# Patient Record
Sex: FEMALE | Race: WHITE | NOT HISPANIC OR LATINO | Employment: FULL TIME | URBAN - METROPOLITAN AREA
[De-identification: names, ages, dates, MRNs, and addresses within clinical notes are randomized per-mention and may not be internally consistent; named-entity substitution may affect disease eponyms.]

---

## 2017-01-09 ENCOUNTER — HOSPITAL ENCOUNTER (OUTPATIENT)
Dept: LAB | Facility: MEDICAL CENTER | Age: 42
End: 2017-01-09
Attending: INTERNAL MEDICINE
Payer: COMMERCIAL

## 2017-01-09 LAB
ALBUMIN SERPL BCP-MCNC: 4.3 G/DL (ref 3.2–4.9)
ALBUMIN/GLOB SERPL: 1.7 G/DL
ALP SERPL-CCNC: 31 U/L (ref 30–99)
ALT SERPL-CCNC: 22 U/L (ref 2–50)
ANION GAP SERPL CALC-SCNC: 8 MMOL/L (ref 0–11.9)
AST SERPL-CCNC: 28 U/L (ref 12–45)
BASOPHILS # BLD AUTO: 0.6 % (ref 0–1.8)
BASOPHILS # BLD: 0.04 K/UL (ref 0–0.12)
BILIRUB SERPL-MCNC: 0.7 MG/DL (ref 0.1–1.5)
BUN SERPL-MCNC: 16 MG/DL (ref 8–22)
CALCIUM SERPL-MCNC: 9.2 MG/DL (ref 8.4–10.2)
CHLORIDE SERPL-SCNC: 104 MMOL/L (ref 96–112)
CO2 SERPL-SCNC: 25 MMOL/L (ref 20–33)
CREAT SERPL-MCNC: 0.68 MG/DL (ref 0.5–1.4)
EOSINOPHIL # BLD AUTO: 0 K/UL (ref 0–0.51)
EOSINOPHIL NFR BLD: 0 % (ref 0–6.9)
ERYTHROCYTE [DISTWIDTH] IN BLOOD BY AUTOMATED COUNT: 54.6 FL (ref 35.9–50)
FERRITIN SERPL-MCNC: 24.3 NG/ML (ref 10–291)
GFR SERPL CREATININE-BSD FRML MDRD: >60 ML/MIN/1.73 M 2
GLOBULIN SER CALC-MCNC: 2.6 G/DL (ref 1.9–3.5)
GLUCOSE SERPL-MCNC: 98 MG/DL (ref 65–99)
HCT VFR BLD AUTO: 41.9 % (ref 37–47)
HGB BLD-MCNC: 14 G/DL (ref 12–16)
IMM GRANULOCYTES # BLD AUTO: 0.05 K/UL (ref 0–0.11)
IMM GRANULOCYTES NFR BLD AUTO: 0.7 % (ref 0–0.9)
IRON SATN MFR SERPL: 35 % (ref 15–55)
IRON SERPL-MCNC: 148 UG/DL (ref 40–170)
LYMPHOCYTES # BLD AUTO: 0.53 K/UL (ref 1–4.8)
LYMPHOCYTES NFR BLD: 7.6 % (ref 22–41)
MCH RBC QN AUTO: 32.6 PG (ref 27–33)
MCHC RBC AUTO-ENTMCNC: 33.4 G/DL (ref 33.6–35)
MCV RBC AUTO: 97.7 FL (ref 81.4–97.8)
MONOCYTES # BLD AUTO: 0.19 K/UL (ref 0–0.85)
MONOCYTES NFR BLD AUTO: 2.7 % (ref 0–13.4)
NEUTROPHILS # BLD AUTO: 6.15 K/UL (ref 2–7.15)
NEUTROPHILS NFR BLD: 88.4 % (ref 44–72)
NRBC # BLD AUTO: 0 K/UL
NRBC BLD AUTO-RTO: 0 /100 WBC
PLATELET # BLD AUTO: 218 K/UL (ref 164–446)
PMV BLD AUTO: 11.8 FL (ref 9–12.9)
POTASSIUM SERPL-SCNC: 3.5 MMOL/L (ref 3.6–5.5)
PROT SERPL-MCNC: 6.9 G/DL (ref 6–8.2)
RBC # BLD AUTO: 4.29 M/UL (ref 4.2–5.4)
SODIUM SERPL-SCNC: 137 MMOL/L (ref 135–145)
TIBC SERPL-MCNC: 428 UG/DL (ref 250–450)
WBC # BLD AUTO: 7 K/UL (ref 4.8–10.8)

## 2017-01-09 PROCEDURE — 85025 COMPLETE CBC W/AUTO DIFF WBC: CPT

## 2017-01-09 PROCEDURE — 83520 IMMUNOASSAY QUANT NOS NONAB: CPT

## 2017-01-09 PROCEDURE — 83540 ASSAY OF IRON: CPT

## 2017-01-09 PROCEDURE — 36415 COLL VENOUS BLD VENIPUNCTURE: CPT

## 2017-01-09 PROCEDURE — 80053 COMPREHEN METABOLIC PANEL: CPT

## 2017-01-09 PROCEDURE — 83550 IRON BINDING TEST: CPT

## 2017-01-09 PROCEDURE — 82728 ASSAY OF FERRITIN: CPT

## 2017-01-13 LAB — MISCELLANEOUS LAB RESULT MISCLAB: NORMAL

## 2017-01-18 RX ORDER — POTASSIUM CHLORIDE 1.5 G/1.58G
20 POWDER, FOR SOLUTION ORAL DAILY
Qty: 90 PACKET | Refills: 3 | Status: SHIPPED | OUTPATIENT
Start: 2017-01-18 | End: 2017-02-02 | Stop reason: SDUPTHER

## 2017-01-24 ENCOUNTER — TELEPHONE (OUTPATIENT)
Dept: MEDICAL GROUP | Facility: MEDICAL CENTER | Age: 42
End: 2017-01-24

## 2017-01-24 NOTE — TELEPHONE ENCOUNTER
We did not submit a prior authorization for this recently.    Could you please check with [employer] this was approved in August 2016, through August 2017

## 2017-01-24 NOTE — TELEPHONE ENCOUNTER
Pt left message stating she spoke with [employer] regarding her supplement. They received auth but need it to be revised. Prior auth needs to say that she needs  Laith Chocolate flavor 36 cans per month with an effective date of 2016. If the effective date is not changed in the next 2 days, they will deny her reimbursement.

## 2017-01-26 NOTE — TELEPHONE ENCOUNTER
Spoke w/Meli @ [facility], she looked it up and said that I would have to go through Customer Service (3 message left and on hold for 40 min).

## 2017-01-27 NOTE — TELEPHONE ENCOUNTER
Spoke w/pt, she will call claims and give them the REF # provided by Maurilio @ [facility].

## 2017-01-27 NOTE — TELEPHONE ENCOUNTER
Spoke with Maurilio, she states this AUTH is still valid. Spoke w/ pharmacy, given AUTH # and they will contact me if there is any further issues.

## 2017-01-27 NOTE — TELEPHONE ENCOUNTER
RICHARD Thorpe to call me, the issue is, that she is no longer Infusion and they wont pay for this ORAL. The Appeal letter says ORAL.

## 2017-02-01 ENCOUNTER — TELEPHONE (OUTPATIENT)
Dept: MEDICAL GROUP | Facility: MEDICAL CENTER | Age: 42
End: 2017-02-01

## 2017-02-02 ENCOUNTER — TELEPHONE (OUTPATIENT)
Dept: MEDICAL GROUP | Facility: MEDICAL CENTER | Age: 42
End: 2017-02-02

## 2017-02-02 RX ORDER — POTASSIUM CHLORIDE 1.5 G/1.58G
20 POWDER, FOR SOLUTION ORAL DAILY
Qty: 90 PACKET | Refills: 3 | Status: SHIPPED
Start: 2017-02-02 | End: 2017-04-20 | Stop reason: SDUPTHER

## 2017-02-02 RX ORDER — POTASSIUM CHLORIDE 1.5 G/1.58G
20 POWDER, FOR SOLUTION ORAL DAILY
Qty: 90 PACKET | Refills: 3 | Status: SHIPPED
Start: 2017-02-02 | End: 2017-02-02 | Stop reason: SDUPTHER

## 2017-02-02 NOTE — TELEPHONE ENCOUNTER
Please check with [facility], we have the authorization for this approved in August 2016 for one year, I will print out the paperwork we did at that time, check with [facility] to see if we need to submit a new prior authorization since this was approved for one year through August 2017

## 2017-02-02 NOTE — TELEPHONE ENCOUNTER
Pt left message requesting to have a new Prior auth submitted for  Laith Chocolate Qty 36 cans for 30 days for 2017-2018.     Call back number: [phone]

## 2017-02-06 ENCOUNTER — TELEPHONE (OUTPATIENT)
Dept: MEDICAL GROUP | Facility: MEDICAL CENTER | Age: 42
End: 2017-02-06

## 2017-02-07 NOTE — TELEPHONE ENCOUNTER
Pt is worried that the pain people want to cut her pain meds by more than half right before she has surgury. Does want to follow their recommendation but not right before her surgery. Has hard script for Opana, but has not filled it. Would like you to write for Oxy one more month. 30 mg #240 and her methadone 10 mg #120. Pt really wants to see this pain grooup but wants to do it after her surgery as she is terrified.  Rx on FRI and fill on Mon. Surgery is 8 am Tues. Please advise. Call back

## 2017-04-04 ENCOUNTER — NON-PROVIDER VISIT (OUTPATIENT)
Dept: MEDICAL GROUP | Facility: MEDICAL CENTER | Age: 42
End: 2017-04-04
Payer: COMMERCIAL

## 2017-04-04 DIAGNOSIS — D72.18 CHURG-STRAUSS SYNDROME (HCC): Chronic | ICD-10-CM

## 2017-04-04 DIAGNOSIS — M30.1 CHURG-STRAUSS SYNDROME (HCC): Chronic | ICD-10-CM

## 2017-04-04 PROCEDURE — 96372 THER/PROPH/DIAG INJ SC/IM: CPT | Performed by: INTERNAL MEDICINE

## 2017-04-04 NOTE — MR AVS SNAPSHOT
"Megan August   2017 11:00 AM   Non-Provider Visit   MRN: [id number]    Department:  [facility]   Dept Phone:  [phone]    Description:  Female : 1975   Provider:  [facility]           Reason for Visit     Injections Nucala (pt provided)      Allergies as of 2017     Allergen Noted Reactions    Other Food 2016   Anaphylaxis    \"all foods\"= blisters/ anaphylaxis    Reclast [Zoledronic Acid] 2016       Body aches, headache      Vital Signs     Smoking Status                   Never Smoker            Basic Information     Date Of Birth Sex Race Ethnicity Preferred Language    1975 Female White Non- English      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   2009 - Present    Polycystic ovary disease E28.2   2009 - Present    Anxiety (Chronic) F41.9   2009 - Present    Sjogren's disease (CMS-HCC) (Chronic) M35.00   2009 - Present    Preventative health care (Chronic) Z00.00   2009 - Present    Dyslipidemia (Chronic) E78.5   2011 - Present    History of melanoma (Chronic) Z85.820   2011 - Present    Eosinophilic esophagitis (Chronic) K20.0   2013 - Present    Constipation K59.00   2015 - Present    Autonomic neuropathy (Chronic) G90.9   3/23/2016 - Present    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   2016 - Present    Current chronic use of systemic steroids (Chronic) Z79.52   2016 - Present      Health Maintenance        Date Due Completion Dates    MAMMOGRAM 2016    PAP SMEAR 2018, 2011    COLONOSCOPY 3/27/2020 3/27/2015    IMM DTaP/Tdap/Td Vaccine (2 - Td) 2/3/2025 2/3/2015            Current Immunizations     Hepatitis A Vaccine, Adult 2/3/2015    Hepatitis B Vaccine Recombivax (Adol/Adult) 2/3/2015    Influenza TIV (IM) 10/21/2013   " Influenza Vaccine Quad Inj (Pf) 10/27/2016  6:06 PM, 10/15/2015  5:42 PM, 9/27/2014    Pneumococcal polysaccharide vaccine (PPSV-23) 11/23/2016    Tdap Vaccine 2/3/2015      Below and/or attached are the medications your provider expects you to take. Review all of your home medications and newly ordered medications with your provider and/or pharmacist. Follow medication instructions as directed by your provider and/or pharmacist. Please keep your medication list with you and share with your provider. Update the information when medications are discontinued, doses are changed, or new medications (including over-the-counter products) are added; and carry medication information at all times in the event of emergency situations     Allergies:  OTHER FOOD - Anaphylaxis     RECLAST - (reactions not documented)               Medications  Valid as of: April 04, 2017 - 11:54 AM    Generic Name Brand Name Tablet Size Instructions for use    CycloSPORINE (Emulsion) RESTASIS 0.05 % Place 1 Drop in both eyes 2 times a day.          Drospirenone-Ethinyl Estradiol (Tab) ASHLEY 3-0.03 MG Take 1 Tab by mouth every day.        Fexofenadine HCl   Take 1 Tab by mouth every evening.        Ibuprofen (Tab) MOTRIN 200 MG Take 200 mg by mouth every 6 hours as needed for Mild Pain.        Levothyroxine Sodium (Tab) SYNTHROID 112 MCG Take 112 mcg by mouth Every morning on an empty stomach.        Mycophenolate Mofetil (Tab) CELLCEPT 500 MG Take 500 mg by mouth 2 times a day.        Naproxen Sodium (Tab) ANAPROX 220 MG Take 220 mg by mouth as needed. Indications: Mild to Moderate Pain        Potassium Chloride (Pack) KLOR-CON 20 MEQ Take 1 Packet by mouth every day.        PredniSONE (Tab) DELTASONE 20 MG Take 1 Tab by mouth every day.        RaNITidine HCl (Tab) ZANTAC 150 MG Take 150 mg by mouth every evening.        Venlafaxine HCl (CAPSULE SR 24 HR) EFFEXOR XR 75 MG Take 1 Cap by mouth every day.        .                 Medicines  prescribed today were sent to:     [facility]/PHARMACY #[id number] - [place], [place] - [address]    [address] [place] [address]    Phone: [phone] Fax: [phone]    Open 24 Hours?: No      Medication refill instructions:       If your prescription bottle indicates you have medication refills left, it is not necessary to call your provider’s office. Please contact your pharmacy and they will refill your medication.    If your prescription bottle indicates you do not have any refills left, you may request refills at any time through one of the following ways: The online [employer] system (except Urgent Care), by calling your provider’s office, or by asking your pharmacy to contact your provider’s office with a refill request. Medication refills are processed only during regular business hours and may not be available until the next business day. Your provider may request additional information or to have a follow-up visit with you prior to refilling your medication.   *Please Note: Medication refills are assigned a new Rx number when refilled electronically. Your pharmacy may indicate that no refills were authorized even though a new prescription for the same medication is available at the pharmacy. Please request the medicine by name with the pharmacy before contacting your provider for a refill.        Other Notes About Your Plan         12/2/16 tsh 0.15 on levothyroxine 112 mcg daily, change to 112 mcg take six days per week, repeat tsh in 6 weeks  2/21/17  [facility] note failed azathioprine due to hair loss and off mycophenolate, now on prednisone 20 mg daily, try to obtain approval for mepolizumab 100 mg q month  3/24/17  [facility] note, discuss prednisone withdrawal, when she is treated with mepolizumab wait 2 weeks then reduce prednisone to 17.5 mg, then in 2 weeks with second injection mepolizumab will reduce prednisone to 15 mg, and 2 weeks after that to 12.5 mg, and in 2  weeks 10 mg  Need hep b vaccine to complete series    11/17 need prevnar  Need vit d             MyChart Access Code: Activation code not generated  Current [employer] Status: Active

## 2017-04-04 NOTE — PROGRESS NOTES
Megan August is a 41 y.o. female here for a non-provider visit for DUCALA injection.    Reason for injection: Churg-Homer Syndrome  Order in MAR?: No  Patient supplied?:Yes  Minimum interval has been met for this injection (per MAR order): Yes    Order and dose verified by: Dr Soares  Patient tolerated injection and no adverse effects were observed or reported YES.    # of Administrations remaining in MAR: Pt to receive shipment at office from [facility]. Pt should receive injection every 28 days.     Pt received 100mg/1.2 mL injection SQ L Arm.    Lot # [id number]  NDC 1858-9912-91  EXP: 06/2018    Packing slip scanned into MEDIA.

## 2017-04-05 DIAGNOSIS — E55.9 HYPOVITAMINOSIS D: ICD-10-CM

## 2017-04-05 DIAGNOSIS — E03.8 OTHER SPECIFIED HYPOTHYROIDISM: Chronic | ICD-10-CM

## 2017-04-05 RX ORDER — LEVOTHYROXINE SODIUM 112 UG/1
TABLET ORAL
Qty: 90 TAB | Refills: 1 | Status: SHIPPED | OUTPATIENT
Start: 2017-04-05 | End: 2017-10-09 | Stop reason: SDUPTHER

## 2017-04-10 RX ORDER — VENLAFAXINE HYDROCHLORIDE 75 MG/1
75 CAPSULE, EXTENDED RELEASE ORAL DAILY
Qty: 90 CAP | Refills: 3 | Status: SHIPPED | OUTPATIENT
Start: 2017-04-10 | End: 2018-04-10 | Stop reason: SDUPTHER

## 2017-04-10 NOTE — TELEPHONE ENCOUNTER
Venlafaxine    Was the patient seen in the last year in this department? Yes  Last 9/16/16  Does patient have an active prescription for medications requested? No     Received Request Via: Pharmacy

## 2017-04-14 ENCOUNTER — TELEPHONE (OUTPATIENT)
Dept: MEDICAL GROUP | Facility: MEDICAL CENTER | Age: 42
End: 2017-04-14

## 2017-04-14 ENCOUNTER — HOSPITAL ENCOUNTER (OUTPATIENT)
Dept: LAB | Facility: MEDICAL CENTER | Age: 42
End: 2017-04-14
Attending: INTERNAL MEDICINE
Payer: COMMERCIAL

## 2017-04-14 DIAGNOSIS — E55.9 HYPOVITAMINOSIS D: ICD-10-CM

## 2017-04-14 LAB
25(OH)D3 SERPL-MCNC: 32 NG/ML (ref 30–100)
TSH SERPL DL<=0.005 MIU/L-ACNC: 1.08 UIU/ML (ref 0.3–3.7)

## 2017-04-14 PROCEDURE — 84443 ASSAY THYROID STIM HORMONE: CPT

## 2017-04-14 PROCEDURE — 36415 COLL VENOUS BLD VENIPUNCTURE: CPT

## 2017-04-14 PROCEDURE — 82306 VITAMIN D 25 HYDROXY: CPT

## 2017-04-15 NOTE — TELEPHONE ENCOUNTER
Please notify patient that:  (1) her thyroid test is normal, continue same dose of her thyroid medication  (2) her vitamin D level is 32, have her take an extra 2000 units of vitamin D daily

## 2017-04-17 ENCOUNTER — TELEPHONE (OUTPATIENT)
Dept: MEDICAL GROUP | Facility: MEDICAL CENTER | Age: 42
End: 2017-04-17

## 2017-04-17 NOTE — TELEPHONE ENCOUNTER
----- Message from Zohaib Soares M.D. sent at 4/14/2017  6:20 PM PDT -----  Please notify patient that:  (1) her thyroid test is normal, continue same dose of her thyroid medication  (2) her vitamin D level is 32, have her take an extra 2000 units of vitamin D daily

## 2017-04-18 NOTE — TELEPHONE ENCOUNTER
Informed pt of message below. Pt would like to know how long she needs to take Vit D & she's also allergic to most supplements. Does Vit d contain anything else in it? Please advise.

## 2017-04-20 ENCOUNTER — APPOINTMENT (OUTPATIENT)
Dept: MEDICAL GROUP | Facility: MEDICAL CENTER | Age: 42
End: 2017-04-20
Payer: COMMERCIAL

## 2017-04-21 ENCOUNTER — OFFICE VISIT (OUTPATIENT)
Dept: MEDICAL GROUP | Facility: MEDICAL CENTER | Age: 42
End: 2017-04-21
Payer: COMMERCIAL

## 2017-04-21 ENCOUNTER — HOSPITAL ENCOUNTER (OUTPATIENT)
Facility: MEDICAL CENTER | Age: 42
End: 2017-04-21
Attending: NURSE PRACTITIONER
Payer: COMMERCIAL

## 2017-04-21 VITALS
HEART RATE: 62 BPM | WEIGHT: 130 LBS | TEMPERATURE: 98.3 F | DIASTOLIC BLOOD PRESSURE: 62 MMHG | HEIGHT: 66 IN | OXYGEN SATURATION: 99 % | BODY MASS INDEX: 20.89 KG/M2 | SYSTOLIC BLOOD PRESSURE: 110 MMHG

## 2017-04-21 DIAGNOSIS — B37.31 YEAST VAGINITIS: ICD-10-CM

## 2017-04-21 LAB
CANDIDA DNA VAG QL PROBE+SIG AMP: NEGATIVE
G VAGINALIS DNA VAG QL PROBE+SIG AMP: NEGATIVE
T VAGINALIS DNA VAG QL PROBE+SIG AMP: NEGATIVE

## 2017-04-21 PROCEDURE — 87480 CANDIDA DNA DIR PROBE: CPT

## 2017-04-21 PROCEDURE — 87660 TRICHOMONAS VAGIN DIR PROBE: CPT

## 2017-04-21 PROCEDURE — 99214 OFFICE O/P EST MOD 30 MIN: CPT | Performed by: NURSE PRACTITIONER

## 2017-04-21 PROCEDURE — 87510 GARDNER VAG DNA DIR PROBE: CPT

## 2017-04-21 RX ORDER — FLUCONAZOLE 150 MG/1
150 TABLET ORAL ONCE
Qty: 1 TAB | Refills: 0 | Status: SHIPPED | OUTPATIENT
Start: 2017-04-21 | End: 2017-04-21

## 2017-04-21 NOTE — PROGRESS NOTES
Subjective:     Chief Complaint   Patient presents with   • Vaginitis     possible cyst      Megan August is a 41 y.o. female established patient here for evaluation of vaginal irritation and discharge. She's been having itching, burning, and increased white discharge for the last several days. She started using an over-the-counter antifungal but has had no improvement. When applying the cream she felt a small area near the vaginal opening that she thought was abnormal or maybe assist. She is not having any pain in the area, it does not seem to be growing or changing. She is not sexually active    She does have an unknown identified autoimmune disorder, is on mepolizumab and cellcept. States her symptoms are well controlled     Current medicines (including changes today)  Current Outpatient Prescriptions   Medication Sig Dispense Refill   • fluconazole (DIFLUCAN) 150 MG tablet Take 1 Tab by mouth Once for 1 dose. 1 Tab 0   • potassium chloride (KLOR-CON) 20 MEQ Pack Take 1 Packet by mouth every day. 90 Packet 3   • Cholecalciferol (VITAMIN D3) Liquid 2,000 Units by Does not apply route every day. Vitamin D2 or D3 drops, 2000 units daily, 3 months supply 1 Bottle 3   • venlafaxine XR (EFFEXOR XR) 75 MG CAPSULE SR 24 HR Take 1 Cap by mouth every day. 90 Cap 3   • levothyroxine (SYNTHROID) 112 MCG Tab One po six days per week 90 Tab 1   • Mepolizumab 100 MG Recon Soln Inject 100 mg as instructed every 28 days. 1 Each 11   • mycophenolate (CELLCEPT) 500 MG tablet Take 500 mg by mouth 2 times a day.     • ranitidine (ZANTAC) 150 MG Tab Take 150 mg by mouth every evening.     • ibuprofen (MOTRIN) 200 MG Tab Take 200 mg by mouth every 6 hours as needed for Mild Pain.     • naproxen (ALEVE) 220 MG tablet Take 220 mg by mouth as needed. Indications: Mild to Moderate Pain     • predniSONE (DELTASONE) 20 MG Tab Take 1 Tab by mouth every day. 30 Tab 0   • drospirenone-ethinyl estradiol (ASHLEY) 3-0.03 MG per tablet  "Take 1 Tab by mouth every day. 28 Tab 5   • Fexofenadine HCl (ALLEGRA PO) Take 1 Tab by mouth every evening.     • cyclosporin (RESTASIS) 0.05 % ophthalmic emulsion Place 1 Drop in both eyes 2 times a day.         No current facility-administered medications for this visit.     She  has a past medical history of  labor; Polycystic ovary disease (2009); Dry eyes (2009); Abscess of breast, postpartum; Anxiety (2009); Exophoria; Melanoma (CMS-HCC) (2011); Anemia; Eosinophilic esophagitis; Eosinophilic esophagitis; Dysautonomia; Sjogren's syndrome (CMS-HCC); Polycystic ovarian syndrome; Other specified symptom associated with female genital organs; Hypothyroid (2009); Cancer (CMS-HCC) (); Mild preeclampsia; Anemia; Anxiety; Hiatus hernia syndrome; Bowel habit changes; Small intestinal bacterial overgrowth; Dental disorder; Dry eyes; and Psychiatric disorder. She also has no past medical history of CAD (coronary artery disease), Liver disease, COPD, or Hypertension.    ROS included above     Objective:     Blood pressure 110/62, pulse 62, temperature 36.8 °C (98.3 °F), height 1.676 m (5' 6\"), weight 58.968 kg (130 lb), SpO2 99 %. Body mass index is 20.99 kg/(m^2).     Physical Exam:  General: Alert, oriented in no acute distress.  Eye contact is good, speech is normal, affect calm  Lungs: clear to auscultation bilaterally, normal effort, no wheeze/ rhonchi/ rales.  CV: regular rate and rhythm, S1, S2, no murmur  Pelvic: external genitalia pink, moist. No abnormality noted. No lesions or mass. Vaginal canal with moderate white discharge.   Ext: no edema, color normal, vascularity normal, temperature normal    Assessment and Plan:   The following treatment plan was discussed   1. Yeast vaginitis   exam today consistent with yeast vaginitis, not improving with over-the-counter medication. Swab sent for confirmation, Diflucan sent to pharmacy. She was concerned about an area that felt " somewhat abnormal to her near the vaginal opening. I see nothing abnormal on exam today, reassurance given  VAGINAL PATHOGENS DNA PANEL    fluconazole (DIFLUCAN) 150 MG tablet       Followup: As needed         Please note that this dictation was created using voice recognition software. I have worked with consultants from the vendor as well as technical experts from [facility] to optimize the interface. I have made every reasonable attempt to correct obvious errors, but I expect that there are errors of grammar and possibly content that I did not discover before finalizing the note.

## 2017-04-21 NOTE — MR AVS SNAPSHOT
"Megan August   2017 10:40 AM   Office Visit   MRN: [id number]    Department:  [facility]   Dept Phone:  [phone]    Description:  Female : 1975   Provider:  REAL Jewell           Reason for Visit     Vaginitis possible cyst       Allergies as of 2017     Allergen Noted Reactions    Other Food 2016   Anaphylaxis    \"all foods\"= blisters/ anaphylaxis    Reclast [Zoledronic Acid] 2016       Body aches, headache      You were diagnosed with     Yeast vaginitis   [900830]         Vital Signs     Blood Pressure Pulse Temperature Height Weight Body Mass Index    110/62 mmHg 62 36.8 °C (98.3 °F) 1.676 m (5' 6\") 58.968 kg (130 lb) 20.99 kg/m2    Oxygen Saturation Smoking Status                99% Never Smoker           Basic Information     Date Of Birth Sex Race Ethnicity Preferred Language    1975 Female White Non- English      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   2009 - Present    Polycystic ovary disease E28.2   2009 - Present    Anxiety (Chronic) F41.9   2009 - Present    Sjogren's disease (CMS-HCC) (Chronic) M35.00   2009 - Present    Preventative health care (Chronic) Z00.00   2009 - Present    Dyslipidemia (Chronic) E78.5   2011 - Present    History of melanoma (Chronic) Z85.820   2011 - Present    Eosinophilic esophagitis (Chronic) K20.0   2013 - Present    Constipation K59.00   2015 - Present    Autonomic neuropathy (Chronic) G90.9   3/23/2016 - Present    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   2016 - Present    Current chronic use of systemic steroids (Chronic) Z79.52   2016 - Present      Health Maintenance        Date Due Completion Dates    MAMMOGRAM 2016    PAP SMEAR 2018, 2011    COLONOSCOPY 3/27/2020 " 3/27/2015    IMM DTaP/Tdap/Td Vaccine (2 - Td) 2/3/2025 2/3/2015            Current Immunizations     Hepatitis A Vaccine, Adult 2/3/2015    Hepatitis B Vaccine Recombivax (Adol/Adult) 2/3/2015    Influenza TIV (IM) 10/21/2013    Influenza Vaccine Quad Inj (Pf) 10/27/2016  6:06 PM, 10/15/2015  5:42 PM, 9/27/2014    Pneumococcal polysaccharide vaccine (PPSV-23) 11/23/2016    Tdap Vaccine 2/3/2015      Below and/or attached are the medications your provider expects you to take. Review all of your home medications and newly ordered medications with your provider and/or pharmacist. Follow medication instructions as directed by your provider and/or pharmacist. Please keep your medication list with you and share with your provider. Update the information when medications are discontinued, doses are changed, or new medications (including over-the-counter products) are added; and carry medication information at all times in the event of emergency situations     Allergies:  OTHER FOOD - Anaphylaxis     RECLAST - (reactions not documented)               Medications  Valid as of: April 21, 2017 - 12:49 PM    Generic Name Brand Name Tablet Size Instructions for use    Cholecalciferol (Liquid) Vitamin D3  2,000 Units by Does not apply route every day. Vitamin D2 or D3 drops, 2000 units daily, 3 months supply        CycloSPORINE (Emulsion) RESTASIS 0.05 % Place 1 Drop in both eyes 2 times a day.          Drospirenone-Ethinyl Estradiol (Tab) ASLHEY 3-0.03 MG Take 1 Tab by mouth every day.        Fexofenadine HCl   Take 1 Tab by mouth every evening.        Fluconazole (Tab) DIFLUCAN 150 MG Take 1 Tab by mouth Once for 1 dose.        Ibuprofen (Tab) MOTRIN 200 MG Take 200 mg by mouth every 6 hours as needed for Mild Pain.        Levothyroxine Sodium (Tab) SYNTHROID 112 MCG One po six days per week        Mepolizumab (Recon Soln) Mepolizumab 100 MG Inject 100 mg as instructed every 28 days.        Mycophenolate Mofetil (Tab) CELLCEPT  500 MG Take 500 mg by mouth 2 times a day.        Naproxen Sodium (Tab) ANAPROX 220 MG Take 220 mg by mouth as needed. Indications: Mild to Moderate Pain        Potassium Chloride (Pack) KLOR-CON 20 MEQ Take 1 Packet by mouth every day.        PredniSONE (Tab) DELTASONE 20 MG Take 1 Tab by mouth every day.        RaNITidine HCl (Tab) ZANTAC 150 MG Take 150 mg by mouth every evening.        Venlafaxine HCl (CAPSULE SR 24 HR) EFFEXOR XR 75 MG Take 1 Cap by mouth every day.        .                 Medicines prescribed today were sent to:     [facility]/PHARMACY #[id number] - [place], [place] - [address]    [address] [place] [address]    Phone: [phone] Fax: [phone]    Open 24 Hours?: No      Medication refill instructions:       If your prescription bottle indicates you have medication refills left, it is not necessary to call your provider’s office. Please contact your pharmacy and they will refill your medication.    If your prescription bottle indicates you do not have any refills left, you may request refills at any time through one of the following ways: The online [employer] system (except Urgent Care), by calling your provider’s office, or by asking your pharmacy to contact your provider’s office with a refill request. Medication refills are processed only during regular business hours and may not be available until the next business day. Your provider may request additional information or to have a follow-up visit with you prior to refilling your medication.   *Please Note: Medication refills are assigned a new Rx number when refilled electronically. Your pharmacy may indicate that no refills were authorized even though a new prescription for the same medication is available at the pharmacy. Please request the medicine by name with the pharmacy before contacting your provider for a refill.        Your To Do List     Future Labs/Procedures Complete By Expires    VAGINAL PATHOGENS DNA PANEL  As directed  4/22/2018      Other Notes About Your Plan        Need hep b vaccine to complete series    11/17 need prevnar                MyChart Access Code: Activation code not generated  Current [employer] Status: Active

## 2017-04-23 ENCOUNTER — TELEPHONE (OUTPATIENT)
Dept: MEDICAL GROUP | Facility: MEDICAL CENTER | Age: 42
End: 2017-04-23

## 2017-04-23 RX ORDER — DROSPIRENONE AND ETHINYL ESTRADIOL 0.03MG-3MG
1 KIT ORAL DAILY
Qty: 28 TAB | Refills: 5 | Status: SHIPPED | OUTPATIENT
Start: 2017-04-23 | End: 2017-10-04 | Stop reason: SDUPTHER

## 2017-04-24 ENCOUNTER — OFFICE VISIT (OUTPATIENT)
Dept: MEDICAL GROUP | Facility: MEDICAL CENTER | Age: 42
End: 2017-04-24
Payer: COMMERCIAL

## 2017-04-24 ENCOUNTER — HOSPITAL ENCOUNTER (OUTPATIENT)
Facility: MEDICAL CENTER | Age: 42
End: 2017-04-24
Attending: NURSE PRACTITIONER
Payer: COMMERCIAL

## 2017-04-24 VITALS
TEMPERATURE: 97 F | WEIGHT: 129 LBS | BODY MASS INDEX: 20.73 KG/M2 | SYSTOLIC BLOOD PRESSURE: 104 MMHG | DIASTOLIC BLOOD PRESSURE: 68 MMHG | OXYGEN SATURATION: 100 % | HEART RATE: 61 BPM | HEIGHT: 66 IN

## 2017-04-24 DIAGNOSIS — Z01.419 VISIT FOR GYNECOLOGIC EXAMINATION: ICD-10-CM

## 2017-04-24 DIAGNOSIS — Z30.09 FAMILY PLANNING: ICD-10-CM

## 2017-04-24 DIAGNOSIS — Z12.31 ENCOUNTER FOR SCREENING MAMMOGRAM FOR BREAST CANCER: ICD-10-CM

## 2017-04-24 PROCEDURE — 99396 PREV VISIT EST AGE 40-64: CPT | Performed by: NURSE PRACTITIONER

## 2017-04-24 PROCEDURE — 87624 HPV HI-RISK TYP POOLED RSLT: CPT

## 2017-04-24 PROCEDURE — 88175 CYTOPATH C/V AUTO FLUID REDO: CPT

## 2017-04-24 NOTE — PROGRESS NOTES
CC:  Pap/Well Woman Exam    History of present illness:  Megan August is 41 y.o.  female presenting today for well woman exam with gynecological exam and Pap smear. Last Pap  and normal. She is on oral contraceptive and doing well with this, no bleeding between periods. Last menstrual period about 2 weeks ago, no significant dysmenorrhea or menorrhagia. Not currently sexually active   she has a complicated medical history with undiagnosed autoimmune disease, symptoms consistent with Sjogren's, eosinophilic esophagitis, Churg-Homer. She is followed by specialists at [facility].   She is due for mammogram         Past Medical History   Diagnosis Date   •  labor    • Polycystic ovary disease 2009   • Dry eyes 2009 right eye scleral redness   • Abscess of breast, postpartum    • Anxiety 2009   • Exophoria    • Melanoma (CMS-HCC) 2011     melanoma - Amenanoic left neck jaw   • Anemia    • Eosinophilic esophagitis    • Eosinophilic esophagitis    • Dysautonomia    • Sjogren's syndrome (CMS-HCC)    • Polycystic ovarian syndrome    • Other specified symptom associated with female genital organs      PCOS   • Hypothyroid 2009   • Cancer (CMS-HCC) 2010     melanoma, left side neck   • Mild preeclampsia    • Anemia    • Anxiety    • Hiatus hernia syndrome    • Bowel habit changes      paralyzed colon   • Small intestinal bacterial overgrowth    • Dental disorder      lack of saliva   • Dry eyes      extreme   • Psychiatric disorder      PTSD       Past Surgical History   Procedure Laterality Date   • Incision and drainage general  2009     left nipple   • Tonsillectomy and adenoidectomy     • Pr  delivery only     • Other       left periauricular melanoma excision   • Other  [id number]     several procedures for infertility    • Other       eye ducts cauterized   • Settlement (insurance)  2014     Performed by Olvin CHAVEZ  MARCI David. at SURGERY [facility]   • Endoscopy       multiple upper an lower procedures   • Colonoscopy  8/2015   • Gastroscopy with feed tube placement N/A 2/19/2016     Procedure: GASTROSCOPY WITH NASOENTERIC TUBE PLACEMENT W/FLUORO;  Surgeon: Gallito Rangel M.D.;  Location: [facility];  Service:        Outpatient Encounter Prescriptions as of 4/24/2017   Medication Sig Dispense Refill   • drospirenone-ethinyl estradiol (ASHLEY) 3-0.03 MG per tablet Take 1 Tab by mouth every day. 28 Tab 5   • potassium chloride (KLOR-CON) 20 MEQ Pack Take 1 Packet by mouth every day. 90 Packet 3   • Cholecalciferol (VITAMIN D3) Liquid 2,000 Units by Does not apply route every day. Vitamin D2 or D3 drops, 2000 units daily, 3 months supply 1 Bottle 3   • venlafaxine XR (EFFEXOR XR) 75 MG CAPSULE SR 24 HR Take 1 Cap by mouth every day. 90 Cap 3   • levothyroxine (SYNTHROID) 112 MCG Tab One po six days per week 90 Tab 1   • Mepolizumab 100 MG Recon Soln Inject 100 mg as instructed every 28 days. 1 Each 11   • mycophenolate (CELLCEPT) 500 MG tablet Take 500 mg by mouth 2 times a day.     • ranitidine (ZANTAC) 150 MG Tab Take 150 mg by mouth every evening.     • ibuprofen (MOTRIN) 200 MG Tab Take 200 mg by mouth every 6 hours as needed for Mild Pain.     • naproxen (ALEVE) 220 MG tablet Take 220 mg by mouth as needed. Indications: Mild to Moderate Pain     • predniSONE (DELTASONE) 20 MG Tab Take 1 Tab by mouth every day. 30 Tab 0   • drospirenone-ethinyl estradiol (ASHLEY) 3-0.03 MG per tablet Take 1 Tab by mouth every day. 28 Tab 5   • Fexofenadine HCl (ALLEGRA PO) Take 1 Tab by mouth every evening.     • cyclosporin (RESTASIS) 0.05 % ophthalmic emulsion Place 1 Drop in both eyes 2 times a day.         No facility-administered encounter medications on file as of 4/24/2017.       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  Social History     Social History   • Marital Status:      Spouse Name: N/A   • Number of Children: N/A   • Years of Education: N/A     Occupational History   • Not on file.     Social History Main Topics   • Smoking status: Never Smoker    • Smokeless tobacco: Never Used   • Alcohol Use: No   • Drug Use: No   • Sexual Activity: Not on file     Other Topics Concern   • Not on file     Social History Narrative       Family History   Problem Relation Age of Onset   • Cancer Father    • Cancer Maternal Grandmother          ROS: denies Weight loss, fatigue, chest pain, SOB, bowel or bladder changes. No significant dysmenorrhea, concerning vaginal discharge or irritation. Denies h/o migraine with aura. Denies musculoskeletal, neurological, or psychiatric problems.      /68 mmHg  Pulse 61  Temp(Src) 36.1 °C (97 °F)  Ht 1.676 m (5' 6\")  Wt 58.514 kg (129 lb)  BMI 20.83 kg/m2  SpO2 100%    GEN:  Appears well and in no apparent distress   NECK:  Supple without adenopathy or thyromegaly  LUNGS:  Clear and equal. No wheeze, ronchi, or rales.  CV:  RRR, S1, S2. No murmur.  Pedal pulses 2+ bilaterally.  BREAST:  Symmetrical without masses. No nipple discharge.  ABD:  Soft, non-tender, non-distended, normal bowel sounds.  No hepatosplenomegaly.  :  Normal external female genitalia.  Vaginal canal clear.  Cervix appears normal. Specimen collected from transformation zone. Bimanual exam:  No CMT, normal size uterus without masses or tenderness; no adnexal masses or tenderness.      Assessment and " plan    1. Visit for gynecologic examination  Normal GYN exam. Pap sent, follow-up pending results. Breast self-exam taught and encouraged monthly. General health and wellness discussion including exercise recommendations reviewed  2. Encounter for screening mammogram for breast cancer  - MA-SCREEN MAMMO W/CAD-BILAT; Future  3. Family planning  Doing well with current OCP      F/u pending results

## 2017-04-24 NOTE — MR AVS SNAPSHOT
"Megan August   2017 10:40 AM   Office Visit   MRN: [id number]    Department:  [facility]   Dept Phone:  [phone]    Description:  Female : 1975   Provider:  REAL Jewell           Reason for Visit     Gynecologic Exam Pap      Allergies as of 2017     Allergen Noted Reactions    Other Food 2016   Anaphylaxis    \"all foods\"= blisters/ anaphylaxis    Reclast [Zoledronic Acid] 2016       Body aches, headache      You were diagnosed with     Visit for gynecologic examination   [640378]       Encounter for screening mammogram for breast cancer   [6753917]       Family planning   [761275]         Vital Signs     Blood Pressure Pulse Temperature Height Weight Body Mass Index    104/68 mmHg 61 36.1 °C (97 °F) 1.676 m (5' 6\") 58.514 kg (129 lb) 20.83 kg/m2    Oxygen Saturation Smoking Status                100% Never Smoker           Basic Information     Date Of Birth Sex Race Ethnicity Preferred Language    1975 Female White Non- English      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   2009 - Present    Polycystic ovary disease E28.2   2009 - Present    Anxiety (Chronic) F41.9   2009 - Present    Sjogren's disease (CMS-HCC) (Chronic) M35.00   2009 - Present    Preventative health care (Chronic) Z00.00   2009 - Present    Dyslipidemia (Chronic) E78.5   2011 - Present    History of melanoma (Chronic) Z85.820   2011 - Present    Eosinophilic esophagitis (Chronic) K20.0   2013 - Present    Constipation K59.00   2015 - Present    Autonomic neuropathy (Chronic) G90.9   3/23/2016 - Present    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   2016 - Present    Current chronic use of systemic steroids (Chronic) Z79.52   2016 - Present      Health Maintenance        Date Due " Completion Dates    MAMMOGRAM 5/12/2016 5/12/2015    PAP SMEAR 4/20/2018 4/20/2015, 5/31/2011    COLONOSCOPY 3/27/2020 3/27/2015    IMM DTaP/Tdap/Td Vaccine (2 - Td) 2/3/2025 2/3/2015            Current Immunizations     Hepatitis A Vaccine, Adult 2/3/2015    Hepatitis B Vaccine Recombivax (Adol/Adult) 2/3/2015    Influenza TIV (IM) 10/21/2013    Influenza Vaccine Quad Inj (Pf) 10/27/2016  6:06 PM, 10/15/2015  5:42 PM, 9/27/2014    Pneumococcal polysaccharide vaccine (PPSV-23) 11/23/2016    Tdap Vaccine 2/3/2015      Below and/or attached are the medications your provider expects you to take. Review all of your home medications and newly ordered medications with your provider and/or pharmacist. Follow medication instructions as directed by your provider and/or pharmacist. Please keep your medication list with you and share with your provider. Update the information when medications are discontinued, doses are changed, or new medications (including over-the-counter products) are added; and carry medication information at all times in the event of emergency situations     Allergies:  OTHER FOOD - Anaphylaxis     RECLAST - (reactions not documented)               Medications  Valid as of: April 24, 2017 - 11:15 AM    Generic Name Brand Name Tablet Size Instructions for use    Cholecalciferol (Liquid) Vitamin D3  2,000 Units by Does not apply route every day. Vitamin D2 or D3 drops, 2000 units daily, 3 months supply        CycloSPORINE (Emulsion) RESTASIS 0.05 % Place 1 Drop in both eyes 2 times a day.          Drospirenone-Ethinyl Estradiol (Tab) ASHLEY 3-0.03 MG Take 1 Tab by mouth every day.        Drospirenone-Ethinyl Estradiol (Tab) ASHLEY 3-0.03 MG Take 1 Tab by mouth every day.        Fexofenadine HCl   Take 1 Tab by mouth every evening.        Ibuprofen (Tab) MOTRIN 200 MG Take 200 mg by mouth every 6 hours as needed for Mild Pain.        Levothyroxine Sodium (Tab) SYNTHROID 112 MCG One po six days per week          Mepolizumab (Recon Soln) Mepolizumab 100 MG Inject 100 mg as instructed every 28 days.        Mycophenolate Mofetil (Tab) CELLCEPT 500 MG Take 500 mg by mouth 2 times a day.        Naproxen Sodium (Tab) ANAPROX 220 MG Take 220 mg by mouth as needed. Indications: Mild to Moderate Pain        Potassium Chloride (Pack) KLOR-CON 20 MEQ Take 1 Packet by mouth every day.        PredniSONE (Tab) DELTASONE 20 MG Take 1 Tab by mouth every day.        RaNITidine HCl (Tab) ZANTAC 150 MG Take 150 mg by mouth every evening.        Venlafaxine HCl (CAPSULE SR 24 HR) EFFEXOR XR 75 MG Take 1 Cap by mouth every day.        .                 Medicines prescribed today were sent to:     [facility]/PHARMACY #[id number] - [place] - [address]    [address] [address]    Phone: [phone] Fax: [phone]    Open 24 Hours?: No      Medication refill instructions:       If your prescription bottle indicates you have medication refills left, it is not necessary to call your provider’s office. Please contact your pharmacy and they will refill your medication.    If your prescription bottle indicates you do not have any refills left, you may request refills at any time through one of the following ways: The online [employer] system (except Urgent Care), by calling your provider’s office, or by asking your pharmacy to contact your provider’s office with a refill request. Medication refills are processed only during regular business hours and may not be available until the next business day. Your provider may request additional information or to have a follow-up visit with you prior to refilling your medication.   *Please Note: Medication refills are assigned a new Rx number when refilled electronically. Your pharmacy may indicate that no refills were authorized even though a new prescription for the same medication is available at the pharmacy. Please request the medicine by name with the pharmacy before contacting your provider  for a refill.        Your To Do List     Future Labs/Procedures Complete By Expires    MA-SCREEN MAMMO W/CAD-BILAT  As directed 5/26/2018    THINPREP PAP WITH HPV  As directed 4/25/2018      Other Notes About Your Plan        Need hep b vaccine to complete series    11/17 need prevnar                MyChart Access Code: Activation code not generated  Current [employer] Status: Active

## 2017-04-24 NOTE — TELEPHONE ENCOUNTER
Please call patient we will refill her Isabelle, however she is due for her Pap smear, does she still see her gynecologist?

## 2017-04-25 LAB
CYTOLOGY REG CYTOL: NORMAL
HPV HR 12 DNA CVX QL NAA+PROBE: NEGATIVE
HPV16 DNA SPEC QL NAA+PROBE: NEGATIVE
HPV18 DNA SPEC QL NAA+PROBE: NEGATIVE
SPECIMEN SOURCE: NORMAL

## 2017-05-02 ENCOUNTER — TELEPHONE (OUTPATIENT)
Dept: MEDICAL GROUP | Facility: MEDICAL CENTER | Age: 42
End: 2017-05-02

## 2017-05-02 ENCOUNTER — NON-PROVIDER VISIT (OUTPATIENT)
Dept: MEDICAL GROUP | Facility: MEDICAL CENTER | Age: 42
End: 2017-05-02
Payer: COMMERCIAL

## 2017-05-02 NOTE — PROGRESS NOTES
Megan August is a 41 y.o. female here for a non-provider visit for Nucala injection.    Reason for injection: Churg-Homer syndrome (CMS-HCC)  Order in MAR?: No  Patient supplied?:Yes  Minimum interval has been met for this injection (per MAR order): Yes    Order and dose verified by: Dr Soares  Patient tolerated injection and no adverse effects were observed or reported: Yes    # of Administrations remaining in MAR: Not available in [facility], pt supplied. [facility] does not carry this type of medication but approved by Dr Soares to administer.     Nucala 100 mg/vial reconstituted with clinic supplied sterile diluant 1.0 mg. Administered SQ back of Left Arm.   Lot # [id number]  Exp: 06/2018  NDC 5731-1180-98    Packing slip for this medication is scanned into Media section of chart.

## 2017-05-02 NOTE — TELEPHONE ENCOUNTER
I cannot do this as a including medication, it is not on the formulary for that.  I can order the medication and you have to write an addendum

## 2017-05-02 NOTE — MR AVS SNAPSHOT
"Megan August   2017 2:30 PM   Non-Provider Visit   MRN: [id number]    Department:  [facility]   Dept Phone:  [phone]    Description:  Female : 1975   Provider:  SHADI STALLWORTH MA           Allergies as of 2017     Allergen Noted Reactions    Other Food 2016   Anaphylaxis    \"all foods\"= blisters/ anaphylaxis    Reclast [Zoledronic Acid] 2016       Body aches, headache      Vital Signs     Smoking Status                   Never Smoker            Basic Information     Date Of Birth Sex Race Ethnicity Preferred Language    1975 Female White Non- English      Your appointments     May 02, 2017  2:30 PM   Non Provider 1 with SHADI STALLWORTH MA   [facility] ([facility]    [address]  [place] [id number] [phone]           You will be receiving a confirmation call a few days before your appointment from our automated call confirmation system.            2017  3:10 PM   MA [id number] with GRAEME DAVIDSON MG 1   [facility] ([place])    [address]  [place] [id number] [phone]           No deodorant, powder, perfume or lotion under the arm or breast area.              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   2009 - Present    Polycystic ovary disease E28.2   2009 - Present    Anxiety (Chronic) F41.9   2009 - Present    Sjogren's disease (CMS-HCC) (Chronic) M35.00   2009 - Present    Preventative health care (Chronic) Z00.00   2009 - Present    Dyslipidemia (Chronic) E78.5   2011 - Present    History of melanoma (Chronic) Z85.820   2011 - Present    Eosinophilic esophagitis (Chronic) K20.0   2013 - Present    Constipation K59.00   2015 - Present    Autonomic neuropathy (Chronic) G90.9   " 3/23/2016 - Present    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   8/8/2016 - Present    Current chronic use of systemic steroids (Chronic) Z79.52   12/11/2016 - Present      Health Maintenance        Date Due Completion Dates    MAMMOGRAM 5/12/2016 5/12/2015    COLONOSCOPY 3/27/2020 3/27/2015    PAP SMEAR 4/24/2020 4/24/2017, 4/20/2015, 5/31/2011    IMM DTaP/Tdap/Td Vaccine (2 - Td) 2/3/2025 2/3/2015            Current Immunizations     Hepatitis A Vaccine, Adult 2/3/2015    Hepatitis B Vaccine Recombivax (Adol/Adult) 2/3/2015    Influenza TIV (IM) 10/21/2013    Influenza Vaccine Quad Inj (Pf) 10/27/2016  6:06 PM, 10/15/2015  5:42 PM, 9/27/2014    Pneumococcal polysaccharide vaccine (PPSV-23) 11/23/2016    Tdap Vaccine 2/3/2015      Below and/or attached are the medications your provider expects you to take. Review all of your home medications and newly ordered medications with your provider and/or pharmacist. Follow medication instructions as directed by your provider and/or pharmacist. Please keep your medication list with you and share with your provider. Update the information when medications are discontinued, doses are changed, or new medications (including over-the-counter products) are added; and carry medication information at all times in the event of emergency situations     Allergies:  OTHER FOOD - Anaphylaxis     RECLAST - (reactions not documented)               Medications  Valid as of: May 02, 2017 -  1:34 PM    Generic Name Brand Name Tablet Size Instructions for use    Cholecalciferol (Liquid) Vitamin D3  2,000 Units by Does not apply route every day. Vitamin D2 or D3 drops, 2000 units daily, 3 months supply        CycloSPORINE (Emulsion) RESTASIS 0.05 % Place 1 Drop in both eyes 2 times a day.          Drospirenone-Ethinyl Estradiol (Tab) ASHLEY 3-0.03 MG Take 1 Tab by mouth every day.        Drospirenone-Ethinyl Estradiol (Tab) ASHLEY 3-0.03 MG Take 1 Tab by mouth every day.         Fexofenadine HCl   Take 1 Tab by mouth every evening.        Ibuprofen (Tab) MOTRIN 200 MG Take 200 mg by mouth every 6 hours as needed for Mild Pain.        Levothyroxine Sodium (Tab) SYNTHROID 112 MCG One po six days per week        Mepolizumab (Recon Soln) Mepolizumab 100 MG Inject 100 mg as instructed every 28 days.        Mycophenolate Mofetil (Tab) CELLCEPT 500 MG Take 500 mg by mouth 2 times a day.        Naproxen Sodium (Tab) ANAPROX 220 MG Take 220 mg by mouth as needed. Indications: Mild to Moderate Pain        Potassium Chloride (Pack) KLOR-CON 20 MEQ Take 1 Packet by mouth every day.        PredniSONE (Tab) DELTASONE 20 MG Take 1 Tab by mouth every day.        RaNITidine HCl (Tab) ZANTAC 150 MG Take 150 mg by mouth every evening.        Venlafaxine HCl (CAPSULE SR 24 HR) EFFEXOR XR 75 MG Take 1 Cap by mouth every day.        .                 Medicines prescribed today were sent to:     [facility]/PHARMACY #[id number] - [place] - [address]    [address] [place] [address]    Phone: [phone] Fax: [phone]    Open 24 Hours?: No      Medication refill instructions:       If your prescription bottle indicates you have medication refills left, it is not necessary to call your provider’s office. Please contact your pharmacy and they will refill your medication.    If your prescription bottle indicates you do not have any refills left, you may request refills at any time through one of the following ways: The online [employer] system (except Urgent Care), by calling your provider’s office, or by asking your pharmacy to contact your provider’s office with a refill request. Medication refills are processed only during regular business hours and may not be available until the next business day. Your provider may request additional information or to have a follow-up visit with you prior to refilling your medication.   *Please Note: Medication refills are assigned a new Rx number when refilled electronically.  Your pharmacy may indicate that no refills were authorized even though a new prescription for the same medication is available at the pharmacy. Please request the medicine by name with the pharmacy before contacting your provider for a refill.        Other Notes About Your Plan        Need hep b vaccine to complete series    11/17 need prevnar                MyChart Access Code: Activation code not generated  Current [employer] Status: Active

## 2017-05-02 NOTE — TELEPHONE ENCOUNTER
1. Caller Name: Megan August                        Call Back Number: [phone] (home) [phone] (work)      2. Message: Pt is coming in for Nucala injection. Please enter order so that I may call her and she will not have to wait when she arrives for her injection.    3. Patient approves office to leave a detailed voicemail/MyChart message: no

## 2017-05-26 ENCOUNTER — TELEPHONE (OUTPATIENT)
Dept: MEDICAL GROUP | Facility: MEDICAL CENTER | Age: 42
End: 2017-05-26

## 2017-05-26 NOTE — TELEPHONE ENCOUNTER
RICHARD for pt notifying her that her NUCALA shot had arrived and that she can come in for it on TUES 6/30/17.

## 2017-05-30 ENCOUNTER — TELEPHONE (OUTPATIENT)
Dept: MEDICAL GROUP | Facility: MEDICAL CENTER | Age: 42
End: 2017-05-30

## 2017-05-30 ENCOUNTER — NON-PROVIDER VISIT (OUTPATIENT)
Dept: MEDICAL GROUP | Facility: MEDICAL CENTER | Age: 42
End: 2017-05-30
Payer: COMMERCIAL

## 2017-05-30 DIAGNOSIS — M30.1 CHURG-STRAUSS SYNDROME (HCC): Chronic | ICD-10-CM

## 2017-05-30 DIAGNOSIS — D72.18 CHURG-STRAUSS SYNDROME (HCC): Chronic | ICD-10-CM

## 2017-05-30 PROCEDURE — 96372 THER/PROPH/DIAG INJ SC/IM: CPT | Performed by: INTERNAL MEDICINE

## 2017-05-30 NOTE — TELEPHONE ENCOUNTER
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 She will be seeing genetticist next week. The MRI and surgery. Will come to see you for sign off.

## 2017-05-30 NOTE — PROGRESS NOTES
Megan August is a 41 y.o. female here for a non-provider visit for NUCALA injection. Drug rec'd via [employer], pt notified to come into clinic for injection.       Lot # [id number]  EXP: Sept/2018  NDC - 2681-5135-74    Reason for injection: Churg-Homer syndrome (CMS-HCC)  Order in MAR?: NO  Patient supplied?:Yes  Minimum interval has been met for this injection (per MAR order): Yes    Order and dose verified by: [facility]  Patient tolerated injection and no adverse effects were observed or reported: Yes    # of Administrations remaining in MAR: Not applicable

## 2017-05-30 NOTE — MR AVS SNAPSHOT
"Megan August   2017 8:00 AM   Non-Provider Visit   MRN: [id number]    Department:  [facility]   Dept Phone:  [phone]    Description:  Female : 1975   Provider:  SOUTH STALLWORTH MA           Reason for Visit     Injections NUCALA      Allergies as of 2017     Allergen Noted Reactions    Other Food 2016   Anaphylaxis    \"all foods\"= blisters/ anaphylaxis    Reclast [Zoledronic Acid] 2016       Body aches, headache      You were diagnosed with     Churg-Homer syndrome (CMS-HCC)   [003373]         Vital Signs     Smoking Status                   Never Smoker            Basic Information     Date Of Birth Sex Race Ethnicity Preferred Language    1975 Female White Non- English      Your appointments     2017  3:10 PM   MA [id number] with GRAEME DAVIDSON MG 1   [facility] ([place])    [address]  [place] [id number] [phone]           No deodorant, powder, perfume or lotion under the arm or breast area.              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   2009 - Present    Polycystic ovary disease E28.2   2009 - Present    Anxiety (Chronic) F41.9   2009 - Present    Sjogren's disease (CMS-HCC) (Chronic) M35.00   2009 - Present    Preventative health care (Chronic) Z00.00   2009 - Present    Dyslipidemia (Chronic) E78.5   2011 - Present    History of melanoma (Chronic) Z85.820   2011 - Present    Eosinophilic esophagitis (Chronic) K20.0   2013 - Present    Constipation K59.00   2015 - Present    Autonomic neuropathy (Chronic) G90.9   3/23/2016 - Present    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   2016 - Present    Current chronic use of systemic steroids (Chronic) Z79.52   2016 - Present      Health " Maintenance        Date Due Completion Dates    MAMMOGRAM 5/12/2016 5/12/2015    COLONOSCOPY 3/27/2020 3/27/2015    PAP SMEAR 4/24/2020 4/24/2017, 4/20/2015, 5/31/2011    IMM DTaP/Tdap/Td Vaccine (2 - Td) 2/3/2025 2/3/2015            Current Immunizations     Hepatitis A Vaccine, Adult 2/3/2015    Hepatitis B Vaccine Recombivax (Adol/Adult) 2/3/2015    Influenza TIV (IM) 10/21/2013    Influenza Vaccine Quad Inj (Pf) 10/27/2016  6:06 PM, 10/15/2015  5:42 PM, 9/27/2014    Pneumococcal polysaccharide vaccine (PPSV-23) 11/23/2016    Tdap Vaccine 2/3/2015      Below and/or attached are the medications your provider expects you to take. Review all of your home medications and newly ordered medications with your provider and/or pharmacist. Follow medication instructions as directed by your provider and/or pharmacist. Please keep your medication list with you and share with your provider. Update the information when medications are discontinued, doses are changed, or new medications (including over-the-counter products) are added; and carry medication information at all times in the event of emergency situations     Allergies:  OTHER FOOD - Anaphylaxis     RECLAST - (reactions not documented)               Medications  Valid as of: May 30, 2017 -  9:33 AM    Generic Name Brand Name Tablet Size Instructions for use    Cholecalciferol (Liquid) Vitamin D3  2,000 Units by Does not apply route every day. Vitamin D2 or D3 drops, 2000 units daily, 3 months supply        CycloSPORINE (Emulsion) RESTASIS 0.05 % Place 1 Drop in both eyes 2 times a day. Three month supply and three refills        Drospirenone-Ethinyl Estradiol (Tab) ASHLEY 3-0.03 MG Take 1 Tab by mouth every day.        Drospirenone-Ethinyl Estradiol (Tab) ASHLEY 3-0.03 MG Take 1 Tab by mouth every day.        Fexofenadine HCl   Take 1 Tab by mouth every evening.        Ibuprofen (Tab) MOTRIN 200 MG Take 200 mg by mouth every 6 hours as needed for Mild Pain.         Levothyroxine Sodium (Tab) SYNTHROID 112 MCG One po six days per week        Mepolizumab (Recon Soln) Mepolizumab 100 MG Inject 100 mg as instructed every 28 days.        Mycophenolate Mofetil (Tab) CELLCEPT 500 MG Take 500 mg by mouth 2 times a day.        Naproxen Sodium (Tab) ANAPROX 220 MG Take 220 mg by mouth as needed. Indications: Mild to Moderate Pain        Potassium Chloride (Pack) KLOR-CON 20 MEQ Take 1 Packet by mouth every day.        PredniSONE (Tab) DELTASONE 20 MG Take 1 Tab by mouth every day.        RaNITidine HCl (Tab) ZANTAC 150 MG Take 150 mg by mouth every evening.        Venlafaxine HCl (CAPSULE SR 24 HR) EFFEXOR XR 75 MG Take 1 Cap by mouth every day.        .                 Medicines prescribed today were sent to:     [facility]/PHARMACY #[id number] - [place], [place] - [address] [facility]    [address] [place] [address]    Phone: [phone] Fax: [phone]    Open 24 Hours?: No      Medication refill instructions:       If your prescription bottle indicates you have medication refills left, it is not necessary to call your provider’s office. Please contact your pharmacy and they will refill your medication.    If your prescription bottle indicates you do not have any refills left, you may request refills at any time through one of the following ways: The online [employer] system (except Urgent Care), by calling your provider’s office, or by asking your pharmacy to contact your provider’s office with a refill request. Medication refills are processed only during regular business hours and may not be available until the next business day. Your provider may request additional information or to have a follow-up visit with you prior to refilling your medication.   *Please Note: Medication refills are assigned a new Rx number when refilled electronically. Your pharmacy may indicate that no refills were authorized even though a new prescription for the same medication is available at the pharmacy. Please  request the medicine by name with the pharmacy before contacting your provider for a refill.        Other Notes About Your Plan        Need hep b vaccine to complete series    11/17 need prevnar                MyChart Access Code: Activation code not generated  Current [employer] Status: Active

## 2017-06-07 ENCOUNTER — TELEPHONE (OUTPATIENT)
Dept: MEDICAL GROUP | Facility: MEDICAL CENTER | Age: 42
End: 2017-06-07

## 2017-06-07 DIAGNOSIS — K20.0 EOSINOPHILIC ESOPHAGITIS: ICD-10-CM

## 2017-06-07 NOTE — TELEPHONE ENCOUNTER
1. Caller Name: Pt                       Call Back Number: [phone]    2. Message: Pt called stating she is needing to have an EGD done in [place] to check for autoimmune disorder. They are requiring a fax from her PCP stating that we a re referring her to Randi Evans at [facility] for an EGD. Pt is requesting to have this faxed by today.     Phone: [phone]  Fax to: [phone]    3. Patient approves office to leave a detailed voicemail/MyChart message: yes

## 2017-06-17 ENCOUNTER — HOSPITAL ENCOUNTER (OUTPATIENT)
Dept: RADIOLOGY | Facility: MEDICAL CENTER | Age: 42
End: 2017-06-17
Payer: COMMERCIAL

## 2017-06-17 DIAGNOSIS — Z15.89 GENETIC PREDISPOSITION TO DISEASE: ICD-10-CM

## 2017-06-17 PROCEDURE — A9579 GAD-BASE MR CONTRAST NOS,1ML: HCPCS

## 2017-06-17 PROCEDURE — 72197 MRI PELVIS W/O & W/DYE: CPT

## 2017-06-17 PROCEDURE — 70543 MRI ORBT/FAC/NCK W/O &W/DYE: CPT

## 2017-06-17 PROCEDURE — 71552 MRI CHEST W/O & W/DYE: CPT

## 2017-06-17 PROCEDURE — 700117 HCHG RX CONTRAST REV CODE 255

## 2017-06-17 PROCEDURE — 74183 MRI ABD W/O CNTR FLWD CNTR: CPT

## 2017-06-17 RX ADMIN — GADODIAMIDE 20 ML: 287 INJECTION INTRAVENOUS at 19:27

## 2017-06-20 ENCOUNTER — HOSPITAL ENCOUNTER (OUTPATIENT)
Dept: RADIOLOGY | Facility: MEDICAL CENTER | Age: 42
End: 2017-06-20
Attending: NURSE PRACTITIONER
Payer: COMMERCIAL

## 2017-06-20 DIAGNOSIS — Z12.31 ENCOUNTER FOR SCREENING MAMMOGRAM FOR BREAST CANCER: ICD-10-CM

## 2017-06-20 PROCEDURE — 77063 BREAST TOMOSYNTHESIS BI: CPT

## 2017-06-26 ENCOUNTER — PATIENT MESSAGE (OUTPATIENT)
Dept: MEDICAL GROUP | Facility: MEDICAL CENTER | Age: 42
End: 2017-06-26

## 2017-06-26 NOTE — TELEPHONE ENCOUNTER
From: Megan August  To: Zohaib Soares M.D.  Sent: 6/26/2017 2:18 PM PDT  Subject: Procedure Question    Hi Dr. Soares,   My [place]  is calling in orders to [facility], for a PET scan. 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    Do you have to approve the orders? If so, can you message me once approved so I can call to schedule?    Thank you,  Megan

## 2017-06-27 ENCOUNTER — NON-PROVIDER VISIT (OUTPATIENT)
Dept: MEDICAL GROUP | Facility: MEDICAL CENTER | Age: 42
End: 2017-06-27
Payer: COMMERCIAL

## 2017-06-27 DIAGNOSIS — D72.18 CHURG-STRAUSS SYNDROME (HCC): Chronic | ICD-10-CM

## 2017-06-27 DIAGNOSIS — M30.1 CHURG-STRAUSS SYNDROME (HCC): Chronic | ICD-10-CM

## 2017-06-27 PROCEDURE — 96372 THER/PROPH/DIAG INJ SC/IM: CPT | Performed by: INTERNAL MEDICINE

## 2017-06-27 NOTE — PROGRESS NOTES
Megan August is a 41 y.o. female here for a non-provider visit for NUCALA 100 mg injection.    Reason for injection:        Sjogren's disease              Order in MAR?: No  Patient supplied?:Yes  Minimum interval has been met for this injection (per MAR order): Yes    Order and dose verified by: [facility]  Patient tolerated injection and no adverse effects were observed or reported: Yes    # of Administrations remaining in MAR:       LOT # [id number]  EXP: 09/2018  NDC# [id number]  Packing slip scanned into chart.

## 2017-06-27 NOTE — MR AVS SNAPSHOT
"Megan August   2017 1:45 PM   Non-Provider Visit   MRN: [id number]    Department:  [facility]   Dept Phone:  [phone]    Description:  Female : 1975   Provider:  SOUTH STALLWORTH MA           Reason for Visit     Injections Nucala 100 mg      Allergies as of 2017     Allergen Noted Reactions    Other Food 2016   Anaphylaxis    \"all foods\"= blisters/ anaphylaxis    Reclast [Zoledronic Acid] 2016       Body aches, headache      You were diagnosed with     Churg-Homer syndrome (CMS-HCC)   [829887]         Vital Signs     Smoking Status                   Never Smoker            Basic Information     Date Of Birth Sex Race Ethnicity Preferred Language    1975 Female White Non- English      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   2009 - Present    Polycystic ovary disease E28.2   2009 - Present    Anxiety (Chronic) F41.9   2009 - Present    Sjogren's disease (CMS-HCC) (Chronic) M35.00   2009 - Present    Preventative health care (Chronic) Z00.00   2009 - Present    Dyslipidemia (Chronic) E78.5   2011 - Present    History of melanoma (Chronic) Z85.820   2011 - Present    Eosinophilic esophagitis (Chronic) K20.0   2013 - Present    Constipation K59.00   2015 - Present    Autonomic neuropathy (Chronic) G90.9   3/23/2016 - Present    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   2016 - Present    Current chronic use of systemic steroids (Chronic) Z79.52   2016 - Present      Health Maintenance        Date Due Completion Dates    MAMMOGRAM 2018, 2015    COLONOSCOPY 3/27/2020 3/27/2015    PAP SMEAR 2020, 2015, 2011    IMM DTaP/Tdap/Td Vaccine (2 - Td) 2/3/2025 2/3/2015            Current Immunizations     Hepatitis A Vaccine, Adult " 2/3/2015    Hepatitis B Vaccine Recombivax (Adol/Adult) 2/3/2015    Influenza TIV (IM) 10/21/2013    Influenza Vaccine Quad Inj (Pf) 10/27/2016  6:06 PM, 10/15/2015  5:42 PM, 9/27/2014    Pneumococcal polysaccharide vaccine (PPSV-23) 11/23/2016    Tdap Vaccine 2/3/2015      Below and/or attached are the medications your provider expects you to take. Review all of your home medications and newly ordered medications with your provider and/or pharmacist. Follow medication instructions as directed by your provider and/or pharmacist. Please keep your medication list with you and share with your provider. Update the information when medications are discontinued, doses are changed, or new medications (including over-the-counter products) are added; and carry medication information at all times in the event of emergency situations     Allergies:  OTHER FOOD - Anaphylaxis     RECLAST - (reactions not documented)               Medications  Valid as of: June 27, 2017 -  3:38 PM    Generic Name Brand Name Tablet Size Instructions for use    Cholecalciferol (Liquid) Vitamin D3  2,000 Units by Does not apply route every day. Vitamin D2 or D3 drops, 2000 units daily, 3 months supply        CycloSPORINE (Emulsion) RESTASIS 0.05 % Place 1 Drop in both eyes 2 times a day. Three month supply and three refills        Drospirenone-Ethinyl Estradiol (Tab) ASHLEY 3-0.03 MG Take 1 Tab by mouth every day.        Drospirenone-Ethinyl Estradiol (Tab) ASHLEY 3-0.03 MG Take 1 Tab by mouth every day.        Fexofenadine HCl   Take 1 Tab by mouth every evening.        Ibuprofen (Tab) MOTRIN 200 MG Take 200 mg by mouth every 6 hours as needed for Mild Pain.        Levothyroxine Sodium (Tab) SYNTHROID 112 MCG One po six days per week        Mepolizumab (Recon Soln) NUCALA 100 MG Inject 100 mg as instructed every 28 days.        Mycophenolate Mofetil (Tab) CELLCEPT 500 MG Take 500 mg by mouth 2 times a day.        Naproxen Sodium (Tab) ANAPROX 220  MG Take 220 mg by mouth as needed. Indications: Mild to Moderate Pain        Potassium Chloride (Pack) KLOR-CON 20 MEQ Take 1 Packet by mouth every day.        PredniSONE (Tab) DELTASONE 20 MG Take 1 Tab by mouth every day.        RaNITidine HCl (Tab) ZANTAC 150 MG Take 150 mg by mouth every evening.        Venlafaxine HCl (CAPSULE SR 24 HR) EFFEXOR XR 75 MG Take 1 Cap by mouth every day.        .                 Medicines prescribed today were sent to:     [facility]/PHARMACY #[id number] - [place], [place] - [address] [facility]    [address] [facility] [address] [place] [address]    Phone: [phone] Fax: [phone]    Open 24 Hours?: No      Medication refill instructions:       If your prescription bottle indicates you have medication refills left, it is not necessary to call your provider’s office. Please contact your pharmacy and they will refill your medication.    If your prescription bottle indicates you do not have any refills left, you may request refills at any time through one of the following ways: The online [employer] system (except Urgent Care), by calling your provider’s office, or by asking your pharmacy to contact your provider’s office with a refill request. Medication refills are processed only during regular business hours and may not be available until the next business day. Your provider may request additional information or to have a follow-up visit with you prior to refilling your medication.   *Please Note: Medication refills are assigned a new Rx number when refilled electronically. Your pharmacy may indicate that no refills were authorized even though a new prescription for the same medication is available at the pharmacy. Please request the medicine by name with the pharmacy before contacting your provider for a refill.        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

## 2017-06-29 ENCOUNTER — HOSPITAL ENCOUNTER (OUTPATIENT)
Dept: RADIOLOGY | Facility: MEDICAL CENTER | Age: 42
End: 2017-06-29
Attending: PEDIATRICS
Payer: COMMERCIAL

## 2017-06-29 ENCOUNTER — TELEPHONE (OUTPATIENT)
Dept: MEDICAL GROUP | Facility: MEDICAL CENTER | Age: 42
End: 2017-06-29

## 2017-06-29 DIAGNOSIS — Z15.89 MONOALLELIC MUTATION OF SDHA GENE: ICD-10-CM

## 2017-06-29 PROCEDURE — A9552 F18 FDG: HCPCS

## 2017-06-29 NOTE — TELEPHONE ENCOUNTER
1. Caller Name: Megan August                        Call Back Number: [phone] (home) [phone] (work)      2. Message: Pt had PET  Scan this am, would like you to release it to [employer] so that she may forward it to her doctors in [place] who are waiting for it to get there. Please advise.     3. Patient approves office to leave a detailed voicemail/[employer] message: no

## 2017-07-21 ENCOUNTER — TELEPHONE (OUTPATIENT)
Dept: MEDICAL GROUP | Facility: MEDICAL CENTER | Age: 42
End: 2017-07-21

## 2017-07-21 NOTE — TELEPHONE ENCOUNTER
Please call [facility] infusion center to update them, I cannot do anything unless I receive information from her specialist in [place]

## 2017-07-21 NOTE — TELEPHONE ENCOUNTER
Spoke with Nita at [facility]. They are faxing dosage information to [phone]. They said thank you very much.

## 2017-07-21 NOTE — TELEPHONE ENCOUNTER
1. Caller Name: [facility], Dr. Randi Evans's office                      Call Back Number: [phone]    2. Message: Received message that Dr. Evans tried ordering IV/IG, but infusion at [facility] wants the order to come from Dr. Soares only.     3. Patient approves office to leave a detailed voicemail/MyChart message: N\A

## 2017-07-21 NOTE — TELEPHONE ENCOUNTER
Nita from [facility] called back to discuss orders for mutual Pt. They have faxed orders to the office to all three fax numbers and they are not coming through. Suggested I contact our infusion center as they did arrive there. Contacted [facility], order for IVIG scanned into media for MD review and possible co/sign. Pt is on [facility]usions schedule, they will just wait for orders to proceed.     Please contact Nita on her cell: [phone].

## 2017-07-21 NOTE — TELEPHONE ENCOUNTER
1. Caller Name: [facility] Nita                                         Call Back Number: [phone]      Patient approves a detailed voicemail message: yes    Calling to report Randi Evans MD sent orders to [facility] for Pt's IVIG infusion treatments. However [facility] is telling them the orders are not listed correctly and they are reaching out to you to co/sign orders so Pt can start treatments STAT. Asking for a return call to update on this matter.

## 2017-07-21 NOTE — TELEPHONE ENCOUNTER
I have not received any orders from Dr. Evans or from the infusion center, I do not know what dose has been ordered of the IVIG or the frequency of dosing, I need more information on this, so either have the infusion center send this information to us or have Dr. Stark's office send this to us.

## 2017-07-26 ENCOUNTER — NON-PROVIDER VISIT (OUTPATIENT)
Dept: MEDICAL GROUP | Facility: MEDICAL CENTER | Age: 42
End: 2017-07-26
Payer: COMMERCIAL

## 2017-07-26 DIAGNOSIS — M35.00 SJOGREN'S SYNDROME, WITH UNSPECIFIED ORGAN INVOLVEMENT (HCC): Chronic | ICD-10-CM

## 2017-07-26 PROCEDURE — 96372 THER/PROPH/DIAG INJ SC/IM: CPT | Performed by: INTERNAL MEDICINE

## 2017-07-26 NOTE — MR AVS SNAPSHOT
"Megan August   2017 8:15 AM   Non-Provider Visit   MRN: [id number]    Department:  [facility]   Dept Phone:  [phone]    Description:  Female : 1975   Provider:  SOUTH STALLWORTH MA           Reason for Visit     Injections Nucala      Allergies as of 2017     Allergen Noted Reactions    Other Food 2016   Anaphylaxis    \"all foods\"= blisters/ anaphylaxis    Reclast [Zoledronic Acid] 2016       Body aches, headache      You were diagnosed with     Sjogren's syndrome, with unspecified organ involvement (CMS-HCC)   [[id number]]         Vital Signs     Smoking Status                   Never Smoker            Basic Information     Date Of Birth Sex Race Ethnicity Preferred Language    1975 Female White Non- English      Your appointments     Aug 07, 2017 10:30 AM   EST IVIG with RN 4   Infusion Services ([facility])    [address]  [place] [address]   [phone]            Aug 08, 2017 10:30 AM   EST IVIG with RN 4   Infusion Services ([facility])    [address]  [place] [address]   [phone]            Aug 09, 2017  8:00 AM   EST IVIG with RN 1   Infusion Services ([facility])    [address]  [place] [address]   [phone]            Aug 17, 2017  9:30 AM   EST IVIG with RN 3   Infusion Services ([facility])    [address]  [place] [address]   [phone]            Aug 24, 2017 11:00 AM   EST IVIG with RN 1   Infusion Services ([facility])    [address]  [place] [address]   [phone]            Aug 31, 2017 11:00 AM   EST IVIG with RN 1   Infusion Services ([facility])    [address]  [place] [address]   [phone]            Sep 07, 2017 11:00 AM   EST IVIG with RN 1   Infusion Services ([facility])    [address]  [place] [address]   [phone]            Sep 14, 2017 11:00 AM   EST IVIG with RN 1   Infusion Services ([facility])    [address] [facility] [address]  [place] [address]   [phone]              Problem List              ICD-10-CM Priority Class Noted - Resolved    Hypothyroid (Chronic) E03.9   6/20/2009 - Present    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   6/20/2009 - Present    Polycystic ovary disease E28.2   6/20/2009 - Present    Anxiety (Chronic) F41.9   6/20/2009 - Present    Sjogren's disease (CMS-HCC) (Chronic) M35.00   6/20/2009 - Present    Preventative health care (Chronic) Z00.00   11/13/2009 - Present    Dyslipidemia (Chronic) E78.5   2/20/2011 - Present    History of melanoma (Chronic) Z85.820   4/4/2011 - Present    Eosinophilic esophagitis (Chronic) K20.0   5/28/2013 - Present    Constipation K59.00   2/11/2015 - Present    Autonomic neuropathy (Chronic) G90.9   3/23/2016 - Present    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   8/8/2016 - Present    Current chronic use of systemic steroids (Chronic) Z79.52   12/11/2016 - Present      Health Maintenance        Date Due Completion Dates    IMM INFLUENZA (1) 9/1/2017 10/27/2016, 10/15/2015, 9/27/2014, 10/21/2013    MAMMOGRAM 6/20/2018 6/20/2017, 5/12/2015    COLONOSCOPY 3/27/2020 3/27/2015    PAP SMEAR 4/24/2020 4/24/2017, 4/20/2015, 5/31/2011    IMM DTaP/Tdap/Td Vaccine (2 - Td) 2/3/2025 2/3/2015            Current Immunizations     Hepatitis A Vaccine, Adult 2/3/2015    Hepatitis B Vaccine Recombivax (Adol/Adult) 2/3/2015    Influenza TIV (IM) 10/21/2013    Influenza Vaccine Quad Inj (Pf) 10/27/2016  6:06 PM, 10/15/2015  5:42 PM, 9/27/2014    Pneumococcal polysaccharide vaccine (PPSV-23) 11/23/2016    Tdap Vaccine 2/3/2015      Below and/or attached are the medications your provider expects you to take. Review all of your home medications and newly ordered medications with your provider and/or pharmacist. Follow medication instructions as directed by your provider and/or pharmacist. Please keep your medication list with you and share with your provider. Update the information when medications  are discontinued, doses are changed, or new medications (including over-the-counter products) are added; and carry medication information at all times in the event of emergency situations     Allergies:  OTHER FOOD - Anaphylaxis     RECLAST - (reactions not documented)               Medications  Valid as of: July 26, 2017 -  8:38 AM    Generic Name Brand Name Tablet Size Instructions for use    Cholecalciferol (Liquid) Vitamin D3  2,000 Units by Does not apply route every day. Vitamin D2 or D3 drops, 2000 units daily, 3 months supply        CycloSPORINE (Emulsion) RESTASIS 0.05 % Place 1 Drop in both eyes 2 times a day. Three month supply and three refills        Drospirenone-Ethinyl Estradiol (Tab) ASHLEY 3-0.03 MG Take 1 Tab by mouth every day.        Drospirenone-Ethinyl Estradiol (Tab) ASHLEY 3-0.03 MG Take 1 Tab by mouth every day.        Fexofenadine HCl   Take 1 Tab by mouth every evening.        Ibuprofen (Tab) MOTRIN 200 MG Take 200 mg by mouth every 6 hours as needed for Mild Pain.        Levothyroxine Sodium (Tab) SYNTHROID 112 MCG One po six days per week        Mepolizumab (Recon Soln) NUCALA 100 MG Inject 100 mg as instructed every 28 days.        Mycophenolate Mofetil (Tab) CELLCEPT 500 MG Take 500 mg by mouth 2 times a day.        Naproxen Sodium (Tab) ANAPROX 220 MG Take 220 mg by mouth as needed. Indications: Mild to Moderate Pain        Potassium Chloride (Pack) KLOR-CON 20 MEQ Take 1 Packet by mouth every day.        PredniSONE (Tab) DELTASONE 20 MG Take 1 Tab by mouth every day.        RaNITidine HCl (Tab) ZANTAC 150 MG Take 150 mg by mouth every evening.        Venlafaxine HCl (CAPSULE SR 24 HR) EFFEXOR XR 75 MG Take 1 Cap by mouth every day.        .                 Medicines prescribed today were sent to:     [facility]/PHARMACY #[id number] - [place] - [address]    [address] [place] [address]    Phone: [phone] Fax: [phone]    Open 24 Hours?: No      Medication refill  instructions:       If your prescription bottle indicates you have medication refills left, it is not necessary to call your provider’s office. Please contact your pharmacy and they will refill your medication.    If your prescription bottle indicates you do not have any refills left, you may request refills at any time through one of the following ways: The online [employer] system (except Urgent Care), by calling your provider’s office, or by asking your pharmacy to contact your provider’s office with a refill request. Medication refills are processed only during regular business hours and may not be available until the next business day. Your provider may request additional information or to have a follow-up visit with you prior to refilling your medication.   *Please Note: Medication refills are assigned a new Rx number when refilled electronically. Your pharmacy may indicate that no refills were authorized even though a new prescription for the same medication is available at the pharmacy. Please request the medicine by name with the pharmacy before contacting your provider for a refill.        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, will be seeing genetticist next week then MRI and surgery  6/17/17 MRI soft tissue neck with and without; negative  6/19/17 MRI pelvis with and without; no suspicious mass  6/19/17 MR abdomen with and without; no suspicious mass  6/19/17 MR chest with and without; nonspecific 7 x 12 mm hyperintense lesion subcarinal region, if concern for paraganglioma further evaluation with gallium-68-dotatate may be helpful for confirmation  6/29/17 CT/PET no evidence of abnormal FDG accumulation, no increased activity subcarinal region  7/21/17 IVIG 0.4 g/kg x 3 days then 0.4g/kg q week x 5 weeks  11/17 need prevnar                [employer] [employer] Access Code: Activation code not  generated  Current MyChart Status: Active

## 2017-07-26 NOTE — PROGRESS NOTES
Megan August is a 41 y.o. female here for a non-provider visit for Nucala injection.    Reason for injection: Sjogren's Disease  Order in MAR?: No  Patient supplied?:Yes  Minimum interval has been met for this injection (per MAR order): Yes    Order and dose verified by: Dr. Soares  Patient tolerated injection and no adverse effects were observed or reported: Yes    # of Administrations remaining in MAR: 0

## 2017-08-07 ENCOUNTER — APPOINTMENT (OUTPATIENT)
Dept: ONCOLOGY | Facility: MEDICAL CENTER | Age: 42
End: 2017-08-07
Attending: INTERNAL MEDICINE
Payer: COMMERCIAL

## 2017-08-08 ENCOUNTER — APPOINTMENT (OUTPATIENT)
Dept: ONCOLOGY | Facility: MEDICAL CENTER | Age: 42
End: 2017-08-08
Attending: INTERNAL MEDICINE
Payer: COMMERCIAL

## 2017-08-09 ENCOUNTER — OUTPATIENT INFUSION SERVICES (OUTPATIENT)
Dept: ONCOLOGY | Facility: MEDICAL CENTER | Age: 42
End: 2017-08-09
Attending: INTERNAL MEDICINE
Payer: COMMERCIAL

## 2017-08-09 VITALS
OXYGEN SATURATION: 98 % | HEART RATE: 66 BPM | RESPIRATION RATE: 18 BRPM | BODY MASS INDEX: 20.98 KG/M2 | DIASTOLIC BLOOD PRESSURE: 64 MMHG | TEMPERATURE: 98 F | HEIGHT: 66 IN | WEIGHT: 130.51 LBS | SYSTOLIC BLOOD PRESSURE: 109 MMHG

## 2017-08-09 PROCEDURE — 96365 THER/PROPH/DIAG IV INF INIT: CPT

## 2017-08-09 PROCEDURE — 96366 THER/PROPH/DIAG IV INF ADDON: CPT

## 2017-08-09 PROCEDURE — 700111 HCHG RX REV CODE 636 W/ 250 OVERRIDE (IP): Performed by: INTERNAL MEDICINE

## 2017-08-09 RX ORDER — IMMUNE GLOBULIN INFUSION (HUMAN) 100 MG/ML
20 INJECTION, SOLUTION INTRAVENOUS; SUBCUTANEOUS ONCE
Status: COMPLETED | OUTPATIENT
Start: 2017-08-09 | End: 2017-08-09

## 2017-08-09 RX ORDER — IMMUNE GLOBULIN INFUSION (HUMAN) 100 MG/ML
5 INJECTION, SOLUTION INTRAVENOUS; SUBCUTANEOUS ONCE
Status: COMPLETED | OUTPATIENT
Start: 2017-08-09 | End: 2017-08-09

## 2017-08-09 RX ADMIN — IMMUNE GLOBULIN INFUSION (HUMAN) 5 G: 100 INJECTION, SOLUTION INTRAVENOUS; SUBCUTANEOUS at 11:16

## 2017-08-09 RX ADMIN — IMMUNE GLOBULIN INFUSION (HUMAN) 20 G: 100 INJECTION, SOLUTION INTRAVENOUS; SUBCUTANEOUS at 08:58

## 2017-08-09 ASSESSMENT — PAIN SCALES - GENERAL: PAINLEVEL: NO PAIN

## 2017-08-09 NOTE — PROGRESS NOTES
Patient presents ambulatory for day 1/3 of IVIG infusion.  Patient reports feeling well and denies any s/s of infection at this time.  This is patient's first time receiving IVIG, education provided to patient regarding medication and possible side effects, medication hand out given to patient, and all questions asked and answered.  PIV established, brisk blood return noted, and flushed.  IVIG given per titration protocol, with no complications or adverse reactions.  Patient to return tomorrow for day 2, confirmed with patient.  PIV flushed, brisk blood return noted, discontinued, gauze, and coban applied to site.  Patient left ambulatory in no distress.

## 2017-08-10 ENCOUNTER — OUTPATIENT INFUSION SERVICES (OUTPATIENT)
Dept: ONCOLOGY | Facility: MEDICAL CENTER | Age: 42
End: 2017-08-10
Attending: INTERNAL MEDICINE
Payer: COMMERCIAL

## 2017-08-10 VITALS
HEIGHT: 66 IN | BODY MASS INDEX: 20.98 KG/M2 | WEIGHT: 130.51 LBS | SYSTOLIC BLOOD PRESSURE: 103 MMHG | HEART RATE: 73 BPM | TEMPERATURE: 97 F | DIASTOLIC BLOOD PRESSURE: 68 MMHG | RESPIRATION RATE: 18 BRPM | OXYGEN SATURATION: 96 %

## 2017-08-10 PROCEDURE — 700111 HCHG RX REV CODE 636 W/ 250 OVERRIDE (IP): Performed by: INTERNAL MEDICINE

## 2017-08-10 PROCEDURE — 96366 THER/PROPH/DIAG IV INF ADDON: CPT

## 2017-08-10 PROCEDURE — 96365 THER/PROPH/DIAG IV INF INIT: CPT

## 2017-08-10 RX ORDER — IMMUNE GLOBULIN INFUSION (HUMAN) 100 MG/ML
20 INJECTION, SOLUTION INTRAVENOUS; SUBCUTANEOUS ONCE
Status: COMPLETED | OUTPATIENT
Start: 2017-08-10 | End: 2017-08-10

## 2017-08-10 RX ORDER — IMMUNE GLOBULIN INFUSION (HUMAN) 100 MG/ML
5 INJECTION, SOLUTION INTRAVENOUS; SUBCUTANEOUS ONCE
Status: COMPLETED | OUTPATIENT
Start: 2017-08-10 | End: 2017-08-10

## 2017-08-10 RX ADMIN — IMMUNE GLOBULIN INFUSION (HUMAN) 5 G: 100 INJECTION, SOLUTION INTRAVENOUS; SUBCUTANEOUS at 10:01

## 2017-08-10 RX ADMIN — IMMUNE GLOBULIN INFUSION (HUMAN) 20 G: 100 INJECTION, SOLUTION INTRAVENOUS; SUBCUTANEOUS at 11:00

## 2017-08-10 ASSESSMENT — PAIN SCALES - GENERAL: PAINLEVEL: NO PAIN

## 2017-08-10 NOTE — PROGRESS NOTES
Patient presents for day 2 of 3 IVIG infusion. Reviewed plan of care, patient verbalizes no new concerns from yesterday. IV started, flushes well with brisk blood return. IVIG titrated up per guidelines and patient tolerance. Infusion completed with no new patient complaints, line flushed clear. IV discontinued per patient request, catheter intact, compression dressing to site. Patient released in no acute distress.

## 2017-08-11 ENCOUNTER — OUTPATIENT INFUSION SERVICES (OUTPATIENT)
Dept: ONCOLOGY | Facility: MEDICAL CENTER | Age: 42
End: 2017-08-11
Attending: INTERNAL MEDICINE
Payer: COMMERCIAL

## 2017-08-11 VITALS
BODY MASS INDEX: 21.15 KG/M2 | OXYGEN SATURATION: 97 % | DIASTOLIC BLOOD PRESSURE: 67 MMHG | SYSTOLIC BLOOD PRESSURE: 109 MMHG | TEMPERATURE: 97.6 F | HEIGHT: 66 IN | WEIGHT: 131.61 LBS | HEART RATE: 64 BPM | RESPIRATION RATE: 18 BRPM

## 2017-08-11 PROCEDURE — 96365 THER/PROPH/DIAG IV INF INIT: CPT

## 2017-08-11 PROCEDURE — 700111 HCHG RX REV CODE 636 W/ 250 OVERRIDE (IP): Performed by: INTERNAL MEDICINE

## 2017-08-11 PROCEDURE — 96366 THER/PROPH/DIAG IV INF ADDON: CPT

## 2017-08-11 RX ORDER — IMMUNE GLOBULIN INFUSION (HUMAN) 100 MG/ML
20 INJECTION, SOLUTION INTRAVENOUS; SUBCUTANEOUS ONCE
Status: COMPLETED | OUTPATIENT
Start: 2017-08-11 | End: 2017-08-11

## 2017-08-11 RX ORDER — IMMUNE GLOBULIN INFUSION (HUMAN) 100 MG/ML
5 INJECTION, SOLUTION INTRAVENOUS; SUBCUTANEOUS ONCE
Status: COMPLETED | OUTPATIENT
Start: 2017-08-11 | End: 2017-08-11

## 2017-08-11 RX ADMIN — IMMUNE GLOBULIN INFUSION (HUMAN) 20 G: 100 INJECTION, SOLUTION INTRAVENOUS; SUBCUTANEOUS at 15:16

## 2017-08-11 RX ADMIN — IMMUNE GLOBULIN INFUSION (HUMAN) 5 G: 100 INJECTION, SOLUTION INTRAVENOUS; SUBCUTANEOUS at 14:13

## 2017-08-11 ASSESSMENT — PAIN SCALES - GENERAL: PAINLEVEL: NO PAIN

## 2017-08-11 NOTE — PROGRESS NOTES
Pt returns to infusion center for day 3 IVIG infusion.  PIV established, brisk blood return noted.  Pt reported headache after days 1 and 2, rate ramped per rate sheet.  Pt tolerated infusion without incident.  PIV flushed with saline per policy, removed, gauze dressing placed.  Pt left infusion center ambulatory and in good condition.  Returns in one week, appointment scheduled.

## 2017-08-17 ENCOUNTER — HOSPITAL ENCOUNTER (OUTPATIENT)
Dept: LAB | Facility: MEDICAL CENTER | Age: 42
End: 2017-08-17
Attending: INTERNAL MEDICINE
Payer: COMMERCIAL

## 2017-08-17 ENCOUNTER — TELEPHONE (OUTPATIENT)
Dept: MEDICAL GROUP | Facility: MEDICAL CENTER | Age: 42
End: 2017-08-17

## 2017-08-17 PROCEDURE — 36415 COLL VENOUS BLD VENIPUNCTURE: CPT

## 2017-08-17 PROCEDURE — 82784 ASSAY IGA/IGD/IGG/IGM EACH: CPT

## 2017-08-17 NOTE — TELEPHONE ENCOUNTER
Pt has had Nucala delivered to our office before and it came in a plain brown box.    This time her medication came in a refrigerated container.   Mfg. Standard say 77 degrees. Rafaela will call [facility] and fax me any pertinent info needed re: storage of this medication.

## 2017-08-18 LAB — IGA SERPL-MCNC: 39 MG/DL (ref 68–408)

## 2017-08-20 ENCOUNTER — OUTPATIENT INFUSION SERVICES (OUTPATIENT)
Dept: ONCOLOGY | Facility: MEDICAL CENTER | Age: 42
End: 2017-08-20
Attending: INTERNAL MEDICINE
Payer: COMMERCIAL

## 2017-08-20 VITALS
BODY MASS INDEX: 21.05 KG/M2 | DIASTOLIC BLOOD PRESSURE: 68 MMHG | OXYGEN SATURATION: 100 % | HEIGHT: 66 IN | RESPIRATION RATE: 18 BRPM | TEMPERATURE: 97.4 F | HEART RATE: 63 BPM | SYSTOLIC BLOOD PRESSURE: 118 MMHG | WEIGHT: 130.95 LBS

## 2017-08-20 PROCEDURE — 96366 THER/PROPH/DIAG IV INF ADDON: CPT

## 2017-08-20 PROCEDURE — 96365 THER/PROPH/DIAG IV INF INIT: CPT

## 2017-08-20 PROCEDURE — 700111 HCHG RX REV CODE 636 W/ 250 OVERRIDE (IP): Performed by: INTERNAL MEDICINE

## 2017-08-20 RX ORDER — IMMUNE GLOBULIN INFUSION (HUMAN) 100 MG/ML
20 INJECTION, SOLUTION INTRAVENOUS; SUBCUTANEOUS ONCE
Status: COMPLETED | OUTPATIENT
Start: 2017-08-20 | End: 2017-08-20

## 2017-08-20 RX ORDER — IMMUNE GLOBULIN INFUSION (HUMAN) 100 MG/ML
5 INJECTION, SOLUTION INTRAVENOUS; SUBCUTANEOUS ONCE
Status: COMPLETED | OUTPATIENT
Start: 2017-08-20 | End: 2017-08-20

## 2017-08-20 RX ADMIN — IMMUNE GLOBULIN INFUSION (HUMAN) 20 G: 100 INJECTION, SOLUTION INTRAVENOUS; SUBCUTANEOUS at 14:49

## 2017-08-20 RX ADMIN — IMMUNE GLOBULIN INFUSION (HUMAN) 5 G: 100 INJECTION, SOLUTION INTRAVENOUS; SUBCUTANEOUS at 13:49

## 2017-08-20 ASSESSMENT — PAIN SCALES - GENERAL: PAINLEVEL: NO PAIN

## 2017-08-20 NOTE — PROGRESS NOTES
Pt is here for her scheduled IVIG. She is not able to eat any foods - lives on protein powder based shakes. Headaches after last IVIG infusions - slow titration appears to help. Proper hydration encouraged. No premedications ordered. IVIG titrated up per protocol in 45min increments and completed without an incident. Discharged home to self care. Next appointement verified.

## 2017-08-22 ENCOUNTER — NON-PROVIDER VISIT (OUTPATIENT)
Dept: MEDICAL GROUP | Facility: MEDICAL CENTER | Age: 42
End: 2017-08-22
Payer: COMMERCIAL

## 2017-08-22 NOTE — MR AVS SNAPSHOT
"Megan August   2017 9:00 AM   Non-Provider Visit   MRN: [id number]    Department:  [facility]   Dept Phone:  [phone]    Description:  Female : 1975   Provider:  SOU[facility] STALLWOR[facility] MA           Reason for Visit     Injections Nucala 100 mg      Allergies as of 2017     Allergen Noted Reactions    Other Food 2016   Anaphylaxis    \"all foods\"= blisters/ anaphylaxis    Reclast [Zoledronic Acid] 2016       Body aches, headache      Vital Signs     Smoking Status                   Never Smoker            Basic Information     Date Of Birth Sex Race Ethnicity Preferred Language    1975 Female White Non- English      Your appointments     Aug 27, 2017  8:30 AM   EST IVIG with RN 3   Infusion Services ([facility])    [address]  [place] [address]   [phone]            Sep 03, 2017  8:30 AM   EST IVIG with RN 3   Infusion Services ([facility])    [address]  [place] [address]   [phone]            Sep 10, 2017  8:30 AM   EST IVIG with RN 3   Infusion Services ([facility])    [address]  [place] [address]   [phone]            Sep 17, 2017  8:30 AM   EST IVIG with RN 3   Infusion Services ([facility])    [address]  [place] [address]   [phone]            Sep 24, 2017  8:30 AM   EST IVIG with RN 4   Infusion Services ([facility])    [address]  [place] [address]   [phone]            Sep 30, 2017  9:30 AM   EST IVIG with RN 4   Infusion Services ([facility])    [address]  [place] [address]   [phone]            Oct 08, 2017  8:30 AM   EST IVIG with RN 3   Infusion Services ([facility])    [address]  [place] [address]   [phone]            Oct 15, 2017  8:30 AM   EST IVIG with RN 3   Infusion Services ([facility])    [address]  [place] [address]   [phone]            Oct 22, 2017  8:30 AM   EST IVIG with RN 3   Infusion Services " ([facility])    [address] [facility] [address]  [place] [address]   [phone]            Oct 29, 2017  8:30 AM   EST IVIG with RN 3   Infusion Services ([facility])    [address] [facility] [address]  [place] [address]   [phone]              Problem List              ICD-10-CM Priority Class Noted - Resolved    Hypothyroid (Chronic) E03.9   6/20/2009 - Present    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   6/20/2009 - Present    Polycystic ovary disease E28.2   6/20/2009 - Present    Anxiety (Chronic) F41.9   6/20/2009 - Present    Sjogren's disease (CMS-HCC) (Chronic) M35.00   6/20/2009 - Present    Preventative health care (Chronic) Z00.00   11/13/2009 - Present    Dyslipidemia (Chronic) E78.5   2/20/2011 - Present    History of melanoma (Chronic) Z85.820   4/4/2011 - Present    Eosinophilic esophagitis (Chronic) K20.0   5/28/2013 - Present    Constipation K59.00   2/11/2015 - Present    Autonomic neuropathy (Chronic) G90.9   3/23/2016 - Present    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   8/8/2016 - Present    Current chronic use of systemic steroids (Chronic) Z79.52   12/11/2016 - Present      Health Maintenance        Date Due Completion Dates    IMM INFLUENZA (1) 9/1/2017 10/27/2016, 10/15/2015, 9/27/2014, 10/21/2013    MAMMOGRAM 6/20/2018 6/20/2017, 5/12/2015    COLONOSCOPY 3/27/2020 3/27/2015    PAP SMEAR 4/24/2020 4/24/2017, 4/20/2015, 5/31/2011    IMM DTaP/Tdap/Td Vaccine (2 - Td) 2/3/2025 2/3/2015            Current Immunizations     Hepatitis A Vaccine, Adult 2/3/2015    Hepatitis B Vaccine Recombivax (Adol/Adult) 2/3/2015    Influenza TIV (IM) 10/21/2013    Influenza Vaccine Quad Inj (Pf) 10/27/2016  6:06 PM, 10/15/2015  5:42 PM, 9/27/2014    Pneumococcal polysaccharide vaccine (PPSV-23) 11/23/2016    Tdap Vaccine 2/3/2015      Below and/or attached are the medications your provider expects you to take. Review all of your home medications and newly ordered medications with your provider and/or pharmacist. Follow  medication instructions as directed by your provider and/or pharmacist. Please keep your medication list with you and share with your provider. Update the information when medications are discontinued, doses are changed, or new medications (including over-the-counter products) are added; and carry medication information at all times in the event of emergency situations     Allergies:  OTHER FOOD - Anaphylaxis     RECLAST - (reactions not documented)               Medications  Valid as of: August 22, 2017 - 10:09 AM    Generic Name Brand Name Tablet Size Instructions for use    Cholecalciferol (Liquid) Vitamin D3  2,000 Units by Does not apply route every day. Vitamin D2 or D3 drops, 2000 units daily, 3 months supply        CycloSPORINE (Emulsion) RESTASIS 0.05 % Place 1 Drop in both eyes 2 times a day. Three month supply and three refills        Drospirenone-Ethinyl Estradiol (Tab) ASHLEY 3-0.03 MG Take 1 Tab by mouth every day.        Drospirenone-Ethinyl Estradiol (Tab) ASHLEY 3-0.03 MG Take 1 Tab by mouth every day.        Fexofenadine HCl   Take 1 Tab by mouth every evening.        Ibuprofen (Tab) MOTRIN 200 MG Take 200 mg by mouth every 6 hours as needed for Mild Pain.        Levothyroxine Sodium (Tab) SYNTHROID 112 MCG One po six days per week        Mepolizumab (Recon Soln) NUCALA 100 MG Inject 100 mg as instructed every 28 days.        Mycophenolate Mofetil (Tab) CELLCEPT 500 MG Take 500 mg by mouth 2 times a day.        Naproxen Sodium (Tab) ANAPROX 220 MG Take 220 mg by mouth as needed. Indications: Mild to Moderate Pain        Potassium Chloride (Pack) KLOR-CON 20 MEQ Take 1 Packet by mouth every day.        PredniSONE (Tab) DELTASONE 20 MG Take 1 Tab by mouth every day.        RaNITidine HCl (Tab) ZANTAC 150 MG Take 150 mg by mouth every evening.        Venlafaxine HCl (CAPSULE SR 24 HR) EFFEXOR XR 75 MG Take 1 Cap by mouth every day.        .                 Medicines prescribed today were sent to:      [facility]/PHARMACY #[id number] - [place], [place] - [address]    [address] [place] [address]    Phone: [phone] Fax: [phone]    Open 24 Hours?: No      Medication refill instructions:       If your prescription bottle indicates you have medication refills left, it is not necessary to call your provider’s office. Please contact your pharmacy and they will refill your medication.    If your prescription bottle indicates you do not have any refills left, you may request refills at any time through one of the following ways: The online [employer] system (except Urgent Care), by calling your provider’s office, or by asking your pharmacy to contact your provider’s office with a refill request. Medication refills are processed only during regular business hours and may not be available until the next business day. Your provider may request additional information or to have a follow-up visit with you prior to refilling your medication.   *Please Note: Medication refills are assigned a new Rx number when refilled electronically. Your pharmacy may indicate that no refills were authorized even though a new prescription for the same medication is available at the pharmacy. Please request the medicine by name with the pharmacy before contacting your provider for a refill.        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 which may lead to hereditary paraganglioma and pheochromocytoma, also at risk for developing GISTs, unlikely that paraganglioma could compress on vagus nerve,  surveillance recommendations based upon the SDHA mutation, and no biochemical study of plasma metanephrine and catecholamines, MRI basis: Neck every 2 years, MRI body every 3-5 years, thus MRI neck, chest, abdomen, pelvis ordered, daughters will be tested as well  6/17/17 MRI soft tissue neck with and without; negative  6/19/17 MRI pelvis with and without; no suspicious mass  6/19/17 MR abdomen with and without; no suspicious mass  6/19/17 MR chest with and without; nonspecific 7 x 12 mm hyperintense lesion subcarinal region, if concern for paraganglioma further evaluation with gallium-68-dotatate may be helpful for confirmation  6/29/17 CT/PET no evidence of abnormal FDG accumulation, no increased activity subcarinal region  7/21/17 IVIG 0.4 g/kg x 3 days then 0.4g/kg q week x 5 weeks per [facility]   8/16/17 IVIG 0.4 g/kg x 3 days then 0.4g/kg q week per [facility]   11/17 need prevnar                MyChart Access Code: Activation code not generated  Current [place] Status: Active

## 2017-08-22 NOTE — PROGRESS NOTES
Megan August is a 41 y.o. female here for a non-provider visit for Nucala injection.    Reason for injection: Labs/Health    Order in MAR?: No  Patient supplied?:Yes  Minimum interval has been met for this injection (per MAR order): Yes    Order and dose verified by: Dr. Soares   Patient tolerated injection and no adverse effects were observed or reported: Yes    # of Administrations remaining in MAR: 0    NUCALA 100mg   NDC: 7388-5978-39  LOT: [id number]  EXP: 1/30/2018

## 2017-08-23 ENCOUNTER — TELEPHONE (OUTPATIENT)
Dept: MEDICAL GROUP | Facility: MEDICAL CENTER | Age: 42
End: 2017-08-23

## 2017-08-23 NOTE — TELEPHONE ENCOUNTER
1. Caller Name: Nita at [facility]                      Call Back Number: [phone]    2. Message: Received message from Nita with Dr. Evans that they are needing some more orders to be placed through our infusion center. I left message for her to call back and also asked that she fax to [phone] the information on what needs to be ordered.     3. Patient approves office to leave a detailed voicemail/MyChart message: N\A

## 2017-08-27 ENCOUNTER — OUTPATIENT INFUSION SERVICES (OUTPATIENT)
Dept: ONCOLOGY | Facility: MEDICAL CENTER | Age: 42
End: 2017-08-27
Attending: INTERNAL MEDICINE
Payer: COMMERCIAL

## 2017-08-27 VITALS
BODY MASS INDEX: 20.83 KG/M2 | HEIGHT: 66 IN | RESPIRATION RATE: 18 BRPM | SYSTOLIC BLOOD PRESSURE: 108 MMHG | HEART RATE: 57 BPM | OXYGEN SATURATION: 100 % | TEMPERATURE: 97.4 F | WEIGHT: 129.63 LBS | DIASTOLIC BLOOD PRESSURE: 72 MMHG

## 2017-08-27 PROCEDURE — 96375 TX/PRO/DX INJ NEW DRUG ADDON: CPT

## 2017-08-27 PROCEDURE — 96366 THER/PROPH/DIAG IV INF ADDON: CPT

## 2017-08-27 PROCEDURE — 96365 THER/PROPH/DIAG IV INF INIT: CPT

## 2017-08-27 PROCEDURE — 700111 HCHG RX REV CODE 636 W/ 250 OVERRIDE (IP): Performed by: INTERNAL MEDICINE

## 2017-08-27 PROCEDURE — 700105 HCHG RX REV CODE 258: Performed by: INTERNAL MEDICINE

## 2017-08-27 RX ORDER — IMMUNE GLOBULIN INFUSION (HUMAN) 100 MG/ML
20 INJECTION, SOLUTION INTRAVENOUS; SUBCUTANEOUS ONCE
Status: COMPLETED | OUTPATIENT
Start: 2017-08-27 | End: 2017-08-27

## 2017-08-27 RX ORDER — IMMUNE GLOBULIN INFUSION (HUMAN) 100 MG/ML
5 INJECTION, SOLUTION INTRAVENOUS; SUBCUTANEOUS ONCE
Status: COMPLETED | OUTPATIENT
Start: 2017-08-27 | End: 2017-08-27

## 2017-08-27 RX ADMIN — IMMUNE GLOBULIN INFUSION (HUMAN) 20 G: 100 INJECTION, SOLUTION INTRAVENOUS; SUBCUTANEOUS at 10:04

## 2017-08-27 RX ADMIN — IMMUNE GLOBULIN INFUSION (HUMAN) 5 G: 100 INJECTION, SOLUTION INTRAVENOUS; SUBCUTANEOUS at 12:09

## 2017-08-27 RX ADMIN — SODIUM CHLORIDE 250 MG: 9 INJECTION, SOLUTION INTRAVENOUS at 09:20

## 2017-08-27 ASSESSMENT — PAIN SCALES - GENERAL: PAINLEVEL: NO PAIN

## 2017-08-27 NOTE — PROGRESS NOTES
Pt returns to infusion center for weekly IVIG infusion.  Pt now to get solumedrol as a premed.  PIV established, brisk blood return noted.  Premed given as ordered.  Pt tolerated IVIG without incident, rate ramped per rate sheet.  PIV flushed with saline per policy, removed, gauze dressing placed.  Pt left infusion center ambulatory and in good condition.  Returns in one week, appointment scheduled.

## 2017-09-03 ENCOUNTER — OUTPATIENT INFUSION SERVICES (OUTPATIENT)
Dept: ONCOLOGY | Facility: MEDICAL CENTER | Age: 42
End: 2017-09-03
Attending: INTERNAL MEDICINE
Payer: COMMERCIAL

## 2017-09-03 VITALS
HEART RATE: 59 BPM | HEIGHT: 66 IN | WEIGHT: 126.98 LBS | OXYGEN SATURATION: 99 % | TEMPERATURE: 97.1 F | BODY MASS INDEX: 20.41 KG/M2 | RESPIRATION RATE: 18 BRPM | SYSTOLIC BLOOD PRESSURE: 111 MMHG | DIASTOLIC BLOOD PRESSURE: 77 MMHG

## 2017-09-03 PROCEDURE — 700111 HCHG RX REV CODE 636 W/ 250 OVERRIDE (IP): Performed by: INTERNAL MEDICINE

## 2017-09-03 PROCEDURE — 700105 HCHG RX REV CODE 258: Performed by: INTERNAL MEDICINE

## 2017-09-03 PROCEDURE — 96366 THER/PROPH/DIAG IV INF ADDON: CPT

## 2017-09-03 PROCEDURE — 96365 THER/PROPH/DIAG IV INF INIT: CPT

## 2017-09-03 PROCEDURE — 96375 TX/PRO/DX INJ NEW DRUG ADDON: CPT

## 2017-09-03 RX ORDER — IMMUNE GLOBULIN INFUSION (HUMAN) 100 MG/ML
20 INJECTION, SOLUTION INTRAVENOUS; SUBCUTANEOUS ONCE
Status: COMPLETED | OUTPATIENT
Start: 2017-09-03 | End: 2017-09-03

## 2017-09-03 RX ORDER — IMMUNE GLOBULIN INFUSION (HUMAN) 100 MG/ML
5 INJECTION, SOLUTION INTRAVENOUS; SUBCUTANEOUS ONCE
Status: COMPLETED | OUTPATIENT
Start: 2017-09-03 | End: 2017-09-03

## 2017-09-03 RX ADMIN — SODIUM CHLORIDE 250 MG: 9 INJECTION, SOLUTION INTRAVENOUS at 08:45

## 2017-09-03 RX ADMIN — IMMUNE GLOBULIN INFUSION (HUMAN) 20 G: 100 INJECTION, SOLUTION INTRAVENOUS; SUBCUTANEOUS at 09:20

## 2017-09-03 RX ADMIN — IMMUNE GLOBULIN INFUSION (HUMAN) 5 G: 100 INJECTION, SOLUTION INTRAVENOUS; SUBCUTANEOUS at 11:36

## 2017-09-03 ASSESSMENT — PAIN SCALES - GENERAL: PAINLEVEL: NO PAIN

## 2017-09-03 NOTE — PROGRESS NOTES
Patient arrived ambulatory to the [facility] for IVIG. Reviewed vital signs, labs, and physician orders, denies S&S of infection. Initiated IV access to left AC, visualized brisk blood return. Administered pre-treatment medications. Administered IVIG per titration scale, patient tolerated with no issue or adverse reaction. IV flushed per protocol, removed with tip intact, placed gauze and coband dressing. Patient provided list of upcoming apt's. Patient left the [facility] ambulatory in no signs of distress.

## 2017-09-10 ENCOUNTER — OUTPATIENT INFUSION SERVICES (OUTPATIENT)
Dept: ONCOLOGY | Facility: MEDICAL CENTER | Age: 42
End: 2017-09-10
Attending: INTERNAL MEDICINE
Payer: COMMERCIAL

## 2017-09-10 VITALS
TEMPERATURE: 97.4 F | HEART RATE: 66 BPM | OXYGEN SATURATION: 100 % | WEIGHT: 128.75 LBS | SYSTOLIC BLOOD PRESSURE: 118 MMHG | DIASTOLIC BLOOD PRESSURE: 72 MMHG | HEIGHT: 66 IN | BODY MASS INDEX: 20.69 KG/M2

## 2017-09-10 PROCEDURE — 700105 HCHG RX REV CODE 258

## 2017-09-10 PROCEDURE — 700111 HCHG RX REV CODE 636 W/ 250 OVERRIDE (IP): Performed by: INTERNAL MEDICINE

## 2017-09-10 PROCEDURE — 96366 THER/PROPH/DIAG IV INF ADDON: CPT

## 2017-09-10 PROCEDURE — 700111 HCHG RX REV CODE 636 W/ 250 OVERRIDE (IP)

## 2017-09-10 PROCEDURE — 700105 HCHG RX REV CODE 258: Performed by: INTERNAL MEDICINE

## 2017-09-10 PROCEDURE — 96367 TX/PROPH/DG ADDL SEQ IV INF: CPT

## 2017-09-10 PROCEDURE — 96365 THER/PROPH/DIAG IV INF INIT: CPT

## 2017-09-10 RX ORDER — IMMUNE GLOBULIN INFUSION (HUMAN) 100 MG/ML
5 INJECTION, SOLUTION INTRAVENOUS; SUBCUTANEOUS ONCE
Status: COMPLETED | OUTPATIENT
Start: 2017-09-10 | End: 2017-09-10

## 2017-09-10 RX ORDER — IMMUNE GLOBULIN INFUSION (HUMAN) 100 MG/ML
20 INJECTION, SOLUTION INTRAVENOUS; SUBCUTANEOUS ONCE
Status: COMPLETED | OUTPATIENT
Start: 2017-09-10 | End: 2017-09-10

## 2017-09-10 RX ADMIN — SODIUM CHLORIDE 250 MG: 9 INJECTION, SOLUTION INTRAVENOUS at 08:48

## 2017-09-10 RX ADMIN — IMMUNE GLOBULIN INFUSION (HUMAN) 5 G: 100 INJECTION, SOLUTION INTRAVENOUS; SUBCUTANEOUS at 11:34

## 2017-09-10 RX ADMIN — IMMUNE GLOBULIN INFUSION (HUMAN) 20 G: 100 INJECTION, SOLUTION INTRAVENOUS; SUBCUTANEOUS at 09:25

## 2017-09-10 ASSESSMENT — PAIN SCALES - GENERAL
PAINLEVEL: NO PAIN
PAINLEVEL: NO PAIN

## 2017-09-10 NOTE — PROGRESS NOTES
Patient arrived ambulatory to the [facility] for weekly IVIG. Reviewed vital signs, and physician orders. Patient denies S&S of infection. IV access established in left forearm, pre-treatment medication administered. IVIG administered per titration scale, no adverse reaction noted. Patient able to state date and time of upcoming apt. Patient left the [facility] ambulatory in no signs of distress.

## 2017-09-12 ENCOUNTER — TELEPHONE (OUTPATIENT)
Dept: MEDICAL GROUP | Facility: MEDICAL CENTER | Age: 42
End: 2017-09-12

## 2017-09-12 NOTE — TELEPHONE ENCOUNTER
1. [facility]                                      Call Back Number: [phone]      Patient approves a detailed voicemail message: yes    Calling to set up shipment of Pt's Medication, arrival date is set for 09/14/2017.

## 2017-09-17 ENCOUNTER — APPOINTMENT (OUTPATIENT)
Dept: ONCOLOGY | Facility: MEDICAL CENTER | Age: 42
End: 2017-09-17
Attending: INTERNAL MEDICINE
Payer: COMMERCIAL

## 2017-09-19 ENCOUNTER — NON-PROVIDER VISIT (OUTPATIENT)
Dept: MEDICAL GROUP | Facility: MEDICAL CENTER | Age: 42
End: 2017-09-19
Payer: COMMERCIAL

## 2017-09-19 ENCOUNTER — TELEPHONE (OUTPATIENT)
Dept: MEDICAL GROUP | Facility: MEDICAL CENTER | Age: 42
End: 2017-09-19

## 2017-09-19 DIAGNOSIS — Z00.00 PREVENTATIVE HEALTH CARE: ICD-10-CM

## 2017-09-19 DIAGNOSIS — M30.1 CHURG-STRAUSS SYNDROME (HCC): Chronic | ICD-10-CM

## 2017-09-19 DIAGNOSIS — D72.18 CHURG-STRAUSS SYNDROME (HCC): Chronic | ICD-10-CM

## 2017-09-19 PROCEDURE — 96372 THER/PROPH/DIAG INJ SC/IM: CPT | Performed by: INTERNAL MEDICINE

## 2017-09-19 NOTE — PROGRESS NOTES
Megan August is a 41 y.o. female here for a non-provider visit for NUCALA injection.    Reason for injection: Churg-Homer syndrome.  Order in MAR?: No  Patient supplied?:Yes (Mailed to clinic by )  Minimum interval has been met for this injection (per MAR order): Yes    Order and dose verified by: [facility]  Patient tolerated injection and no adverse effects were observed or reported: Yes    # of Administrations remaining in MAR: None      LOT- [id number]  EX - 11/2018  NDC- 6835-8080-39

## 2017-09-20 ENCOUNTER — HOSPITAL ENCOUNTER (OUTPATIENT)
Dept: LAB | Facility: MEDICAL CENTER | Age: 42
End: 2017-09-20
Attending: INTERNAL MEDICINE
Payer: COMMERCIAL

## 2017-09-20 DIAGNOSIS — Z00.00 PREVENTATIVE HEALTH CARE: ICD-10-CM

## 2017-09-20 PROCEDURE — 86762 RUBELLA ANTIBODY: CPT

## 2017-09-20 PROCEDURE — 36415 COLL VENOUS BLD VENIPUNCTURE: CPT

## 2017-09-20 PROCEDURE — 86480 TB TEST CELL IMMUN MEASURE: CPT

## 2017-09-20 PROCEDURE — 86787 VARICELLA-ZOSTER ANTIBODY: CPT

## 2017-09-20 PROCEDURE — 86765 RUBEOLA ANTIBODY: CPT

## 2017-09-20 PROCEDURE — 86735 MUMPS ANTIBODY: CPT

## 2017-09-20 PROCEDURE — 86706 HEP B SURFACE ANTIBODY: CPT

## 2017-09-20 NOTE — TELEPHONE ENCOUNTER
1. Caller Name: Megan August                        Call Back Number: [phone] (home) [phone] (work)      2. Message: Pt starting a new job and needs orders for pre- employment requirements (Hep B, Quantaferon and MMR).  Paperwork scanned into chart.    Spoke with Megan after Dr Soares advised me on this.     Believes she also needs Varicella titer and proof of TDAP.      3. Patient approves office to leave a detailed voicemail/MyChart message: no

## 2017-09-20 NOTE — TELEPHONE ENCOUNTER
Please note the patient we will order the QuantiFERON blood test, the MMR titers, hepatitis B titer, varicella titer  The laboratory test orders are in the computer system    Also please notify the patient she needs to schedule an annual examination appointment with me, I have not seen her in one year, I need to see her periodically since we are administering the medication for her

## 2017-09-22 LAB
HBV SURFACE AB SERPL IA-ACNC: 799.43 MIU/ML (ref 0–10)
M TB TUBERC IFN-G BLD QL: NEGATIVE
M TB TUBERC IFN-G/MITOGEN IGNF BLD: 0.01
M TB TUBERC IGNF/MITOGEN IGNF CONTROL: 50.46 [IU]/ML
MEV IGG SER IA-ACNC: POSITIVE
MITOGEN IGNF BCKGRD COR BLD-ACNC: 0.03 [IU]/ML
MUV IGG SER IA-ACNC: POSITIVE
RUBV AB SER QL: 104.3 IU/ML
VZV IGG SER IA-ACNC: POSITIVE

## 2017-09-23 PROBLEM — Z15.89: Status: ACTIVE | Noted: 2017-09-23

## 2017-09-23 PROBLEM — Z15.89: Chronic | Status: ACTIVE | Noted: 2017-09-23

## 2017-09-24 ENCOUNTER — TELEPHONE (OUTPATIENT)
Dept: MEDICAL GROUP | Facility: MEDICAL CENTER | Age: 42
End: 2017-09-24

## 2017-09-24 ENCOUNTER — APPOINTMENT (OUTPATIENT)
Dept: ONCOLOGY | Facility: MEDICAL CENTER | Age: 42
End: 2017-09-24
Attending: INTERNAL MEDICINE
Payer: COMMERCIAL

## 2017-09-24 NOTE — TELEPHONE ENCOUNTER
Please notify patient that her laboratory tests show;   QuantiFERON gold test is negative meaning no evidence of TB, her hepatitis B, MMR, and varicella all show immunity.

## 2017-09-25 ENCOUNTER — TELEPHONE (OUTPATIENT)
Dept: MEDICAL GROUP | Facility: MEDICAL CENTER | Age: 42
End: 2017-09-25

## 2017-09-25 NOTE — TELEPHONE ENCOUNTER
----- Message from Zohaib Soares M.D. sent at 9/24/2017  4:09 AM PDT -----  Please notify patient that her laboratory tests show;   QuantiFERON gold test is negative meaning no evidence of TB, her hepatitis B, MMR, and varicella all show immunity.

## 2017-09-26 ENCOUNTER — OFFICE VISIT (OUTPATIENT)
Dept: MEDICAL GROUP | Facility: MEDICAL CENTER | Age: 42
End: 2017-09-26
Payer: COMMERCIAL

## 2017-09-26 VITALS
OXYGEN SATURATION: 99 % | HEIGHT: 66 IN | HEART RATE: 66 BPM | WEIGHT: 130 LBS | DIASTOLIC BLOOD PRESSURE: 70 MMHG | SYSTOLIC BLOOD PRESSURE: 112 MMHG | TEMPERATURE: 98.6 F | BODY MASS INDEX: 20.89 KG/M2

## 2017-09-26 DIAGNOSIS — E03.9 HYPOTHYROIDISM, UNSPECIFIED TYPE: Chronic | ICD-10-CM

## 2017-09-26 DIAGNOSIS — K20.0 EOSINOPHILIC ESOPHAGITIS: Chronic | ICD-10-CM

## 2017-09-26 DIAGNOSIS — M30.1 CHURG-STRAUSS SYNDROME (HCC): Chronic | ICD-10-CM

## 2017-09-26 DIAGNOSIS — M35.00 SJOGREN'S SYNDROME, WITH UNSPECIFIED ORGAN INVOLVEMENT (HCC): Chronic | ICD-10-CM

## 2017-09-26 DIAGNOSIS — Z00.00 PREVENTATIVE HEALTH CARE: ICD-10-CM

## 2017-09-26 DIAGNOSIS — D72.18 CHURG-STRAUSS SYNDROME (HCC): Chronic | ICD-10-CM

## 2017-09-26 DIAGNOSIS — Z79.52 CURRENT CHRONIC USE OF SYSTEMIC STEROIDS: Chronic | ICD-10-CM

## 2017-09-26 DIAGNOSIS — K92.89 GASTROINTESTINAL DYSMOTILITY: ICD-10-CM

## 2017-09-26 DIAGNOSIS — Z23 NEEDS FLU SHOT: ICD-10-CM

## 2017-09-26 PROCEDURE — 99396 PREV VISIT EST AGE 40-64: CPT | Mod: 25 | Performed by: INTERNAL MEDICINE

## 2017-09-26 PROCEDURE — 90686 IIV4 VACC NO PRSV 0.5 ML IM: CPT | Performed by: INTERNAL MEDICINE

## 2017-09-26 PROCEDURE — 90471 IMMUNIZATION ADMIN: CPT | Performed by: INTERNAL MEDICINE

## 2017-09-26 ASSESSMENT — PATIENT HEALTH QUESTIONNAIRE - PHQ9: CLINICAL INTERPRETATION OF PHQ2 SCORE: 0

## 2017-09-26 NOTE — LETTER
September 27, 2017        Dear Sir or Madam,      This letter is in regards to Ms. Megan August ([birth year]) a patient at [facility]. Ms. August is under medical supervision by myself and by her immunology and gastrointestinal specialists at the [facility], for a rare genetic disorder.    Recently, Ms. August as part of a diagnostic test, ingested a smart pill containing a sensor composed of two digestible metals, copper and magnesium.  The purpose of this test is to evaluate her gastrointestinal motility, in essence her stomach and intestines do not function normally, and this particular device is utilized to measure motility or movement in her gastrointestinal system.  Because Ms. August has a slow transit time, the device that she has ingested has not yet passed through her gastrointestinal system, and potentially the components of the device may trigger a metal detector or screening device monitored by the Transportation Security Administration at airports.  We apologize for any inconvenience this may cause the TSA or her fellow travelers.    We are kindly asking for the consideration of TSA employees, to understand that Ms. August still has a monitoring device in her gastrointestinal tract, which potentially may trigger a false positive metal detector reaction.  This letter is just an advisory that this circumstance may occur. We request that the TSA take this under advisement in order to potentially limit the amount of repetitive screening that normally would be necessary given the underlying circumstances.    We understand and appreciate the difficulties that the TSA encounters daily, and we respect your professionalism and commitment to keeping our nation's airports safe for travel.  We both thank you for your time and consideration in this matter, and for your dedication and service to our country.        Sincerely,        Zohaib Soares M.D.    Electronically Signed

## 2017-09-26 NOTE — PROGRESS NOTES
Subjective:      Megan August is a 41 y.o. 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  Patient has not lost weight.  Also taking restasis and prescription toothpaste   Does treadmill 3.4 mph 30 minutes  Only drinks water, unable to have any other liquids because of GI upset and food allergies  Sleep 7 hours at night , no anxiety, depression, good social support with family  Stools every 5-6 days  No urine issues , no incontinence  No recurrent sinus tract infections  No vision changes  Anxiety stable on Effexor, no depression, no mood changes  Social history , no tobacco, no alcohol, about to start new job at [employer]  Family history no changes  Medications, allergies, medical history, surgical history, social history, family history reviewed and updated              Current Outpatient Prescriptions   Medication Sig Dispense Refill   • cyclosporin (RESTASIS) 0.05 % ophthalmic emulsion Place 1 Drop in both eyes 2 times a day. Three month supply and three refills 180 Each 3   • Mepolizumab 100 MG Recon Soln Inject 100 mg as instructed every 28 days. 1 Each 11   • drospirenone-ethinyl estradiol (ASHLEY) 3-0.03 MG per tablet Take 1 Tab by mouth every day. 28 Tab 5   • potassium  chloride (KLOR-CON) 20 MEQ Pack Take 1 Packet by mouth every day. 90 Packet 3   • Cholecalciferol (VITAMIN D3) Liquid 2,000 Units by Does not apply route every day. Vitamin D2 or D3 drops, 2000 units daily, 3 months supply 1 Bottle 3   • venlafaxine XR (EFFEXOR XR) 75 MG CAPSULE SR 24 HR Take 1 Cap by mouth every day. 90 Cap 3   • levothyroxine (SYNTHROID) 112 MCG Tab One po six days per week 90 Tab 1   • mycophenolate (CELLCEPT) 500 MG tablet Take 500 mg by mouth 2 times a day.     • ranitidine (ZANTAC) 150 MG Tab Take 150 mg by mouth every evening.     • ibuprofen (MOTRIN) 200 MG Tab Take 200 mg by mouth every 6 hours as needed for Mild Pain.     • naproxen (ALEVE) 220 MG tablet Take 220 mg by mouth as needed. Indications: Mild to Moderate Pain     • predniSONE (DELTASONE) 20 MG Tab Take 1 Tab by mouth every day. 30 Tab 0   • drospirenone-ethinyl estradiol (ASHLEY) 3-0.03 MG per tablet Take 1 Tab by mouth every day. 28 Tab 5   • Fexofenadine HCl (ALLEGRA PO) Take 1 Tab by mouth every evening.       No current facility-administered medications for this visit.      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   • Autonomic neuropathy 03/23/2016   • Constipation 02/11/2015   • Eosinophilic esophagitis 05/28/2013   • History of melanoma 04/04/2011   • Dyslipidemia 02/20/2011   • Preventative health care 11/13/2009   • Hypothyroid 06/20/2009   • Coronary artery fistula 06/20/2009   • Polycystic ovary disease 06/20/2009   • Anxiety 06/20/2009   • Sjogren's disease (CMS-HCC) 06/20/2009           Anxiety  8/6/16 effexor over 4 years     Autonomic neuropathy  2/11/16 neurology [facility] evaluation, qsweat response is reduced in the foot, heart rate and blood pressure response to deep breathing and valsalva intact and normal, blood pressure response to 10  minute tilt table head up mild increase in blood pressure oscillations with moderate postural tachycardia although this remains in the normal range, patient was asymptomatic; conclusion cardio vagal and cardiovascular adrenergic responses were intact, distal postganglionic sympathetic sudomotor function was decreased, a limited autonomic neuropathy is possible, medications (venlafaxine) may have caused the abnormalities  4/22/16 referral dr.gerald fitzgerald [facility] allergy for churg amber [phone]  5/9/16  [facility] neurology consultation note evaluation possible autoimmune CNS disease; exam notable for decreased laboratory sensation hyperreflexia, evaluate for underlying autoimmunity further testing repeat YOON, check thyroid antibodies, prilosec and intrinsic factor antibodies, copper, zinc, MARLEN panel, antibody testing with autoimmune dysautonomia panel sent off to [facility], MRI cervical and thoracic spine, lumbar puncture in office to evaluate for CSF evidence of inflammation, follow-up 3 months  7/28/16 thiopurine methyltransferase 34 (24-34)  5/30/17 note per 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, will be seeing genetticist next week then MRI and surgery  6/11/17 plasma epinephrine, norepinephrine, dopamine, chromogranin A negative done at [facility]  6/12/17 EGD at [facility] diffuse mild mucosal changes upper third esophagus, middle third and lower third esophagus, biopsies performed, normal stomach  6/17/17 MRI soft tissue neck with and without; negative  6/19/17 genetics evaluation dr.joshua lange [facility], patient underwent whole exome sequencing in january negative for mutations that would explain her medical condition, but revealed 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 denoted as c.[id number] which may lead to hereditary paraganglioma and pheochromocytoma, also at risk for developing GISTs,  unlikely that paraganglioma could compress on vagus nerve, surveillance recommendations based upon the SDHA mutation, annual biochemical study of plasma metanephrine and catecholamines, MRI base of skull andneck every 2 years, MRI body every 3-5 years, thus MRI neck, chest, abdomen, pelvis ordered, daughters will be tested as well  6/19/17 MRI pelvis with and without; no suspicious mass  6/19/17 MR abdomen with and without; no suspicious mass  6/19/17 MR chest with and without; nonspecific 7 x 12 mm hyperintense lesion subcarinal region, if concern for paraganglioma further evaluation with gallium-68-dotatate may be helpful for confirmation  6/29/17 CT/PET no evidence of abnormal FDG accumulation, no increased activity subcarinal region    biallelic mutation mutation SDHA gene   5/30/17 note per 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, will be seeing genetticist next week then MRI and surgery  6/19/17 genetics evaluation dr.joshua lange [facility], patient underwent whole exome sequencing in january negative for mutations that would explain her medical condition, but revealed 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 denoted as c.[id number] which may lead to hereditary paraganglioma and pheochromocytoma, also at risk for developing GISTs, unlikely that paraganglioma could compress on vagus nerve, surveillance recommendations based upon the SDHA mutation, annual biochemical study of plasma metanephrine and catecholamines, MRI base of skull andneck every 2 years, MRI body every 3-5 years, thus MRI neck, chest, abdomen, pelvis ordered, daughters will be tested as well  6/17/17 MRI soft tissue neck with and without; negative  6/19/17 MRI pelvis with and without; no suspicious mass  6/19/17 MR abdomen with and without; no suspicious mass  6/19/17 MR chest with and without; nonspecific 7 x 12 mm hyperintense lesion subcarinal region, if concern for paraganglioma  further evaluation with gallium-68-dotatate may be helpful for confirmation  6/29/17 CT/PET no evidence of abnormal FDG accumulation, no increased activity subcarinal region     Churg-Homer disease  2/9/15 wbc 5.6 with 20% eosinophils  12/19/15 gastroenterology evaluation [facility] eosinophil area may be more related to autoimmune process, question whether this is allergic phenomena related to connective tissue disorder and elevated levels of transforming growth factor beta that will also recruit eosinophils, will repeat serologies, biopsy for direct immunofluorescence and MBP to evaluate eosinophilic esophagitis, nutritional consultation, recheck immunoglobulins as well, check MRI brain  12/19/15 iron 84,TIBC 324,transferrin 26,tsh 2.8,vit d 28,vitamin A levels normal, b12 418,vitamin b6 58 normal, alpha and gamma tocopherol normal,vitamin k normal, wbc 6.3 (9% eosinophils),IgG,IgA,IgM normal,IgG sublass 4 and IgE normal,SPEP negative, tTg IgA normal,ESR 3,crp 0.9,copper normal,selenium normal,zinc 46 (),folate and b1 normal,YOON 1:160 homogenous,anti-scl 70 negative, centromere Ab negative, TH/TO Ab negative, RNA plymerase III negative, U1-RNP Ab negative,fibrillarin U3 negative, PM/Scl negative,C3 and C4 negative, RG 13, CCP 4   2/10/16 [facility] nutrition consultation estimated needs [id number] kcal/day,  3/27/15 colon per [facility] tubular adenomas x2, negative for colitis  4/16/15 [facility] note CBC peripheral eosinophilia 21%, constipation by CT scan, colonoscopy unremarkable, biopsies negative, check YOON, immunoglobulins, tryptase, stool giardia antigen  4/16/15 wbc 3.9 with 5.7% eosinophils   5/28/15 [facility] note followup bloating and constipation, repeat labs WBC 3.9, normal eosinophils, TSH, YOON, immunoglobulins, stool for ova and parasites negative, tryptase negative, previous CT and colonoscopy negative, no evidence of eosinophilic colitis despite history of eosinophilic esophagitis and peripheral  eosinophilia in the past, continue PPI and miralax  8/27/15 [facility] note, abdominal distention bowel visit, previous CT scan showed constipation, and colonoscopy random biopsies unremarkable without increased eosinophils, additional labs for parasites, immunoglobulin, thyroid function, tryptase, YOON negative, treated empirically for enteritis/colitis with improvement of symptoms question muscular involvement of GI tract with eosinophils given peripheral eosinophilia along with history of eosinophilic esophagitis and improvement with short course of oral prednisone vs. IBS, trial of linzess 290 mcg daily, followup 3 months  10/21/15 patient written note went to [facility] and diagnosed with bacterial overgrowth, trial of xifaxin with improvement in symptoms   1/7/16 EGD by GI in [place], mucosal changes consistent with esophageal esophagitis, biopsies obtained polyps gastric fundus, small 1 cm hiatal hernia  4/14/16 EGD per [facility] diffuse exudates and edema upper third esophagus, biopsies performed, diffuse exudates and edema lower third esophagus, biopsies performed  8/8/16 on neocate judy one can per day 1800 vera and drinks water three times a day   9/16/16 on azathioprine 50 mg 2 per day per [facility]  9/23/16 patient spoke to [facility] GI 's assistant raman hayes, stop azathioprine and start prednisone 20 mg daily, consider methotrexate  11/16/16 apparently has been on prednisone for one year from [place] and on cellcept, will order dexa  2/21/17  [facility] note failed azathioprine due to hair loss and off mycophenolate, now on prednisone 20 mg daily, try to obtain approval for mepolizumab 100 mg q month  3/24/17  [facility] note, discuss prednisone withdrawal, when she is treated with mepolizumab wait 2 weeks then reduce prednisone to 17.5 mg, then in 2 weeks with second injection mepolizumab will reduce prednisone to 15 mg, and 2 weeks after that to 12.5  mg, and in 2 weeks 10 mg  4/4/17 nucala 100 mg sc administered first dose  6/11/17 plasma epinephrine, norepinephrine, dopamine, chromogranin A negative done at [facility]  6/12/17 EGD at [facility] diffuse mild mucosal changes upper third esophagus, middle third and lower third esophagus, biopsies performed, normal stomach  7/21/17 IVIG 0.4 g/kg x 3 days then 0.4g/kg q week x 5 weeks per [facility]   8/16/17 IVIG 0.4 g/kg x 3 days then 0.4g/kg q week per [facility]     Constipation  3/16/15 CT abdomen pelvis with; large amount colonics stool    Coronary artery fistula  Sees [facility] last report I have in 2000, apparently worked up in [facility] asymptomatic    Chronic steroids  11/15 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 per [facility]  11/22/16 dexa LS -0.3,hip -0.4; FRAX 3.0% major,0.1% hip on prednisone 20 mg   12/11/16 reclast infusion  12/12/16 reaction to reclast seen ER    Dyslipidemia  2/11 chol 213,trig 102,hdl 58,ldl 135  3/12 chol 239,trig 122,hdl 60,ldl 155  7/13 chol 230,trig 121,hdl 54,ldl 152  1/7/14 chol 219,trig 139,hdl 60,ldl 131  9/18/15 chol 239,trig 162,hdl 63,ldl 144,crp 0.7     Eosinophilic esophagitis  5/13 EGD per [facility] eosinophils, started on prilosec 20 mg  6/6/13 EGD per [facility] marked esophageal eosinophilia, consistent with likely eosinophilic esophagitis, trial PPI x8 weeks, if symptoms persist consider trial swallowed fluticasone x 6 weeks.  1/8/14 trial of swallowed fluticsone 220 mcg two puffs bid  7/28/14 flare up after eating sushi, changed to pulmicort respules 1 mg bid with splenda and prilosec bid  8/28/14 EGD per [facility] benign stricture 13 mm that appeared 38 cm from the incisors, compatible with eosinophilic esophagitis, savary dilation successful, biopsies benign squamous mucosa also negative for eosinophilic esophagitis, no intestinal metaplasia, dysplasia or malignancy continue omeprazole bid and swallowed fluticasone  2/9/15 wbc 5.6 with 20%  eosinophils  3/27/15 colon per [facility] tubular adenomas x2, negative for colitis  4/16/15 [facility] note CBC peripheral eosinophilia 21%, constipation by CT scan, colonoscopy unremarkable, biopsies negative, will check YOON, immunoglobulins, tryptase, stool giardia antigen  4/16/15 wbc 3.9 with 5.7% eosinophils    5/28/15 [facility] note followup bloating and constipation, repeat labs WBC 3.9, normal eosinophils, TSH, YOON, immunoglobulins, stool for ova and parasites negative, tryptase negative, previous CT and colonoscopy negative, no evidence of eosinophilic colitis despite history of eosinophilic esophagitis and peripheral eosinophilia in the past, continue PPI and MiraLAX  8/27/15 [facility] note, abdominal distention bowel visit, previous CT scan showed constipation, and colonoscopy random biopsies unremarkable without increased eosinophils, additional labs for parasites, immunoglobulin, thyroid function, tryptase, YOON negative, treated empirically for enteritis/colitis with improvement of symptoms,question muscular involvement of GI tract with eosinophils given peripheral eosinophilia along with history of eosinophilic esophagitis and improvement with short course of oral prednisone vs. IBS, trial of linzess 290 mcg daily, followup 3 months  10/21/15 patient written note went to [place] and diagnosed with bacterial overgrowth, trial of xifaxin with improvement in symptoms    16 EGD per GI diffuse exudates and edema upper third esophagus, biopsies performed, diffuse exudates and edema lower third esophagus, biopsies performed  16 on  judy one can per day 1800 vera and drinks water three times a day   17 EGD at [facility] diffuse mild mucosal changes upper third esophagus, middle third and lower third esophagus, biopsies performed, normal stomach  17 IVIG 0.4 g/kg x 3 days then 0.4g/kg q week x 5 weeks per [facility]   17 IVIG 0.4 g/kg x 3 days then 0.4g/kg q week per [facility]  [place] premedicated with 250 mg solumedrol     History melanoma  3/11 derm note  malignant melanoma honey level III, 0.65 mm.  8/12 [facility] derm note h/o melanoma amelanotic variant, stage 1b 0.7 mm in depth left neck 5/11; no recurrence follow up one year  2013 sees  once a year and [facility] once per year  3/16/15 CT abdomen pelvis with; large amount colonics stool     Hypothyroid  2/25/09 tsh 0.019, levothyroxine adjusted to 0.137 mcg sat,sund and levoxyl 0.125 mcg M-F needs repeat  9/09 tsh 0.033 change tolevothyroxine 125 mcg daily repeat in 6 weeks  11/09 tsh 0.032, change to levothyroxine 112 mcg repeat in 6 weeks  1/10 tsh 0.03, change to levothyroxine 100 mcg  10/10 tsh 3.0 cont levothyroxine 100 mcg  11/10 change to levothyroxine 112 mcg repeat tsh 6 weeks; 11/18/10 pt did not change dose, still on 100 mcg  2/11 tsh 0.9 cont on levothyroxine 100 mcg  12/30/11 tsh 0.7, on levothyroxine 112 mcg, decreased to 100 mcg  3/12 tsh 0.25 on levothyroxine 100 mcg; decrease to 88 mcg  7/12/12 tsh 1.4,free t4 0.79, free t3 2.1; on levothyroxine 88 mcg  7/13 tsh 1.2 on levothyroxine 88 mcg  1/7/14 tsh 0.7, free t4 0.8, free t3,on levothyroxine 88 mcg  2/9/15 tsh 5.7 on levothyroxine 88 mcg,increase to 100 mcg and repeat tsh in 6 weeks  4/16/15 tsh 0.8 on levothyroxine 100 mcg  9/18/15 tsh 5.3 on levothyroxine 100 mcg, increase to 112 mcg repeat tsh in 6 weeks  12/3/15 tsh 0.7 on levothyroxine 112 mcg  12/2/16 tsh 0.15 on levothyroxine 112 mcg daily, change to 112 mcg take six days per week, repeat tsh in 6 weeks  4/14/17 tsh 1.0 on levothyroxine 112 mcg six days per week     Polycystic ovary  Sees gyn no old records  2010  started on ocella  5/31/11 pap negative     Preventative health  5/31/11 pap   2/3/15 tdap  2/3/15 hep b first in series  3/27/15 colon per [facility] tubular adenomas x2, negative for colitis  11/22/16 dexa LS -0.3,hip -0.4; FRAX 3.0% major,0.1% hip on prednisone 20 mg   11/23/16  pneumovax  4/14/17 vit d 32 increase by 2000 units daily  6/20/17 mammogram      Sjogren's  Uses restasis  3/10 optho eval  [id number]  11/11 saw  ductal plugs inserted  1/12 vigamox prn ophthalmic  3/14  eye duct surgery on restasis  6/12/17 EGD at [facility] diffuse mild mucosal changes upper third esophagus, middle third and lower third esophagus, biopsies performed, normal stomach            Depression Screening    Little interest or pleasure in doing things?  0 - not at all  Feeling down, depressed , or hopeless? 0 - not at all  Patient Health Questionnaire Score: 0       Review of Systems   Constitutional: Positive for malaise/fatigue. Negative for weight loss.   HENT: Negative for congestion and hearing loss.    Eyes: Negative for blurred vision and double vision.   Respiratory: Negative for cough and shortness of breath.    Cardiovascular: Negative for chest pain and palpitations.   Gastrointestinal: Negative for abdominal pain, constipation, diarrhea and heartburn.   Genitourinary: Negative for frequency.   Musculoskeletal: Negative for joint pain.   Neurological: Negative for headaches.   Psychiatric/Behavioral: Negative for depression and memory loss. The patient has insomnia. The patient is not nervous/anxious.           Objective:        Physical Exam   Constitutional: She appears well-developed and well-nourished. No distress.   HENT:   Head: Normocephalic and atraumatic.   Right Ear: External ear normal.   Left Ear: External ear normal.   Eyes: Conjunctivae are normal. Right eye exhibits no discharge. Left eye exhibits no discharge.   Neck: No JVD present. No thyromegaly present.   Cardiovascular: Normal rate, regular rhythm and normal heart sounds.    No murmur heard.  Pulmonary/Chest: Effort normal and breath sounds normal. No respiratory distress. She has no wheezes.   Abdominal: Soft. She exhibits no distension.   Musculoskeletal: She exhibits no edema.   Skin: Skin  is warm. She is not diaphoretic.   Psychiatric: She has a normal mood and affect. Her behavior is normal.   Nursing note and vitals reviewed.              Assessment/Plan:     Assessment  #! Annual    #2 gastrointestinal dysmotility followed by GI at [place], related to gene mutation, apparently only 9 cases of the [place], the patient with decreased motility, food allergies, particularly difficult situation, only able to take in  judy supplements one can per day and water, unable to eat any other foods because of food allergies and gastrointestinal dysmotility syndrome.  Trial of IVIG was successful as she has tried and failed prednisone, nucala , azathioprine    #3 eosinophilic esophagitis    #4 anxiety on Effexor stable    #5 autonomic neuropathy [facility] genetic evaluation  17 genetics evaluation dr.joshua lange [facility], patient underwent whole exome sequencing in january negative for mutations that would explain her medical condition, but revealed 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 denoted as c.91C>T;[id number] which may lead to hereditary paraganglioma and pheochromocytoma, also at risk for developing GISTs, unlikely that paraganglioma could compress on vagus nerve, surveillance recommendations based upon the SDHA mutation, annual biochemical study of plasma metanephrine and catecholamines, MRI base of skull andneck every 2 years, MRI body every 3-5 years, thus MRI neck, chest, abdomen, pelvis ordered, daughters will be tested as well    #6  Chronic steroids prednisone 4 mg unable to taper per [place] [employer], on reclast     #7 Dyslipidemia diet-controlled    #8 history melanoma  sees  once a year and [facility] once per year     #9 Hypothyroid on supplementation 17 tsh 1.0 on levothyroxine 112 mcg six days per week     #10 Preventative health  11 pap   2/3/15 tdap  2/3/15 hep b first in series  3/27/15 colon per [facility] tubular  adenomas x2, negative for colitis  16 dexa LS -0.3,hip -0.4; FRAX 3.0% major,0.1% hip on prednisone 20 mg   16 pneumovax  17 vit d 32 increase by 2000 units daily  17 mammogram               Plan  #1 labs    #2 reclast infusion due in December due to long-term prednisone therapy    #3 continue Nucala is not experiencing nausea, headaches or side effects    #4 continue cardiovascular weightbearing exercise as tolerated    #5 continue Effexor    #6 IVIG per [facility]    #6 smart pill testing being conducted, letter provided to patient    #7 continue  feedings, unable to eat any other foods or liquids besides water, she needs his to maintain her nutrition, with progression of disease she may ultimately require intravenous parenteral nutrition alone, which has adverse long-term consequences, thus we will try to do all we can to help her and the [facility] continue her  drinks as well as the IVIG    #8 mammogram annually    #9 influenza vaccine    #10 Follow-up gynecology    #11 pneumococcal 13 vaccination in December given chronic immunosuppression state    #12 follow-up 6 months with myself, follow-up sooner [facility]

## 2017-09-27 ENCOUNTER — HOSPITAL ENCOUNTER (OUTPATIENT)
Dept: LAB | Facility: MEDICAL CENTER | Age: 42
End: 2017-09-27
Attending: INTERNAL MEDICINE
Payer: COMMERCIAL

## 2017-09-27 ENCOUNTER — TELEPHONE (OUTPATIENT)
Dept: MEDICAL GROUP | Facility: MEDICAL CENTER | Age: 42
End: 2017-09-27

## 2017-09-27 DIAGNOSIS — Z00.00 PREVENTATIVE HEALTH CARE: ICD-10-CM

## 2017-09-27 LAB
ALBUMIN SERPL BCP-MCNC: 3.9 G/DL (ref 3.2–4.9)
ALBUMIN/GLOB SERPL: 1.1 G/DL
ALP SERPL-CCNC: 36 U/L (ref 30–99)
ALT SERPL-CCNC: 18 U/L (ref 2–50)
ANION GAP SERPL CALC-SCNC: 8 MMOL/L (ref 0–11.9)
AST SERPL-CCNC: 30 U/L (ref 12–45)
BASOPHILS # BLD AUTO: 1.1 % (ref 0–1.8)
BASOPHILS # BLD: 0.04 K/UL (ref 0–0.12)
BILIRUB SERPL-MCNC: 0.6 MG/DL (ref 0.1–1.5)
BUN SERPL-MCNC: 10 MG/DL (ref 8–22)
CALCIUM SERPL-MCNC: 9.3 MG/DL (ref 8.5–10.5)
CHLORIDE SERPL-SCNC: 105 MMOL/L (ref 96–112)
CHOLEST SERPL-MCNC: 213 MG/DL (ref 100–199)
CO2 SERPL-SCNC: 26 MMOL/L (ref 20–33)
CREAT SERPL-MCNC: 0.7 MG/DL (ref 0.5–1.4)
EOSINOPHIL # BLD AUTO: 0.01 K/UL (ref 0–0.51)
EOSINOPHIL NFR BLD: 0.3 % (ref 0–6.9)
ERYTHROCYTE [DISTWIDTH] IN BLOOD BY AUTOMATED COUNT: 47.3 FL (ref 35.9–50)
EST. AVERAGE GLUCOSE BLD GHB EST-MCNC: 100 MG/DL
GFR SERPL CREATININE-BSD FRML MDRD: >60 ML/MIN/1.73 M 2
GLOBULIN SER CALC-MCNC: 3.6 G/DL (ref 1.9–3.5)
GLUCOSE SERPL-MCNC: 71 MG/DL (ref 65–99)
HBA1C MFR BLD: 5.1 % (ref 0–5.6)
HCT VFR BLD AUTO: 41.8 % (ref 37–47)
HDLC SERPL-MCNC: 76 MG/DL
HGB BLD-MCNC: 14 G/DL (ref 12–16)
IMM GRANULOCYTES # BLD AUTO: 0 K/UL (ref 0–0.11)
IMM GRANULOCYTES NFR BLD AUTO: 0 % (ref 0–0.9)
LDLC SERPL CALC-MCNC: 121 MG/DL
LYMPHOCYTES # BLD AUTO: 0.27 K/UL (ref 1–4.8)
LYMPHOCYTES NFR BLD: 7.1 % (ref 22–41)
MCH RBC QN AUTO: 32.3 PG (ref 27–33)
MCHC RBC AUTO-ENTMCNC: 33.5 G/DL (ref 33.6–35)
MCV RBC AUTO: 96.5 FL (ref 81.4–97.8)
MONOCYTES # BLD AUTO: 0.31 K/UL (ref 0–0.85)
MONOCYTES NFR BLD AUTO: 8.2 % (ref 0–13.4)
NEUTROPHILS # BLD AUTO: 3.15 K/UL (ref 2–7.15)
NEUTROPHILS NFR BLD: 83.3 % (ref 44–72)
NRBC # BLD AUTO: 0 K/UL
NRBC BLD AUTO-RTO: 0 /100 WBC
PLATELET # BLD AUTO: 162 K/UL (ref 164–446)
PMV BLD AUTO: 12.2 FL (ref 9–12.9)
POTASSIUM SERPL-SCNC: 3.7 MMOL/L (ref 3.6–5.5)
PROT SERPL-MCNC: 7.5 G/DL (ref 6–8.2)
RBC # BLD AUTO: 4.33 M/UL (ref 4.2–5.4)
SODIUM SERPL-SCNC: 139 MMOL/L (ref 135–145)
TRIGL SERPL-MCNC: 81 MG/DL (ref 0–149)
TSH SERPL DL<=0.005 MIU/L-ACNC: 2.14 UIU/ML (ref 0.3–3.7)
WBC # BLD AUTO: 3.8 K/UL (ref 4.8–10.8)

## 2017-09-27 PROCEDURE — 80061 LIPID PANEL: CPT

## 2017-09-27 PROCEDURE — 80053 COMPREHEN METABOLIC PANEL: CPT

## 2017-09-27 PROCEDURE — 84443 ASSAY THYROID STIM HORMONE: CPT

## 2017-09-27 PROCEDURE — 83036 HEMOGLOBIN GLYCOSYLATED A1C: CPT

## 2017-09-27 PROCEDURE — 36415 COLL VENOUS BLD VENIPUNCTURE: CPT

## 2017-09-27 PROCEDURE — 85025 COMPLETE CBC W/AUTO DIFF WBC: CPT

## 2017-09-27 ASSESSMENT — ENCOUNTER SYMPTOMS
HEARTBURN: 0
HEADACHES: 0
DOUBLE VISION: 0
CONSTIPATION: 0
SHORTNESS OF BREATH: 0
ABDOMINAL PAIN: 0
DEPRESSION: 0
PALPITATIONS: 0
COUGH: 0
NERVOUS/ANXIOUS: 0
MEMORY LOSS: 0
BLURRED VISION: 0
WEIGHT LOSS: 0
INSOMNIA: 1
DIARRHEA: 0

## 2017-09-28 ENCOUNTER — TELEPHONE (OUTPATIENT)
Dept: MEDICAL GROUP | Facility: MEDICAL CENTER | Age: 42
End: 2017-09-28

## 2017-09-29 ENCOUNTER — TELEPHONE (OUTPATIENT)
Dept: MEDICAL GROUP | Facility: MEDICAL CENTER | Age: 42
End: 2017-09-29

## 2017-09-29 NOTE — TELEPHONE ENCOUNTER
Called the patient and left message  Please notify her that her blood tests show:  (1) normal liver function, kidney function and blood sugar  (2) thyroid test is normal continue same dose no changes  (3) cholesterol is 213 total, good cholesterol is 76 (goal is above 40), bad cholesterol is 121 (goal is less than 100), no medications are necessary

## 2017-09-29 NOTE — TELEPHONE ENCOUNTER
----- Message from Zohaib Soares M.D. sent at 9/28/2017  8:13 PM PDT -----  Called the patient and left message  Please notify her that her blood tests show:  (1) normal liver function, kidney function and blood sugar  (2) thyroid test is normal continue same dose no changes  (3) cholesterol is 213 total, good cholesterol is 76 (goal is above 40), bad cholesterol is 121 (goal is less than 100), no medications are necessary

## 2017-09-30 ENCOUNTER — APPOINTMENT (OUTPATIENT)
Dept: ONCOLOGY | Facility: MEDICAL CENTER | Age: 42
End: 2017-09-30
Attending: INTERNAL MEDICINE
Payer: COMMERCIAL

## 2017-10-08 ENCOUNTER — APPOINTMENT (OUTPATIENT)
Dept: ONCOLOGY | Facility: MEDICAL CENTER | Age: 42
End: 2017-10-08
Attending: INTERNAL MEDICINE
Payer: COMMERCIAL

## 2017-10-09 RX ORDER — LEVOTHYROXINE SODIUM 112 UG/1
TABLET ORAL
Qty: 90 TAB | Refills: 2 | Status: SHIPPED | OUTPATIENT
Start: 2017-10-09 | End: 2018-05-11 | Stop reason: SDUPTHER

## 2017-10-15 ENCOUNTER — OUTPATIENT INFUSION SERVICES (OUTPATIENT)
Dept: ONCOLOGY | Facility: MEDICAL CENTER | Age: 42
End: 2017-10-15
Attending: INTERNAL MEDICINE
Payer: COMMERCIAL

## 2017-10-15 NOTE — PROGRESS NOTES
"Pt did not show up for 0800 appt. CARMINE called pt and she reported MD has \"cancelled her IVIG treatment\". When asked if future appts should be cancelled as well, she reported that she would call MD and ask if she needed to keep next week's appt and would let us know.   "

## 2017-10-20 ENCOUNTER — NON-PROVIDER VISIT (OUTPATIENT)
Dept: MEDICAL GROUP | Facility: MEDICAL CENTER | Age: 42
End: 2017-10-20
Payer: COMMERCIAL

## 2017-10-20 NOTE — PROGRESS NOTES
Megan August is a 41 y.o. female here for a non-provider visit for NUCALA injection.    Reason for injection: Sjogrens disease.  Order in MAR?: No  Patient supplied?:Yes  Minimum interval has been met for this injection (per MAR order): Yes    Order and dose verified by: [facility]  Patient tolerated injection and no adverse effects were observed or reported: Yes    # of Administrations remaining in MAR: 0

## 2017-10-22 ENCOUNTER — APPOINTMENT (OUTPATIENT)
Dept: ONCOLOGY | Facility: MEDICAL CENTER | Age: 42
End: 2017-10-22
Attending: INTERNAL MEDICINE
Payer: COMMERCIAL

## 2017-10-24 DIAGNOSIS — Z79.52 CURRENT CHRONIC USE OF SYSTEMIC STEROIDS: Chronic | ICD-10-CM

## 2017-10-24 DIAGNOSIS — D72.18 CHURG-STRAUSS SYNDROME (HCC): Chronic | ICD-10-CM

## 2017-10-24 DIAGNOSIS — M30.1 CHURG-STRAUSS SYNDROME (HCC): Chronic | ICD-10-CM

## 2017-10-24 DIAGNOSIS — K20.0 EOSINOPHILIC ESOPHAGITIS: Chronic | ICD-10-CM

## 2017-10-24 DIAGNOSIS — K92.89 GASTROINTESTINAL DYSMOTILITY: ICD-10-CM

## 2017-10-25 RX ORDER — PREDNISONE 1 MG/1
TABLET ORAL
Qty: 270 TAB | Refills: 2 | Status: SHIPPED | OUTPATIENT
Start: 2017-10-25 | End: 2018-10-16 | Stop reason: SDUPTHER

## 2017-10-27 RX ORDER — POTASSIUM CHLORIDE 1.5 G/1.58G
20 POWDER, FOR SOLUTION ORAL DAILY
Qty: 90 PACKET | Refills: 3 | Status: SHIPPED | OUTPATIENT
Start: 2017-10-27 | End: 2018-11-29 | Stop reason: SDUPTHER

## 2017-11-03 NOTE — TELEPHONE ENCOUNTER
Spoke with Megan, the Potassium packets at [facility] are horrible tasting and she would like to get from \[facility]\"". Spoke with Dr Soares and the transfer is fine with him. Spoke with Elidia. They can fill on Mon. Pt notified.

## 2017-11-09 ENCOUNTER — TELEPHONE (OUTPATIENT)
Dept: MEDICAL GROUP | Facility: MEDICAL CENTER | Age: 42
End: 2017-11-09

## 2017-11-12 ENCOUNTER — HOSPITAL ENCOUNTER (OUTPATIENT)
Dept: RADIOLOGY | Facility: MEDICAL CENTER | Age: 42
End: 2017-11-12
Attending: INTERNAL MEDICINE
Payer: COMMERCIAL

## 2017-11-12 DIAGNOSIS — K20.0 EOSINOPHILIC ESOPHAGITIS: ICD-10-CM

## 2017-11-12 PROCEDURE — 74000 DX-ABDOMEN-1 VIEW: CPT

## 2017-11-13 ENCOUNTER — HOSPITAL ENCOUNTER (OUTPATIENT)
Dept: LAB | Facility: MEDICAL CENTER | Age: 42
End: 2017-11-13
Attending: INTERNAL MEDICINE
Payer: COMMERCIAL

## 2017-11-13 PROCEDURE — 82784 ASSAY IGA/IGD/IGG/IGM EACH: CPT

## 2017-11-13 PROCEDURE — 36415 COLL VENOUS BLD VENIPUNCTURE: CPT

## 2017-11-14 LAB — IGA SERPL-MCNC: 46 MG/DL (ref 68–408)

## 2017-11-19 ENCOUNTER — OUTPATIENT INFUSION SERVICES (OUTPATIENT)
Dept: ONCOLOGY | Facility: MEDICAL CENTER | Age: 42
End: 2017-11-19
Attending: INTERNAL MEDICINE
Payer: COMMERCIAL

## 2017-11-19 ENCOUNTER — PATIENT MESSAGE (OUTPATIENT)
Dept: MEDICAL GROUP | Facility: MEDICAL CENTER | Age: 42
End: 2017-11-19

## 2017-11-19 VITALS
DIASTOLIC BLOOD PRESSURE: 73 MMHG | OXYGEN SATURATION: 99 % | WEIGHT: 122.8 LBS | HEART RATE: 70 BPM | HEIGHT: 66 IN | TEMPERATURE: 97.5 F | RESPIRATION RATE: 18 BRPM | SYSTOLIC BLOOD PRESSURE: 109 MMHG | BODY MASS INDEX: 19.73 KG/M2

## 2017-11-19 DIAGNOSIS — D72.18 CHURG-STRAUSS SYNDROME (HCC): Chronic | ICD-10-CM

## 2017-11-19 DIAGNOSIS — K20.0 EOSINOPHILIC ESOPHAGITIS: Chronic | ICD-10-CM

## 2017-11-19 DIAGNOSIS — M30.1 CHURG-STRAUSS SYNDROME (HCC): Chronic | ICD-10-CM

## 2017-11-19 PROCEDURE — 306780 HCHG STAT FOR TRANSFUSION PER CASE

## 2017-11-19 ASSESSMENT — PAIN SCALES - GENERAL: PAINLEVEL: NO PAIN

## 2017-11-19 NOTE — PROGRESS NOTES
"Patient arrived ambulatory to the [facility] for labs/IVIG. Reviewed vital signs, labs, and physician order. Per pt, \"My Dr in [place] said that I don't need my steroid pre-treatment any longer, and I'm signed up for 12 more of these infusions once a week, and a lab level needs to be drawn prior to each infusion\". Upon reviewing the current order in the pt's chart, the only order present is IGA level lab draw, last drawn on 17. Reviewed orders with pharmacy and charge RN, order in chart for IVIG is , will require new orders to proceed with next set of 12 IVIG infusions. Patient aware of the plan of care, made contact with MD in [place], will have orders faxed to Dr Soares's office to co-sign then to [facility] in order to proceed with future infusions. Patient aware of upcoming apt dates and times. Patient left the [facility] ambulatory in no signs of distress.   "

## 2017-11-20 ENCOUNTER — TELEPHONE (OUTPATIENT)
Dept: MEDICAL GROUP | Facility: MEDICAL CENTER | Age: 42
End: 2017-11-20

## 2017-11-20 RX ORDER — IMMUNE GLOBULIN INFUSION (HUMAN) 100 MG/ML
1 INJECTION, SOLUTION INTRAVENOUS; SUBCUTANEOUS ONCE
Qty: 10 ML | Refills: 0 | Status: SHIPPED | OUTPATIENT
Start: 2017-11-21 | End: 2017-11-21

## 2017-11-20 NOTE — TELEPHONE ENCOUNTER
From: Megan August  To: Zohaib Soares M.D.  Sent: 11/19/2017 12:15 PM PST  Subject: Procedure Question    Hi dr Soares,  I’m at the hospital trying to get my infusion. But they are sending me home because the new orders weren’t sent in.     [place] says they sent the orders to you weeks ago. They said you have to sign off on the orders, and send them to infusion services. Help...???    The news orders are for 12 ivig infusions with  - igA blood draw first   - NO solumedrol first  -

## 2017-11-20 NOTE — TELEPHONE ENCOUNTER
1. Caller Name: Lissett @ Infusion                      Call Back Number: [phone]    2. Message: Pt needs new standing order for IVIG. Please fax it to Infusion ASAP. [phone]    3. Patient approves office to leave a detailed voicemail/MyChart message: no

## 2017-11-20 NOTE — TELEPHONE ENCOUNTER
Pt called and left message requesting us to update infusion with new cosigned order. Left message for [facility] notifying them that we cosigned the IGa blood draw order, but will need a new order for the IVIG infusion since the last one . Pt updated on status and would like call back when new order is received.     Call back number: [phone]

## 2017-11-20 NOTE — TELEPHONE ENCOUNTER
I have no information from her specialist at the [facility] regarding the dose or duration or frequency of IVIG.    Is it 0.4 mg/kg IVIG?  How frequently will she be getting this?

## 2017-11-21 NOTE — TELEPHONE ENCOUNTER
Contacted Infusion to check if they have all of this information. Lissett arrives in office today at 9:00, message given to call back and give all available information.

## 2017-11-22 ENCOUNTER — NON-PROVIDER VISIT (OUTPATIENT)
Dept: MEDICAL GROUP | Facility: MEDICAL CENTER | Age: 42
End: 2017-11-22
Payer: COMMERCIAL

## 2017-11-22 NOTE — PROGRESS NOTES
NDC: [id number]  LOT#: [id number]  Expiration Date: 2019    Dose: 100mg/ml  Site: Left Arm    Patient educated on use and side effects of medication. Name and  verified prior to injection. Pt tolerated? yes     Verified by AED    Administered by Eula Gilbert at 7:40 AM.    Patient Provided Medication: yes -

## 2017-11-26 ENCOUNTER — OUTPATIENT INFUSION SERVICES (OUTPATIENT)
Dept: ONCOLOGY | Facility: MEDICAL CENTER | Age: 42
End: 2017-11-26
Attending: INTERNAL MEDICINE
Payer: COMMERCIAL

## 2017-11-26 VITALS
RESPIRATION RATE: 18 BRPM | SYSTOLIC BLOOD PRESSURE: 112 MMHG | BODY MASS INDEX: 20.41 KG/M2 | HEIGHT: 66 IN | OXYGEN SATURATION: 99 % | WEIGHT: 126.98 LBS | TEMPERATURE: 97.2 F | HEART RATE: 66 BPM | DIASTOLIC BLOOD PRESSURE: 79 MMHG

## 2017-11-26 DIAGNOSIS — D72.18 CHURG-STRAUSS SYNDROME (HCC): ICD-10-CM

## 2017-11-26 DIAGNOSIS — M30.1 CHURG-STRAUSS SYNDROME (HCC): ICD-10-CM

## 2017-11-26 PROCEDURE — 700111 HCHG RX REV CODE 636 W/ 250 OVERRIDE (IP): Performed by: INTERNAL MEDICINE

## 2017-11-26 PROCEDURE — 82784 ASSAY IGA/IGD/IGG/IGM EACH: CPT

## 2017-11-26 PROCEDURE — 96366 THER/PROPH/DIAG IV INF ADDON: CPT

## 2017-11-26 PROCEDURE — 36415 COLL VENOUS BLD VENIPUNCTURE: CPT

## 2017-11-26 PROCEDURE — 96365 THER/PROPH/DIAG IV INF INIT: CPT

## 2017-11-26 RX ORDER — IMMUNE GLOBULIN INFUSION (HUMAN) 100 MG/ML
5 INJECTION, SOLUTION INTRAVENOUS; SUBCUTANEOUS ONCE
Status: COMPLETED | OUTPATIENT
Start: 2017-11-26 | End: 2017-11-26

## 2017-11-26 RX ORDER — IMMUNE GLOBULIN INFUSION (HUMAN) 100 MG/ML
20 INJECTION, SOLUTION INTRAVENOUS; SUBCUTANEOUS ONCE
Status: COMPLETED | OUTPATIENT
Start: 2017-11-26 | End: 2017-11-26

## 2017-11-26 RX ADMIN — IMMUNE GLOBULIN INFUSION (HUMAN) 20 G: 100 INJECTION, SOLUTION INTRAVENOUS; SUBCUTANEOUS at 08:35

## 2017-11-26 RX ADMIN — IMMUNE GLOBULIN INFUSION (HUMAN) 5 G: 100 INJECTION, SOLUTION INTRAVENOUS; SUBCUTANEOUS at 11:01

## 2017-11-26 ASSESSMENT — PAIN SCALES - GENERAL: PAINLEVEL: NO PAIN

## 2017-11-26 NOTE — PROGRESS NOTES
Patient arrived ambulatory to the hospitals for IVIG. Reviewed vital signs, labs, and physician orders. Patient denies S&S of infection, reports after previous infusions experiencing headache lasting 2 days, requested to slow infusion down. Reviewed S&S with pharmacist and the titration scale, agreed to infuse IVIG up to 120 ml max this infusion per titrations scale, patient verbalized understanding of plan. IV access established in left AC, obtained IGA sample per MD order, flushed with no resistance. IVIG infusion initiated, no adverse reaction observed. IV flushed, catheter removed with tip intact, gauze and coban dressing placed. Confirmed upcoming apt date and time with patient. Patient left the hospitals ambulatory in no signs of distress.

## 2017-11-27 LAB — IGA SERPL-MCNC: 45 MG/DL (ref 68–408)

## 2017-12-03 ENCOUNTER — OUTPATIENT INFUSION SERVICES (OUTPATIENT)
Dept: ONCOLOGY | Facility: MEDICAL CENTER | Age: 42
End: 2017-12-03
Attending: INTERNAL MEDICINE
Payer: COMMERCIAL

## 2017-12-03 VITALS
DIASTOLIC BLOOD PRESSURE: 68 MMHG | WEIGHT: 125.44 LBS | BODY MASS INDEX: 20.16 KG/M2 | OXYGEN SATURATION: 99 % | SYSTOLIC BLOOD PRESSURE: 111 MMHG | TEMPERATURE: 97.2 F | HEART RATE: 62 BPM | RESPIRATION RATE: 18 BRPM | HEIGHT: 66 IN

## 2017-12-03 DIAGNOSIS — M30.1 CHURG-STRAUSS SYNDROME (HCC): ICD-10-CM

## 2017-12-03 DIAGNOSIS — D72.18 CHURG-STRAUSS SYNDROME (HCC): ICD-10-CM

## 2017-12-03 PROCEDURE — 96366 THER/PROPH/DIAG IV INF ADDON: CPT

## 2017-12-03 PROCEDURE — 36415 COLL VENOUS BLD VENIPUNCTURE: CPT

## 2017-12-03 PROCEDURE — 700111 HCHG RX REV CODE 636 W/ 250 OVERRIDE (IP): Performed by: INTERNAL MEDICINE

## 2017-12-03 PROCEDURE — 96365 THER/PROPH/DIAG IV INF INIT: CPT

## 2017-12-03 PROCEDURE — 96360 HYDRATION IV INFUSION INIT: CPT

## 2017-12-03 PROCEDURE — 96361 HYDRATE IV INFUSION ADD-ON: CPT

## 2017-12-03 PROCEDURE — 82784 ASSAY IGA/IGD/IGG/IGM EACH: CPT

## 2017-12-03 RX ORDER — IMMUNE GLOBULIN INFUSION (HUMAN) 100 MG/ML
5 INJECTION, SOLUTION INTRAVENOUS; SUBCUTANEOUS ONCE
Status: COMPLETED | OUTPATIENT
Start: 2017-12-03 | End: 2017-12-03

## 2017-12-03 RX ORDER — IMMUNE GLOBULIN INFUSION (HUMAN) 100 MG/ML
20 INJECTION, SOLUTION INTRAVENOUS; SUBCUTANEOUS ONCE
Status: COMPLETED | OUTPATIENT
Start: 2017-12-03 | End: 2017-12-03

## 2017-12-03 RX ADMIN — IMMUNE GLOBULIN INFUSION (HUMAN) 20 G: 100 INJECTION, SOLUTION INTRAVENOUS; SUBCUTANEOUS at 11:31

## 2017-12-03 RX ADMIN — IMMUNE GLOBULIN INFUSION (HUMAN) 5 G: 100 INJECTION, SOLUTION INTRAVENOUS; SUBCUTANEOUS at 14:00

## 2017-12-03 ASSESSMENT — PAIN SCALES - GENERAL: PAINLEVEL: NO PAIN

## 2017-12-03 NOTE — PROGRESS NOTES
"Patient arrived ambulatory to the [facility] for IVIG. Reviewed vital signs, labs, and physician orders. Patient denies S&S of infection, reports \"feeling well\". IV access established to left forearm, obtained lab specimen per MD order. IVIG infused per titration scale, max rate 120ml/hr per pt request r/t hx of headaches with infusion, no adverse reaction observed. IV flushed, visualized brisk blood return, catheter removed with tip intact, gauze and coban dressing placed. Confirmed upcoming apt date and time with patient. Patient left the [facility] ambulatory in no signs of distress.   "

## 2017-12-05 LAB — IGA SERPL-MCNC: 49 MG/DL (ref 68–408)

## 2017-12-10 ENCOUNTER — OUTPATIENT INFUSION SERVICES (OUTPATIENT)
Dept: ONCOLOGY | Facility: MEDICAL CENTER | Age: 42
End: 2017-12-10
Attending: INTERNAL MEDICINE
Payer: COMMERCIAL

## 2017-12-10 VITALS
OXYGEN SATURATION: 100 % | TEMPERATURE: 96.5 F | WEIGHT: 126.54 LBS | HEART RATE: 59 BPM | BODY MASS INDEX: 20.34 KG/M2 | SYSTOLIC BLOOD PRESSURE: 117 MMHG | RESPIRATION RATE: 18 BRPM | HEIGHT: 66 IN | DIASTOLIC BLOOD PRESSURE: 77 MMHG

## 2017-12-10 DIAGNOSIS — K92.89 GASTROINTESTINAL DYSMOTILITY: ICD-10-CM

## 2017-12-10 PROCEDURE — 700111 HCHG RX REV CODE 636 W/ 250 OVERRIDE (IP): Performed by: INTERNAL MEDICINE

## 2017-12-10 PROCEDURE — 82784 ASSAY IGA/IGD/IGG/IGM EACH: CPT

## 2017-12-10 PROCEDURE — 36415 COLL VENOUS BLD VENIPUNCTURE: CPT

## 2017-12-10 PROCEDURE — 96366 THER/PROPH/DIAG IV INF ADDON: CPT

## 2017-12-10 PROCEDURE — 96365 THER/PROPH/DIAG IV INF INIT: CPT

## 2017-12-10 RX ORDER — IMMUNE GLOBULIN INFUSION (HUMAN) 100 MG/ML
20 INJECTION, SOLUTION INTRAVENOUS; SUBCUTANEOUS ONCE
Status: COMPLETED | OUTPATIENT
Start: 2017-12-10 | End: 2017-12-10

## 2017-12-10 RX ORDER — IMMUNE GLOBULIN INFUSION (HUMAN) 100 MG/ML
5 INJECTION, SOLUTION INTRAVENOUS; SUBCUTANEOUS ONCE
Status: COMPLETED | OUTPATIENT
Start: 2017-12-10 | End: 2017-12-10

## 2017-12-10 RX ADMIN — IMMUNE GLOBULIN INFUSION (HUMAN) 5 G: 100 INJECTION, SOLUTION INTRAVENOUS; SUBCUTANEOUS at 11:07

## 2017-12-10 RX ADMIN — IMMUNE GLOBULIN INFUSION (HUMAN) 20 G: 100 INJECTION, SOLUTION INTRAVENOUS; SUBCUTANEOUS at 08:42

## 2017-12-10 NOTE — PROGRESS NOTES
Patient arrived to infusion center for IVIG. No new complaints today except she is tired. PIV established and labs drawn per order. IVIG titrated to 120 ml/hr per patient request. Patient tolerated well. PIV discontinued and patient discharged to home with self care. She confirms appointment for 12/17/17.

## 2017-12-12 LAB — IGA SERPL-MCNC: 47 MG/DL (ref 68–408)

## 2017-12-17 ENCOUNTER — OUTPATIENT INFUSION SERVICES (OUTPATIENT)
Dept: ONCOLOGY | Facility: MEDICAL CENTER | Age: 42
End: 2017-12-17
Attending: INTERNAL MEDICINE
Payer: COMMERCIAL

## 2017-12-17 VITALS
DIASTOLIC BLOOD PRESSURE: 84 MMHG | OXYGEN SATURATION: 99 % | BODY MASS INDEX: 20.44 KG/M2 | RESPIRATION RATE: 18 BRPM | TEMPERATURE: 98.2 F | HEART RATE: 62 BPM | HEIGHT: 66 IN | SYSTOLIC BLOOD PRESSURE: 114 MMHG | WEIGHT: 127.21 LBS

## 2017-12-17 DIAGNOSIS — D72.18 CHURG-STRAUSS SYNDROME (HCC): Chronic | ICD-10-CM

## 2017-12-17 DIAGNOSIS — M30.1 CHURG-STRAUSS SYNDROME (HCC): Chronic | ICD-10-CM

## 2017-12-17 PROCEDURE — 96366 THER/PROPH/DIAG IV INF ADDON: CPT

## 2017-12-17 PROCEDURE — 82784 ASSAY IGA/IGD/IGG/IGM EACH: CPT

## 2017-12-17 PROCEDURE — 96365 THER/PROPH/DIAG IV INF INIT: CPT

## 2017-12-17 PROCEDURE — 700111 HCHG RX REV CODE 636 W/ 250 OVERRIDE (IP): Performed by: INTERNAL MEDICINE

## 2017-12-17 RX ORDER — IMMUNE GLOBULIN INFUSION (HUMAN) 100 MG/ML
5 INJECTION, SOLUTION INTRAVENOUS; SUBCUTANEOUS ONCE
Status: COMPLETED | OUTPATIENT
Start: 2017-12-17 | End: 2017-12-17

## 2017-12-17 RX ORDER — IMMUNE GLOBULIN INFUSION (HUMAN) 100 MG/ML
20 INJECTION, SOLUTION INTRAVENOUS; SUBCUTANEOUS ONCE
Status: COMPLETED | OUTPATIENT
Start: 2017-12-17 | End: 2017-12-17

## 2017-12-17 RX ADMIN — IMMUNE GLOBULIN INFUSION (HUMAN) 5 G: 100 INJECTION, SOLUTION INTRAVENOUS; SUBCUTANEOUS at 10:57

## 2017-12-17 RX ADMIN — IMMUNE GLOBULIN INFUSION (HUMAN) 20 G: 100 INJECTION, SOLUTION INTRAVENOUS; SUBCUTANEOUS at 08:34

## 2017-12-17 ASSESSMENT — PAIN SCALES - GENERAL: PAINLEVEL: NO PAIN

## 2017-12-17 NOTE — PROGRESS NOTES
Pt presents to infusion center for weekly IVIG (4 of 12). PIV started, brisk blood return observed. IVIG infused and titrated per Gammagard rate calculator to max rate of 120 ml/hr per pt preference to avoid headaches. Pt tolerated well with no s/s of adverse reaction. PIV flushed and removed, gauze and coban dressing placed. Has next appt, left on foot in good spirits.

## 2017-12-18 ENCOUNTER — NON-PROVIDER VISIT (OUTPATIENT)
Dept: MEDICAL GROUP | Facility: MEDICAL CENTER | Age: 42
End: 2017-12-18
Payer: COMMERCIAL

## 2017-12-18 LAB — IGA SERPL-MCNC: 60 MG/DL (ref 68–408)

## 2017-12-18 NOTE — PROGRESS NOTES
Megan August is a 41 y.o. female here for a non-provider visit for NUCALA injection.    Reason for injection: Sjogren's syndrome  Order in MAR?: No  Patient supplied?:Yes  Minimum interval has been met for this injection (per MAR order): Yes    Order and dose verified by: [facility]  Patient tolerated injection and no adverse effects were observed or reported: Yes    # of Administrations remaining in MAR: 0        NCD: 0028-3026-91  Lot: [id number]  Exp: 02/19    SQ L ARM  100 mg/ml

## 2017-12-24 ENCOUNTER — OUTPATIENT INFUSION SERVICES (OUTPATIENT)
Dept: ONCOLOGY | Facility: MEDICAL CENTER | Age: 42
End: 2017-12-24
Attending: INTERNAL MEDICINE
Payer: COMMERCIAL

## 2017-12-24 VITALS
OXYGEN SATURATION: 99 % | DIASTOLIC BLOOD PRESSURE: 68 MMHG | RESPIRATION RATE: 18 BRPM | TEMPERATURE: 97 F | HEIGHT: 66 IN | SYSTOLIC BLOOD PRESSURE: 106 MMHG | HEART RATE: 68 BPM | WEIGHT: 126.76 LBS | BODY MASS INDEX: 20.37 KG/M2

## 2017-12-24 PROCEDURE — 96366 THER/PROPH/DIAG IV INF ADDON: CPT

## 2017-12-24 PROCEDURE — 700111 HCHG RX REV CODE 636 W/ 250 OVERRIDE (IP): Performed by: INTERNAL MEDICINE

## 2017-12-24 PROCEDURE — 96365 THER/PROPH/DIAG IV INF INIT: CPT

## 2017-12-24 RX ORDER — IMMUNE GLOBULIN INFUSION (HUMAN) 100 MG/ML
5 INJECTION, SOLUTION INTRAVENOUS; SUBCUTANEOUS ONCE
Status: COMPLETED | OUTPATIENT
Start: 2017-12-24 | End: 2017-12-24

## 2017-12-24 RX ORDER — IMMUNE GLOBULIN INFUSION (HUMAN) 100 MG/ML
20 INJECTION, SOLUTION INTRAVENOUS; SUBCUTANEOUS ONCE
Status: COMPLETED | OUTPATIENT
Start: 2017-12-24 | End: 2017-12-24

## 2017-12-24 RX ADMIN — IMMUNE GLOBULIN INFUSION (HUMAN) 20 G: 100 INJECTION, SOLUTION INTRAVENOUS; SUBCUTANEOUS at 08:22

## 2017-12-24 RX ADMIN — IMMUNE GLOBULIN INFUSION (HUMAN) 5 G: 100 INJECTION, SOLUTION INTRAVENOUS; SUBCUTANEOUS at 10:48

## 2017-12-24 ASSESSMENT — PAIN SCALES - GENERAL: PAINLEVEL: NO PAIN

## 2017-12-24 NOTE — PROGRESS NOTES
Patient here for weekly IVIG (5 of 12). PIV established. IVIG given per MAR to max rate of 120 ml/hr. PIV removed; gauze/coban applied over site. Next appointment scheduled. Discharged to self care; no apparent distress noted.

## 2017-12-31 ENCOUNTER — OUTPATIENT INFUSION SERVICES (OUTPATIENT)
Dept: ONCOLOGY | Facility: MEDICAL CENTER | Age: 42
End: 2017-12-31
Attending: INTERNAL MEDICINE
Payer: COMMERCIAL

## 2017-12-31 VITALS
BODY MASS INDEX: 20.23 KG/M2 | HEIGHT: 66 IN | SYSTOLIC BLOOD PRESSURE: 110 MMHG | OXYGEN SATURATION: 100 % | DIASTOLIC BLOOD PRESSURE: 67 MMHG | WEIGHT: 125.88 LBS | TEMPERATURE: 97 F | HEART RATE: 62 BPM | RESPIRATION RATE: 18 BRPM

## 2017-12-31 DIAGNOSIS — K20.0 EOSINOPHILIC ESOPHAGITIS: ICD-10-CM

## 2017-12-31 DIAGNOSIS — D72.18 ALLERGIC GRANULOMATOSIS ANGIITIS (HCC): ICD-10-CM

## 2017-12-31 DIAGNOSIS — M30.1 ALLERGIC GRANULOMATOSIS ANGIITIS (HCC): ICD-10-CM

## 2017-12-31 PROCEDURE — 96365 THER/PROPH/DIAG IV INF INIT: CPT

## 2017-12-31 PROCEDURE — 82784 ASSAY IGA/IGD/IGG/IGM EACH: CPT

## 2017-12-31 PROCEDURE — 36415 COLL VENOUS BLD VENIPUNCTURE: CPT

## 2017-12-31 PROCEDURE — 700111 HCHG RX REV CODE 636 W/ 250 OVERRIDE (IP): Performed by: INTERNAL MEDICINE

## 2017-12-31 PROCEDURE — 96366 THER/PROPH/DIAG IV INF ADDON: CPT

## 2017-12-31 RX ORDER — IMMUNE GLOBULIN INFUSION (HUMAN) 100 MG/ML
20 INJECTION, SOLUTION INTRAVENOUS; SUBCUTANEOUS ONCE
Status: COMPLETED | OUTPATIENT
Start: 2017-12-31 | End: 2017-12-31

## 2017-12-31 RX ORDER — IMMUNE GLOBULIN INFUSION (HUMAN) 100 MG/ML
5 INJECTION, SOLUTION INTRAVENOUS; SUBCUTANEOUS ONCE
Status: COMPLETED | OUTPATIENT
Start: 2017-12-31 | End: 2017-12-31

## 2017-12-31 RX ADMIN — IMMUNE GLOBULIN INFUSION (HUMAN) 5 G: 100 INJECTION, SOLUTION INTRAVENOUS; SUBCUTANEOUS at 10:42

## 2017-12-31 RX ADMIN — IMMUNE GLOBULIN INFUSION (HUMAN) 20 G: 100 INJECTION, SOLUTION INTRAVENOUS; SUBCUTANEOUS at 08:20

## 2017-12-31 ASSESSMENT — PAIN SCALES - GENERAL: PAINLEVEL: NO PAIN

## 2017-12-31 NOTE — PROGRESS NOTES
Pt ambulated into department for day 6/12 of IVIG. Pt declined having any new or acute symptoms since her infusion last week. PIV started, had + blood return, flushed briskly. Blood drawn and sent to lab for analysis. IVIG given per Gammagard policy, titrated to max rate of 120 ml/hr. RN educated Pt to increase water intake to reduce her chances of developing a headache after. Pt tolerated infusion well and without incident. PIV discontinued after, bleeding controlled with gauze and coban. Pt's appointment next Sunday on 1/7/18 at 09:30 confirmed with Pt prior to leaving. Left department by self appearing in good spirits and NAD.

## 2018-01-01 LAB — IGA SERPL-MCNC: 58 MG/DL (ref 68–408)

## 2018-01-05 ENCOUNTER — TELEPHONE (OUTPATIENT)
Dept: MEDICAL GROUP | Facility: MEDICAL CENTER | Age: 43
End: 2018-01-05

## 2018-01-05 DIAGNOSIS — M81.8 IDIOPATHIC OSTEOPOROSIS: ICD-10-CM

## 2018-01-05 RX ORDER — ZOLEDRONIC ACID 5 MG/100ML
5 INJECTION, SOLUTION INTRAVENOUS ONCE
Qty: 100 ML | Refills: 0 | Status: SHIPPED
Start: 2018-01-05 | End: 2018-01-05

## 2018-01-05 NOTE — TELEPHONE ENCOUNTER
1. Caller Name: Megan August                        Call Back Number: [phone] (home) [phone] (work)      2. Message: Pt called to say that it is time for her Reclast Inf. Will need an order for that but also Potassium. Last year she wound up in the hospital after her Potassium dropped because of this inf. Was given Potassium and she was fine.    3. Patient approves office to leave a detailed voicemail/MyChart message: no

## 2018-01-07 ENCOUNTER — OUTPATIENT INFUSION SERVICES (OUTPATIENT)
Dept: ONCOLOGY | Facility: MEDICAL CENTER | Age: 43
End: 2018-01-07
Attending: INTERNAL MEDICINE
Payer: COMMERCIAL

## 2018-01-07 VITALS
DIASTOLIC BLOOD PRESSURE: 72 MMHG | OXYGEN SATURATION: 100 % | BODY MASS INDEX: 20.02 KG/M2 | RESPIRATION RATE: 16 BRPM | HEIGHT: 66 IN | SYSTOLIC BLOOD PRESSURE: 108 MMHG | TEMPERATURE: 97.4 F | WEIGHT: 124.56 LBS | HEART RATE: 64 BPM

## 2018-01-07 DIAGNOSIS — D72.18 CHURG-STRAUSS SYNDROME (HCC): Chronic | ICD-10-CM

## 2018-01-07 DIAGNOSIS — M30.1 CHURG-STRAUSS SYNDROME (HCC): Chronic | ICD-10-CM

## 2018-01-07 DIAGNOSIS — K20.0 EOSINOPHILIC ESOPHAGITIS: Chronic | ICD-10-CM

## 2018-01-07 PROCEDURE — 96365 THER/PROPH/DIAG IV INF INIT: CPT

## 2018-01-07 PROCEDURE — 36415 COLL VENOUS BLD VENIPUNCTURE: CPT

## 2018-01-07 PROCEDURE — 700111 HCHG RX REV CODE 636 W/ 250 OVERRIDE (IP): Performed by: INTERNAL MEDICINE

## 2018-01-07 PROCEDURE — 96366 THER/PROPH/DIAG IV INF ADDON: CPT

## 2018-01-07 PROCEDURE — 82784 ASSAY IGA/IGD/IGG/IGM EACH: CPT

## 2018-01-07 RX ORDER — IMMUNE GLOBULIN INFUSION (HUMAN) 100 MG/ML
20 INJECTION, SOLUTION INTRAVENOUS; SUBCUTANEOUS ONCE
Status: COMPLETED | OUTPATIENT
Start: 2018-01-07 | End: 2018-01-07

## 2018-01-07 RX ORDER — IMMUNE GLOBULIN INFUSION (HUMAN) 100 MG/ML
5 INJECTION, SOLUTION INTRAVENOUS; SUBCUTANEOUS ONCE
Status: COMPLETED | OUTPATIENT
Start: 2018-01-07 | End: 2018-01-07

## 2018-01-07 RX ADMIN — IMMUNE GLOBULIN INFUSION (HUMAN) 20 G: 100 INJECTION, SOLUTION INTRAVENOUS; SUBCUTANEOUS at 10:14

## 2018-01-07 RX ADMIN — IMMUNE GLOBULIN INFUSION (HUMAN) 5 G: 100 INJECTION, SOLUTION INTRAVENOUS; SUBCUTANEOUS at 12:47

## 2018-01-07 ASSESSMENT — PAIN SCALES - GENERAL: PAINLEVEL: NO PAIN

## 2018-01-07 NOTE — PROGRESS NOTES
Pt presents ambulatory for IVIG infusion. Pt reports no adverse reactions from previous infusions. IV established and lab drawn as ordered. IVIG titrated to max rate of 120 mL/hr per pt toleration as reported with past infusions. Infusion completed without complications or adverse reactions. IV flushed with saline and d/c'd with gauze and coband applied to site. Pt to return in one week, appt confirmed with pt and pt scheduled to receive Reclast with next infusion, pt verbalized understanding. Pt left ambulatory in no distress at 1335

## 2018-01-08 LAB — IGA SERPL-MCNC: 56 MG/DL (ref 68–408)

## 2018-01-14 ENCOUNTER — OUTPATIENT INFUSION SERVICES (OUTPATIENT)
Dept: ONCOLOGY | Facility: MEDICAL CENTER | Age: 43
End: 2018-01-14
Attending: INTERNAL MEDICINE
Payer: COMMERCIAL

## 2018-01-14 VITALS
OXYGEN SATURATION: 98 % | TEMPERATURE: 97 F | WEIGHT: 127.21 LBS | DIASTOLIC BLOOD PRESSURE: 69 MMHG | BODY MASS INDEX: 20.44 KG/M2 | HEIGHT: 66 IN | HEART RATE: 67 BPM | RESPIRATION RATE: 18 BRPM | SYSTOLIC BLOOD PRESSURE: 114 MMHG

## 2018-01-14 DIAGNOSIS — D72.18 POLYARTERITIS WITH LUNG INVOLVEMENT (HCC): ICD-10-CM

## 2018-01-14 DIAGNOSIS — M30.1 POLYARTERITIS WITH LUNG INVOLVEMENT (HCC): ICD-10-CM

## 2018-01-14 LAB
CA-I BLD ISE-SCNC: 1.12 MMOL/L (ref 1.1–1.3)
CREAT BLD-MCNC: 0.8 MG/DL (ref 0.5–1.4)
POTASSIUM BLD-SCNC: 3.9 MMOL/L (ref 3.6–5.5)

## 2018-01-14 PROCEDURE — 82565 ASSAY OF CREATININE: CPT

## 2018-01-14 PROCEDURE — 82330 ASSAY OF CALCIUM: CPT

## 2018-01-14 PROCEDURE — 36415 COLL VENOUS BLD VENIPUNCTURE: CPT

## 2018-01-14 PROCEDURE — 700111 HCHG RX REV CODE 636 W/ 250 OVERRIDE (IP): Performed by: INTERNAL MEDICINE

## 2018-01-14 PROCEDURE — 96365 THER/PROPH/DIAG IV INF INIT: CPT

## 2018-01-14 PROCEDURE — 82784 ASSAY IGA/IGD/IGG/IGM EACH: CPT

## 2018-01-14 PROCEDURE — 96367 TX/PROPH/DG ADDL SEQ IV INF: CPT

## 2018-01-14 PROCEDURE — 84132 ASSAY OF SERUM POTASSIUM: CPT

## 2018-01-14 PROCEDURE — 96366 THER/PROPH/DIAG IV INF ADDON: CPT

## 2018-01-14 RX ORDER — IMMUNE GLOBULIN INFUSION (HUMAN) 100 MG/ML
5 INJECTION, SOLUTION INTRAVENOUS; SUBCUTANEOUS ONCE
Status: COMPLETED | OUTPATIENT
Start: 2018-01-14 | End: 2018-01-14

## 2018-01-14 RX ORDER — ZOLEDRONIC ACID 5 MG/100ML
5 INJECTION, SOLUTION INTRAVENOUS ONCE
Status: COMPLETED | OUTPATIENT
Start: 2018-01-14 | End: 2018-01-14

## 2018-01-14 RX ORDER — IMMUNE GLOBULIN INFUSION (HUMAN) 100 MG/ML
20 INJECTION, SOLUTION INTRAVENOUS; SUBCUTANEOUS ONCE
Status: COMPLETED | OUTPATIENT
Start: 2018-01-14 | End: 2018-01-14

## 2018-01-14 RX ADMIN — IMMUNE GLOBULIN INFUSION (HUMAN) 20 G: 100 INJECTION, SOLUTION INTRAVENOUS; SUBCUTANEOUS at 12:00

## 2018-01-14 RX ADMIN — IMMUNE GLOBULIN INFUSION (HUMAN) 5 G: 100 INJECTION, SOLUTION INTRAVENOUS; SUBCUTANEOUS at 10:59

## 2018-01-14 RX ADMIN — ZOLEDRONIC ACID 5 MG: 0.05 INJECTION, SOLUTION INTRAVENOUS at 10:23

## 2018-01-14 ASSESSMENT — PAIN SCALES - GENERAL: PAINLEVEL: NO PAIN

## 2018-01-14 NOTE — PROGRESS NOTES
Pt is here for her IVIG/Reclast/lab draw. Cr/iCa drawn for Reclast - infusion completed followed by IVIG. IVIG completed without an incident; titrated to the max rate of 120ml/h to avoid headaches. Pt brings her own nutrition - liquid food only. Iga and K drawn.  Treatment completed. Discharged home to self care. Next appointment verfied.

## 2018-01-14 NOTE — PROGRESS NOTES
Pharmacy note  Cr = 0.8 mg/dL, CrCl ~ 83 ml/min  Ionized Ca = 1.12  Last Reclast 12/11/16  OK for zoledronic acid (Reclast) 5 mg IV today.    Kristi Méndez, PharmD, BCOP

## 2018-01-15 ENCOUNTER — TELEPHONE (OUTPATIENT)
Dept: MEDICAL GROUP | Facility: MEDICAL CENTER | Age: 43
End: 2018-01-15

## 2018-01-15 LAB — IGA SERPL-MCNC: 61 MG/DL (ref 68–408)

## 2018-01-16 ENCOUNTER — NON-PROVIDER VISIT (OUTPATIENT)
Dept: MEDICAL GROUP | Facility: MEDICAL CENTER | Age: 43
End: 2018-01-16
Payer: COMMERCIAL

## 2018-01-16 NOTE — TELEPHONE ENCOUNTER
1. Caller Name: Megan August                        Call Back Number: [phone] (home) [phone] (work)      2. Message: Just wanted to make sure it would be a good morning for NUCALA inj. She will be coming at 7 am.     3. Patient approves office to leave a detailed voicemail/MyChart message: no

## 2018-01-16 NOTE — PROGRESS NOTES
Megan August is a 42 y.o. female here for a non-provider visit for NUCALA injection.    Reason for injection: Churg-Homer syndrome  Order in MAR?: No  Patient supplied?:Yes  Minimum interval has been met for this injection (per MAR order): Yes    [facility]  Patient tolerated injection and no adverse effects were observed or reported: Yes    # of Administrations remaining in MAR: 0      LOT # [id number]  EXP: Mar 2019  NDC: 3379-2641-02

## 2018-01-21 ENCOUNTER — OUTPATIENT INFUSION SERVICES (OUTPATIENT)
Dept: ONCOLOGY | Facility: MEDICAL CENTER | Age: 43
End: 2018-01-21
Attending: INTERNAL MEDICINE
Payer: COMMERCIAL

## 2018-01-21 VITALS
TEMPERATURE: 96.2 F | BODY MASS INDEX: 20.83 KG/M2 | HEIGHT: 66 IN | SYSTOLIC BLOOD PRESSURE: 106 MMHG | HEART RATE: 67 BPM | OXYGEN SATURATION: 100 % | WEIGHT: 129.63 LBS | DIASTOLIC BLOOD PRESSURE: 73 MMHG | RESPIRATION RATE: 16 BRPM

## 2018-01-21 DIAGNOSIS — M35.00 SJOGREN'S SYNDROME, WITH UNSPECIFIED ORGAN INVOLVEMENT (HCC): Chronic | ICD-10-CM

## 2018-01-21 PROCEDURE — 96366 THER/PROPH/DIAG IV INF ADDON: CPT

## 2018-01-21 PROCEDURE — 82784 ASSAY IGA/IGD/IGG/IGM EACH: CPT

## 2018-01-21 PROCEDURE — 36415 COLL VENOUS BLD VENIPUNCTURE: CPT

## 2018-01-21 PROCEDURE — 96365 THER/PROPH/DIAG IV INF INIT: CPT

## 2018-01-21 PROCEDURE — 700111 HCHG RX REV CODE 636 W/ 250 OVERRIDE (IP): Performed by: INTERNAL MEDICINE

## 2018-01-21 RX ORDER — IMMUNE GLOBULIN INFUSION (HUMAN) 100 MG/ML
5 INJECTION, SOLUTION INTRAVENOUS; SUBCUTANEOUS ONCE
Status: COMPLETED | OUTPATIENT
Start: 2018-01-21 | End: 2018-01-21

## 2018-01-21 RX ORDER — IMMUNE GLOBULIN INFUSION (HUMAN) 100 MG/ML
20 INJECTION, SOLUTION INTRAVENOUS; SUBCUTANEOUS ONCE
Status: COMPLETED | OUTPATIENT
Start: 2018-01-21 | End: 2018-01-21

## 2018-01-21 RX ADMIN — IMMUNE GLOBULIN INFUSION (HUMAN) 20 G: 100 INJECTION, SOLUTION INTRAVENOUS; SUBCUTANEOUS at 10:11

## 2018-01-21 RX ADMIN — IMMUNE GLOBULIN INFUSION (HUMAN) 5 G: 100 INJECTION, SOLUTION INTRAVENOUS; SUBCUTANEOUS at 12:40

## 2018-01-21 ASSESSMENT — PAIN SCALES - GENERAL: PAINLEVEL: NO PAIN

## 2018-01-21 NOTE — PROGRESS NOTES
Pt presents ambulatory to IS for weekly IVIG infusion.  Pt reports feeling well today and denies acute complaints.  PIV started in LAC, blood return noted.  IgA lab drawn from line.  IVIG infused per pt's rate calculator, max rate of 120 ml/hr.  Pt tolerated well, no adverse reaction noted.  PIV flushed, removed, and site covered with gauze and coban.  Next appointment confirmed.  Pt discharged from IS in NAD under self care.

## 2018-01-23 LAB — IGA SERPL-MCNC: 52 MG/DL (ref 68–408)

## 2018-01-28 ENCOUNTER — OUTPATIENT INFUSION SERVICES (OUTPATIENT)
Dept: ONCOLOGY | Facility: MEDICAL CENTER | Age: 43
End: 2018-01-28
Attending: INTERNAL MEDICINE
Payer: COMMERCIAL

## 2018-01-28 VITALS
DIASTOLIC BLOOD PRESSURE: 66 MMHG | BODY MASS INDEX: 20.94 KG/M2 | WEIGHT: 130.29 LBS | SYSTOLIC BLOOD PRESSURE: 114 MMHG | OXYGEN SATURATION: 100 % | TEMPERATURE: 97.8 F | HEART RATE: 60 BPM | RESPIRATION RATE: 18 BRPM | HEIGHT: 66 IN

## 2018-01-28 DIAGNOSIS — M35.00 SJOGREN'S SYNDROME, WITH UNSPECIFIED ORGAN INVOLVEMENT (HCC): Chronic | ICD-10-CM

## 2018-01-28 PROCEDURE — 36415 COLL VENOUS BLD VENIPUNCTURE: CPT

## 2018-01-28 PROCEDURE — 82784 ASSAY IGA/IGD/IGG/IGM EACH: CPT

## 2018-01-28 PROCEDURE — 96366 THER/PROPH/DIAG IV INF ADDON: CPT

## 2018-01-28 PROCEDURE — 700111 HCHG RX REV CODE 636 W/ 250 OVERRIDE (IP): Performed by: INTERNAL MEDICINE

## 2018-01-28 PROCEDURE — 96365 THER/PROPH/DIAG IV INF INIT: CPT

## 2018-01-28 RX ORDER — IMMUNE GLOBULIN INFUSION (HUMAN) 100 MG/ML
20 INJECTION, SOLUTION INTRAVENOUS; SUBCUTANEOUS ONCE
Status: COMPLETED | OUTPATIENT
Start: 2018-01-28 | End: 2018-01-28

## 2018-01-28 RX ORDER — IMMUNE GLOBULIN INFUSION (HUMAN) 100 MG/ML
5 INJECTION, SOLUTION INTRAVENOUS; SUBCUTANEOUS ONCE
Status: COMPLETED | OUTPATIENT
Start: 2018-01-28 | End: 2018-01-28

## 2018-01-28 RX ADMIN — IMMUNE GLOBULIN INFUSION (HUMAN) 5 G: 100 INJECTION, SOLUTION INTRAVENOUS; SUBCUTANEOUS at 13:07

## 2018-01-28 RX ADMIN — IMMUNE GLOBULIN INFUSION (HUMAN) 20 G: 100 INJECTION, SOLUTION INTRAVENOUS; SUBCUTANEOUS at 10:32

## 2018-01-28 ASSESSMENT — PAIN SCALES - GENERAL: PAINLEVEL: NO PAIN

## 2018-01-28 NOTE — PROGRESS NOTES
Megan returns for IVIG infusion. PIV started and Dede drawn per MD order and sent to lab.  IVIG infused as ordered. Rate increased per protocol. Patient tolerated well and without incident. No signs or symptoms of adverse reaction observed or expressed. Megan LOVE'd home to self care in good condition. Patient returns monthly, scheduled to return 2/4/18.

## 2018-01-29 LAB — IGA SERPL-MCNC: 54 MG/DL (ref 68–408)

## 2018-02-04 ENCOUNTER — OUTPATIENT INFUSION SERVICES (OUTPATIENT)
Dept: ONCOLOGY | Facility: MEDICAL CENTER | Age: 43
End: 2018-02-04
Attending: INTERNAL MEDICINE
Payer: COMMERCIAL

## 2018-02-04 VITALS
HEIGHT: 66 IN | HEART RATE: 68 BPM | BODY MASS INDEX: 20.62 KG/M2 | DIASTOLIC BLOOD PRESSURE: 78 MMHG | WEIGHT: 128.31 LBS | TEMPERATURE: 97.5 F | OXYGEN SATURATION: 99 % | SYSTOLIC BLOOD PRESSURE: 112 MMHG | RESPIRATION RATE: 18 BRPM

## 2018-02-04 DIAGNOSIS — D72.18 CHURG-STRAUSS SYNDROME (HCC): ICD-10-CM

## 2018-02-04 DIAGNOSIS — M30.1 CHURG-STRAUSS SYNDROME (HCC): ICD-10-CM

## 2018-02-04 PROCEDURE — 700111 HCHG RX REV CODE 636 W/ 250 OVERRIDE (IP): Performed by: INTERNAL MEDICINE

## 2018-02-04 PROCEDURE — 96366 THER/PROPH/DIAG IV INF ADDON: CPT

## 2018-02-04 PROCEDURE — 82784 ASSAY IGA/IGD/IGG/IGM EACH: CPT

## 2018-02-04 PROCEDURE — 96365 THER/PROPH/DIAG IV INF INIT: CPT

## 2018-02-04 RX ORDER — IMMUNE GLOBULIN INFUSION (HUMAN) 100 MG/ML
5 INJECTION, SOLUTION INTRAVENOUS; SUBCUTANEOUS ONCE
Status: COMPLETED | OUTPATIENT
Start: 2018-02-04 | End: 2018-02-04

## 2018-02-04 RX ORDER — IMMUNE GLOBULIN INFUSION (HUMAN) 100 MG/ML
20 INJECTION, SOLUTION INTRAVENOUS; SUBCUTANEOUS ONCE
Status: COMPLETED | OUTPATIENT
Start: 2018-02-04 | End: 2018-02-04

## 2018-02-04 RX ADMIN — IMMUNE GLOBULIN INFUSION (HUMAN) 20 G: 100 INJECTION, SOLUTION INTRAVENOUS; SUBCUTANEOUS at 10:19

## 2018-02-04 RX ADMIN — IMMUNE GLOBULIN INFUSION (HUMAN) 5 G: 100 INJECTION, SOLUTION INTRAVENOUS; SUBCUTANEOUS at 12:40

## 2018-02-04 ASSESSMENT — PAIN SCALES - GENERAL: PAINLEVEL: NO PAIN

## 2018-02-04 NOTE — PROGRESS NOTES
Pt here for weekly IVIG. Assessment complete. POC reviewed. PIV established in L AC per pt request, brisk blood return observed, IGA drawn as ordered. IVIG administered as ordered and titrated per Gammagard rate calculator to a max rate of 120mL/hr w/o adverse s/s reported or observed. PIV removed & pt d/c'd by Eula REARDON. Future appt confirmed.

## 2018-02-06 LAB — IGA SERPL-MCNC: 52 MG/DL (ref 68–408)

## 2018-02-07 ENCOUNTER — PATIENT OUTREACH (OUTPATIENT)
Dept: HEALTH INFORMATION MANAGEMENT | Facility: OTHER | Age: 43
End: 2018-02-07

## 2018-02-07 DIAGNOSIS — Z71.89 COMPLEX CARE COORDINATION: ICD-10-CM

## 2018-02-11 ENCOUNTER — OUTPATIENT INFUSION SERVICES (OUTPATIENT)
Dept: ONCOLOGY | Facility: MEDICAL CENTER | Age: 43
End: 2018-02-11
Attending: INTERNAL MEDICINE
Payer: COMMERCIAL

## 2018-02-11 VITALS
SYSTOLIC BLOOD PRESSURE: 118 MMHG | TEMPERATURE: 98.1 F | DIASTOLIC BLOOD PRESSURE: 82 MMHG | OXYGEN SATURATION: 100 % | RESPIRATION RATE: 18 BRPM | HEART RATE: 64 BPM | WEIGHT: 127.65 LBS | HEIGHT: 66 IN | BODY MASS INDEX: 20.51 KG/M2

## 2018-02-11 DIAGNOSIS — M30.1 CHURG-STRAUSS SYNDROME (HCC): Chronic | ICD-10-CM

## 2018-02-11 DIAGNOSIS — D72.18 CHURG-STRAUSS SYNDROME (HCC): Chronic | ICD-10-CM

## 2018-02-11 PROCEDURE — 82784 ASSAY IGA/IGD/IGG/IGM EACH: CPT

## 2018-02-11 PROCEDURE — 96365 THER/PROPH/DIAG IV INF INIT: CPT

## 2018-02-11 PROCEDURE — 36415 COLL VENOUS BLD VENIPUNCTURE: CPT

## 2018-02-11 PROCEDURE — 700111 HCHG RX REV CODE 636 W/ 250 OVERRIDE (IP): Performed by: INTERNAL MEDICINE

## 2018-02-11 PROCEDURE — 96366 THER/PROPH/DIAG IV INF ADDON: CPT

## 2018-02-11 RX ORDER — IMMUNE GLOBULIN INFUSION (HUMAN) 100 MG/ML
20 INJECTION, SOLUTION INTRAVENOUS; SUBCUTANEOUS ONCE
Status: COMPLETED | OUTPATIENT
Start: 2018-02-11 | End: 2018-02-11

## 2018-02-11 RX ORDER — IMMUNE GLOBULIN INFUSION (HUMAN) 100 MG/ML
5 INJECTION, SOLUTION INTRAVENOUS; SUBCUTANEOUS ONCE
Status: COMPLETED | OUTPATIENT
Start: 2018-02-11 | End: 2018-02-11

## 2018-02-11 RX ADMIN — IMMUNE GLOBULIN INFUSION (HUMAN) 20 G: 100 INJECTION, SOLUTION INTRAVENOUS; SUBCUTANEOUS at 08:51

## 2018-02-11 RX ADMIN — IMMUNE GLOBULIN INFUSION (HUMAN) 5 G: 100 INJECTION, SOLUTION INTRAVENOUS; SUBCUTANEOUS at 11:30

## 2018-02-11 ASSESSMENT — PAIN SCALES - GENERAL: PAINLEVEL: NO PAIN

## 2018-02-11 NOTE — PROGRESS NOTES
Patient arrived ambulatory to the [facility] for IVIG. Reviewed vital signs, labs, and physician order. Patient denies S&S of infection. Patient reports this will be the last visit as patient is going to [place] to see MD to determine if changes to plan of care will occur, has no plan to make upcoming for next IVIG apt until aware of plan. IV access established in left AC by Paris REARDON, obtained blood sample for IGA per MD order. IVIG administered, titrated up to rate of 120ml max, no adverse reaction observed. IV flushed per protocol, catheter removed with tip intact, gauze and coban dressing placed. Confirmed with patient that no upcoming apt is made for IVIG and patient is to contact [facility] to schedule future treatments once a plan is in place. Patient left the [facility] ambulatory in no sign of distress.

## 2018-02-13 ENCOUNTER — PATIENT OUTREACH (OUTPATIENT)
Dept: HEALTH INFORMATION MANAGEMENT | Facility: OTHER | Age: 43
End: 2018-02-13

## 2018-02-13 ENCOUNTER — TELEPHONE (OUTPATIENT)
Dept: MEDICAL GROUP | Facility: MEDICAL CENTER | Age: 43
End: 2018-02-13

## 2018-02-13 LAB — IGA SERPL-MCNC: 49 MG/DL (ref 68–408)

## 2018-02-14 ENCOUNTER — NON-PROVIDER VISIT (OUTPATIENT)
Dept: MEDICAL GROUP | Facility: MEDICAL CENTER | Age: 43
End: 2018-02-14
Payer: COMMERCIAL

## 2018-02-14 NOTE — PROGRESS NOTES
Megan August is a 42 y.o. female here for a non-provider visit for NUCALA injection.    Reason for injection: Churg-Homer syndrome (CMS-HCC)  Order in MAR?: No  Patient supplied?:YES  Minimum interval has been met for this injection (per MAR order): Yes    Order and dose verified by: [facility]  Patient tolerated injection and no adverse effects were observed or reported: Yes    # of Administrations remaining in MAR: 0        LOT: [id number]  EXP:03/2019  NDC:7212-1839-05

## 2018-02-15 ENCOUNTER — TELEPHONE (OUTPATIENT)
Dept: MEDICAL GROUP | Facility: MEDICAL CENTER | Age: 43
End: 2018-02-15

## 2018-02-15 DIAGNOSIS — L98.9 SKIN LESION: ICD-10-CM

## 2018-02-15 DIAGNOSIS — Z85.820 HISTORY OF MELANOMA: ICD-10-CM

## 2018-02-15 NOTE — TELEPHONE ENCOUNTER
1. Caller Name: Megan August                        Call Back Number: [phone] (home) [phone] (work)      2. Message: Pt has history of Melanoma. Is having her routine check up, has to see Dr Smith next week and they are telling her that she must have a REF. Please submit.    3. Patient approves office to leave a detailed voicemail/MyChart message: yes

## 2018-02-15 NOTE — TELEPHONE ENCOUNTER
Referral placed to dermatology, please contact [facility] and have them facilitate the referral since her appointment is upcoming next week

## 2018-03-01 ENCOUNTER — PATIENT OUTREACH (OUTPATIENT)
Dept: HEALTH INFORMATION MANAGEMENT | Facility: OTHER | Age: 43
End: 2018-03-01

## 2018-03-01 NOTE — PROGRESS NOTES
Second attempt to reach patient today. Introduced self and explained what the program is about. Patient declined services, stating she has everything she needs right now. She sees Dr. Nat Evans at [place] [facility] who help manage her care    Patient has her IVIg  infusion every Sunday at [facility]. She also get her Nukala shot and takes   Neochate judy.    Patient denies any social determinants at this time.    Refused pharmacy referral.    Patient has RN CC's contact information if needed.

## 2018-03-05 NOTE — TELEPHONE ENCOUNTER
NUCALA    PLEASE RESEND RX, MUST GO TO [facility].     Was the patient seen in the last year in this department? Yes     Does patient have an active prescription for medications requested? No     Received Request Via: Patient

## 2018-03-06 ENCOUNTER — TELEPHONE (OUTPATIENT)
Dept: MEDICAL GROUP | Facility: MEDICAL CENTER | Age: 43
End: 2018-03-06

## 2018-03-07 ENCOUNTER — TELEPHONE (OUTPATIENT)
Dept: MEDICAL GROUP | Facility: MEDICAL CENTER | Age: 43
End: 2018-03-07

## 2018-03-07 NOTE — TELEPHONE ENCOUNTER
[facility] is calling to set up delivery for Nucala  Please call the specialty pharmacy at [phone] (9 AM to 9 PM eastern standard time) to arrange delivery

## 2018-03-08 NOTE — TELEPHONE ENCOUNTER
Per Nita @ [facility]  Megan's new order needs to read:    IVIG 0.4g/kg IV every 2 weeks x 12  No solumedrol premedication  Draw serum IgA prior to infusion

## 2018-03-08 NOTE — TELEPHONE ENCOUNTER
1. Caller Name: Nita @ [facility]                      Call Back Number: [phone]     2. Message: Per Nita, she would like you to call as there is some changes that need to be made to Infusion orders and you must submit them.     3. Patient approves office to leave a detailed voicemail/MyChart message: no

## 2018-03-08 NOTE — TELEPHONE ENCOUNTER
Could you please call her and ask her what changes are needed? I could speak to her if needed but it is 730 pm, so it is too late to call back.

## 2018-03-18 ENCOUNTER — OUTPATIENT INFUSION SERVICES (OUTPATIENT)
Dept: ONCOLOGY | Facility: MEDICAL CENTER | Age: 43
End: 2018-03-18
Attending: INTERNAL MEDICINE
Payer: COMMERCIAL

## 2018-03-18 VITALS
WEIGHT: 133.6 LBS | SYSTOLIC BLOOD PRESSURE: 113 MMHG | TEMPERATURE: 97.7 F | HEIGHT: 66 IN | RESPIRATION RATE: 18 BRPM | HEART RATE: 63 BPM | BODY MASS INDEX: 21.47 KG/M2 | OXYGEN SATURATION: 100 % | DIASTOLIC BLOOD PRESSURE: 57 MMHG

## 2018-03-18 DIAGNOSIS — D72.18 CHURG-STRAUSS SYNDROME (HCC): Chronic | ICD-10-CM

## 2018-03-18 DIAGNOSIS — M30.1 CHURG-STRAUSS SYNDROME (HCC): Chronic | ICD-10-CM

## 2018-03-18 DIAGNOSIS — M35.00 SJOGREN'S SYNDROME, WITH UNSPECIFIED ORGAN INVOLVEMENT (HCC): Chronic | ICD-10-CM

## 2018-03-18 PROCEDURE — 36415 COLL VENOUS BLD VENIPUNCTURE: CPT

## 2018-03-18 PROCEDURE — 700111 HCHG RX REV CODE 636 W/ 250 OVERRIDE (IP): Performed by: INTERNAL MEDICINE

## 2018-03-18 PROCEDURE — 96365 THER/PROPH/DIAG IV INF INIT: CPT

## 2018-03-18 PROCEDURE — 96366 THER/PROPH/DIAG IV INF ADDON: CPT

## 2018-03-18 PROCEDURE — 82784 ASSAY IGA/IGD/IGG/IGM EACH: CPT

## 2018-03-18 RX ORDER — IMMUNE GLOBULIN INFUSION (HUMAN) 100 MG/ML
20 INJECTION, SOLUTION INTRAVENOUS; SUBCUTANEOUS ONCE
Status: COMPLETED | OUTPATIENT
Start: 2018-03-18 | End: 2018-03-18

## 2018-03-18 RX ORDER — IMMUNE GLOBULIN INFUSION (HUMAN) 100 MG/ML
5 INJECTION, SOLUTION INTRAVENOUS; SUBCUTANEOUS ONCE
Status: COMPLETED | OUTPATIENT
Start: 2018-03-18 | End: 2018-03-18

## 2018-03-18 RX ADMIN — IMMUNE GLOBULIN INFUSION (HUMAN) 20 G: 100 INJECTION, SOLUTION INTRAVENOUS; SUBCUTANEOUS at 10:53

## 2018-03-18 RX ADMIN — IMMUNE GLOBULIN INFUSION (HUMAN) 5 G: 100 INJECTION, SOLUTION INTRAVENOUS; SUBCUTANEOUS at 09:14

## 2018-03-18 ASSESSMENT — PAIN SCALES - GENERAL: PAINLEVEL: NO PAIN

## 2018-03-18 NOTE — PROGRESS NOTES
Pt returns to infusion center for IVIG.  Infusion now every two weeks.  PIV established, lab drawn as ordered.  Pt tolerated infusion with max rate of 120ml/hr.  PIV flushed with saline per policy, removed, gauze dressing placed. Pt left infusion center ambulatory and in good condition.  Returns in two weeks, appointment scheduled.

## 2018-03-19 ENCOUNTER — TELEPHONE (OUTPATIENT)
Dept: MEDICAL GROUP | Facility: MEDICAL CENTER | Age: 43
End: 2018-03-19

## 2018-03-19 ENCOUNTER — NON-PROVIDER VISIT (OUTPATIENT)
Dept: MEDICAL GROUP | Facility: MEDICAL CENTER | Age: 43
End: 2018-03-19
Payer: COMMERCIAL

## 2018-03-19 LAB — IGA SERPL-MCNC: 48 MG/DL (ref 68–408)

## 2018-03-19 NOTE — PROGRESS NOTES
Megan August is a 42 y.o. female here for a non-provider visit for NUCALA injection.    Reason for injection: Churg-Homer syndrome (CMS-HCC)  Order in MAR?: No  Patient supplied?:Yes  Minimum interval has been met for this injection (per MAR order): Yes    Order and dose verified by: [facility]  Patient tolerated injection and no adverse effects were observed or reported: Yes    # of Administrations remaining in MAR: 0      LOT [id number]  EXP: 04/2019  NDC: 9091-5297-81

## 2018-03-29 ENCOUNTER — PATIENT OUTREACH (OUTPATIENT)
Dept: HEALTH INFORMATION MANAGEMENT | Facility: OTHER | Age: 43
End: 2018-03-29

## 2018-04-01 ENCOUNTER — OUTPATIENT INFUSION SERVICES (OUTPATIENT)
Dept: ONCOLOGY | Facility: MEDICAL CENTER | Age: 43
End: 2018-04-01
Attending: INTERNAL MEDICINE
Payer: COMMERCIAL

## 2018-04-01 VITALS
BODY MASS INDEX: 20.83 KG/M2 | OXYGEN SATURATION: 100 % | WEIGHT: 129.63 LBS | TEMPERATURE: 97.3 F | HEART RATE: 64 BPM | SYSTOLIC BLOOD PRESSURE: 97 MMHG | DIASTOLIC BLOOD PRESSURE: 54 MMHG | HEIGHT: 66 IN | RESPIRATION RATE: 16 BRPM

## 2018-04-01 DIAGNOSIS — K20.0 EOSINOPHILIC ESOPHAGITIS: Chronic | ICD-10-CM

## 2018-04-01 PROCEDURE — 82784 ASSAY IGA/IGD/IGG/IGM EACH: CPT

## 2018-04-01 PROCEDURE — 96366 THER/PROPH/DIAG IV INF ADDON: CPT

## 2018-04-01 PROCEDURE — 96365 THER/PROPH/DIAG IV INF INIT: CPT

## 2018-04-01 PROCEDURE — 700111 HCHG RX REV CODE 636 W/ 250 OVERRIDE (IP): Performed by: INTERNAL MEDICINE

## 2018-04-01 RX ORDER — IMMUNE GLOBULIN INFUSION (HUMAN) 100 MG/ML
20 INJECTION, SOLUTION INTRAVENOUS; SUBCUTANEOUS ONCE
Status: COMPLETED | OUTPATIENT
Start: 2018-04-01 | End: 2018-04-01

## 2018-04-01 RX ORDER — IMMUNE GLOBULIN INFUSION (HUMAN) 100 MG/ML
5 INJECTION, SOLUTION INTRAVENOUS; SUBCUTANEOUS ONCE
Status: COMPLETED | OUTPATIENT
Start: 2018-04-01 | End: 2018-04-01

## 2018-04-01 RX ADMIN — IMMUNE GLOBULIN INFUSION (HUMAN) 5 G: 100 INJECTION, SOLUTION INTRAVENOUS; SUBCUTANEOUS at 08:58

## 2018-04-01 RX ADMIN — IMMUNE GLOBULIN INFUSION (HUMAN) 20 G: 100 INJECTION, SOLUTION INTRAVENOUS; SUBCUTANEOUS at 10:04

## 2018-04-01 ASSESSMENT — PAIN SCALES - GENERAL: PAINLEVEL: NO PAIN

## 2018-04-01 NOTE — PROGRESS NOTES
Patient arrived to clinic for IVIG infusion.  Denies any discomfort.  PIV established with good blood return noted.  IVIG infused with titrated rate to a max of 120ml/hr.  Pt tolerated well.  PIV flushed, removed, and gauze/coban placed.   Confirmed next appointment and pt ambulated out of clinic in no apparent distress.

## 2018-04-03 LAB — IGA SERPL-MCNC: 48 MG/DL (ref 68–408)

## 2018-04-11 ENCOUNTER — NON-PROVIDER VISIT (OUTPATIENT)
Dept: MEDICAL GROUP | Facility: MEDICAL CENTER | Age: 43
End: 2018-04-11
Payer: COMMERCIAL

## 2018-04-11 NOTE — PROGRESS NOTES
Megan August is a 42 y.o. female here for a non-provider visit for NUCALA injection.    Reason for injection: Churg-Homer syndrome (CMS-HCC)  Order in MAR?: No  Patient supplied?:Yes  Minimum interval has been met for this injection (per MAR order): Yes    Order and dose verified by: [facility]  Patient tolerated injection and no adverse effects were observed or reported: Yes    # of Administrations remaining in MAR: 0

## 2018-04-15 ENCOUNTER — OUTPATIENT INFUSION SERVICES (OUTPATIENT)
Dept: ONCOLOGY | Facility: MEDICAL CENTER | Age: 43
End: 2018-04-15
Attending: INTERNAL MEDICINE
Payer: COMMERCIAL

## 2018-04-15 VITALS
TEMPERATURE: 97.3 F | OXYGEN SATURATION: 97 % | HEIGHT: 66 IN | HEART RATE: 71 BPM | RESPIRATION RATE: 18 BRPM | BODY MASS INDEX: 21.19 KG/M2 | WEIGHT: 131.84 LBS | SYSTOLIC BLOOD PRESSURE: 110 MMHG | DIASTOLIC BLOOD PRESSURE: 77 MMHG

## 2018-04-15 DIAGNOSIS — M35.00 SJOGREN'S SYNDROME, WITH UNSPECIFIED ORGAN INVOLVEMENT (HCC): Chronic | ICD-10-CM

## 2018-04-15 DIAGNOSIS — M30.1 CHURG-STRAUSS SYNDROME (HCC): Chronic | ICD-10-CM

## 2018-04-15 DIAGNOSIS — K92.89 GASTROINTESTINAL DYSMOTILITY: ICD-10-CM

## 2018-04-15 DIAGNOSIS — D72.18 CHURG-STRAUSS SYNDROME (HCC): Chronic | ICD-10-CM

## 2018-04-15 PROCEDURE — 700111 HCHG RX REV CODE 636 W/ 250 OVERRIDE (IP): Performed by: INTERNAL MEDICINE

## 2018-04-15 PROCEDURE — 96366 THER/PROPH/DIAG IV INF ADDON: CPT

## 2018-04-15 PROCEDURE — 96365 THER/PROPH/DIAG IV INF INIT: CPT

## 2018-04-15 PROCEDURE — 82784 ASSAY IGA/IGD/IGG/IGM EACH: CPT

## 2018-04-15 RX ORDER — IMMUNE GLOBULIN INFUSION (HUMAN) 100 MG/ML
20 INJECTION, SOLUTION INTRAVENOUS; SUBCUTANEOUS ONCE
Status: COMPLETED | OUTPATIENT
Start: 2018-04-15 | End: 2018-04-15

## 2018-04-15 RX ORDER — IMMUNE GLOBULIN INFUSION (HUMAN) 100 MG/ML
5 INJECTION, SOLUTION INTRAVENOUS; SUBCUTANEOUS ONCE
Status: COMPLETED | OUTPATIENT
Start: 2018-04-15 | End: 2018-04-15

## 2018-04-15 RX ADMIN — IMMUNE GLOBULIN INFUSION (HUMAN) 5 G: 100 INJECTION, SOLUTION INTRAVENOUS; SUBCUTANEOUS at 11:21

## 2018-04-15 RX ADMIN — IMMUNE GLOBULIN INFUSION (HUMAN) 20 G: 100 INJECTION, SOLUTION INTRAVENOUS; SUBCUTANEOUS at 08:55

## 2018-04-15 ASSESSMENT — PAIN SCALES - GENERAL: PAINLEVEL: NO PAIN

## 2018-04-15 NOTE — PROGRESS NOTES
Pt ambulated into department for Day 3/12 of IVIG. Pt reported that she is getting over a sore throat, stated that she feels much better than she did last week and it has almost completely resolved. PIV started, had + blood return, flushed briskly. Blood drawn and sent to lab for analysis. IVIG given per Gammagard policy, titrated to max rate of 120 ml/hr per Pt's request. Pt tolerated infusion well and without incident. PIV discontinued after, bleeding controlled with gauze and coban. Pt expressed having marital problems at home. Denied physical abuse, and declined needing additional help at this time. Pt's appointment in two weeks on Sunday on 4/29/18 at 08:30 am confirmed with Pt prior to leaving. Left department by self appearing in good spirits and NAD.

## 2018-04-16 ENCOUNTER — TELEPHONE (OUTPATIENT)
Dept: MEDICAL GROUP | Facility: MEDICAL CENTER | Age: 43
End: 2018-04-16

## 2018-04-16 DIAGNOSIS — Z00.00 PREVENTATIVE HEALTH CARE: Chronic | ICD-10-CM

## 2018-04-16 RX ORDER — DROSPIRENONE AND ETHINYL ESTRADIOL 0.03MG-3MG
1 KIT ORAL DAILY
Qty: 28 TAB | Refills: 4 | Status: SHIPPED | OUTPATIENT
Start: 2018-04-16 | End: 2018-09-09

## 2018-04-16 RX ORDER — DROSPIRENONE AND ETHINYL ESTRADIOL 0.03MG-3MG
1 KIT ORAL DAILY
Qty: 28 TAB | Refills: 4 | Status: SHIPPED | OUTPATIENT
Start: 2018-04-16 | End: 2018-04-16 | Stop reason: SDUPTHER

## 2018-04-17 LAB — IGA SERPL-MCNC: 48 MG/DL (ref 68–408)

## 2018-04-26 ENCOUNTER — OFFICE VISIT (OUTPATIENT)
Dept: URGENT CARE | Facility: CLINIC | Age: 43
End: 2018-04-26
Payer: COMMERCIAL

## 2018-04-26 VITALS
SYSTOLIC BLOOD PRESSURE: 100 MMHG | WEIGHT: 129 LBS | BODY MASS INDEX: 20.73 KG/M2 | TEMPERATURE: 98 F | HEIGHT: 66 IN | DIASTOLIC BLOOD PRESSURE: 80 MMHG | OXYGEN SATURATION: 97 % | HEART RATE: 66 BPM | RESPIRATION RATE: 16 BRPM

## 2018-04-26 DIAGNOSIS — R59.1 LYMPHADENOPATHY: ICD-10-CM

## 2018-04-26 PROCEDURE — 99212 OFFICE O/P EST SF 10 MIN: CPT | Performed by: NURSE PRACTITIONER

## 2018-04-26 ASSESSMENT — ENCOUNTER SYMPTOMS
VOMITING: 0
SWOLLEN GLANDS: 1
VISUAL CHANGE: 0
CHILLS: 0
FEVER: 0
NAUSEA: 0
FATIGUE: 0
DIZZINESS: 0
MYALGIAS: 0
COUGH: 0
SHORTNESS OF BREATH: 0
EYE PAIN: 0
SORE THROAT: 0

## 2018-04-27 ENCOUNTER — TELEPHONE (OUTPATIENT)
Dept: MEDICAL GROUP | Facility: MEDICAL CENTER | Age: 43
End: 2018-04-27

## 2018-04-27 NOTE — PROGRESS NOTES
Subjective:     Megan August is a 42 y.o. female who presents for Lymphadenopathy (left side of neck, hx of medical issues, she has multiple allergies and rec. reg infusions, her pcp is [place], needs to know of dermatological or lymphotic, x1day noticed sore)  Patient presents to clinic today with referral from PCP in which she reports to in [place] for a very rare condition that she has. See past medical history. Patient complaining of a swollen lymph node on the left side of her neck unsure if it is a lymph node or dermatological lesion. Patient denies any fevers, chills, sore throat. Patient has had melanoma. Moved in exact location. Patient has a surgical scar however she believes it is a lymph node. It is tender to palpation and she notices ×1 day.     Other   This is a new problem. The current episode started today. The problem occurs constantly. The problem has been unchanged. Associated symptoms include swollen glands. Pertinent negatives include no chest pain, chills, congestion, coughing, fatigue, fever, myalgias, nausea, rash, sore throat, visual change or vomiting. Nothing aggravates the symptoms. She has tried nothing for the symptoms. The treatment provided no relief.     Past Medical History:   Diagnosis Date   • Abscess of breast, postpartum    • Anemia    • Anemia    • Anxiety 6/20/2009   • Anxiety    • Bowel habit changes     paralyzed colon   • Cancer (CMS-[facility]) 2010    melanoma, left side neck   • Dental disorder     lack of saliva   • Dry eyes 6/20/2009 5/7/2014 right eye scleral redness   • Dry eyes     extreme   • Dysautonomia    • Eosinophilic esophagitis    • Eosinophilic esophagitis    • Exophoria    • Hiatus hernia syndrome    • Hypothyroid 6/20/2009   • Melanoma (CMS-[facility]) 4/4/2011    melanoma - Amenanoic left neck jaw   • Mild preeclampsia    • Other specified symptom associated with female genital organs     PCOS   • Polycystic ovarian syndrome    • Polycystic ovary disease  2009   •  labor    • Psychiatric disorder     PTSD   • Sjogren's syndrome (CMS-HCC)    • Small intestinal bacterial overgrowth      Past Surgical History:   Procedure Laterality Date   • GASTROSCOPY WITH FEED TUBE PLACEMENT N/A 2016    Procedure: GASTROSCOPY WITH NASOENTERIC TUBE PLACEMENT W/FLUORO;  Surgeon: Gallito Rangel M.D.;  Location: [facility];  Service:    • COLONOSCOPY  2015   • SETTLEMENT (INSURANCE)  2014    Performed by Olvin David M.D. at SURGERY [facility]   • OTHER      eye ducts cauterized   • OTHER      left periauricular melanoma excision   • INCISION AND DRAINAGE GENERAL  2009    left nipple   • PB  DELIVERY ONLY     • TONSILLECTOMY AND ADENOIDECTOMY     • ENDOSCOPY      multiple upper an lower procedures   • OTHER  [id number]    several procedures for infertility      Social History     Social History   • Marital status:      Spouse name: N/A   • Number of children: N/A   • Years of education: N/A     Occupational History   • Not on file.     Social History Main Topics   • Smoking status: Never Smoker   • Smokeless tobacco: Never Used   • Alcohol use No   • Drug use: No   • Sexual activity: Not on file     Other Topics Concern   • Not on file     Social History Narrative   • No narrative on file      Family History   Problem Relation Age of Onset   • Cancer Father    • Cancer Maternal Grandmother     Review of Systems   Constitutional: Negative for chills, fatigue and fever.   HENT: Negative for congestion and sore throat.    Eyes: Negative for pain.   Respiratory: Negative for cough and shortness of breath.    Cardiovascular: Negative for chest pain.   Gastrointestinal: Negative for nausea and vomiting.   Genitourinary: Negative for hematuria.   Musculoskeletal: Negative for myalgias.   Skin: Negative for rash.   Neurological: Negative for dizziness.     Allergies   Allergen Reactions   • Other Food  "Anaphylaxis     \"all foods\"= blisters/ anaphylaxis      Objective:   /80   Pulse 66   Temp 36.7 °C (98 °F)   Resp 16   Ht 1.676 m (5' 6\")   Wt 58.5 kg (129 lb)   SpO2 97%   Breastfeeding? No   BMI 20.82 kg/m²   Physical Exam   Constitutional: She is oriented to person, place, and time. She appears well-developed and well-nourished. No distress.   HENT:   Head: Normocephalic and atraumatic.   Right Ear: Tympanic membrane normal.   Left Ear: Tympanic membrane normal.   Nose: Nose normal. Right sinus exhibits no maxillary sinus tenderness and no frontal sinus tenderness. Left sinus exhibits no maxillary sinus tenderness and no frontal sinus tenderness.   Mouth/Throat: Uvula is midline, oropharynx is clear and moist and mucous membranes are normal. No posterior oropharyngeal edema, posterior oropharyngeal erythema or tonsillar abscesses. No tonsillar exudate.   Eyes: Conjunctivae and EOM are normal. Pupils are equal, round, and reactive to light. Right eye exhibits no discharge. Left eye exhibits no discharge.   Cardiovascular: Normal rate and regular rhythm.    No murmur heard.  Pulmonary/Chest: Effort normal and breath sounds normal. No respiratory distress.   Abdominal: Soft. She exhibits no distension. There is no tenderness.   Lymphadenopathy:        Head (left side): Tonsillar adenopathy present.       Neurological: She is alert and oriented to person, place, and time. She has normal reflexes. No sensory deficit.   Skin: Skin is warm, dry and intact.   Psychiatric: She has a normal mood and affect.         Assessment/Plan:   Assessment    1. Lymphadenopathy  Swollen lymph node noted the left side of neck. No signs of dermatological lesion, cyst, abscess. Patient without fevers no infectious etiology suspected.      Differential diagnosis, natural history, supportive care, and indications for immediate follow-up discussed.    "

## 2018-04-27 NOTE — TELEPHONE ENCOUNTER
Please call patient, we can see her Monday, April 30 at 4:20 PM, I will tentatively schedule that, please verify with the patient that she can make that appointment.

## 2018-04-29 ENCOUNTER — OUTPATIENT INFUSION SERVICES (OUTPATIENT)
Dept: ONCOLOGY | Facility: MEDICAL CENTER | Age: 43
End: 2018-04-29
Attending: INTERNAL MEDICINE
Payer: COMMERCIAL

## 2018-04-29 VITALS
BODY MASS INDEX: 21.4 KG/M2 | WEIGHT: 133.16 LBS | HEIGHT: 66 IN | SYSTOLIC BLOOD PRESSURE: 118 MMHG | OXYGEN SATURATION: 99 % | RESPIRATION RATE: 18 BRPM | HEART RATE: 69 BPM | DIASTOLIC BLOOD PRESSURE: 71 MMHG | TEMPERATURE: 97 F

## 2018-04-29 DIAGNOSIS — M30.1 CHURG-STRAUSS SYNDROME (HCC): Chronic | ICD-10-CM

## 2018-04-29 DIAGNOSIS — D72.18 CHURG-STRAUSS SYNDROME (HCC): Chronic | ICD-10-CM

## 2018-04-29 PROCEDURE — 96365 THER/PROPH/DIAG IV INF INIT: CPT

## 2018-04-29 PROCEDURE — 96366 THER/PROPH/DIAG IV INF ADDON: CPT

## 2018-04-29 PROCEDURE — 700111 HCHG RX REV CODE 636 W/ 250 OVERRIDE (IP): Performed by: INTERNAL MEDICINE

## 2018-04-29 RX ORDER — IMMUNE GLOBULIN INFUSION (HUMAN) 100 MG/ML
5 INJECTION, SOLUTION INTRAVENOUS; SUBCUTANEOUS ONCE
Status: COMPLETED | OUTPATIENT
Start: 2018-04-29 | End: 2018-04-29

## 2018-04-29 RX ORDER — IMMUNE GLOBULIN INFUSION (HUMAN) 100 MG/ML
20 INJECTION, SOLUTION INTRAVENOUS; SUBCUTANEOUS ONCE
Status: COMPLETED | OUTPATIENT
Start: 2018-04-29 | End: 2018-04-29

## 2018-04-29 RX ADMIN — IMMUNE GLOBULIN INFUSION (HUMAN) 5 G: 100 INJECTION, SOLUTION INTRAVENOUS; SUBCUTANEOUS at 11:33

## 2018-04-29 RX ADMIN — IMMUNE GLOBULIN INFUSION (HUMAN) 20 G: 100 INJECTION, SOLUTION INTRAVENOUS; SUBCUTANEOUS at 09:04

## 2018-04-29 ASSESSMENT — PAIN SCALES - GENERAL: PAINLEVEL_OUTOF10: 0

## 2018-04-29 NOTE — PROGRESS NOTES
Patient arrived ambulatory to the [facility] for IVIG. Reviewed vital signs, labs, and physician order. Patient denies S&S of infection or bleeding. IV access established in left forearm, visualized brisk blood return. IVIG administered per titration scale up to 120ml/hr, no adverse reaction observed. IV flushed per protocol, catheter removed with tip intact, gauze and coban dressing placed. Confirmed upcoming appointment date and time with patient. Patient left the [facility] ambulatory in no sign of distress.

## 2018-05-10 ENCOUNTER — NON-PROVIDER VISIT (OUTPATIENT)
Dept: MEDICAL GROUP | Facility: MEDICAL CENTER | Age: 43
End: 2018-05-10
Payer: COMMERCIAL

## 2018-05-10 NOTE — PROGRESS NOTES
Megan August is a 42 y.o. female here for a non-provider visit for Nucala 100mg vial injection.    Reason for injection: Sjogren's disease (HCC) [M35.00]    Order in MAR?: No  Patient supplied?:Yes  Minimum interval has been met for this injection (per MAR order): Yes    Order and dose verified by: [facility]  Patient tolerated injection and no adverse effects were observed or reported: Yes    # of Administrations remaining in MAR: 0    NDC:5015-7215-11  LOT:[id number]

## 2018-05-13 ENCOUNTER — OUTPATIENT INFUSION SERVICES (OUTPATIENT)
Dept: ONCOLOGY | Facility: MEDICAL CENTER | Age: 43
End: 2018-05-13
Attending: INTERNAL MEDICINE
Payer: COMMERCIAL

## 2018-05-13 VITALS
BODY MASS INDEX: 21.54 KG/M2 | DIASTOLIC BLOOD PRESSURE: 81 MMHG | OXYGEN SATURATION: 100 % | RESPIRATION RATE: 18 BRPM | HEIGHT: 66 IN | SYSTOLIC BLOOD PRESSURE: 112 MMHG | TEMPERATURE: 97.6 F | HEART RATE: 64 BPM | WEIGHT: 134.04 LBS

## 2018-05-13 DIAGNOSIS — M35.00 SJOGREN'S SYNDROME, WITH UNSPECIFIED ORGAN INVOLVEMENT (HCC): Chronic | ICD-10-CM

## 2018-05-13 PROCEDURE — 96365 THER/PROPH/DIAG IV INF INIT: CPT

## 2018-05-13 PROCEDURE — 700111 HCHG RX REV CODE 636 W/ 250 OVERRIDE (IP): Performed by: INTERNAL MEDICINE

## 2018-05-13 PROCEDURE — 82784 ASSAY IGA/IGD/IGG/IGM EACH: CPT

## 2018-05-13 PROCEDURE — 96366 THER/PROPH/DIAG IV INF ADDON: CPT

## 2018-05-13 RX ORDER — IMMUNE GLOBULIN INFUSION (HUMAN) 100 MG/ML
20 INJECTION, SOLUTION INTRAVENOUS; SUBCUTANEOUS ONCE
Status: COMPLETED | OUTPATIENT
Start: 2018-05-13 | End: 2018-05-13

## 2018-05-13 RX ORDER — IMMUNE GLOBULIN INFUSION (HUMAN) 100 MG/ML
5 INJECTION, SOLUTION INTRAVENOUS; SUBCUTANEOUS ONCE
Status: COMPLETED | OUTPATIENT
Start: 2018-05-13 | End: 2018-05-13

## 2018-05-13 RX ADMIN — IMMUNE GLOBULIN INFUSION (HUMAN) 5 G: 100 INJECTION, SOLUTION INTRAVENOUS; SUBCUTANEOUS at 11:39

## 2018-05-13 RX ADMIN — IMMUNE GLOBULIN INFUSION (HUMAN) 20 G: 100 INJECTION, SOLUTION INTRAVENOUS; SUBCUTANEOUS at 09:15

## 2018-05-13 ASSESSMENT — PAIN SCALES - GENERAL: PAINLEVEL_OUTOF10: 0

## 2018-05-13 NOTE — PROGRESS NOTES
Patient presents ambulatory for IVIG infusion.  Patient reports feeling well and denies any s/s of infection at this time.  PIV established and brisk blood return noted.  IVIG infused per MD order, titrated to max rate of 120 mL/hr per patient request with no complications or adverse reactions.  Patient to return 5/27/18, confirmed with patient.  PIV flushed, brisk blood return noted, discontinued, gauze, and coban applied to site.  Patient left ambulatory in no distress.

## 2018-05-15 LAB — IGA SERPL-MCNC: 48 MG/DL (ref 68–408)

## 2018-05-27 ENCOUNTER — OUTPATIENT INFUSION SERVICES (OUTPATIENT)
Dept: ONCOLOGY | Facility: MEDICAL CENTER | Age: 43
End: 2018-05-27
Attending: INTERNAL MEDICINE
Payer: COMMERCIAL

## 2018-05-27 VITALS
HEART RATE: 60 BPM | SYSTOLIC BLOOD PRESSURE: 121 MMHG | RESPIRATION RATE: 18 BRPM | BODY MASS INDEX: 21.4 KG/M2 | HEIGHT: 66 IN | DIASTOLIC BLOOD PRESSURE: 81 MMHG | TEMPERATURE: 99 F | OXYGEN SATURATION: 99 % | WEIGHT: 133.16 LBS

## 2018-05-27 DIAGNOSIS — M35.00 SJOGREN'S SYNDROME, WITH UNSPECIFIED ORGAN INVOLVEMENT (HCC): Chronic | ICD-10-CM

## 2018-05-27 PROCEDURE — 96365 THER/PROPH/DIAG IV INF INIT: CPT

## 2018-05-27 PROCEDURE — 700111 HCHG RX REV CODE 636 W/ 250 OVERRIDE (IP): Performed by: INTERNAL MEDICINE

## 2018-05-27 PROCEDURE — 36415 COLL VENOUS BLD VENIPUNCTURE: CPT

## 2018-05-27 PROCEDURE — 96366 THER/PROPH/DIAG IV INF ADDON: CPT

## 2018-05-27 PROCEDURE — 82784 ASSAY IGA/IGD/IGG/IGM EACH: CPT

## 2018-05-27 RX ORDER — IMMUNE GLOBULIN INFUSION (HUMAN) 100 MG/ML
5 INJECTION, SOLUTION INTRAVENOUS; SUBCUTANEOUS ONCE
Status: COMPLETED | OUTPATIENT
Start: 2018-05-27 | End: 2018-05-27

## 2018-05-27 RX ORDER — IMMUNE GLOBULIN INFUSION (HUMAN) 100 MG/ML
20 INJECTION, SOLUTION INTRAVENOUS; SUBCUTANEOUS ONCE
Status: COMPLETED | OUTPATIENT
Start: 2018-05-27 | End: 2018-05-27

## 2018-05-27 RX ORDER — DIPHENHYDRAMINE HCL 25 MG
25 TABLET ORAL EVERY 6 HOURS PRN
COMMUNITY
End: 2020-12-15

## 2018-05-27 RX ADMIN — IMMUNE GLOBULIN INFUSION (HUMAN) 20 G: 100 INJECTION, SOLUTION INTRAVENOUS; SUBCUTANEOUS at 09:00

## 2018-05-27 RX ADMIN — IMMUNE GLOBULIN INFUSION (HUMAN) 5 G: 100 INJECTION, SOLUTION INTRAVENOUS; SUBCUTANEOUS at 11:22

## 2018-05-27 ASSESSMENT — PAIN SCALES - GENERAL: PAINLEVEL_OUTOF10: 0

## 2018-05-27 NOTE — PROGRESS NOTES
Pt presents to infusion center for Q 2 week IVIG. No new acute complaints. PIV started, brisk blood return observed. IgA drawn as ordered. IVIG infused and titrated per Gammagard rate calculator to max rate of 120 ml/hr per pt preference. Pt tolerated well with no s/s of adverse reaction. PIV flushed and removed, gauze and coban dressing placed. Has next appt, left on foot in good spirits.

## 2018-05-29 DIAGNOSIS — R22.1 NECK MASS: ICD-10-CM

## 2018-05-29 LAB — IGA SERPL-MCNC: 56 MG/DL (ref 68–408)

## 2018-06-02 ENCOUNTER — HOSPITAL ENCOUNTER (OUTPATIENT)
Dept: RADIOLOGY | Facility: MEDICAL CENTER | Age: 43
End: 2018-06-02
Attending: INTERNAL MEDICINE
Payer: COMMERCIAL

## 2018-06-02 DIAGNOSIS — R22.1 NECK MASS: ICD-10-CM

## 2018-06-02 PROCEDURE — 76536 US EXAM OF HEAD AND NECK: CPT

## 2018-06-04 ENCOUNTER — TELEPHONE (OUTPATIENT)
Dept: MEDICAL GROUP | Facility: MEDICAL CENTER | Age: 43
End: 2018-06-04

## 2018-06-04 DIAGNOSIS — R22.1 NECK MASS: ICD-10-CM

## 2018-06-05 NOTE — TELEPHONE ENCOUNTER
Notified with results, recommend CT scan.  She states that she has established with  in [place], she will need to follow up with his office, I will order the CT scan soft tissue neck in the interim until she can follow up with his office.

## 2018-06-07 ENCOUNTER — APPOINTMENT (OUTPATIENT)
Dept: RADIOLOGY | Facility: MEDICAL CENTER | Age: 43
End: 2018-06-07
Attending: INTERNAL MEDICINE
Payer: COMMERCIAL

## 2018-06-07 ENCOUNTER — NON-PROVIDER VISIT (OUTPATIENT)
Dept: MEDICAL GROUP | Facility: MEDICAL CENTER | Age: 43
End: 2018-06-07
Payer: COMMERCIAL

## 2018-06-07 NOTE — PROGRESS NOTES
Megan August is a 42 y.o. female here for a non-provider visit for Nucala 100mg vial injection.     Reason for injection: Sjogren's disease (HCC) [M35.00]     Order in MAR?: No  Patient supplied?:Yes  Minimum interval has been met for this injection (per MAR order): Yes     Order and dose verified by: [facility]  Patient tolerated injection and no adverse effects were observed or reported: Yes     # of Administrations remaining in MAR: 0     NDC:3986-2777-83  LOT:[id number]

## 2018-06-17 ENCOUNTER — OUTPATIENT INFUSION SERVICES (OUTPATIENT)
Dept: ONCOLOGY | Facility: MEDICAL CENTER | Age: 43
End: 2018-06-17
Attending: INTERNAL MEDICINE
Payer: COMMERCIAL

## 2018-06-17 VITALS
OXYGEN SATURATION: 98 % | HEART RATE: 73 BPM | DIASTOLIC BLOOD PRESSURE: 80 MMHG | RESPIRATION RATE: 18 BRPM | BODY MASS INDEX: 21.12 KG/M2 | WEIGHT: 131.39 LBS | SYSTOLIC BLOOD PRESSURE: 112 MMHG | TEMPERATURE: 97 F | HEIGHT: 66 IN

## 2018-06-17 DIAGNOSIS — M35.00 SJOGREN'S SYNDROME, WITH UNSPECIFIED ORGAN INVOLVEMENT (HCC): Chronic | ICD-10-CM

## 2018-06-17 DIAGNOSIS — K92.89 GASTROINTESTINAL DYSMOTILITY: ICD-10-CM

## 2018-06-17 DIAGNOSIS — M30.1 CHURG-STRAUSS SYNDROME (HCC): Chronic | ICD-10-CM

## 2018-06-17 DIAGNOSIS — D72.18 CHURG-STRAUSS SYNDROME (HCC): Chronic | ICD-10-CM

## 2018-06-17 PROCEDURE — 700111 HCHG RX REV CODE 636 W/ 250 OVERRIDE (IP): Performed by: INTERNAL MEDICINE

## 2018-06-17 PROCEDURE — 82784 ASSAY IGA/IGD/IGG/IGM EACH: CPT

## 2018-06-17 PROCEDURE — 36415 COLL VENOUS BLD VENIPUNCTURE: CPT

## 2018-06-17 PROCEDURE — 96365 THER/PROPH/DIAG IV INF INIT: CPT

## 2018-06-17 PROCEDURE — 96366 THER/PROPH/DIAG IV INF ADDON: CPT

## 2018-06-17 RX ORDER — IMMUNE GLOBULIN INFUSION (HUMAN) 100 MG/ML
5 INJECTION, SOLUTION INTRAVENOUS; SUBCUTANEOUS ONCE
Status: COMPLETED | OUTPATIENT
Start: 2018-06-17 | End: 2018-06-17

## 2018-06-17 RX ORDER — IMMUNE GLOBULIN INFUSION (HUMAN) 100 MG/ML
20 INJECTION, SOLUTION INTRAVENOUS; SUBCUTANEOUS ONCE
Status: COMPLETED | OUTPATIENT
Start: 2018-06-17 | End: 2018-06-17

## 2018-06-17 RX ADMIN — IMMUNE GLOBULIN INFUSION (HUMAN) 20 G: 100 INJECTION, SOLUTION INTRAVENOUS; SUBCUTANEOUS at 09:40

## 2018-06-17 RX ADMIN — IMMUNE GLOBULIN INFUSION (HUMAN) 5 G: 100 INJECTION, SOLUTION INTRAVENOUS; SUBCUTANEOUS at 08:58

## 2018-06-17 ASSESSMENT — PAIN SCALES - GENERAL: PAINLEVEL_OUTOF10: 0

## 2018-06-17 NOTE — PROGRESS NOTES
Pt arrives ambulatory to [facility] accompanied by self.  She is here for bi-weekly IVIG.  IVIG infused as ordered with max rate of 120ml/hr (per pt request).  Pt tolerated well, no evidence of adverse effects noted or expressed.  Pt rested quietly t/o, chatting with staff, working on lap top.  Pt dc'd to self care in no apparent distress.  Future appts confirmed.

## 2018-06-18 LAB — IGA SERPL-MCNC: 53 MG/DL (ref 68–408)

## 2018-06-19 ENCOUNTER — TELEPHONE (OUTPATIENT)
Dept: MEDICAL GROUP | Facility: MEDICAL CENTER | Age: 43
End: 2018-06-19

## 2018-06-19 NOTE — TELEPHONE ENCOUNTER
Dr. Mejias's office called. Stated that patient's referral is dated for today (6/19/18) but it was suppose to be dated for 6/14/18. Pt has surgery scheduled for July 9th but the office cannot start seeing her until referral date is changed.

## 2018-06-19 NOTE — LETTER
July 13, 2018        Megan August  [address] [place] [address]        Dear Megan:    We have made multiple attempts to contact you. Please contact our office to obtain this information     If you have any questions or concerns, please don't hesitate to call [phone]        Sincerely,      Paula WILEY   Medical Assistant         Electronically Signed

## 2018-06-19 NOTE — TELEPHONE ENCOUNTER
The patient did not specify the date, and I was only notified that she needed a referral yesterday.    Please call [facility] and ask them if they can change the date of the referral to June 14, since I was not notified prior to yesterday there is no other way for me to change the referral date.

## 2018-06-21 ENCOUNTER — TELEPHONE (OUTPATIENT)
Dept: MEDICAL GROUP | Facility: MEDICAL CENTER | Age: 43
End: 2018-06-21

## 2018-06-22 ENCOUNTER — TELEPHONE (OUTPATIENT)
Dept: MEDICAL GROUP | Facility: MEDICAL CENTER | Age: 43
End: 2018-06-22

## 2018-06-22 NOTE — TELEPHONE ENCOUNTER
My chart request to fax cyclosporine ophthalmologic prescription to [facility] [phone], on counter to fax

## 2018-06-28 NOTE — TELEPHONE ENCOUNTER
Patient contacted me again today.  The ENTs office apparently told her that this has not been fixed.  Please call the patient's insurance [employer] to clarify to see if we can backdate the referral to ENT (Dr. Cadena) to June 14th.       C-collar

## 2018-07-01 ENCOUNTER — OUTPATIENT INFUSION SERVICES (OUTPATIENT)
Dept: ONCOLOGY | Facility: MEDICAL CENTER | Age: 43
End: 2018-07-01
Attending: INTERNAL MEDICINE
Payer: COMMERCIAL

## 2018-07-01 ENCOUNTER — TELEPHONE (OUTPATIENT)
Dept: MEDICAL GROUP | Facility: LAB | Age: 43
End: 2018-07-01

## 2018-07-01 VITALS
WEIGHT: 130.51 LBS | HEIGHT: 66 IN | RESPIRATION RATE: 18 BRPM | OXYGEN SATURATION: 99 % | HEART RATE: 64 BPM | SYSTOLIC BLOOD PRESSURE: 107 MMHG | TEMPERATURE: 97 F | BODY MASS INDEX: 20.98 KG/M2 | DIASTOLIC BLOOD PRESSURE: 72 MMHG

## 2018-07-01 DIAGNOSIS — R52 PAIN: ICD-10-CM

## 2018-07-01 DIAGNOSIS — M30.1 CHURG-STRAUSS SYNDROME (HCC): ICD-10-CM

## 2018-07-01 DIAGNOSIS — D72.18 CHURG-STRAUSS SYNDROME (HCC): ICD-10-CM

## 2018-07-01 PROCEDURE — 96366 THER/PROPH/DIAG IV INF ADDON: CPT

## 2018-07-01 PROCEDURE — 82784 ASSAY IGA/IGD/IGG/IGM EACH: CPT

## 2018-07-01 PROCEDURE — 96365 THER/PROPH/DIAG IV INF INIT: CPT

## 2018-07-01 PROCEDURE — A9270 NON-COVERED ITEM OR SERVICE: HCPCS | Performed by: INTERNAL MEDICINE

## 2018-07-01 PROCEDURE — 700111 HCHG RX REV CODE 636 W/ 250 OVERRIDE (IP): Performed by: INTERNAL MEDICINE

## 2018-07-01 PROCEDURE — 700102 HCHG RX REV CODE 250 W/ 637 OVERRIDE(OP): Performed by: INTERNAL MEDICINE

## 2018-07-01 RX ORDER — IMMUNE GLOBULIN INFUSION (HUMAN) 100 MG/ML
5 INJECTION, SOLUTION INTRAVENOUS; SUBCUTANEOUS ONCE
Status: COMPLETED | OUTPATIENT
Start: 2018-07-01 | End: 2018-07-01

## 2018-07-01 RX ORDER — IMMUNE GLOBULIN INFUSION (HUMAN) 100 MG/ML
20 INJECTION, SOLUTION INTRAVENOUS; SUBCUTANEOUS ONCE
Status: COMPLETED | OUTPATIENT
Start: 2018-07-01 | End: 2018-07-01

## 2018-07-01 RX ORDER — ACETAMINOPHEN 325 MG/1
650 TABLET ORAL ONCE
Status: COMPLETED | OUTPATIENT
Start: 2018-07-01 | End: 2018-07-01

## 2018-07-01 RX ADMIN — IMMUNE GLOBULIN INFUSION (HUMAN) 5 G: 100 INJECTION, SOLUTION INTRAVENOUS; SUBCUTANEOUS at 08:54

## 2018-07-01 RX ADMIN — IMMUNE GLOBULIN INFUSION (HUMAN) 20 G: 100 INJECTION, SOLUTION INTRAVENOUS; SUBCUTANEOUS at 10:00

## 2018-07-01 RX ADMIN — ACETAMINOPHEN 650 MG: 325 TABLET ORAL at 10:31

## 2018-07-01 ASSESSMENT — PAIN SCALES - GENERAL
PAINLEVEL_OUTOF10: 0
PAINLEVEL_OUTOF10: 3
PAINLEVEL_OUTOF10: 1

## 2018-07-01 NOTE — TELEPHONE ENCOUNTER
Paris from infusion center called for on call physician. Pt is receiving his regular IVIG infusion and asked for a Tylenol dose for a minor pain.  Okay to give a one time dose of Tylenol 650 mg.

## 2018-07-01 NOTE — PROGRESS NOTES
"Pt is here for her scheduled IVIG. Tolerating the treatment well with gradual titration to the max rate of 120ml/h. Tylenol given for the L lateral neck pain - small mass (1/4\" nodule); scheduled to be removed Monday. Infusion completed without an incident. Discharged home to self care. Next appointment scheduled.   "

## 2018-07-02 LAB — IGA SERPL-MCNC: 52 MG/DL (ref 68–408)

## 2018-07-04 NOTE — TELEPHONE ENCOUNTER
Could someone please check on this immediately?  I receive this message from the patient on July 3.  Please refer to the other messages in this thread.          From: Megan August  To: Zohaib Soares M.D.  Sent: 7/3/2018 10:59 AM PDT  Subject: Procedure Question    Dr Soares,  Dr Cadena's office still does not have the authorization backdated to 6/14.  So unless your referral specialist calls [facility] directly to expedite the referral TODAY they have to cancel my surgery.    I'm trying to reach a live person at your office- help please!!    thanks,  Megan

## 2018-07-06 ENCOUNTER — HOSPITAL ENCOUNTER (OUTPATIENT)
Dept: LAB | Facility: MEDICAL CENTER | Age: 43
End: 2018-07-06
Attending: OTOLARYNGOLOGY
Payer: COMMERCIAL

## 2018-07-06 LAB
BASOPHILS # BLD AUTO: 1.1 % (ref 0–1.8)
BASOPHILS # BLD: 0.04 K/UL (ref 0–0.12)
EOSINOPHIL # BLD AUTO: 0.01 K/UL (ref 0–0.51)
EOSINOPHIL NFR BLD: 0.3 % (ref 0–6.9)
ERYTHROCYTE [DISTWIDTH] IN BLOOD BY AUTOMATED COUNT: 47.9 FL (ref 35.9–50)
HCT VFR BLD AUTO: 42.5 % (ref 37–47)
HGB BLD-MCNC: 13.7 G/DL (ref 12–16)
IMM GRANULOCYTES # BLD AUTO: 0 K/UL (ref 0–0.11)
IMM GRANULOCYTES NFR BLD AUTO: 0 % (ref 0–0.9)
LYMPHOCYTES # BLD AUTO: 1.14 K/UL (ref 1–4.8)
LYMPHOCYTES NFR BLD: 31 % (ref 22–41)
MCH RBC QN AUTO: 31.6 PG (ref 27–33)
MCHC RBC AUTO-ENTMCNC: 32.2 G/DL (ref 33.6–35)
MCV RBC AUTO: 97.9 FL (ref 81.4–97.8)
MONOCYTES # BLD AUTO: 0.44 K/UL (ref 0–0.85)
MONOCYTES NFR BLD AUTO: 12 % (ref 0–13.4)
NEUTROPHILS # BLD AUTO: 2.05 K/UL (ref 2–7.15)
NEUTROPHILS NFR BLD: 55.6 % (ref 44–72)
NRBC # BLD AUTO: 0 K/UL
NRBC BLD-RTO: 0 /100 WBC
PLATELET # BLD AUTO: 208 K/UL (ref 164–446)
PMV BLD AUTO: 13 FL (ref 9–12.9)
RBC # BLD AUTO: 4.34 M/UL (ref 4.2–5.4)
WBC # BLD AUTO: 3.7 K/UL (ref 4.8–10.8)

## 2018-07-06 PROCEDURE — 85025 COMPLETE CBC W/AUTO DIFF WBC: CPT

## 2018-07-06 PROCEDURE — 36415 COLL VENOUS BLD VENIPUNCTURE: CPT

## 2018-07-06 NOTE — TELEPHONE ENCOUNTER
Yola Ibanez, Med Ass't   You 35 minutes ago (1:16 PM)      Hello it looks like my coworker Ivone talked to her   Phone Call 07/03/2018 11:09 AM Keri Weiner - -   Note     Received a phone from the patient stating that Dr. Cadena's office never received auth for 6/14/2018. In the patient's notes it doesn't state anything about being seen that date. It only states that she was having surgery on 7/9/2018. Let her know that Dr. Cadena's office would need to submit an appeal into [facility] as they saw her without an authorization.           Unfortunately now patient will have to call [facility] and submit and appeal we are only allowed to back date 1 week.  (Routing comment)

## 2018-07-09 ENCOUNTER — NON-PROVIDER VISIT (OUTPATIENT)
Dept: MEDICAL GROUP | Facility: MEDICAL CENTER | Age: 43
End: 2018-07-09
Payer: COMMERCIAL

## 2018-07-09 NOTE — PROGRESS NOTES
Megan August is a 42 y.o. female here for a non-provider visit for Nucala 100mg vial injection.     Reason for injection: Sjogren's disease (HCC) [M35.00]     Order in MAR?: No  Patient supplied?:Yes  Minimum interval has been met for this injection (per MAR order): Yes     Order and dose verified by: [facility]  Patient tolerated injection and no adverse effects were observed or reported: Yes     # of Administrations remaining in MAR: 0     NDC:6649-9030-12  LOT:[id number]

## 2018-07-14 ENCOUNTER — HOSPITAL ENCOUNTER (OUTPATIENT)
Dept: LAB | Facility: MEDICAL CENTER | Age: 43
End: 2018-07-14
Attending: OTOLARYNGOLOGY
Payer: COMMERCIAL

## 2018-07-14 LAB
ALBUMIN SERPL BCP-MCNC: 4.3 G/DL (ref 3.2–4.9)
ALBUMIN/GLOB SERPL: 1.3 G/DL
ALP SERPL-CCNC: 50 U/L (ref 30–99)
ALT SERPL-CCNC: 18 U/L (ref 2–50)
ANION GAP SERPL CALC-SCNC: 6 MMOL/L (ref 0–11.9)
AST SERPL-CCNC: 25 U/L (ref 12–45)
BILIRUB CONJ SERPL-MCNC: <0.1 MG/DL (ref 0.1–0.5)
BILIRUB INDIRECT SERPL-MCNC: NORMAL MG/DL (ref 0–1)
BILIRUB SERPL-MCNC: 0.4 MG/DL (ref 0.1–1.5)
BUN SERPL-MCNC: 15 MG/DL (ref 8–22)
CALCIUM SERPL-MCNC: 9.4 MG/DL (ref 8.5–10.5)
CHLORIDE SERPL-SCNC: 104 MMOL/L (ref 96–112)
CO2 SERPL-SCNC: 27 MMOL/L (ref 20–33)
CREAT SERPL-MCNC: 0.74 MG/DL (ref 0.5–1.4)
GLOBULIN SER CALC-MCNC: 3.4 G/DL (ref 1.9–3.5)
GLUCOSE SERPL-MCNC: 91 MG/DL (ref 65–99)
POTASSIUM SERPL-SCNC: 3.8 MMOL/L (ref 3.6–5.5)
PROT SERPL-MCNC: 7.7 G/DL (ref 6–8.2)
SODIUM SERPL-SCNC: 137 MMOL/L (ref 135–145)

## 2018-07-14 PROCEDURE — 36415 COLL VENOUS BLD VENIPUNCTURE: CPT

## 2018-07-14 PROCEDURE — 80053 COMPREHEN METABOLIC PANEL: CPT

## 2018-07-14 PROCEDURE — 82248 BILIRUBIN DIRECT: CPT

## 2018-07-15 ENCOUNTER — OUTPATIENT INFUSION SERVICES (OUTPATIENT)
Dept: ONCOLOGY | Facility: MEDICAL CENTER | Age: 43
End: 2018-07-15
Attending: INTERNAL MEDICINE
Payer: COMMERCIAL

## 2018-07-15 VITALS
RESPIRATION RATE: 18 BRPM | TEMPERATURE: 97.4 F | OXYGEN SATURATION: 96 % | SYSTOLIC BLOOD PRESSURE: 131 MMHG | HEART RATE: 82 BPM | HEIGHT: 66 IN | WEIGHT: 130.29 LBS | DIASTOLIC BLOOD PRESSURE: 93 MMHG | BODY MASS INDEX: 20.94 KG/M2

## 2018-07-15 DIAGNOSIS — M30.1 CHURG-STRAUSS SYNDROME (HCC): ICD-10-CM

## 2018-07-15 DIAGNOSIS — D72.18 CHURG-STRAUSS SYNDROME (HCC): ICD-10-CM

## 2018-07-15 PROCEDURE — 700111 HCHG RX REV CODE 636 W/ 250 OVERRIDE (IP): Performed by: INTERNAL MEDICINE

## 2018-07-15 PROCEDURE — 36415 COLL VENOUS BLD VENIPUNCTURE: CPT

## 2018-07-15 PROCEDURE — 96365 THER/PROPH/DIAG IV INF INIT: CPT

## 2018-07-15 PROCEDURE — 96366 THER/PROPH/DIAG IV INF ADDON: CPT

## 2018-07-15 PROCEDURE — 82784 ASSAY IGA/IGD/IGG/IGM EACH: CPT

## 2018-07-15 RX ORDER — IMMUNE GLOBULIN 100 MG/ML
20 SOLUTION INTRAVENOUS ONCE
Status: COMPLETED | OUTPATIENT
Start: 2018-07-15 | End: 2018-07-15

## 2018-07-15 RX ORDER — IMMUNE GLOBULIN 100 MG/ML
5 SOLUTION INTRAVENOUS ONCE
Status: COMPLETED | OUTPATIENT
Start: 2018-07-15 | End: 2018-07-15

## 2018-07-15 RX ADMIN — IMMUNE GLOBULIN 20 G: 100 SOLUTION INTRAVENOUS at 10:00

## 2018-07-15 RX ADMIN — IMMUNE GLOBULIN 5 G: 100 SOLUTION INTRAVENOUS at 09:06

## 2018-07-15 ASSESSMENT — PAIN SCALES - GENERAL: PAINLEVEL_OUTOF10: 0

## 2018-07-15 NOTE — PROGRESS NOTES
Pt is here for her scheduled IVIG. IgA drawn as ordered. Pt is tearful regarding her pending biopsy results - emotional support provided. Infusion titrated up to the max rate of 120ml/h - well tolerated. Discharged home to self care. Next appointment scheduled.

## 2018-07-16 LAB — IGA SERPL-MCNC: 60 MG/DL (ref 68–408)

## 2018-07-19 ENCOUNTER — HOSPITAL ENCOUNTER (OUTPATIENT)
Dept: RADIOLOGY | Facility: MEDICAL CENTER | Age: 43
End: 2018-07-19
Attending: OTOLARYNGOLOGY
Payer: COMMERCIAL

## 2018-07-19 DIAGNOSIS — C43.4 MALIGNANT MELANOMA OF NECK (HCC): ICD-10-CM

## 2018-07-19 DIAGNOSIS — C79.89: ICD-10-CM

## 2018-07-19 PROCEDURE — A9552 F18 FDG: HCPCS

## 2018-07-24 ENCOUNTER — TELEPHONE (OUTPATIENT)
Dept: MEDICAL GROUP | Facility: MEDICAL CENTER | Age: 43
End: 2018-07-24

## 2018-07-24 DIAGNOSIS — E03.9 HYPOTHYROIDISM, UNSPECIFIED TYPE: ICD-10-CM

## 2018-07-24 RX ORDER — LEVOTHYROXINE SODIUM 112 UG/1
TABLET ORAL
Qty: 90 TAB | Refills: 0 | Status: SHIPPED | OUTPATIENT
Start: 2018-07-24 | End: 2018-09-09

## 2018-07-24 NOTE — TELEPHONE ENCOUNTER
Levothyroxine    Pt has been taking her medication incorrectly.  SIG says 6 days a week and she did not realize it until she ran out. Pt has been taking it everyday. Please send to [facility].    Was the patient seen in the last year in this department? Yes Last 9/26/17    Does patient have an active prescription for medications requested? No     Received Request Via: Patient

## 2018-07-24 NOTE — TELEPHONE ENCOUNTER
1. Caller Name: Megan August                        Call Back Number: [phone] (home) [phone] (work)      2. Message: Pt would like PET scan results released to [facility].    3. Patient approves office to leave a detailed voicemail/KRAFTWERKt message: no

## 2018-07-29 ENCOUNTER — APPOINTMENT (OUTPATIENT)
Dept: ONCOLOGY | Facility: MEDICAL CENTER | Age: 43
End: 2018-07-29
Attending: INTERNAL MEDICINE
Payer: COMMERCIAL

## 2018-07-30 ENCOUNTER — TELEPHONE (OUTPATIENT)
Dept: MEDICAL GROUP | Facility: MEDICAL CENTER | Age: 43
End: 2018-07-30

## 2018-07-30 DIAGNOSIS — C43.9 MALIGNANT MELANOMA, UNSPECIFIED SITE (HCC): ICD-10-CM

## 2018-07-30 NOTE — TELEPHONE ENCOUNTER
URGENT - Pts surgery is FRI      1. Caller Name: Neel @  [facility] -- Dr Arash NGUYEN [phone]                         Call Back Number: [phone]  FAX : [phone]        2. Message: Pt needs REF to [facility] for ONCOLOGY SX on Fri    3. Patient approves office to leave a detailed voicemail/MyChart      LM for Neel to call and give just a bit more detail on why she is having surgery.

## 2018-07-31 NOTE — TELEPHONE ENCOUNTER
Please notify [facility] patient has an appointment on Friday, can they expedite the [place]of[place] referral?

## 2018-08-06 ENCOUNTER — NON-PROVIDER VISIT (OUTPATIENT)
Dept: MEDICAL GROUP | Facility: MEDICAL CENTER | Age: 43
End: 2018-08-06
Payer: COMMERCIAL

## 2018-08-06 NOTE — PROGRESS NOTES
Megan August is a 42 y.o. female here for a non-provider visit for Nucala 100mg vial injection.     Reason for injection: Sjogren's disease (HCC) [M35.00]     Order in MAR?: No  Patient supplied?:Yes  Minimum interval has been met for this injection (per MAR order): Yes     Order and dose verified by: [facility]  Patient tolerated injection and no adverse effects were observed or reported: Yes     # of Administrations remaining in MAR: 0     NDC:0102-0095-28  LOT:[id number]

## 2018-08-12 ENCOUNTER — APPOINTMENT (OUTPATIENT)
Dept: ONCOLOGY | Facility: MEDICAL CENTER | Age: 43
End: 2018-08-12
Attending: INTERNAL MEDICINE
Payer: COMMERCIAL

## 2018-08-21 ENCOUNTER — TELEPHONE (OUTPATIENT)
Dept: MEDICAL GROUP | Facility: MEDICAL CENTER | Age: 43
End: 2018-08-21

## 2018-08-21 NOTE — TELEPHONE ENCOUNTER
1. Caller Name: Megan August                        Call Back Number: [phone] (home) [phone] (work)      2. Message: Pt called and needs a new order and PA for Neocate Laith chocolate flavor 36 cans per month. Back date to 6/30/18 PROVIDER # [id number]  CLAIM # [id number]    Have already called [facility] - Dr Neely office for OV  Notes.    Dr Soares has requested she be seen since it has been so long. She has been scheduled.          3. Patient approves office to leave a detailed voicemail/MyChart message: yes

## 2018-08-26 ENCOUNTER — APPOINTMENT (OUTPATIENT)
Dept: ONCOLOGY | Facility: MEDICAL CENTER | Age: 43
End: 2018-08-26
Attending: INTERNAL MEDICINE
Payer: COMMERCIAL

## 2018-08-27 DIAGNOSIS — C43.9 MALIGNANT MELANOMA, UNSPECIFIED SITE (HCC): ICD-10-CM

## 2018-08-30 RX ORDER — DROSPIRENONE AND ETHINYL ESTRADIOL 0.03MG-3MG
1 KIT ORAL DAILY
Qty: 28 TAB | Refills: 1 | Status: SHIPPED | OUTPATIENT
Start: 2018-08-30 | End: 2018-09-26 | Stop reason: SDUPTHER

## 2018-08-31 PROBLEM — R63.39 IMPAIRED ORAL FEEDING: Status: ACTIVE | Noted: 2018-08-31

## 2018-09-09 ENCOUNTER — OUTPATIENT INFUSION SERVICES (OUTPATIENT)
Dept: ONCOLOGY | Facility: MEDICAL CENTER | Age: 43
End: 2018-09-09
Attending: INTERNAL MEDICINE
Payer: COMMERCIAL

## 2018-09-09 VITALS
BODY MASS INDEX: 21.47 KG/M2 | RESPIRATION RATE: 18 BRPM | TEMPERATURE: 98 F | WEIGHT: 133.6 LBS | OXYGEN SATURATION: 93 % | HEART RATE: 86 BPM | DIASTOLIC BLOOD PRESSURE: 66 MMHG | SYSTOLIC BLOOD PRESSURE: 111 MMHG | HEIGHT: 66 IN

## 2018-09-09 DIAGNOSIS — M35.00 SJOGREN'S SYNDROME, WITH UNSPECIFIED ORGAN INVOLVEMENT (HCC): Chronic | ICD-10-CM

## 2018-09-09 PROCEDURE — 96365 THER/PROPH/DIAG IV INF INIT: CPT

## 2018-09-09 PROCEDURE — 96366 THER/PROPH/DIAG IV INF ADDON: CPT

## 2018-09-09 PROCEDURE — 82784 ASSAY IGA/IGD/IGG/IGM EACH: CPT

## 2018-09-09 PROCEDURE — 700111 HCHG RX REV CODE 636 W/ 250 OVERRIDE (IP): Performed by: INTERNAL MEDICINE

## 2018-09-09 PROCEDURE — 36415 COLL VENOUS BLD VENIPUNCTURE: CPT

## 2018-09-09 RX ORDER — IMMUNE GLOBULIN 100 MG/ML
20 SOLUTION INTRAVENOUS ONCE
Status: COMPLETED | OUTPATIENT
Start: 2018-09-09 | End: 2018-09-09

## 2018-09-09 RX ORDER — IMMUNE GLOBULIN 100 MG/ML
5 SOLUTION INTRAVENOUS ONCE
Status: COMPLETED | OUTPATIENT
Start: 2018-09-09 | End: 2018-09-09

## 2018-09-09 RX ADMIN — IMMUNE GLOBULIN 5 G: 100 SOLUTION INTRAVENOUS at 13:10

## 2018-09-09 RX ADMIN — IMMUNE GLOBULIN 20 G: 100 SOLUTION INTRAVENOUS at 10:34

## 2018-09-09 ASSESSMENT — PAIN SCALES - GENERAL: PAINLEVEL_OUTOF10: 0

## 2018-09-11 LAB — IGA SERPL-MCNC: 47 MG/DL (ref 68–408)

## 2018-09-20 ENCOUNTER — TELEPHONE (OUTPATIENT)
Dept: MEDICAL GROUP | Facility: MEDICAL CENTER | Age: 43
End: 2018-09-20

## 2018-09-20 NOTE — LETTER
2018          Dear Sir diana Kim,    This letter is in regards to Ms. Megan August (73). Ms. August is on  Laith supplementation, 1.2 cans orally three times per day, for a total of 36 cans per month.  Ms. August has been seen by gastroenterology locally, and subsequently by gastroenterology at the [facility] where she was diagnosed with a rare form of Churg-Homer disease with consultations by the Department of Gastroenterology and the Department of Allergy Immunology at that facility.    Because of her Churg-Homer disease, she had been suffering from persistent, chronic, incapacitating gastrointestinal symptoms such as nausea, vomiting, abdominal pain, bloating, and the inability to not only swallow food, but would suffer bouts of emesis after ingestion of any meals.  Her symptoms were so severe, that a decision was made after review of her nutritional status in conjunction with the dietetics program at the [facility] and the gastroenterology department, to initiate nasogastric tube feeds, and after a trial and error process, the only supplementation which she could tolerate was the  Laith supplementation. Thus she needs to maintain on the supplement indefinitely per her Gastroenterology specialist.    Sincerely,     Zohaib Soares M.D.      Sincerely,        Zohaib Soares M.D.    Electronically Signed

## 2018-09-21 NOTE — TELEPHONE ENCOUNTER
1. Caller Name: Kyra @ [facility]                      Call Back Number:     2. Message: Spoke with [facility] and [facility] today, need a letter from you stating the need and a written Rx for NEOCATE JR CHOCOLATE FLAVOR 36 CANS PER MONTH. Please Prior Auth from 6/30/18     3. Patient approves office to leave a detailed voicemail/MyChart message: no

## 2018-09-21 NOTE — TELEPHONE ENCOUNTER
Dear Sir diana Kim,    This letter is in regards to Ms. Megan August (73). Ms. August is on  Laith supplementation, 1.2 cans orally three times per day, for a total of 36 cans per month.  Ms. August has been seen by gastroenterology locally, and subsequently by gastroenterology at the [facility] where she was diagnosed with a rare form of Churg-Homer disease with consultations by the Department of Gastroenterology and the Department of Allergy Immunology at that facility.    Because of her Churg-Homer disease, she had been suffering from persistent, chronic, incapacitating gastrointestinal symptoms such as nausea, vomiting, abdominal pain, bloating, and the inability to not only swallow food, but would suffer bouts of emesis after ingestion of any meals.  Her symptoms were so severe, that a decision was made after review of her nutritional status in conjunction with the dietetics program at the [facility] and the gastroenterology department, to initiate nasogastric tube feeds, and after a trial and error process, the only supplementation which she could tolerate was the  Laith supplementation. Thus she needs to maintain on the supplement indefinitely per her Gastroenterology specialist.    Sincerely,     Zohaib Soares M.D.

## 2018-09-23 ENCOUNTER — OUTPATIENT INFUSION SERVICES (OUTPATIENT)
Dept: ONCOLOGY | Facility: MEDICAL CENTER | Age: 43
End: 2018-09-23
Attending: INTERNAL MEDICINE
Payer: COMMERCIAL

## 2018-09-23 VITALS
HEIGHT: 66 IN | HEART RATE: 62 BPM | DIASTOLIC BLOOD PRESSURE: 79 MMHG | OXYGEN SATURATION: 100 % | WEIGHT: 132.5 LBS | SYSTOLIC BLOOD PRESSURE: 116 MMHG | BODY MASS INDEX: 21.29 KG/M2 | RESPIRATION RATE: 18 BRPM | TEMPERATURE: 96.3 F

## 2018-09-23 DIAGNOSIS — M30.1 CHURG-STRAUSS SYNDROME (HCC): Chronic | ICD-10-CM

## 2018-09-23 DIAGNOSIS — D72.18 CHURG-STRAUSS SYNDROME (HCC): Chronic | ICD-10-CM

## 2018-09-23 PROCEDURE — 82784 ASSAY IGA/IGD/IGG/IGM EACH: CPT

## 2018-09-23 PROCEDURE — 96365 THER/PROPH/DIAG IV INF INIT: CPT

## 2018-09-23 PROCEDURE — 96366 THER/PROPH/DIAG IV INF ADDON: CPT

## 2018-09-23 PROCEDURE — 36415 COLL VENOUS BLD VENIPUNCTURE: CPT

## 2018-09-23 PROCEDURE — 700111 HCHG RX REV CODE 636 W/ 250 OVERRIDE (IP): Performed by: INTERNAL MEDICINE

## 2018-09-23 RX ORDER — IMMUNE GLOBULIN 100 MG/ML
5 SOLUTION INTRAVENOUS ONCE
Status: COMPLETED | OUTPATIENT
Start: 2018-09-23 | End: 2018-09-23

## 2018-09-23 RX ORDER — IMMUNE GLOBULIN 100 MG/ML
20 SOLUTION INTRAVENOUS ONCE
Status: COMPLETED | OUTPATIENT
Start: 2018-09-23 | End: 2018-09-23

## 2018-09-23 RX ADMIN — IMMUNE GLOBULIN 5 G: 100 SOLUTION INTRAVENOUS at 12:36

## 2018-09-23 RX ADMIN — IMMUNE GLOBULIN 20 G: 100 SOLUTION INTRAVENOUS at 10:17

## 2018-09-23 ASSESSMENT — PAIN SCALES - GENERAL: PAINLEVEL_OUTOF10: 0

## 2018-09-23 NOTE — PROGRESS NOTES
"Patient arrived ambulatory to the [facility] for IVIG. Reviewed vital signs, labs, and physician order. Patient denies S&S of infection, or bleeding. IV access established in left AC, IGA level drawn per MD order, visualized brisk blood return. IVIG administered per titration scale up to 120ml's per hour \"per pt request to prevent headache\", no adverse reaction observed. IV flushed per protocol, catheter removed with tip intact, gauze and coban dressing placed. Confirmed upcoming appointment date and time with patient. Patient left the [facility] ambulatory in no sign of distress.   "

## 2018-09-25 LAB — IGA SERPL-MCNC: 51 MG/DL (ref 68–408)

## 2018-09-26 RX ORDER — DROSPIRENONE AND ETHINYL ESTRADIOL 0.03MG-3MG
1 KIT ORAL DAILY
Qty: 28 TAB | Refills: 2 | Status: SHIPPED | OUTPATIENT
Start: 2018-09-26 | End: 2018-12-15 | Stop reason: SDUPTHER

## 2018-10-07 ENCOUNTER — OUTPATIENT INFUSION SERVICES (OUTPATIENT)
Dept: ONCOLOGY | Facility: MEDICAL CENTER | Age: 43
End: 2018-10-07
Attending: INTERNAL MEDICINE
Payer: COMMERCIAL

## 2018-10-07 VITALS
TEMPERATURE: 97 F | BODY MASS INDEX: 21.33 KG/M2 | HEART RATE: 61 BPM | HEIGHT: 66 IN | RESPIRATION RATE: 18 BRPM | DIASTOLIC BLOOD PRESSURE: 73 MMHG | SYSTOLIC BLOOD PRESSURE: 116 MMHG | OXYGEN SATURATION: 99 % | WEIGHT: 132.72 LBS

## 2018-10-07 DIAGNOSIS — D72.18 CHURG-STRAUSS SYNDROME (HCC): Chronic | ICD-10-CM

## 2018-10-07 DIAGNOSIS — M30.1 CHURG-STRAUSS SYNDROME (HCC): Chronic | ICD-10-CM

## 2018-10-07 PROCEDURE — 96365 THER/PROPH/DIAG IV INF INIT: CPT

## 2018-10-07 PROCEDURE — 700111 HCHG RX REV CODE 636 W/ 250 OVERRIDE (IP): Performed by: INTERNAL MEDICINE

## 2018-10-07 PROCEDURE — 36415 COLL VENOUS BLD VENIPUNCTURE: CPT

## 2018-10-07 PROCEDURE — 82784 ASSAY IGA/IGD/IGG/IGM EACH: CPT

## 2018-10-07 PROCEDURE — 96366 THER/PROPH/DIAG IV INF ADDON: CPT

## 2018-10-07 RX ORDER — IMMUNE GLOBULIN 100 MG/ML
5 SOLUTION INTRAVENOUS ONCE
Status: COMPLETED | OUTPATIENT
Start: 2018-10-07 | End: 2018-10-07

## 2018-10-07 RX ORDER — IMMUNE GLOBULIN 100 MG/ML
20 SOLUTION INTRAVENOUS ONCE
Status: COMPLETED | OUTPATIENT
Start: 2018-10-07 | End: 2018-10-07

## 2018-10-07 RX ADMIN — IMMUNE GLOBULIN 5 G: 100 SOLUTION INTRAVENOUS at 11:47

## 2018-10-07 RX ADMIN — IMMUNE GLOBULIN 20 G: 100 SOLUTION INTRAVENOUS at 09:13

## 2018-10-07 ASSESSMENT — PAIN SCALES - GENERAL: PAINLEVEL_OUTOF10: 0

## 2018-10-07 NOTE — PROGRESS NOTES
Pt presents to infusion center for IVIG. Denies any new, acute complaints. PIV started, brisk blood return observed. IgA lab drawn per orders. IVIG infused and titrated per Gammagard rate calculator to max rate of 120 ml/hr per pt preference. Pt tolerated well with no s/s of adverse reaction. PIV flushed and removed, gauze and coban dressing placed. Has next appt, left on foot in good spirits.

## 2018-10-08 ENCOUNTER — OFFICE VISIT (OUTPATIENT)
Dept: HEMATOLOGY ONCOLOGY | Facility: MEDICAL CENTER | Age: 43
End: 2018-10-08
Payer: COMMERCIAL

## 2018-10-08 VITALS
RESPIRATION RATE: 18 BRPM | BODY MASS INDEX: 21.21 KG/M2 | OXYGEN SATURATION: 100 % | WEIGHT: 131.94 LBS | SYSTOLIC BLOOD PRESSURE: 122 MMHG | HEIGHT: 66 IN | DIASTOLIC BLOOD PRESSURE: 68 MMHG | TEMPERATURE: 97.2 F | HEART RATE: 61 BPM

## 2018-10-08 DIAGNOSIS — Z15.89 BIALLELIC MUTATION OF SDHA GENE: Chronic | ICD-10-CM

## 2018-10-08 DIAGNOSIS — C43.4 MALIGNANT MELANOMA OF NECK (HCC): ICD-10-CM

## 2018-10-08 DIAGNOSIS — D72.18 CHURG-STRAUSS SYNDROME (HCC): Chronic | ICD-10-CM

## 2018-10-08 DIAGNOSIS — M30.1 CHURG-STRAUSS SYNDROME (HCC): Chronic | ICD-10-CM

## 2018-10-08 PROCEDURE — 99205 OFFICE O/P NEW HI 60 MIN: CPT | Performed by: INTERNAL MEDICINE

## 2018-10-08 ASSESSMENT — PAIN SCALES - GENERAL: PAINLEVEL: NO PAIN

## 2018-10-08 NOTE — PROGRESS NOTES
Consult Note: Oncology    Date of consultation: 10/8/2018 8:23 AM    Referring provider: Zohaib Soares M.D.    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    History of presenting illness:     -42 y.o. 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     - initially noticed L neck mass in 2011, for which she eventually underwent a biopsy with pathology revealed malignant melanoma, non-ulcerative, 0.7 mm thickness. She underwent WLE in 5/2011 without SLNBx or neck dissection. In 2018 she recently noticed the re-emergence of a mass of L neck with associated tenderness and swelling. Excisional biopsy of L neck mass performed locally on 7/9/18 revealed recurrent malignant melanoma in soft tissue node reportedly adjacent to but not infiltrating nearby salivary gland tissue. [facility] on 7/19/18, report indicates new L parotid lesion with SUV max of 5. She underwent excision of left upper neck skin and left selective neck dissection IB-V on 8/3/18 with Dr. 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 She reports that since giving birth to her twins 9 years ago, she has experienced severe autoimmune symptoms including severe sore throat, food impaction, progressive GI paralysis, eosinophilia, and allergy to all food, requiring her to be sustained on an amino acid formula. Prior to her pregnancy, she experienced autoimmune symptoms including hypothyroidism, extreme dry eyes,  dry mouth, and polycystic ovaries. She receives IVIG every two weeks, which does not help with motility, but prevents allergic reaction with associated hives, fatigue, and pain. She also receives a monthly Nucala shot to reduce her eosinophil count. Her condition is managed by GI specialist Dr. Nat Evans at the [facility].Nucala has been stopped after she developed recurrent melanoma due to concern for immunosuppression.    -She was seen by Dr. Cameron at [facility]. Due to her immune condition and adjuvant chemotherapy was not recommended.  Plan is to do active surveillance with ultrasound of the neck every 6 months alternating with PET scan every 6 months.  -She had some wound healing problems at the neck dissection site which has resolved. She continues to have multiple neuro visceral symptoms related to her autoimmune conditions.        Past Medical History:    Past Medical History:   Diagnosis Date   • Abscess of breast, postpartum    • Anemia    • Anemia    • Anxiety 2009   • Anxiety    • Bowel habit changes     paralyzed colon   • Cancer (HCC) 2010    melanoma, left side neck   • Dental disorder     lack of saliva   • Dry eyes 2009 right eye scleral redness   • Dry eyes     extreme   • Dysautonomia    • Eosinophilic esophagitis    • Eosinophilic esophagitis    • Exophoria    • Hiatus hernia syndrome    • Hypothyroid 2009   • Melanoma (HCC) 2011    melanoma - Amenanoic left neck jaw   • Mild preeclampsia    • Other specified symptom associated with female genital organs     PCOS   • Polycystic ovarian syndrome    • Polycystic ovary disease 2009   •  labor    • Psychiatric disorder     PTSD   • Sjogren's syndrome (HCC)    • Small intestinal bacterial overgrowth        Past surgical history:    Past Surgical History:   Procedure Laterality Date   • GASTROSCOPY WITH FEED TUBE PLACEMENT N/A 2016    Procedure: GASTROSCOPY WITH NASOENTERIC TUBE PLACEMENT  W/FLUORO;  Surgeon: Gallito Rangel M.D.;  Location: SURGERY [facility];  Service:    • COLONOSCOPY  2015   • SETTLEMENT (INSURANCE)  2014    Performed by Olvin David M.D. at SURGERY [facility]   • OTHER      eye ducts cauterized   • OTHER      left periauricular melanoma excision   • INCISION AND DRAINAGE GENERAL  2009    left nipple   • PB  DELIVERY ONLY     • TONSILLECTOMY AND ADENOIDECTOMY     • ENDOSCOPY      multiple upper an lower procedures   • OTHER  [id number]    several procedures for infertility        Allergies:  Other food    Medications:    Current Outpatient Prescriptions   Medication Sig Dispense Refill   • levothyroxine (SYNTHROID) 112 MCG Tab Take one po 6 days per week 90 Tab 0   • drospirenone-ethinyl estradiol (ASHLEY) 3-0.03 MG per tablet Take 1 Tab by mouth every day. 28 Tab 2   • cyclosporin (RESTASIS) 0.05 % ophthalmic emulsion Place 1 Drop in both eyes 2 times a day. Dispense quantity 360 vials to [facility] pharmacy fax [phone] 0.4 mL 3   • venlafaxine XR (EFFEXOR XR) 75 MG CAPSULE SR 24 HR Take 1 Cap by mouth every day. 90 Cap 1   • diphenhydrAMINE (BENADRYL) 25 MG Tab Take 25 mg by mouth every 6 hours as needed for Sleep.     • mepolizumab (NUCALA) 100 MG Recon Soln injection Inject 1 mL as instructed every 28 days. Administer in clinic every 28 days 1 Each 6   • Cholecalciferol ([employer] VITAMIN D3 DROPS/INFANT) 400 UT/0.028ML Liquid Take 5 Drops by mouth every day. 15 mL 11   • potassium chloride (KLOR-CON) 20 MEQ Pack Take 1 Packet by mouth every day. 90 Packet 3   • predniSONE (DELTASONE) 1 MG Tab TAKE 9 TABLETS BY MOUTH DAILY. 270 Tab 2   • ranitidine (ZANTAC) 150 MG Tab Take 150 mg by mouth every evening.     • ibuprofen (MOTRIN) 200 MG Tab Take 200 mg by mouth every 6 hours as needed for Mild Pain.     • naproxen (ALEVE) 220 MG tablet Take 220 mg by mouth as needed. Indications: Mild to Moderate Pain     • Fexofenadine HCl  "(ALLEGRA PO) Take 1 Tab by mouth every evening.       No current facility-administered medications for this visit.        Social History:     Social History     Social History   • Marital status:      Spouse name: N/A   • Number of children: N/A   • Years of education: N/A     Occupational History   • Not on file.     Social History Main Topics   • Smoking status: Never Smoker   • Smokeless tobacco: Never Used   • Alcohol use No   • Drug use: No   • Sexual activity: Not on file     Other Topics Concern   • Not on file     Social History Narrative   • No narrative on file       Family History:     Family History   Problem Relation Age of Onset   • Cancer Father    • Cancer Maternal Grandmother        Review of Systems:  All other review of systems are negative except what was mentioned above in the HPI.    Constitutional: No fever, chills,  chronic weight loss ,malaise/fatigue.    HEENT: No new auditory or visual complaints. No sore throat and neck pain.     Respiratory:No new cough, sputum production, shortness of breath and wheezing.    Cardiovascular: No new chest pain, palpitations, orthopnea and leg swelling.    Gastrointestinal: Chronic nausea, vomiting ,abdominal pain, hematochezia or melena     Genitourinary: Negative for dysuria, hematuria    Musculoskeletal: No new arthralgias or myalgias   Skin: Negative for rash and itching.    Neurological: Negative for focal weakness or headaches.    Endo/Heme/Allergies: No abnormal bleed/bruise.    Psychiatric/Behavioral: No new depression/anxiety.    Physical Exam:  Vitals:   /68 (BP Location: Left arm, Patient Position: Sitting, BP Cuff Size: Adult)   Pulse 61   Temp 36.2 °C (97.2 °F) (Temporal)   Resp 18   Ht 1.68 m (5' 6.14\")   Wt 59.8 kg (131 lb 15.1 oz)   LMP  (LMP Unknown)   SpO2 100%   Breastfeeding? Unknown   BMI 21.21 kg/m²     General: Not in acute distress, alert and oriented x 3  HEENT: No pallor, icterus. Oropharynx clear.   Neck: " Supple, no palpable masses. Well-healed scar tissue from the left cervical lymph node dissection  Lymph nodes: No palpable cervical, supraclavicular, axillary or inguinal lymphadenopathy.    CVS: regular rate and rhythm, no rubs or gallops  RESP: Clear to auscultate bilaterally, no wheezing or crackles.   ABD: Soft, non tender, non distended, positive bowel sounds, no palpable organomegaly  EXT: No edema or cyanosis  CNS: Alert and oriented x3, No focal deficits.  Skin- No rash      Labs:   No results for input(s): RBC, HEMOGLOBIN, HEMATOCRIT, PLATELETCT, PROTHROMBTM, APTT, INR, IRON, FERRITIN, TOTIRONBC in the last 72 hours.  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 BRAF negative.     She had a delayed local recurrence after 4 years. . She has been on chronic immunosuppression for her autoimmune condition with prednisone and mepolizumab. Unclear whether relapse is precipitated by the immunosuppression. She is off of mepolizumab.  Reviewed the outside records, pathology and to [facility] recommendation by . Given the autoimmune condition. She is not a good candidate for adjuvant immunotherapy.  Current recommendation is for ultrasound of the neck every 6 months alternating with 6 months PET scan. Her physical exam is otherwise unremarkable. We will schedule her for an ultrasound. We will send nex gen sequencing to see whether she has any other actionable mutation like c-kit given the  amelanotic presentation.    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 . She is seeing auto-immune specialist at [facility].  If she has any recurrence, we will have to get input from her provider.     Patient reports that her insurance will be changing next year. Discussed various options including switching providers to [facility] or [facility] for future surveillance. She will let her know about her decision.    She agreed and verbalized her agreement and understanding with the current plan.  I answered all questions and concerns she has at this time.              Thank you for allowing me to participate in her care.    Please note that this dictation was created using voice recognition software. I have made every reasonable attempt to correct obvious errors, but I expect that there are errors of grammar and possibly content that I did not discover before finalizing the note.      SIGNATURES:  Darius Torres    CC:  MOISÉS Maldonado Dean K, M.D.

## 2018-10-10 LAB — IGA SERPL-MCNC: 38 MG/DL (ref 68–408)

## 2018-10-12 ENCOUNTER — TELEPHONE (OUTPATIENT)
Dept: MEDICAL GROUP | Facility: MEDICAL CENTER | Age: 43
End: 2018-10-12

## 2018-10-12 NOTE — TELEPHONE ENCOUNTER
I called [facility] this am to check on the orders for NEOCATE JR Supplement. Spoke with Maria Luisa. She said that we have provided all of the documentation necessary to request approval from [facility]. She states that they are now waiting for [facility] to send APPROVAL.     Called [facility] and spoke with Analilia. She says that this has been approved since 9/26/18 through 12/31/18. I explained to her that it in fact was not approved and that the approval they gave me in September was not enough to get the Neocate shipped to patient. I requested that they call [facility] themselves and clarify what they say is approved and get the patiens supplement shipped to her. I supplied them with phone # and acct # and requested that they notify me when they have it resolved. Analilia will call me when it is done.     I spoke with Megan and explained what we were doing today between [facility] and [facility]. Will call her when I hear from [facility].

## 2018-10-12 NOTE — TELEPHONE ENCOUNTER
Rec'd call back from Analilia @ [facility]. She spoke with Gustavo at [facility]. Analilia assured me that [facility] now had all the Approval info that they needed to get order out to pt.     Tried to notify Megan, no answer, unable to leave message.

## 2018-10-16 ENCOUNTER — OFFICE VISIT (OUTPATIENT)
Dept: MEDICAL GROUP | Facility: MEDICAL CENTER | Age: 43
End: 2018-10-16
Payer: COMMERCIAL

## 2018-10-16 ENCOUNTER — TELEPHONE (OUTPATIENT)
Dept: HEMATOLOGY ONCOLOGY | Facility: MEDICAL CENTER | Age: 43
End: 2018-10-16

## 2018-10-16 VITALS
HEART RATE: 54 BPM | OXYGEN SATURATION: 98 % | TEMPERATURE: 97.6 F | SYSTOLIC BLOOD PRESSURE: 122 MMHG | BODY MASS INDEX: 20.89 KG/M2 | HEIGHT: 66 IN | DIASTOLIC BLOOD PRESSURE: 76 MMHG | WEIGHT: 130 LBS

## 2018-10-16 DIAGNOSIS — Z23 NEED FOR PNEUMOCOCCAL VACCINATION: ICD-10-CM

## 2018-10-16 DIAGNOSIS — D80.3 IGG DEFICIENCY (HCC): ICD-10-CM

## 2018-10-16 DIAGNOSIS — Z00.00 PREVENTATIVE HEALTH CARE: ICD-10-CM

## 2018-10-16 DIAGNOSIS — Z12.39 ENCOUNTER FOR BREAST CANCER SCREENING OTHER THAN MAMMOGRAM: ICD-10-CM

## 2018-10-16 DIAGNOSIS — Z23 NEED FOR VACCINATION: ICD-10-CM

## 2018-10-16 DIAGNOSIS — E53.8 LOW SERUM VITAMIN B12: ICD-10-CM

## 2018-10-16 DIAGNOSIS — Z23 NEEDS FLU SHOT: ICD-10-CM

## 2018-10-16 DIAGNOSIS — D72.18 CHURG-STRAUSS SYNDROME (HCC): Chronic | ICD-10-CM

## 2018-10-16 DIAGNOSIS — C43.4 MALIGNANT MELANOMA OF NECK (HCC): ICD-10-CM

## 2018-10-16 DIAGNOSIS — M35.00 SJOGREN'S SYNDROME, WITH UNSPECIFIED ORGAN INVOLVEMENT (HCC): Chronic | ICD-10-CM

## 2018-10-16 DIAGNOSIS — E03.9 HYPOTHYROIDISM, UNSPECIFIED TYPE: Chronic | ICD-10-CM

## 2018-10-16 DIAGNOSIS — K20.0 EOSINOPHILIC ESOPHAGITIS: Chronic | ICD-10-CM

## 2018-10-16 DIAGNOSIS — Z15.89 BIALLELIC MUTATION OF SDHA GENE: Chronic | ICD-10-CM

## 2018-10-16 DIAGNOSIS — E28.2 POLYCYSTIC OVARY DISEASE: ICD-10-CM

## 2018-10-16 DIAGNOSIS — Z79.52 CURRENT CHRONIC USE OF SYSTEMIC STEROIDS: Chronic | ICD-10-CM

## 2018-10-16 DIAGNOSIS — Z91.89 AT HIGH RISK FOR OSTEOPOROSIS: ICD-10-CM

## 2018-10-16 DIAGNOSIS — K92.89 GASTROINTESTINAL DYSMOTILITY: ICD-10-CM

## 2018-10-16 DIAGNOSIS — M30.1 CHURG-STRAUSS SYNDROME (HCC): Chronic | ICD-10-CM

## 2018-10-16 PROCEDURE — 90670 PCV13 VACCINE IM: CPT | Performed by: INTERNAL MEDICINE

## 2018-10-16 PROCEDURE — 90686 IIV4 VACC NO PRSV 0.5 ML IM: CPT | Performed by: INTERNAL MEDICINE

## 2018-10-16 PROCEDURE — 99396 PREV VISIT EST AGE 40-64: CPT | Mod: 25 | Performed by: INTERNAL MEDICINE

## 2018-10-16 PROCEDURE — 90472 IMMUNIZATION ADMIN EACH ADD: CPT | Performed by: INTERNAL MEDICINE

## 2018-10-16 PROCEDURE — 90471 IMMUNIZATION ADMIN: CPT | Performed by: INTERNAL MEDICINE

## 2018-10-16 RX ORDER — PREDNISONE 1 MG/1
4 TABLET ORAL DAILY
Qty: 270 TAB | Refills: 2
Start: 2018-10-16 | End: 2018-10-29 | Stop reason: SDUPTHER

## 2018-10-16 ASSESSMENT — PATIENT HEALTH QUESTIONNAIRE - PHQ9: CLINICAL INTERPRETATION OF PHQ2 SCORE: 0

## 2018-10-16 NOTE — PROGRESS NOTES
Subjective:      Megan August is a 42 y.o. female who presents with Annual Exam            HPI   Annual exam   Medication, allergies, medical history, surgical history, social history, family history reviewed and updated  Patient with a history of melanoma July of this year left neck biopsy amelanotic melanoma stage IIIb, poorly differentiated, seen [facility] status post left inferior parotidectomy, left upper neck dissection, 0/23 lymph nodes, adjuvant immunotherapy deferred given chronic underlying autoimmune disease.  Previously patient had been on nucala for autoimmune disease by Dr. Evans [facility], initially had been on azathioprine for Churg-Homer, prednisone started 2016, changed to nucala 2017, now off of nucala with recent diagnosis of metastatic melanoma, remains on prednisone therapy 9 mg daily unable to taper.  Patient has rare SDHA genetic mutation.  Followed by [facility] oncology recommending CT/PET every 6 months alternating with soft tissue neck ultrasound every 6 months, so she is getting some type of ultrasound or CT/PET every 3 months.  Eosinophilic esophagitis, Churg-Homer and gastrointestinal dysmotility followed by [place] specialist Dr. Evans.  Secondary diffuse eosinophilia, poor GI motility, unable to tolerate anything except water and neocate Laith feedings 36 cans per 30 days to maintain her weight.  TPN is also a consideration.  Patient has seen nutritionist  as well as dietitian.  Neocate is the only thing she can tolerate besides water.  Any other food or liquid intake will cause severe bloating, gastroparesis and intolerance, blistering of her throat secondary to allergic reaction, severe abdominal cramping, pain, inability to swallow.  She has been able to maintain her weight and nutrition with Neocate 36 cans per 30 days.  She has more significant belching and burping secondary to gastrointestinal dysmotility.  Bowel movement every 7-10 days secondary to  gastrointestinal dysmotility.  No difficulty with urination, no dysuria, no incontinence bowel or bladder.  Takes neocate jr can 36 per 30 days and water, will try other types of food and intake and will develop blistering of throat and head pain, body pain, and GI tract will decrease motility due to eosinophil reaction, will have more severe abdominal pain, nausea, inability to swallow and then will need IV nutrition 8/31/18  [facility] letter patient suffers from unclear autoimmune disease with diffuse eosinophilia, poor GI motility, requires neocate jr to sustain weight, and remain off TPN, chocolate flavored, 36 cans/month  Sees [facility] every year dermatology  Has CT/PET alternating every quarter u/s soft tissue neck   Sees  every quarter  Sees [facility] eye care   7/18 off nucala due to melanoma and still on IVIG since then more fatigue and GI symptoms, will experience more bloating and belching  SH  with good support three kids 9,9,12, one sister, still works [employer] , no tobacco, does exercise 4-5 days per week, 3.4 mph treadmill 30 minutes, good social support with  and work staff   Mood stable denies anxiety or depression, gets adequate sleep 7-1/2 hours nightly.  Still able to exercise without difficulty.  On klor daily  Effexor mood stable no side effects with medication  On IVIG for Churg-Homer and gastrointestinal dysmotility, prednisone 9 mg daily no side effects of heartburn, mood changes, insomnia hypothyroid on replacement  Birth control ashley daily no side effects  No hot flashes   Nsaid on occasion       Current Outpatient Prescriptions   Medication Sig Dispense Refill   • levothyroxine (SYNTHROID) 112 MCG Tab Take one po 6 days per week 90 Tab 0   • drospirenone-ethinyl estradiol (ASHLEY) 3-0.03 MG per tablet Take 1 Tab by mouth every day. 28 Tab 2   • cyclosporin (RESTASIS) 0.05 % ophthalmic emulsion Place 1 Drop in both eyes 2 times a day.  Dispense quantity 360 vials to [facility] pharmacy fax [phone] 0.4 mL 3   • venlafaxine XR (EFFEXOR XR) 75 MG CAPSULE SR 24 HR Take 1 Cap by mouth every day. 90 Cap 1   • diphenhydrAMINE (BENADRYL) 25 MG Tab Take 25 mg by mouth every 6 hours as needed for Sleep.     • mepolizumab (NUCALA) 100 MG Recon Soln injection Inject 1 mL as instructed every 28 days. Administer in clinic every 28 days 1 Each 6   • Cholecalciferol ([employer] VITAMIN D3 DROPS/INFANT) 400 UT/0.028ML Liquid Take 5 Drops by mouth every day. 15 mL 11   • potassium chloride (KLOR-CON) 20 MEQ Pack Take 1 Packet by mouth every day. 90 Packet 3   • predniSONE (DELTASONE) 1 MG Tab TAKE 9 TABLETS BY MOUTH DAILY. 270 Tab 2   • ranitidine (ZANTAC) 150 MG Tab Take 150 mg by mouth every evening.     • ibuprofen (MOTRIN) 200 MG Tab Take 200 mg by mouth every 6 hours as needed for Mild Pain.     • naproxen (ALEVE) 220 MG tablet Take 220 mg by mouth as needed. Indications: Mild to Moderate Pain     • Fexofenadine HCl (ALLEGRA PO) Take 1 Tab by mouth every evening.       No current facility-administered medications for this visit.                 Anxiety  8/6/16 effexor over 4 years     Autonomic neuropathy  2/11/16 neurology [facility] evaluation, qsweat response is reduced in the foot, heart rate and blood pressure response to deep breathing and valsalva intact and normal, blood pressure response to 10 minute tilt table head up mild increase in blood pressure oscillations with moderate postural tachycardia although this remains in the normal range, patient was asymptomatic; conclusion cardio vagal and cardiovascular adrenergic responses were intact, distal postganglionic sympathetic sudomotor function was decreased, a limited autonomic neuropathy is possible, medications (venlafaxine) may have caused the abnormalities  4/22/16 referral dr.gerald fitzgerald [facility] allergy for churg amber [phone]  5/9/16  [facility]  neurology consultation note evaluation possible autoimmune CNS disease; exam notable for decreased laboratory sensation hyperreflexia, evaluate for underlying autoimmunity further testing repeat YOON, check thyroid antibodies, prilosec and intrinsic factor antibodies, copper, zinc, MARLEN panel, antibody testing with autoimmune dysautonomia panel sent off to [facility], MRI cervical and thoracic spine, lumbar puncture in office to evaluate for CSF evidence of inflammation, follow-up 3 months  7/28/16 thiopurine methyltransferase 34 (24-34)  5/30/17 note per 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, will be seeing genetticist next week then MRI and surgery  6/11/17 plasma epinephrine, norepinephrine, dopamine, chromogranin A negative done at [facility]  6/12/17 EGD at [facility] diffuse mild mucosal changes upper third esophagus, middle third and lower third esophagus, biopsies performed, normal stomach  6/17/17 MRI soft tissue neck with and without; negative  6/19/17 genetics evaluation dr.joshua lange [facility], patient underwent whole exome sequencing in january negative for mutations that would explain her medical condition, but revealed 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 denoted as [id number] which may lead to hereditary paraganglioma and pheochromocytoma, also at risk for developing GISTs, unlikely that paraganglioma could compress on vagus nerve, surveillance recommendations based upon the SDHA mutation, annual biochemical study of plasma metanephrine and catecholamines, MRI base of skull andneck every 2 years, MRI body every 3-5 years, thus MRI neck, chest, abdomen, pelvis ordered, daughters will be tested as well  6/19/17 MRI pelvis with and without; no suspicious mass  6/19/17 MR abdomen with and without; no suspicious mass  6/19/17 MR chest with and without; nonspecific 7 x 12 mm hyperintense lesion subcarinal region, if concern  for paraganglioma further evaluation with gallium-68-dotatate may be helpful for confirmation  6/29/17 CT/PET no evidence of abnormal FDG accumulation, no increased activity subcarinal region  5/19/18 MRI abdomen with and without; 2 left celiac ganglia upper ganglia and left of celiac trunk at its origin from the abdominal aorta 3 x 8 mm, second ganglion of her anterior left diaphragmatic sylvester adjacent to the aorta about level of the superior mesenteric artery 4 x 18 mm, 2 right celiac ganglia upper ganglia right diaphragmatic sylvester anterior to the inferior vena cava level of celiac trunk, 3 x 9 mm, second right ganglion between right diaphragmatic sylvester and inferior vena cava measuring 3 x 19 mm     biallelic mutation mutation SDHA gene   5/30/17 note per 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, will be seeing genetticist next week then MRI and surgery  6/19/17 genetics evaluation dr.joshua lange [facility], patient underwent whole exome sequencing in january negative for mutations that would explain her medical condition, but revealed 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 denoted as [id number] which may lead to hereditary paraganglioma and pheochromocytoma, also at risk for developing GISTs, unlikely that paraganglioma could compress on vagus nerve, surveillance recommendations based upon the SDHA mutation, annual biochemical study of plasma metanephrine and catecholamines, MRI base of skull andneck every 2 years, MRI body every 3-5 years, thus MRI neck, chest, abdomen, pelvis ordered, daughters will be tested as well  6/17/17 MRI soft tissue neck with and without; negative  6/19/17 MRI pelvis with and without; no suspicious mass  6/19/17 MR abdomen with and without; no suspicious mass  6/19/17 MR chest with and without; nonspecific 7 x 12 mm hyperintense lesion subcarinal region, if concern for paraganglioma further evaluation with gallium-68-dotatate  may be helpful for confirmation  6/29/17 CT/PET no evidence of abnormal FDG accumulation, no increased activity subcarinal region   5/19/18 MRI abdomen with and without; 2 left celiac ganglia upper ganglia and left of celiac trunk at its origin from the abdominal aorta 3 x 8 mm, second ganglion of her anterior left diaphragmatic sylvester adjacent to the aorta about level of the superior mesenteric artery 4 x 18 mm, 2 right celiac ganglia upper ganglia right diaphragmatic sylvester anterior to the inferior vena cava level of celiac trunk, 3 x 9 mm, second right ganglion between right diaphragmatic sylvester and inferior vena cava measuring 3 x 19 mm    chronic steroid  11/15 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 per [facility]  11/22/16 dexa LS -0.3,hip -0.4; FRAX 3.0% major,0.1% hip on prednisone 20 mg   12/11/16 reclast infusion  12/12/16 reaction to reclast seen ER, patient believes she can try this medication again however  10/25/17 on prednisone 9 mg qday and on nucala, tried IVIG     Churg-Homer disease  2/9/15 wbc 5.6 with 20% eosinophils  12/19/15 gastroenterology evaluation [facility] eosinophil area may be more related to autoimmune process, question whether this is allergic phenomena related to connective tissue disorder and elevated levels of transforming growth factor beta that will also recruit eosinophils, will repeat serologies, biopsy for direct immunofluorescence and MBP to evaluate eosinophilic esophagitis, nutritional consultation, recheck immunoglobulins as well, check MRI brain  12/19/15 iron 84,TIBC 324,transferrin 26,tsh 2.8,vit d 28,vitamin A levels normal, b12 418,vitamin b6 58 normal, alpha and gamma tocopherol normal,vitamin k normal, wbc 6.3 (9% eosinophils),IgG,IgA,IgM normal,IgG sublass 4 and IgE normal,SPEP negative, tTg IgA normal,ESR 3,crp 0.9,copper normal,selenium normal,zinc 46 (),folate and b1 normal,YOON 1:160 homogenous,anti-scl 70 negative, centromere Ab negative,  TH/TO Ab negative, RNA plymerase III negative, U1-RNP Ab negative,fibrillarin U3 negative, PM/Scl negative,C3 and C4 negative, RG 13, CCP 4   2/10/16 [facility] nutrition consultation estimated needs [id number] kcal/day,  3/27/15 colon per [facility] tubular adenomas x2, negative for colitis  4/16/15 [facility] note CBC peripheral eosinophilia 21%, constipation by CT scan, colonoscopy unremarkable, biopsies negative, check YOON, immunoglobulins, tryptase, stool giardia antigen  4/16/15 wbc 3.9 with 5.7% eosinophils   5/28/15 [facility] note followup bloating and constipation, repeat labs WBC 3.9, normal eosinophils, TSH, YOON, immunoglobulins, stool for ova and parasites negative, tryptase negative, previous CT and colonoscopy negative, no evidence of eosinophilic colitis despite history of eosinophilic esophagitis and peripheral eosinophilia in the past, continue PPI and miralax  8/27/15 [facility] note, abdominal distention bowel visit, previous CT scan showed constipation, and colonoscopy random biopsies unremarkable without increased eosinophils, additional labs for parasites, immunoglobulin, thyroid function, tryptase, YOON negative, treated empirically for enteritis/colitis with improvement of symptoms question muscular involvement of GI tract with eosinophils given peripheral eosinophilia along with history of eosinophilic esophagitis and improvement with short course of oral prednisone vs. IBS, trial of linzess 290 mcg daily, followup 3 months  10/21/15 patient written note went to [facility] and diagnosed with bacterial overgrowth, trial of xifaxin with improvement in symptoms   1/7/16 EGD by GI in [place], mucosal changes consistent with esophageal esophagitis, biopsies obtained polyps gastric fundus, small 1 cm hiatal hernia  4/14/16 EGD per [facility] diffuse exudates and edema upper third esophagus, biopsies performed, diffuse exudates and edema lower third esophagus, biopsies performed  8/8/16 on neocate judy one can per day 1800  vera and drinks water three times a day   9/16/16 on azathioprine 50 mg 2 per day per [facility]  9/23/16 patient spoke to [facility] GI 's assistant raman hayes, stop azathioprine and start prednisone 20 mg daily, consider methotrexate  11/16/16 apparently has been on prednisone for one year from [place] and on cellcept, will order dexa  2/21/17  [facility] note failed azathioprine due to hair loss and off mycophenolate, now on prednisone 20 mg daily, try to obtain approval for mepolizumab 100 mg q month  3/24/17  [facility] note, discuss prednisone withdrawal, when she is treated with mepolizumab wait 2 weeks then reduce prednisone to 17.5 mg, then in 2 weeks with second injection mepolizumab will reduce prednisone to 15 mg, and 2 weeks after that to 12.5 mg, and in 2 weeks 10 mg  4/4/17 nucala 100 mg sc administered first dose  6/11/17 plasma epinephrine, norepinephrine, dopamine, chromogranin A negative done at [facility]  6/12/17 EGD at [facility] diffuse mild mucosal changes upper third esophagus, middle third and lower third esophagus, biopsies performed, normal stomach  7/21/17 IVIG 0.4 g/kg x 3 days then 0.4g/kg q week x 5 weeks per [facility]   8/16/17 IVIG 0.4 g/kg x 3 days then 0.4g/kg q week per [facility]   9/12/17 [facility] note completed 5 week course of IVIG will take a break from IVIG and continue prednisone 4 mg  9/14/17 [facility]  phone note, needs smart pill  9/26/17 on prednisone 4 mg daily and 20 mg qsunday and on nucala, tried IVIG   10/25/17 on prednisone 9 mg qday and on nucala, tried IVIG  11/8/17 [facility]  phone note smart pill results dysmotility in the stomach and colon so IVIG is necessary, need another smart pill after 12 weeks to see motility is improved, need to dose prednisone 40 mg one day before and one day after the IVIG also need to check IgA  levels periodically, order sent  11/20/17 per [facility] IVIG 0.4 g/kg x 12 weeks no premedication with solumedrol, IgA level prior to each infusion, remains on prednisone 9 mg qday and nucala per [facility]   12/5/17 IgA 49  2/2/18  [facility] dermatology note, we will try to obtain authorization approval for mepolizumab for eosinophilia  4/20/18 EGD  [facility] GI procedure note up with her esophagus normal, biopsies performed, diffuse moderate mucosal changes congestion, discoloration, distal esophagus biopsies performed, stomach normal, resume omeprazole 40 mg if tolerated, if not start ranitidine 150 mg bid  4/20/18 CT angiogram abdomen with contrast, celiac trunk normal, superior mesenteric artery normal, no evidence of median arcuate ligament syndrome  5/19/18 MRI abdomen with and without; 2 left celiac ganglia upper ganglia and left of celiac trunk at its origin from the abdominal aorta 3 x 8 mm, second ganglion of her anterior left diaphragmatic sylvester adjacent to the aorta about level of the superior mesenteric artery 4 x 18 mm, 2 right celiac ganglia upper ganglia right diaphragmatic sylvester anterior to the inferior vena cava level of celiac trunk, 3 x 9 mm, second right ganglion between right diaphragmatic sylvester and inferior vena cava measuring 3 x 19 mm  4/20/18  [facility] GI procedure note ED upper third of esophagus normal, diffuse moderate mucosal changes, biopsies performed, duodenal normal, pathology duodenal mucosa, gastric antrum and fundus normal mucosa, esophageal squamous mucosa with intraepithelial eosinophils  7/18 off nucala due to melanoma and still on IVIG     Constipation  3/16/15 CT abdomen pelvis with; large amount colonics stool     Coronary artery fistula  Sees [facility] last report I have in 2000, apparently worked up in [facility] asymptomatic     Chronic steroids  11/15 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  per [facility]  11/22/16 dexa LS -0.3,hip -0.4; FRAX 3.0% major,0.1% hip on prednisone 20 mg   12/11/16 reclast infusion  12/12/16 reaction to reclast seen ER     Dyslipidemia  2/11 chol 213,trig 102,hdl 58,ldl 135  3/12 chol 239,trig 122,hdl 60,ldl 155  7/13 chol 230,trig 121,hdl 54,ldl 152  1/7/14 chol 219,trig 139,hdl 60,ldl 131  9/18/15 chol 239,trig 162,hdl 63,ldl 144,crp 0.7  9/28/17 chol 213,trig 81,hdl 76,ldl 121    Gastrointestinal dysmotility  2/9/15 wbc 5.6 with 20% eosinophils  12/19/15 gastroenterology evaluation [facility] eosinophil area may be more related to autoimmune process, question whether this is allergic phenomena related to connective tissue disorder and elevated levels of transforming growth factor beta that will also recruit eosinophils, will repeat serologies, biopsy for direct immunofluorescence and MBP to evaluate eosinophilic esophagitis, nutritional consultation, recheck immunoglobulins as well, check MRI brain  12/19/15 iron 84,TIBC 324,transferrin 26,tsh 2.8,vit d 28,vitamin A levels normal, b12 418,vitamin b6 58 normal, alpha and gamma tocopherol normal,vitamin k normal, wbc 6.3 (9% eosinophils),IgG,IgA,IgM normal,IgG sublass 4 and IgE normal,SPEP negative, tTg IgA normal,ESR 3,crp 0.9,copper normal,selenium normal,zinc 46 (),folate and b1 normal,YOON 1:160 homogenous,anti-scl 70 negative, centromere Ab negative, TH/TO Ab negative, RNA plymerase III negative, U1-RNP Ab negative,fibrillarin U3 negative, PM/Scl negative,C3 and C4 negative, RG 13, CCP 4   2/10/16 [facility] nutrition consultation estimated needs [id number] kcal/day,  3/27/15 colon per [facility] tubular adenomas x2, negative for colitis  4/16/15 [facility] note CBC peripheral eosinophilia 21%, constipation by CT scan, colonoscopy unremarkable, biopsies negative, check YOON, immunoglobulins, tryptase, stool giardia antigen  4/16/15 wbc 3.9 with 5.7% eosinophils   5/28/15 [facility] note followup bloating and  constipation, repeat labs WBC 3.9, normal eosinophils, TSH, YOON, immunoglobulins, stool for ova and parasites negative, tryptase negative, previous CT and colonoscopy negative, no evidence of eosinophilic colitis despite history of eosinophilic esophagitis and peripheral eosinophilia in the past, continue PPI and miralax  8/27/15 [facility] note, abdominal distention bowel visit, previous CT scan showed constipation, and colonoscopy random biopsies unremarkable without increased eosinophils, additional labs for parasites, immunoglobulin, thyroid function, tryptase, YOON negative, treated empirically for enteritis/colitis with improvement of symptoms question muscular involvement of GI tract with eosinophils given peripheral eosinophilia along with history of eosinophilic esophagitis and improvement with short course of oral prednisone vs. IBS, trial of linzess 290 mcg daily, followup 3 months  10/21/15 patient written note went to [facility] and diagnosed with bacterial overgrowth, trial of xifaxin with improvement in symptoms   1/7/16 EGD by GI in [place], mucosal changes consistent with esophageal esophagitis, biopsies obtained polyps gastric fundus, small 1 cm hiatal hernia  4/14/16 EGD per [facility] diffuse exudates and edema upper third esophagus, biopsies performed, diffuse exudates and edema lower third esophagus, biopsies performed  8/8/16 on neocate judy one can per day 1800 vera and drinks water three times a day   9/16/16 on azathioprine 50 mg 2 per day per [facility]  9/23/16 patient spoke to [facility] GI 's assistant raman hayes, stop azathioprine and start prednisone 20 mg daily, consider methotrexate  11/16/16 apparently has been on prednisone for one year from [place] and on cellcept, will order dexa  2/21/17  [facility] note failed azathioprine due to hair loss and off mycophenolate, now on prednisone 20 mg daily, try to obtain approval for mepolizumab 100 mg q  month  3/24/17  [facility] note, discuss prednisone withdrawal, when she is treated with mepolizumab wait 2 weeks then reduce prednisone to 17.5 mg, then in 2 weeks with second injection mepolizumab will reduce prednisone to 15 mg, and 2 weeks after that to 12.5 mg, and in 2 weeks 10 mg  4/4/17 nucala 100 mg sc administered first dose  6/11/17 plasma epinephrine, norepinephrine, dopamine, chromogranin A negative done at [facility]  6/12/17 EGD at [facility] diffuse mild mucosal changes upper third esophagus, middle third and lower third esophagus, biopsies performed, normal stomach  7/21/17 IVIG 0.4 g/kg x 3 days then 0.4g/kg q week x 5 weeks per [facility]   8/16/17 IVIG 0.4 g/kg x 3 days then 0.4g/kg q week per [facility]   9/12/17 [facility] note completed 5 week course of IVIG will take a break from IVIG and continue prednisone 4 mg  9/14/17 [facility]  phone note, needs smart pill  9/26/17 on prednisone 4 mg daily and 20 mg qsunday and on nucala, tried IVIG   10/25/17 on prednisone 9 mg qday and on nucala, tried IVIG  11/8/17 [facility]  phone note smart pill results dysmotility in the stomach and colon so IVIG is necessary, need another smart pill after 12 weeks to see motility is improved, need to dose prednisone 40 mg one day before and one day after the IVIG also need to check IgA levels periodically, order sent  11/20/17 per [facility] IVIG 0.4 g/kg x 12 weeks no premedication with solumedrol, IgA level prior to each infusion, remains on prednisone 9 mg qday and nucala per [facility]   2/2/18  [facility] dermatology note, we will try to obtain authorization approval for mepolizumab for eosinophilia  4/20/18 EGD  [facility] GI procedure note up with her esophagus normal, biopsies performed, diffuse moderate mucosal changes congestion, discoloration,  distal esophagus biopsies performed, stomach normal, resume omeprazole 40 mg if tolerated, if not start ranitidine 150 mg bid  4/20/18 CT angiogram abdomen with contrast, celiac trunk normal, superior mesenteric artery normal, no evidence of median arcuate ligament syndrome  5/19/18 MRI abdomen with and without; 2 left celiac ganglia upper ganglia and left of celiac trunk at its origin from the abdominal aorta 3 x 8 mm, second ganglion of her anterior left diaphragmatic sylvester adjacent to the aorta about level of the superior mesenteric artery 4 x 18 mm, 2 right celiac ganglia upper ganglia right diaphragmatic sylvester anterior to the inferior vena cava level of celiac trunk, 3 x 9 mm, second right ganglion between right diaphragmatic sylvester and inferior vena cava measuring 3 x 19 mm  4/20/18  [facility] GI procedure note ED upper third of esophagus normal, diffuse moderate mucosal changes, biopsies performed, duodenal normal, pathology duodenal mucosa, gastric antrum and fundus normal mucosa, esophageal squamous mucosa with intraepithelial eosinophils  7/18 off nucala due to melanoma and still on IVIG  8/31/18  [facility] letter patient suffers from unclear autoimmune disease with diffuse eosinophilia, poor GI motility, requires neocate jr to sustain weight, and remain off TPN, chocolate flavored, 36 cans/month     Eosinophilic esophagitis  5/13 EGD per [facility] eosinophils, started on prilosec 20 mg  6/6/13 EGD per [facility] marked esophageal eosinophilia, consistent with likely eosinophilic esophagitis, trial PPI x8 weeks, if symptoms persist consider trial swallowed fluticasone x 6 weeks.  1/8/14 trial of swallowed fluticsone 220 mcg two puffs bid  7/28/14 flare up after eating sushi, changed to pulmicort respules 1 mg bid with splenda and prilosec bid  8/28/14 EGD per [facility] benign stricture 13 mm that appeared 38 cm from the incisors, compatible with eosinophilic esophagitis, savary dilation  successful, biopsies benign squamous mucosa also negative for eosinophilic esophagitis, no intestinal metaplasia, dysplasia or malignancy continue omeprazole bid and swallowed fluticasone  2/9/15 wbc 5.6 with 20% eosinophils  3/27/15 colon per [facility] tubular adenomas x2, negative for colitis  4/16/15 [facility] note CBC peripheral eosinophilia 21%, constipation by CT scan, colonoscopy unremarkable, biopsies negative, will check YOON, immunoglobulins, tryptase, stool giardia antigen  4/16/15 wbc 3.9 with 5.7% eosinophils    5/28/15 [facility] note followup bloating and constipation, repeat labs WBC 3.9, normal eosinophils, TSH, YOON, immunoglobulins, stool for ova and parasites negative, tryptase negative, previous CT and colonoscopy negative, no evidence of eosinophilic colitis despite history of eosinophilic esophagitis and peripheral eosinophilia in the past, continue PPI and MiraLAX  8/27/15 [facility] note, abdominal distention bowel visit, previous CT scan showed constipation, and colonoscopy random biopsies unremarkable without increased eosinophils, additional labs for parasites, immunoglobulin, thyroid function, tryptase, YOON negative, treated empirically for enteritis/colitis with improvement of symptoms,question muscular involvement of GI tract with eosinophils given peripheral eosinophilia along with history of eosinophilic esophagitis and improvement with short course of oral prednisone vs. IBS, trial of linzess 290 mcg daily, followup 3 months  10/21/15 patient written note went to [employer] and diagnosed with bacterial overgrowth, trial of xifaxin with improvement in symptoms    16 EGD per [facility] diffuse exudates and edema upper third esophagus, biopsies performed, diffuse exudates and edema lower third esophagus, biopsies performed  16 on  judy one can per day 1800 vera and drinks water three times a day   17 EGD at [facility] diffuse mild mucosal changes upper third esophagus, middle third and  lower third esophagus, biopsies performed, normal stomach  7/21/17 IVIG 0.4 g/kg x 3 days then 0.4g/kg q week x 5 weeks per [facility]   8/16/17 IVIG 0.4 g/kg x 3 days then 0.4g/kg q week per [facility] premedicated with 250 mg solumedrol  9/26/17 on prednisone 4 mg daily and 20 mg qsunday and on nucala, tried IVIG   10/25/17 on prednisone 9 mg qday and on nucala, tried IVIG  11/20/17 per [facility] IVIG 0.4 g/kg x 12 weeks no premedication with solumedrol, IgA level prior to each infusion, remains on prednisone 9 mg qday and nucala per [facility]   12/5/17 IgA 49  2/2/18  [facility] dermatology note, we will try to obtain authorization approval for mepolizumab for eosinophilia  4/20/18 EGD  [facility] GI procedure note up with her esophagus normal, biopsies performed, diffuse moderate mucosal changes congestion, discoloration, distal esophagus biopsies performed, stomach normal, resume omeprazole 40 mg if tolerated, if not start ranitidine 150 mg bid  4/20/18 CT angiogram abdomen with contrast, celiac trunk normal, superior mesenteric artery normal, no evidence of median arcuate ligament syndrome  5/19/18 MRI abdomen with and without; 2 left celiac ganglia upper ganglia and left of celiac trunk at its origin from the abdominal aorta 3 x 8 mm, second ganglion of her anterior left diaphragmatic sylvester adjacent to the aorta about level of the superior mesenteric artery 4 x 18 mm, 2 right celiac ganglia upper ganglia right diaphragmatic sylvester anterior to the inferior vena cava level of celiac trunk, 3 x 9 mm, second right ganglion between right diaphragmatic sylvester and inferior vena cava measuring 3 x 19 mm  4/20/18  [facility] GI procedure note ED upper third of esophagus normal, diffuse moderate mucosal changes, biopsies performed, duodenal normal, pathology duodenal mucosa, gastric antrum and fundus normal mucosa, esophageal  squamous mucosa with intraepithelial eosinophils  7/18 off nucala due to melanoma and still on IVIG     History melanoma  3/11 derm note  malignant melanoma honey level III, 0.65 mm.  8/12 [facility] derm note h/o melanoma amelanotic variant, stage 1b 0.7 mm in depth left neck 5/11; no recurrence follow up one year  2013 sees  once a year and [facility] once per year  3/16/15 CT abdomen pelvis with; large amount colonics stool     Hypothyroid  2/25/09 tsh 0.019, levothyroxine adjusted to 0.137 mcg sat,sund and levoxyl 0.125 mcg M-F needs repeat  9/09 tsh 0.033 change tolevothyroxine 125 mcg daily repeat in 6 weeks  11/09 tsh 0.032, change to levothyroxine 112 mcg repeat in 6 weeks  1/10 tsh 0.03, change to levothyroxine 100 mcg  10/10 tsh 3.0 cont levothyroxine 100 mcg  11/10 change to levothyroxine 112 mcg repeat tsh 6 weeks; 11/18/10 pt did not change dose, still on 100 mcg  2/11 tsh 0.9 cont on levothyroxine 100 mcg  12/30/11 tsh 0.7, on levothyroxine 112 mcg, decreased to 100 mcg  3/12 tsh 0.25 on levothyroxine 100 mcg; decrease to 88 mcg  7/12/12 tsh 1.4,free t4 0.79, free t3 2.1; on levothyroxine 88 mcg  7/13 tsh 1.2 on levothyroxine 88 mcg  1/7/14 tsh 0.7, free t4 0.8, free t3,on levothyroxine 88 mcg  2/9/15 tsh 5.7 on levothyroxine 88 mcg,increase to 100 mcg and repeat tsh in 6 weeks  4/16/15 tsh 0.8 on levothyroxine 100 mcg  9/18/15 tsh 5.3 on levothyroxine 100 mcg, increase to 112 mcg repeat tsh in 6 weeks  12/3/15 tsh 0.7 on levothyroxine 112 mcg  12/2/16 tsh 0.15 on levothyroxine 112 mcg daily, change to 112 mcg take six days per week, repeat tsh in 6 weeks  4/14/17 tsh 1.0 on levothyroxine 112 mcg six days per week  9/28/17 tsh 2.1 on levothyroxine 112 mcg six days per week    Impaired oral feeding  8/31/18  [facility] letter patient suffers from unclear autoimmune disease with diffuse eosinophilia, poor GI motility, requires neocate jr to sustain weight, and remain off TPN,  chocolate flavored, 36 cans/month  8/31/18  [facility] letter patient suffers from unclear autoimmune disease with diffuse eosinophilia, poor GI motility, requires neocate jr to sustain weight, and remain off TPN, chocolate flavored, 36 cans/month    melanoma  3/11 derm note  malignant melanoma honey level III, 0.65 mm.  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 5/11; no recurrence follow up one year  2013 sees  once a year and [facility] once per year  3/16/15 CT abdomen pelvis with; large amount colonics stool  6/4/18 ultrasound soft tissue neck left preauricular region 8.2 x 6.7 x 5.4 mm cystic lesion superficial aspect parotid, radiology recommended CT or MRI  7/9/18  ENT left neck biopsy pathology report amelanotic melanoma positive for malignancy poorly differentiated tumor  7/23/18 CT/PET new left parotid lesion which may represent primary or melanoma metastases, no other hypermetabolic lesions identified  7/27/18  [facility] ENT note amelanotic melanoma concerning for kellie disease or direct skin extension, analyzed outside tissue histopathology, reviewed imaging studies, tumor board presentation, 1 week of augmentin, scheduled for left selective neck dissection  7/18 off nucala due to melanoma and still on IVIG  8/3/18  ENT [facility] operative note left inferior parotidectomy, left upper neck dissection with facial nerve monitoring  8/21/18 dr.schoenbeck [facility] oncology note; 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 status post left upper neck excision and dissection 8/3/18; final pathologic diagnosis left upper skin and parotid tail excision benign, lymph nodes left 0/23, defer adjuvant immunotherapy given her underlying autoimmune disease chance of remaining in remission without adjuvant therapy 30%, may decrease to 15% with adjuvant therapy however increased  risk for immune related complications, recommend ultrasound neck by oncology every 3 months, CT/PET scan every 6 months for 2 years and annually, schedule appointment with [facility] melanoma clinic annually     Polycystic ovary  Sees gyn no old records  2010  started on ocella  5/31/11 pap negative     Preventative health  2/3/15 tdap  2/3/15 hep b first in series  3/27/15 colon per [facility] tubular adenomas x2, negative for colitis  11/22/16 dexa LS -0.3,hip -0.4; FRAX 3.0% major,0.1% hip on prednisone 20 mg   11/23/16 pneumovax  4/14/17 vit d 32 increase by 2000 units daily  4/26/17 pap  6/20/17 mammogram   9/22/17 quantiferon gold negative, hep B Ab positive, MMR titers positive, varicella IgG positive     Sjogren's  Uses restasis  3/10 optho eval  [phone]  11/11 saw  ductal plugs inserted  1/12 vigamox prn ophthalmic  3/14  eye duct surgery on restasis  6/12/17 EGD at [facility] diffuse mild mucosal changes upper third esophagus, middle third and lower third esophagus, biopsies performed, normal stomach       Patient Active Problem List   Diagnosis   • Hypothyroid   • Coronary artery fistula   • Polycystic ovary disease   • Anxiety   • Sjogren's disease (HCC)   • Preventative health care   • Dyslipidemia   • Melanoma (HCC)   • Eosinophilic esophagitis   • Constipation   • Autonomic neuropathy   • Churg-Homer syndrome (HCC)   • Current chronic use of systemic steroids   • Biallelic mutation of SDHA gene   • Gastrointestinal dysmotility   • Impaired oral feeding     Depression Screening    Little interest or pleasure in doing things?  0 - not at all  Feeling down, depressed , or hopeless? 0 - not at all  Patient Health Questionnaire Score: 0          Health Maintenance Summary                MAMMOGRAM Overdue 6/20/2018      Done 6/20/2017 MA-MAMMO SCREENING BILAT W/TOMOSYNTHESIS W/CAD     Patient has more history with this topic...    IMM INFLUENZA Overdue 9/1/2018      Done  9/26/2017 Imm Admin: Influenza Vaccine Quad Inj (Pf)     Patient has more history with this topic...    COLONOSCOPY Next Due 3/27/2020      Done 3/27/2015 AMB REFERRAL TO GI FOR COLONOSCOPY    PAP SMEAR Next Due 4/24/2020      Done 4/24/2017 PATHOLOGY GYN SPECIMEN     Patient has more history with this topic...    IMM DTaP/Tdap/Td Vaccine Next Due 2/3/2025      Done 2/3/2015 Imm Admin: Tdap Vaccine          Patient Care Team:  Zohaib Soares M.D. as PCP - General    ROS       Objective:     LMP  (LMP Unknown)      Physical Exam   Constitutional: She appears well-developed and well-nourished. No distress.   HENT:   Head: Normocephalic and atraumatic.   Right Ear: External ear normal.   Left Ear: External ear normal.   Mouth/Throat: Oropharynx is clear and moist.   Eyes: Conjunctivae are normal. Right eye exhibits no discharge. Left eye exhibits no discharge.   Neck: Neck supple. No JVD present. No thyromegaly present.   Cardiovascular: Normal rate and regular rhythm.    Pulmonary/Chest: Effort normal and breath sounds normal. No respiratory distress. She has no wheezes.   Abdominal: She exhibits no distension.   Musculoskeletal: She exhibits no edema.   Neurological: She is alert.   Skin: Skin is warm. She is not diaphoretic.   Psychiatric: She has a normal mood and affect.   Nursing note and vitals reviewed.      Neck no cervical adenopathy, no thyromegaly, left neck evidence of previous incision site clean, dry, intact          Assessment/Plan:     Assessment  #! Annual exam     #2 Churg-Homer disease followed by [facility] gastroenterology specialist   has been on azathioprine, prednisone, nucala    #3 gastrointestinal dysmotility related to eosinophilic esophagitis, Churg-Homer, followed by GI Dr. Evans [facility] has had workup including multiple EGD, gastric emptying study, CT angiogram, MRI abdomen, smart pill study.  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  Uses sunscreen.  Last CT/PET July 2, 2018    #5 impaired oral feeding because of eosinophilic esophagitis, churg amber disease, because of underlying gastrointestinal dysmotility, unable to tolerate food and liquids other than water and Neocate Laith supplementation 36 cans per 30 days.  Any other food or liquid causes nausea, throat irritation, significant allergic reaction, abdominal pain, nausea.  Any type of exacerbation will shut down her gastrointestinal tract necessitating intravenous parenteral nutrition.  She is unable to taper off the prednisone 8/31/18  [facility] letter patient suffers from unclear autoimmune disease with diffuse eosinophilia, poor GI motility, requires neocate jr to sustain weight, and remain off TPN, chocolate flavored, 36 cans/month      #6  hypothyroid 9/28/17 tsh 2.1 on levothyroxine 112 mcg six days per week    #7 chronic steroid 11/15 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 per [facility]  10/25/17 on prednisone 9 mg qday and on nucala, tried IVIG, unable to taper off prednisone at this time    #8 Preventative health  2/3/15 tdap  2/3/15 hep b first in  series  3/27/15 colon per [facility] tubular adenomas x2, negative for colitis  11/22/16 dexa LS -0.3,hip -0.4; FRAX 3.0% major,0.1% hip on prednisone 20 mg   11/23/16 pneumovax  4/14/17 vit d 32 increase by 2000 units daily  4/26/17 pap  6/20/17 mammogram   9/22/17 quantiferon gold negative, hep B Ab positive, MMR titers positive, varicella IgG positive      Plan  #! Flu vaccine    #2 prevnar given chronic autoimmune disease, malignant metastatic melanoma, has had pneumococcal 23    #3 labs    #4 mammogram, bone density    #5  Follow-up [facility] dermatology, oncology, [facility] gastroenterology    #6 nutrition, diet, exercise discussed, she needs to maintain on Neocate Laith 36 cans per 30 days per [facility] gastroenterology, patient is unable to tolerate any other type of nutrition secondary to gastrointestinal dysmotility and churg amber disease, and needs to maintain on this nutritional supplementation lifelong.  It does appear that her disease is progressing, especially off the nucala despite chronic prednisone and IVIG, patient tells me that her [facility] specialist has told her eventually if she cannot tolerate even the Neocate Laith can feeds, she will need lifelong TPN supplementation intravenously, it appears we can avoid that for now as she is sustaining her nutritional requirements with Neocate Laith and water.  She has seen a dietitian previously.  She can tolerate her medication orally without difficulty at this time.    #7 continue use sunscreen    #8 exercise, sleep habits, stress reduction discussed    #9 continue Effexor    #10 continue thyroid supplementation pending labs    #11 follow-up 6 months #12 follow-up melanoma alternating soft tissue ultrasound neck and CT/PET every 3 months to be ordered by oncology

## 2018-10-16 NOTE — TELEPHONE ENCOUNTER
New Oncology Patient 48 hour follow up:    Called to welcome patient to [facility] and to follow up on initial consult with Dr. Torres on 10/08/2018, was unable to reach and left voicemail. Please review next steps in patient's plan of care.  Dr. Torres ordered an Ultrasound which is scheduled for 10/21/2018. When patient calls back please confirm.

## 2018-10-19 ENCOUNTER — TELEPHONE (OUTPATIENT)
Dept: HEMATOLOGY ONCOLOGY | Facility: MEDICAL CENTER | Age: 43
End: 2018-10-19

## 2018-10-19 NOTE — TELEPHONE ENCOUNTER
Called and spoke with patient regarding message above, patient agreed and will give us a call if there are any further questions or concerns.

## 2018-10-20 ENCOUNTER — HOSPITAL ENCOUNTER (OUTPATIENT)
Dept: RADIOLOGY | Facility: MEDICAL CENTER | Age: 43
End: 2018-10-20
Attending: INTERNAL MEDICINE
Payer: COMMERCIAL

## 2018-10-20 DIAGNOSIS — M30.1 CHURG-STRAUSS SYNDROME (HCC): Chronic | ICD-10-CM

## 2018-10-20 DIAGNOSIS — Z15.89 BIALLELIC MUTATION OF SDHA GENE: Chronic | ICD-10-CM

## 2018-10-20 DIAGNOSIS — D72.18 CHURG-STRAUSS SYNDROME (HCC): Chronic | ICD-10-CM

## 2018-10-20 DIAGNOSIS — C43.4 MALIGNANT MELANOMA OF NECK (HCC): ICD-10-CM

## 2018-10-20 PROCEDURE — 76536 US EXAM OF HEAD AND NECK: CPT

## 2018-10-21 ENCOUNTER — OUTPATIENT INFUSION SERVICES (OUTPATIENT)
Dept: ONCOLOGY | Facility: MEDICAL CENTER | Age: 43
End: 2018-10-21
Attending: INTERNAL MEDICINE
Payer: COMMERCIAL

## 2018-10-21 VITALS
WEIGHT: 132.5 LBS | TEMPERATURE: 97 F | RESPIRATION RATE: 18 BRPM | DIASTOLIC BLOOD PRESSURE: 71 MMHG | HEART RATE: 60 BPM | SYSTOLIC BLOOD PRESSURE: 109 MMHG | BODY MASS INDEX: 21.39 KG/M2 | OXYGEN SATURATION: 99 %

## 2018-10-21 DIAGNOSIS — Z00.00 PREVENTATIVE HEALTH CARE: ICD-10-CM

## 2018-10-21 DIAGNOSIS — D80.3 IGG DEFICIENCY (HCC): ICD-10-CM

## 2018-10-21 DIAGNOSIS — E53.8 LOW SERUM VITAMIN B12: ICD-10-CM

## 2018-10-21 LAB
25(OH)D3 SERPL-MCNC: 42 NG/ML (ref 30–100)
ALBUMIN SERPL BCP-MCNC: 4.6 G/DL (ref 3.2–4.9)
ALBUMIN/GLOB SERPL: 1.4 G/DL
ALP SERPL-CCNC: 62 U/L (ref 30–99)
ALT SERPL-CCNC: 16 U/L (ref 2–50)
ANION GAP SERPL CALC-SCNC: 8 MMOL/L (ref 0–11.9)
APPEARANCE UR: CLEAR
AST SERPL-CCNC: 29 U/L (ref 12–45)
BASOPHILS # BLD AUTO: 0.8 % (ref 0–1.8)
BASOPHILS # BLD: 0.03 K/UL (ref 0–0.12)
BILIRUB SERPL-MCNC: 0.4 MG/DL (ref 0.1–1.5)
BILIRUB UR QL STRIP.AUTO: NEGATIVE
BUN SERPL-MCNC: 14 MG/DL (ref 8–22)
CALCIUM SERPL-MCNC: 9.6 MG/DL (ref 8.5–10.5)
CHLORIDE SERPL-SCNC: 105 MMOL/L (ref 96–112)
CHOLEST SERPL-MCNC: 240 MG/DL (ref 100–199)
CO2 SERPL-SCNC: 25 MMOL/L (ref 20–33)
COLOR UR: YELLOW
CREAT SERPL-MCNC: 0.74 MG/DL (ref 0.5–1.4)
EOSINOPHIL # BLD AUTO: 0.1 K/UL (ref 0–0.51)
EOSINOPHIL NFR BLD: 2.7 % (ref 0–6.9)
ERYTHROCYTE [DISTWIDTH] IN BLOOD BY AUTOMATED COUNT: 47.6 FL (ref 35.9–50)
EST. AVERAGE GLUCOSE BLD GHB EST-MCNC: 108 MG/DL
GLOBULIN SER CALC-MCNC: 3.4 G/DL (ref 1.9–3.5)
GLUCOSE SERPL-MCNC: 78 MG/DL (ref 65–99)
GLUCOSE UR STRIP.AUTO-MCNC: NEGATIVE MG/DL
HBA1C MFR BLD: 5.4 % (ref 0–5.6)
HCT VFR BLD AUTO: 40.7 % (ref 37–47)
HDLC SERPL-MCNC: 81 MG/DL
HGB BLD-MCNC: 13.8 G/DL (ref 12–16)
IMM GRANULOCYTES # BLD AUTO: 0.01 K/UL (ref 0–0.11)
IMM GRANULOCYTES NFR BLD AUTO: 0.3 % (ref 0–0.9)
KETONES UR STRIP.AUTO-MCNC: NEGATIVE MG/DL
LDLC SERPL CALC-MCNC: 133 MG/DL
LEUKOCYTE ESTERASE UR QL STRIP.AUTO: NEGATIVE
LYMPHOCYTES # BLD AUTO: 0.81 K/UL (ref 1–4.8)
LYMPHOCYTES NFR BLD: 21.7 % (ref 22–41)
MCH RBC QN AUTO: 33.1 PG (ref 27–33)
MCHC RBC AUTO-ENTMCNC: 33.9 G/DL (ref 33.6–35)
MCV RBC AUTO: 97.6 FL (ref 81.4–97.8)
MICRO URNS: NORMAL
MONOCYTES # BLD AUTO: 0.28 K/UL (ref 0–0.85)
MONOCYTES NFR BLD AUTO: 7.5 % (ref 0–13.4)
NEUTROPHILS # BLD AUTO: 2.5 K/UL (ref 2–7.15)
NEUTROPHILS NFR BLD: 67 % (ref 44–72)
NITRITE UR QL STRIP.AUTO: NEGATIVE
NRBC # BLD AUTO: 0 K/UL
NRBC BLD-RTO: 0 /100 WBC
PH UR STRIP.AUTO: 7.5 [PH]
PLATELET # BLD AUTO: 183 K/UL (ref 164–446)
PMV BLD AUTO: 12.3 FL (ref 9–12.9)
POTASSIUM SERPL-SCNC: 3.6 MMOL/L (ref 3.6–5.5)
PROT SERPL-MCNC: 8 G/DL (ref 6–8.2)
PROT UR QL STRIP: NEGATIVE MG/DL
RBC # BLD AUTO: 4.17 M/UL (ref 4.2–5.4)
RBC UR QL AUTO: NEGATIVE
SODIUM SERPL-SCNC: 138 MMOL/L (ref 135–145)
SP GR UR STRIP.AUTO: 1.01
TRIGL SERPL-MCNC: 129 MG/DL (ref 0–149)
TSH SERPL DL<=0.005 MIU/L-ACNC: 2.31 UIU/ML (ref 0.38–5.33)
UROBILINOGEN UR STRIP.AUTO-MCNC: 0.2 MG/DL
VIT B12 SERPL-MCNC: 1008 PG/ML (ref 211–911)
WBC # BLD AUTO: 3.7 K/UL (ref 4.8–10.8)

## 2018-10-21 PROCEDURE — 83036 HEMOGLOBIN GLYCOSYLATED A1C: CPT

## 2018-10-21 PROCEDURE — 80061 LIPID PANEL: CPT

## 2018-10-21 PROCEDURE — 85025 COMPLETE CBC W/AUTO DIFF WBC: CPT

## 2018-10-21 PROCEDURE — 81003 URINALYSIS AUTO W/O SCOPE: CPT

## 2018-10-21 PROCEDURE — 80053 COMPREHEN METABOLIC PANEL: CPT

## 2018-10-21 PROCEDURE — 700111 HCHG RX REV CODE 636 W/ 250 OVERRIDE (IP): Performed by: INTERNAL MEDICINE

## 2018-10-21 PROCEDURE — 82607 VITAMIN B-12: CPT

## 2018-10-21 PROCEDURE — 36415 COLL VENOUS BLD VENIPUNCTURE: CPT

## 2018-10-21 PROCEDURE — 82787 IGG 1 2 3 OR 4 EACH: CPT | Mod: 91

## 2018-10-21 PROCEDURE — 82306 VITAMIN D 25 HYDROXY: CPT

## 2018-10-21 PROCEDURE — 96365 THER/PROPH/DIAG IV INF INIT: CPT

## 2018-10-21 PROCEDURE — 96366 THER/PROPH/DIAG IV INF ADDON: CPT

## 2018-10-21 PROCEDURE — 84443 ASSAY THYROID STIM HORMONE: CPT

## 2018-10-21 RX ORDER — IMMUNE GLOBULIN 100 MG/ML
20 SOLUTION INTRAVENOUS ONCE
Status: DISCONTINUED | OUTPATIENT
Start: 2018-10-21 | End: 2018-10-21 | Stop reason: ALTCHOICE

## 2018-10-21 RX ORDER — IMMUNE GLOBULIN INFUSION (HUMAN) 100 MG/ML
20 INJECTION, SOLUTION INTRAVENOUS; SUBCUTANEOUS ONCE
Status: COMPLETED | OUTPATIENT
Start: 2018-10-21 | End: 2018-10-21

## 2018-10-21 RX ORDER — IMMUNE GLOBULIN INFUSION (HUMAN) 100 MG/ML
5 INJECTION, SOLUTION INTRAVENOUS; SUBCUTANEOUS ONCE
Status: COMPLETED | OUTPATIENT
Start: 2018-10-21 | End: 2018-10-21

## 2018-10-21 RX ORDER — IMMUNE GLOBULIN 100 MG/ML
5 SOLUTION INTRAVENOUS ONCE
Status: DISCONTINUED | OUTPATIENT
Start: 2018-10-21 | End: 2018-10-21 | Stop reason: ALTCHOICE

## 2018-10-21 RX ADMIN — IMMUNE GLOBULIN INFUSION (HUMAN) 20 G: 100 INJECTION, SOLUTION INTRAVENOUS; SUBCUTANEOUS at 09:40

## 2018-10-21 RX ADMIN — IMMUNE GLOBULIN INFUSION (HUMAN) 5 G: 100 INJECTION, SOLUTION INTRAVENOUS; SUBCUTANEOUS at 12:04

## 2018-10-21 ASSESSMENT — PAIN SCALES - GENERAL: PAINLEVEL_OUTOF10: 0

## 2018-10-21 NOTE — PROGRESS NOTES
Pt presents to infusion center for IVIG. She presented with lab orders from primary. PIV started, brisk blood return observed, labs/urine collected as ordered. IVIG infused and titrated per Gammagard rate calculator to max rate of 120 ml/hr per pt preference. Pt tolerated well with no s/s of adverse reaction. PIV flushed and removed, gauze and coban dressing placed. Has next appt, left on foot in good spirits.

## 2018-10-22 ENCOUNTER — TELEPHONE (OUTPATIENT)
Dept: HEMATOLOGY ONCOLOGY | Facility: MEDICAL CENTER | Age: 43
End: 2018-10-22

## 2018-10-22 ENCOUNTER — TELEPHONE (OUTPATIENT)
Dept: MEDICAL GROUP | Facility: MEDICAL CENTER | Age: 43
End: 2018-10-22

## 2018-10-22 DIAGNOSIS — C43.4 MALIGNANT MELANOMA OF NECK (HCC): ICD-10-CM

## 2018-10-22 DIAGNOSIS — R22.1 NECK MASS: ICD-10-CM

## 2018-10-22 PROBLEM — D70.9 NEUTROPENIA (HCC): Status: ACTIVE | Noted: 2018-10-22

## 2018-10-22 NOTE — TELEPHONE ENCOUNTER
Patient would like to request a call back regarding her ultrasound results, patient is requesting a call back as soon as possible. Please advise.

## 2018-10-23 ENCOUNTER — TELEPHONE (OUTPATIENT)
Dept: MEDICAL GROUP | Facility: MEDICAL CENTER | Age: 43
End: 2018-10-23

## 2018-10-23 LAB
IGG1 SER-MCNC: 876 MG/DL (ref 240–1118)
IGG2 SER-MCNC: 472 MG/DL (ref 124–549)
IGG3 SER-MCNC: 70 MG/DL (ref 21–134)
IGG4 SER-MCNC: 14 MG/DL (ref 1–123)

## 2018-10-23 NOTE — TELEPHONE ENCOUNTER
1. Caller Name: Whitley Joyce ([facility])                      Call Back Number: [phone]    2. Message: Whitley called requesting Dv code for Megan's nutritional supplement. Provided with M30.1.    3. Patient approves office to leave a detailed voicemail/MyChart message: N\A

## 2018-10-23 NOTE — TELEPHONE ENCOUNTER
Notified with labs.  Ultrasound thyroid ordered by oncology, left neck mass, patient tried calling oncology, could not get a hold of oncologist who is on-call this week and she spoke with [facility] who recommended biopsy.  I will order ultrasound-guided biopsy for her, she will call imaging to get that done urgently

## 2018-10-24 DIAGNOSIS — R93.89 ABNORMAL CT SCAN, NECK: ICD-10-CM

## 2018-10-24 DIAGNOSIS — C43.4 MALIGNANT MELANOMA OF NECK (HCC): ICD-10-CM

## 2018-10-24 NOTE — PROGRESS NOTES
Patient with history of melanoma of neck with abnormal ultrasound findings dated 10/20/18.  Discussed results with Dr. Torres and radiologist patient is referred to surgeon for excisional biopsy of superficial lesion.    Patient is updated and awaits call from surgery -request referral to Dr. Inna Cadena who has previously completed her melanoma excisions.

## 2018-10-25 ENCOUNTER — APPOINTMENT (OUTPATIENT)
Dept: RADIOLOGY | Facility: MEDICAL CENTER | Age: 43
End: 2018-10-25
Attending: INTERNAL MEDICINE
Payer: COMMERCIAL

## 2018-10-25 ENCOUNTER — TELEPHONE (OUTPATIENT)
Dept: MEDICAL GROUP | Facility: MEDICAL CENTER | Age: 43
End: 2018-10-25

## 2018-10-25 DIAGNOSIS — R22.1 NECK MASS: ICD-10-CM

## 2018-10-25 DIAGNOSIS — C43.4 MALIGNANT MELANOMA OF NECK (HCC): ICD-10-CM

## 2018-10-29 DIAGNOSIS — D72.18 CHURG-STRAUSS SYNDROME (HCC): Chronic | ICD-10-CM

## 2018-10-29 DIAGNOSIS — M30.1 CHURG-STRAUSS SYNDROME (HCC): Chronic | ICD-10-CM

## 2018-10-29 RX ORDER — PREDNISONE 1 MG/1
9 TABLET ORAL DAILY
Qty: 270 TAB | Refills: 11 | Status: SHIPPED | OUTPATIENT
Start: 2018-10-29 | End: 2019-08-27

## 2018-10-29 NOTE — TELEPHONE ENCOUNTER
Prednisone 1 mg take 9 tablets by mouth daily      Was the patient seen in the last year in this department? Yes    Does patient have an active prescription for medications requested? No     Received Request Via: Patient

## 2018-10-31 ENCOUNTER — HOSPITAL ENCOUNTER (OUTPATIENT)
Facility: MEDICAL CENTER | Age: 43
End: 2018-10-31
Attending: OTOLARYNGOLOGY | Admitting: OTOLARYNGOLOGY
Payer: COMMERCIAL

## 2018-10-31 VITALS
BODY MASS INDEX: 20.9 KG/M2 | DIASTOLIC BLOOD PRESSURE: 71 MMHG | SYSTOLIC BLOOD PRESSURE: 114 MMHG | RESPIRATION RATE: 14 BRPM | HEIGHT: 66 IN | OXYGEN SATURATION: 97 % | HEART RATE: 71 BPM | TEMPERATURE: 97.7 F | WEIGHT: 130.07 LBS

## 2018-10-31 LAB
B-HCG FREE SERPL-ACNC: <5 MIU/ML
IHCGL IHCGL: NEGATIVE MIU/ML
PATHOLOGY CONSULT NOTE: NORMAL

## 2018-10-31 PROCEDURE — 700111 HCHG RX REV CODE 636 W/ 250 OVERRIDE (IP)

## 2018-10-31 PROCEDURE — 160039 HCHG SURGERY MINUTES - EA ADDL 1 MIN LEVEL 3: Performed by: OTOLARYNGOLOGY

## 2018-10-31 PROCEDURE — 501838 HCHG SUTURE GENERAL: Performed by: OTOLARYNGOLOGY

## 2018-10-31 PROCEDURE — 160002 HCHG RECOVERY MINUTES (STAT): Performed by: OTOLARYNGOLOGY

## 2018-10-31 PROCEDURE — 160009 HCHG ANES TIME/MIN: Performed by: OTOLARYNGOLOGY

## 2018-10-31 PROCEDURE — 160036 HCHG PACU - EA ADDL 30 MINS PHASE I: Performed by: OTOLARYNGOLOGY

## 2018-10-31 PROCEDURE — 160048 HCHG OR STATISTICAL LEVEL 1-5: Performed by: OTOLARYNGOLOGY

## 2018-10-31 PROCEDURE — 88305 TISSUE EXAM BY PATHOLOGIST: CPT

## 2018-10-31 PROCEDURE — 160035 HCHG PACU - 1ST 60 MINS PHASE I: Performed by: OTOLARYNGOLOGY

## 2018-10-31 PROCEDURE — 160025 RECOVERY II MINUTES (STATS): Performed by: OTOLARYNGOLOGY

## 2018-10-31 PROCEDURE — 84702 CHORIONIC GONADOTROPIN TEST: CPT

## 2018-10-31 PROCEDURE — A9270 NON-COVERED ITEM OR SERVICE: HCPCS | Performed by: ANESTHESIOLOGY

## 2018-10-31 PROCEDURE — 700102 HCHG RX REV CODE 250 W/ 637 OVERRIDE(OP): Performed by: ANESTHESIOLOGY

## 2018-10-31 PROCEDURE — 160028 HCHG SURGERY MINUTES - 1ST 30 MINS LEVEL 3: Performed by: OTOLARYNGOLOGY

## 2018-10-31 PROCEDURE — 700101 HCHG RX REV CODE 250

## 2018-10-31 PROCEDURE — 160046 HCHG PACU - 1ST 60 MINS PHASE II: Performed by: OTOLARYNGOLOGY

## 2018-10-31 RX ORDER — HYDROMORPHONE HYDROCHLORIDE 2 MG/ML
0.4 INJECTION, SOLUTION INTRAMUSCULAR; INTRAVENOUS; SUBCUTANEOUS
Status: DISCONTINUED | OUTPATIENT
Start: 2018-10-31 | End: 2018-10-31 | Stop reason: HOSPADM

## 2018-10-31 RX ORDER — HYDROMORPHONE HYDROCHLORIDE 2 MG/ML
0.1 INJECTION, SOLUTION INTRAMUSCULAR; INTRAVENOUS; SUBCUTANEOUS
Status: DISCONTINUED | OUTPATIENT
Start: 2018-10-31 | End: 2018-10-31 | Stop reason: HOSPADM

## 2018-10-31 RX ORDER — DIPHENHYDRAMINE HYDROCHLORIDE 50 MG/ML
12.5 INJECTION INTRAMUSCULAR; INTRAVENOUS
Status: DISCONTINUED | OUTPATIENT
Start: 2018-10-31 | End: 2018-10-31 | Stop reason: HOSPADM

## 2018-10-31 RX ORDER — OXYCODONE HYDROCHLORIDE 5 MG/1
5 TABLET ORAL
Status: DISCONTINUED | OUTPATIENT
Start: 2018-10-31 | End: 2018-10-31 | Stop reason: HOSPADM

## 2018-10-31 RX ORDER — SODIUM CHLORIDE, SODIUM LACTATE, POTASSIUM CHLORIDE, CALCIUM CHLORIDE 600; 310; 30; 20 MG/100ML; MG/100ML; MG/100ML; MG/100ML
INJECTION, SOLUTION INTRAVENOUS ONCE
Status: COMPLETED | OUTPATIENT
Start: 2018-10-31 | End: 2018-10-31

## 2018-10-31 RX ORDER — OXYCODONE HCL 5 MG/5 ML
10 SOLUTION, ORAL ORAL
Status: DISCONTINUED | OUTPATIENT
Start: 2018-10-31 | End: 2018-10-31 | Stop reason: HOSPADM

## 2018-10-31 RX ORDER — AMOXICILLIN 500 MG/1
500 CAPSULE ORAL 3 TIMES DAILY
Qty: 21 CAP | Refills: 0 | Status: SHIPPED | OUTPATIENT
Start: 2018-10-31 | End: 2018-12-15

## 2018-10-31 RX ORDER — ONDANSETRON 2 MG/ML
4 INJECTION INTRAMUSCULAR; INTRAVENOUS
Status: DISCONTINUED | OUTPATIENT
Start: 2018-10-31 | End: 2018-10-31 | Stop reason: HOSPADM

## 2018-10-31 RX ORDER — SODIUM CHLORIDE, SODIUM LACTATE, POTASSIUM CHLORIDE, CALCIUM CHLORIDE 600; 310; 30; 20 MG/100ML; MG/100ML; MG/100ML; MG/100ML
INJECTION, SOLUTION INTRAVENOUS CONTINUOUS
Status: DISCONTINUED | OUTPATIENT
Start: 2018-10-31 | End: 2018-10-31 | Stop reason: HOSPADM

## 2018-10-31 RX ORDER — LIDOCAINE HYDROCHLORIDE AND EPINEPHRINE 10; 10 MG/ML; UG/ML
INJECTION, SOLUTION INFILTRATION; PERINEURAL
Status: DISCONTINUED | OUTPATIENT
Start: 2018-10-31 | End: 2018-10-31 | Stop reason: HOSPADM

## 2018-10-31 RX ORDER — GABAPENTIN 300 MG/1
300 CAPSULE ORAL ONCE
Status: COMPLETED | OUTPATIENT
Start: 2018-10-31 | End: 2018-10-31

## 2018-10-31 RX ORDER — OXYCODONE HCL 5 MG/5 ML
5 SOLUTION, ORAL ORAL
Status: DISCONTINUED | OUTPATIENT
Start: 2018-10-31 | End: 2018-10-31 | Stop reason: HOSPADM

## 2018-10-31 RX ORDER — LABETALOL HYDROCHLORIDE 5 MG/ML
5 INJECTION, SOLUTION INTRAVENOUS
Status: DISCONTINUED | OUTPATIENT
Start: 2018-10-31 | End: 2018-10-31 | Stop reason: HOSPADM

## 2018-10-31 RX ORDER — OXYCODONE HYDROCHLORIDE 5 MG/1
10 TABLET ORAL
Status: DISCONTINUED | OUTPATIENT
Start: 2018-10-31 | End: 2018-10-31 | Stop reason: HOSPADM

## 2018-10-31 RX ORDER — CELECOXIB 200 MG/1
400 CAPSULE ORAL ONCE
Status: COMPLETED | OUTPATIENT
Start: 2018-10-31 | End: 2018-10-31

## 2018-10-31 RX ORDER — HYDRALAZINE HYDROCHLORIDE 20 MG/ML
5 INJECTION INTRAMUSCULAR; INTRAVENOUS
Status: DISCONTINUED | OUTPATIENT
Start: 2018-10-31 | End: 2018-10-31 | Stop reason: HOSPADM

## 2018-10-31 RX ORDER — ACETAMINOPHEN 500 MG
1000 TABLET ORAL ONCE
Status: COMPLETED | OUTPATIENT
Start: 2018-10-31 | End: 2018-10-31

## 2018-10-31 RX ORDER — HYDROMORPHONE HYDROCHLORIDE 2 MG/ML
0.2 INJECTION, SOLUTION INTRAMUSCULAR; INTRAVENOUS; SUBCUTANEOUS
Status: DISCONTINUED | OUTPATIENT
Start: 2018-10-31 | End: 2018-10-31 | Stop reason: HOSPADM

## 2018-10-31 RX ORDER — HALOPERIDOL 5 MG/ML
1 INJECTION INTRAMUSCULAR
Status: DISCONTINUED | OUTPATIENT
Start: 2018-10-31 | End: 2018-10-31 | Stop reason: HOSPADM

## 2018-10-31 RX ORDER — LIDOCAINE HYDROCHLORIDE AND EPINEPHRINE 10; 10 MG/ML; UG/ML
INJECTION, SOLUTION INFILTRATION; PERINEURAL
Status: DISCONTINUED
Start: 2018-10-31 | End: 2018-10-31 | Stop reason: HOSPADM

## 2018-10-31 RX ADMIN — GABAPENTIN 300 MG: 300 CAPSULE ORAL at 09:42

## 2018-10-31 RX ADMIN — SODIUM CHLORIDE, SODIUM LACTATE, POTASSIUM CHLORIDE, CALCIUM CHLORIDE: 600; 310; 30; 20 INJECTION, SOLUTION INTRAVENOUS at 09:42

## 2018-10-31 RX ADMIN — CELECOXIB 400 MG: 200 CAPSULE ORAL at 09:42

## 2018-10-31 RX ADMIN — ACETAMINOPHEN 1000 MG: 500 TABLET, FILM COATED ORAL at 09:42

## 2018-10-31 ASSESSMENT — PAIN SCALES - GENERAL
PAINLEVEL_OUTOF10: 0
PAINLEVEL_OUTOF10: 6
PAINLEVEL_OUTOF10: 5
PAINLEVEL_OUTOF10: 6
PAINLEVEL_OUTOF10: 5

## 2018-10-31 NOTE — OP REPORT
DATE OF SERVICE:  10/31/2018    PREOPERATIVE DIAGNOSIS:  Left neck mass with history of melanoma.    POSTOPERATIVE DIAGNOSIS:  Left neck mass with history of melanoma.    PROCEDURE PERFORMED:  Posterior neck dissection.    ATTENDING:  Inna Cadena MD    ANESTHESIOLOGIST:  Chani Whelan MD    COMPLICATIONS:  None.    SPECIMENS:  Posterior dissection for specimen and portion of skin scar.    PROCEDURE IN DETAIL:  The patient was appropriately identified and taken to   the operating room where she was lying in supine position.  General anesthesia   was induced and LMA was placed.  The patient was rolled onto her right side   and noted to have a previous scar on the left side of the neck.  The posterior   end of the scar over the trapezius was poorly healed and indurated.  At this   time, 1% lidocaine was injected into the scar and going up.  She also had a   palpable mass about mid scar.  A total of 6 mL was used.  Incision was made   along the scar superiorly and then around the inferior portion of the poorly   healing wound.    DICTATION ENDS HERE       ____________________________________     Inna Cadena MD    CW[facility] / NTS    DD:  10/31/2018 12:29:03  DT:  10/31/2018 12:45:16    D#:  [id number]  Job#:  [id number]

## 2018-10-31 NOTE — DISCHARGE INSTRUCTIONS
ACTIVITY: Rest and take it easy for the first 24 hours.  A responsible adult is recommended to remain with you during that time.  It is normal to feel sleepy.  We encourage you to not do anything that requires balance, judgment or coordination.    MILD FLU-LIKE SYMPTOMS ARE NORMAL. YOU MAY EXPERIENCE GENERALIZED MUSCLE ACHES, THROAT IRRITATION, HEADACHE AND/OR SOME NAUSEA.    FOR 24 HOURS DO NOT:  Drive, operate machinery or run household appliances.  Drink beer or alcoholic beverages.   Make important decisions or sign legal documents.      DIET: To avoid nausea, slowly advance diet as tolerated, avoiding spicy or greasy foods for the first day.  Add more substantial food to your diet according to your physician's instructions.  Babies can be fed formula or breast milk as soon as they are hungry.  INCREASE FLUIDS AND FIBER TO AVOID CONSTIPATION.    SURGICAL DRESSING/BATHING: ok to shower. Keep incision clean and dry. Shower than pat dry. Do not remove steri strips. Watch of signs of infection. Dr. Cadena will call you when she receives biopsy results.     FOLLOW-UP APPOINTMENT:  A follow-up appointment should be arranged with your doctor; call to schedule.    You should CALL YOUR PHYSICIAN if you develop:  Fever greater than 101 degrees F.  Pain not relieved by medication, or persistent nausea or vomiting.  Excessive bleeding (blood soaking through dressing) or unexpected drainage from the wound.  Extreme redness or swelling around the incision site, drainage of pus or foul smelling drainage.  Inability to urinate or empty your bladder within 8 hours.  Problems with breathing or chest pain.    You should call 911 if you develop problems with breathing or chest pain.  If you are unable to contact your doctor or surgical center, you should go to the nearest emergency room or urgent care center.  Physician's telephone #: [phone]    If any questions arise, call your doctor.  If your doctor is not available,  please feel free to call the Surgical Center at [phone].  The Center is open Monday through Friday from 7AM to 7PM.  You can also call the HEALTH HOTLINE open 24 hours/day, 7 days/week and speak to a nurse at [phone], or toll free at [phone].    A registered nurse may call you a few days after your surgery to see how you are doing after your procedure.    MEDICATIONS: Resume taking daily medication.  Take prescribed pain medication with food.  If no medication is prescribed, you may take non-aspirin pain medication if needed.  PAIN MEDICATION CAN BE VERY CONSTIPATING.  Take a stool softener or laxative such as senokot, pericolace, or milk of magnesia if needed.    Prescription given for Amoxicillin.  Last pain medication given during surgery..    If your physician has prescribed pain medication that includes Acetaminophen (Tylenol), do not take additional Acetaminophen (Tylenol) while taking the prescribed medication.    Depression / Suicide Risk    As you are discharged from this [facility] facility, it is important to learn how to keep safe from harming yourself.    Recognize the warning signs:  · Abrupt changes in personality, positive or negative- including increase in energy   · Giving away possessions  · Change in eating patterns- significant weight changes-  positive or negative  · Change in sleeping patterns- unable to sleep or sleeping all the time   · Unwillingness or inability to communicate  · Depression  · Unusual sadness, discouragement and loneliness  · Talk of wanting to die  · Neglect of personal appearance   · Rebelliousness- reckless behavior  · Withdrawal from people/activities they love  · Confusion- inability to concentrate     If you or a loved one observes any of these behaviors or has concerns about self-harm, here's what you can do:  · Talk about it- your feelings and reasons for harming yourself  · Remove any means that you might use to hurt yourself (examples:  pills, rope, extension cords, firearm)  · Get professional help from the community (Mental Health, Substance Abuse, psychological counseling)  · Do not be alone:Call your Safe Contact- someone whom you trust who will be there for you.  · Call your local CRISIS HOTLINE 281-6766 or 065-972-9382  · Call your local Children's Mobile Crisis Response Team [place] (511) 697-4082 or www.[employer]  · Call the toll free National Suicide Prevention Hotlines   · National Suicide Prevention Lifeline 571-368-SKVS (6162)  · National Hope Line Network 800-SUICIDE (300-9753)

## 2018-10-31 NOTE — OR NURSING
1220-Received from OR via [facility]. S/P neck excision. Steri strips in place. 02 at 3L NC, sedated.   Bedside report received from RN. And Anesthesiologist.    1235-Dr. Cadena at bedside to update patient.    1248-hand off to CARON Garcia.    1320-remains asleep. No problems noted.    1400-drowsy, woke up briefly. Was able to drink her protein shake. Complains of severe headache but declines medication. Too soon for tylenol. States she is not feeling up to going home yet, would like to sleep for an hour to see if her headache will go away. Notified her friend of  time. Friend plans to be here at 1530.    1500-incision oozing sanguineous drainage. Gauze and tegaderm applied to incision.    1545-meets discharge criteria. Iv removed. Instructions explained to friend.  All questions answered. Discharged home with responsible party. Escorted to car via wheelchair.

## 2018-11-04 ENCOUNTER — OUTPATIENT INFUSION SERVICES (OUTPATIENT)
Dept: ONCOLOGY | Facility: MEDICAL CENTER | Age: 43
End: 2018-11-04
Attending: INTERNAL MEDICINE
Payer: COMMERCIAL

## 2018-11-04 VITALS
BODY MASS INDEX: 21.44 KG/M2 | SYSTOLIC BLOOD PRESSURE: 115 MMHG | RESPIRATION RATE: 18 BRPM | HEIGHT: 66 IN | HEART RATE: 68 BPM | DIASTOLIC BLOOD PRESSURE: 72 MMHG | WEIGHT: 133.38 LBS | OXYGEN SATURATION: 99 % | TEMPERATURE: 97.1 F

## 2018-11-04 DIAGNOSIS — D72.18 CHURG-STRAUSS SYNDROME (HCC): Chronic | ICD-10-CM

## 2018-11-04 DIAGNOSIS — M30.1 CHURG-STRAUSS SYNDROME (HCC): Chronic | ICD-10-CM

## 2018-11-04 PROCEDURE — 36415 COLL VENOUS BLD VENIPUNCTURE: CPT

## 2018-11-04 PROCEDURE — 96366 THER/PROPH/DIAG IV INF ADDON: CPT

## 2018-11-04 PROCEDURE — 700111 HCHG RX REV CODE 636 W/ 250 OVERRIDE (IP): Performed by: INTERNAL MEDICINE

## 2018-11-04 PROCEDURE — 82784 ASSAY IGA/IGD/IGG/IGM EACH: CPT

## 2018-11-04 PROCEDURE — 96365 THER/PROPH/DIAG IV INF INIT: CPT

## 2018-11-04 RX ORDER — IMMUNE GLOBULIN INFUSION (HUMAN) 100 MG/ML
20 INJECTION, SOLUTION INTRAVENOUS; SUBCUTANEOUS ONCE
Status: COMPLETED | OUTPATIENT
Start: 2018-11-04 | End: 2018-11-04

## 2018-11-04 RX ORDER — IMMUNE GLOBULIN INFUSION (HUMAN) 100 MG/ML
5 INJECTION, SOLUTION INTRAVENOUS; SUBCUTANEOUS ONCE
Status: COMPLETED | OUTPATIENT
Start: 2018-11-04 | End: 2018-11-04

## 2018-11-04 RX ADMIN — IMMUNE GLOBULIN INFUSION (HUMAN) 20 G: 100 INJECTION, SOLUTION INTRAVENOUS; SUBCUTANEOUS at 09:25

## 2018-11-04 RX ADMIN — IMMUNE GLOBULIN INFUSION (HUMAN) 5 G: 100 INJECTION, SOLUTION INTRAVENOUS; SUBCUTANEOUS at 11:50

## 2018-11-04 ASSESSMENT — PAIN SCALES - GENERAL: PAINLEVEL_OUTOF10: 0

## 2018-11-04 NOTE — PROGRESS NOTES
Pt presents to infusion center for IVIG. No new acute complaints. PIV started, brisk blood return observed. IgA lab drawn as ordered.  IVIG infused and titrated per [employer] rate calculator to max rate of 120 ml/hr, per pt preference. Pt tolerated well with no s/s of adverse reaction. PIV flushed and removed, gauze and coban dressing placed. Has next appt, left on foot in good spirits.

## 2018-11-06 LAB — IGA SERPL-MCNC: 55 MG/DL (ref 68–408)

## 2018-11-13 NOTE — OP REPORT
DATE OF SERVICE:  10/31/2018    PREOPERATIVE DIAGNOSIS:  Left neck mass with a history of melanoma.    POSTOPERATIVE DIAGNOSIS:  Left neck mass with a history of melanoma.    PROCEDURE PERFORMED:  Left posterior neck dissection.    ATTENDING SURGEON:  Inna Cadena MD    ANESTHESIOLOGIST:  Chani Whelan MD    COMPLICATIONS:  None.    SPECIMENS:  Posterior neck dissection and portion of skin scar.    DESCRIPTION OF PROCEDURE:  The patient was appropriately identified and taken   to the operating room where she was laid in supine position.  General   anesthesia was induced and LMA was placed.  The patient was then rolled on the   left side of her neck.  She was noted to have a scar with poor healing to the   posterior end of this and indurated.  At this time, 1% lidocaine with   epinephrine was injected in the scar and along going up.  She had a palpable   mass and scar.  A total of 6 mL of lidocaine was used.  Incision was made   along the scar superiorly and down around the inferior portion of the poorly   healing wound to excise the inferior portion of the scar.  This was taken down   and the soft tissues.  There was a lot of inflammation and very rubbery   tissue.  This was taken off the trapezius and the surrounding tissue and   dissected out superiorly and posteriorly.  Several palpable lumps were felt.    These were removed along with the fat overlying this area.  This was taken   circumferentially posteriorly, any nerves were identified and dissected out   and then the scar was cut out inferiorly.  The dissection of the underlying   tissues was sent off for specimen as well as the scar separately.  The area   was irrigated out clean.  No further palpable masses could be felt.  The deep   tissues were then closed using interrupted 4-0 Vicryl as well as subcutaneous   tissue with subcuticular running 5-0 fast was done to close the incision.    Benzoin and Steri-Strips were placed.  She was  unprepped and draped, awakened,   extubated, and returned to the recovery room in stable satisfactory   condition.       ____________________________________     MD RICK Israel / ELLEN    DD:  11/13/2018 10:21:26  DT:  11/13/2018 10:35:48    D#:  [id number]  Job#:  [id number]

## 2018-11-18 ENCOUNTER — OUTPATIENT INFUSION SERVICES (OUTPATIENT)
Dept: ONCOLOGY | Facility: MEDICAL CENTER | Age: 43
End: 2018-11-18
Attending: INTERNAL MEDICINE
Payer: COMMERCIAL

## 2018-11-18 VITALS
DIASTOLIC BLOOD PRESSURE: 66 MMHG | HEIGHT: 66 IN | BODY MASS INDEX: 21.05 KG/M2 | TEMPERATURE: 97.1 F | RESPIRATION RATE: 18 BRPM | WEIGHT: 130.95 LBS | SYSTOLIC BLOOD PRESSURE: 107 MMHG | OXYGEN SATURATION: 99 % | HEART RATE: 63 BPM

## 2018-11-18 PROCEDURE — 700111 HCHG RX REV CODE 636 W/ 250 OVERRIDE (IP): Performed by: INTERNAL MEDICINE

## 2018-11-18 PROCEDURE — 700111 HCHG RX REV CODE 636 W/ 250 OVERRIDE (IP)

## 2018-11-18 PROCEDURE — 96365 THER/PROPH/DIAG IV INF INIT: CPT

## 2018-11-18 PROCEDURE — 96366 THER/PROPH/DIAG IV INF ADDON: CPT

## 2018-11-18 RX ORDER — IMMUNE GLOBULIN INFUSION (HUMAN) 100 MG/ML
25 INJECTION, SOLUTION INTRAVENOUS; SUBCUTANEOUS ONCE
Status: COMPLETED | OUTPATIENT
Start: 2018-11-18 | End: 2018-11-18

## 2018-11-18 RX ADMIN — IMMUNE GLOBULIN INFUSION (HUMAN) 25 G: 100 INJECTION, SOLUTION INTRAVENOUS; SUBCUTANEOUS at 08:54

## 2018-11-18 ASSESSMENT — PAIN SCALES - GENERAL: PAINLEVEL_OUTOF10: 0

## 2018-11-18 NOTE — PROGRESS NOTES
Pt presents ambulatory to IS for q 2 week IVIG infusion.  Pt reports feeling well today and denies acute complaints.  PIV started in LAC, blood return noted.  IVIG infused per pt's rate calculator, max rate of 120 ml/hr.  Pt tolerated well, no adverse reaction noted.  PIV flushed, removed, and site covered with gauze and coban.  Next appointment confirmed.  Pt discharged from IS in NAD under self care.

## 2018-11-21 ENCOUNTER — TELEPHONE (OUTPATIENT)
Dept: MEDICAL GROUP | Facility: MEDICAL CENTER | Age: 43
End: 2018-11-21

## 2018-11-21 NOTE — TELEPHONE ENCOUNTER
1. Caller Name: Megan August                        Call Back Number: [phone] (home) [phone] (work)      2. Message: Pt called to say that her supplement is changing and that it will now contain fiber. 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 She needs a new Rx for 36 cans of Elicare Laith Vanilla Flavor. I have started PA, Please leave on my desk.      3. Patient approves office to leave a detailed voicemail/MyChart message: no

## 2018-11-26 ENCOUNTER — HOSPITAL ENCOUNTER (OUTPATIENT)
Dept: RADIOLOGY | Facility: MEDICAL CENTER | Age: 43
End: 2018-11-26
Attending: INTERNAL MEDICINE
Payer: COMMERCIAL

## 2018-11-26 ENCOUNTER — TELEPHONE (OUTPATIENT)
Dept: MEDICAL GROUP | Facility: MEDICAL CENTER | Age: 43
End: 2018-11-26

## 2018-11-26 DIAGNOSIS — Z12.39 ENCOUNTER FOR BREAST CANCER SCREENING OTHER THAN MAMMOGRAM: ICD-10-CM

## 2018-11-26 DIAGNOSIS — Z91.89 AT HIGH RISK FOR OSTEOPOROSIS: ICD-10-CM

## 2018-11-26 DIAGNOSIS — Z79.52 CURRENT CHRONIC USE OF SYSTEMIC STEROIDS: Chronic | ICD-10-CM

## 2018-11-26 PROCEDURE — 77067 SCR MAMMO BI INCL CAD: CPT

## 2018-11-26 PROCEDURE — 77080 DXA BONE DENSITY AXIAL: CPT

## 2018-11-27 ENCOUNTER — TELEPHONE (OUTPATIENT)
Dept: MEDICAL GROUP | Facility: MEDICAL CENTER | Age: 43
End: 2018-11-27

## 2018-11-27 NOTE — TELEPHONE ENCOUNTER
----- Message from Zohaib Soares M.D. sent at 11/26/2018  8:07 PM PST -----  Please notify patient that her bone density test is normal, we can repeat that in 3-5 years

## 2018-12-02 ENCOUNTER — OUTPATIENT INFUSION SERVICES (OUTPATIENT)
Dept: ONCOLOGY | Facility: MEDICAL CENTER | Age: 43
End: 2018-12-02
Attending: INTERNAL MEDICINE
Payer: COMMERCIAL

## 2018-12-02 VITALS
HEART RATE: 64 BPM | RESPIRATION RATE: 18 BRPM | HEIGHT: 66 IN | SYSTOLIC BLOOD PRESSURE: 114 MMHG | WEIGHT: 133.82 LBS | BODY MASS INDEX: 21.51 KG/M2 | DIASTOLIC BLOOD PRESSURE: 68 MMHG | TEMPERATURE: 96.9 F | OXYGEN SATURATION: 99 %

## 2018-12-02 DIAGNOSIS — D72.18 CHURG-STRAUSS SYNDROME (HCC): Chronic | ICD-10-CM

## 2018-12-02 DIAGNOSIS — M30.1 CHURG-STRAUSS SYNDROME (HCC): Chronic | ICD-10-CM

## 2018-12-02 PROCEDURE — 96366 THER/PROPH/DIAG IV INF ADDON: CPT

## 2018-12-02 PROCEDURE — 36415 COLL VENOUS BLD VENIPUNCTURE: CPT

## 2018-12-02 PROCEDURE — 700111 HCHG RX REV CODE 636 W/ 250 OVERRIDE (IP): Performed by: INTERNAL MEDICINE

## 2018-12-02 PROCEDURE — 96365 THER/PROPH/DIAG IV INF INIT: CPT

## 2018-12-02 PROCEDURE — 82784 ASSAY IGA/IGD/IGG/IGM EACH: CPT

## 2018-12-02 RX ORDER — IMMUNE GLOBULIN INFUSION (HUMAN) 100 MG/ML
5 INJECTION, SOLUTION INTRAVENOUS; SUBCUTANEOUS ONCE
Status: COMPLETED | OUTPATIENT
Start: 2018-12-02 | End: 2018-12-02

## 2018-12-02 RX ORDER — IMMUNE GLOBULIN INFUSION (HUMAN) 100 MG/ML
20 INJECTION, SOLUTION INTRAVENOUS; SUBCUTANEOUS ONCE
Status: COMPLETED | OUTPATIENT
Start: 2018-12-02 | End: 2018-12-02

## 2018-12-02 RX ADMIN — IMMUNE GLOBULIN INFUSION (HUMAN) 20 G: 100 INJECTION, SOLUTION INTRAVENOUS; SUBCUTANEOUS at 10:10

## 2018-12-02 RX ADMIN — IMMUNE GLOBULIN INFUSION (HUMAN) 5 G: 100 INJECTION, SOLUTION INTRAVENOUS; SUBCUTANEOUS at 09:08

## 2018-12-02 ASSESSMENT — PAIN SCALES - GENERAL: PAINLEVEL_OUTOF10: 0

## 2018-12-02 NOTE — PROGRESS NOTES
Pt is here for her scheduled IVIG. Lab drawn. Pt states that her tumor removal site on her L lateral neck is finally healing. IVIG titrated up to the max rate of 120ml/h to avoid post infusion headache. Infusion completed without an incident. Discharged home to self care. Next appointment confirmed.

## 2018-12-04 LAB — IGA SERPL-MCNC: 50 MG/DL (ref 68–408)

## 2018-12-15 RX ORDER — DROSPIRENONE AND ETHINYL ESTRADIOL 0.03MG-3MG
1 KIT ORAL DAILY
Qty: 84 TAB | Refills: 1 | Status: SHIPPED | OUTPATIENT
Start: 2018-12-15 | End: 2019-06-05 | Stop reason: SDUPTHER

## 2018-12-16 ENCOUNTER — OUTPATIENT INFUSION SERVICES (OUTPATIENT)
Dept: ONCOLOGY | Facility: MEDICAL CENTER | Age: 43
End: 2018-12-16
Attending: INTERNAL MEDICINE
Payer: COMMERCIAL

## 2018-12-16 VITALS
WEIGHT: 134.92 LBS | RESPIRATION RATE: 18 BRPM | BODY MASS INDEX: 21.68 KG/M2 | OXYGEN SATURATION: 100 % | HEART RATE: 63 BPM | SYSTOLIC BLOOD PRESSURE: 106 MMHG | TEMPERATURE: 97 F | DIASTOLIC BLOOD PRESSURE: 68 MMHG | HEIGHT: 66 IN

## 2018-12-16 PROCEDURE — 700111 HCHG RX REV CODE 636 W/ 250 OVERRIDE (IP): Performed by: INTERNAL MEDICINE

## 2018-12-16 PROCEDURE — 96366 THER/PROPH/DIAG IV INF ADDON: CPT

## 2018-12-16 PROCEDURE — 96365 THER/PROPH/DIAG IV INF INIT: CPT

## 2018-12-16 RX ORDER — IMMUNE GLOBULIN INFUSION (HUMAN) 100 MG/ML
20 INJECTION, SOLUTION INTRAVENOUS; SUBCUTANEOUS ONCE
Status: COMPLETED | OUTPATIENT
Start: 2018-12-16 | End: 2018-12-16

## 2018-12-16 RX ORDER — IMMUNE GLOBULIN INFUSION (HUMAN) 100 MG/ML
5 INJECTION, SOLUTION INTRAVENOUS; SUBCUTANEOUS ONCE
Status: COMPLETED | OUTPATIENT
Start: 2018-12-16 | End: 2018-12-16

## 2018-12-16 RX ADMIN — IMMUNE GLOBULIN INFUSION (HUMAN) 5 G: 100 INJECTION, SOLUTION INTRAVENOUS; SUBCUTANEOUS at 08:53

## 2018-12-16 RX ADMIN — IMMUNE GLOBULIN INFUSION (HUMAN) 20 G: 100 INJECTION, SOLUTION INTRAVENOUS; SUBCUTANEOUS at 10:00

## 2018-12-16 ASSESSMENT — PAIN SCALES - GENERAL: PAINLEVEL_OUTOF10: 0

## 2018-12-16 NOTE — PROGRESS NOTES
Pt arrived to IS, ambulatory, for IVIG infusion. Pt voices no complaints. 24g PIV established in the L-AC, positive blood return noted. IVIG infused with no s/sx of adverse reaction, titrated to 120 mL/hr per pt request. PIV flushed and removed. Pt left IS with no s/sx of distress. Follow up appointment confirmed.

## 2019-01-06 ENCOUNTER — OUTPATIENT INFUSION SERVICES (OUTPATIENT)
Dept: ONCOLOGY | Facility: MEDICAL CENTER | Age: 44
End: 2019-01-06
Attending: INTERNAL MEDICINE
Payer: COMMERCIAL

## 2019-01-06 VITALS
RESPIRATION RATE: 18 BRPM | WEIGHT: 135.36 LBS | HEART RATE: 64 BPM | TEMPERATURE: 97 F | HEIGHT: 66 IN | DIASTOLIC BLOOD PRESSURE: 69 MMHG | SYSTOLIC BLOOD PRESSURE: 111 MMHG | OXYGEN SATURATION: 100 % | BODY MASS INDEX: 21.75 KG/M2

## 2019-01-06 DIAGNOSIS — M35.00 SJOGREN'S SYNDROME, WITH UNSPECIFIED ORGAN INVOLVEMENT (HCC): Chronic | ICD-10-CM

## 2019-01-06 PROCEDURE — 96365 THER/PROPH/DIAG IV INF INIT: CPT

## 2019-01-06 PROCEDURE — 96366 THER/PROPH/DIAG IV INF ADDON: CPT

## 2019-01-06 PROCEDURE — 82784 ASSAY IGA/IGD/IGG/IGM EACH: CPT

## 2019-01-06 PROCEDURE — 36415 COLL VENOUS BLD VENIPUNCTURE: CPT

## 2019-01-06 PROCEDURE — 700111 HCHG RX REV CODE 636 W/ 250 OVERRIDE (IP): Performed by: INTERNAL MEDICINE

## 2019-01-06 RX ORDER — IMMUNE GLOBULIN INFUSION (HUMAN) 100 MG/ML
5 INJECTION, SOLUTION INTRAVENOUS; SUBCUTANEOUS ONCE
Status: COMPLETED | OUTPATIENT
Start: 2019-01-06 | End: 2019-01-06

## 2019-01-06 RX ORDER — IMMUNE GLOBULIN INFUSION (HUMAN) 100 MG/ML
10 INJECTION, SOLUTION INTRAVENOUS; SUBCUTANEOUS ONCE
Status: COMPLETED | OUTPATIENT
Start: 2019-01-06 | End: 2019-01-06

## 2019-01-06 RX ADMIN — IMMUNE GLOBULIN INFUSION (HUMAN) 10 G: 100 INJECTION, SOLUTION INTRAVENOUS; SUBCUTANEOUS at 11:04

## 2019-01-06 RX ADMIN — IMMUNE GLOBULIN INFUSION (HUMAN) 5 G: 100 INJECTION, SOLUTION INTRAVENOUS; SUBCUTANEOUS at 08:59

## 2019-01-06 RX ADMIN — IMMUNE GLOBULIN INFUSION (HUMAN) 10 G: 100 INJECTION, SOLUTION INTRAVENOUS; SUBCUTANEOUS at 10:04

## 2019-01-06 ASSESSMENT — PAIN SCALES - GENERAL: PAINLEVEL_OUTOF10: 0

## 2019-01-06 NOTE — PROGRESS NOTES
Pt arrived to IS, ambulatory, for IVIG infusion. Pt voices no complaints. 24g PIV established in the L-AC, positive blood return noted. Lab drawn per order. IVIG infused with no s/sx of adverse reaction, titrated to 120 mL/hr per pt request. PIV flushed and removed. Pt left IS with no s/sx of distress. Follow up appointment confirmed.

## 2019-01-09 LAB — IGA SERPL-MCNC: 46 MG/DL (ref 68–408)

## 2019-01-10 DIAGNOSIS — E03.9 HYPOTHYROIDISM, UNSPECIFIED TYPE: ICD-10-CM

## 2019-01-10 RX ORDER — LEVOTHYROXINE SODIUM 112 UG/1
TABLET ORAL
Qty: 90 TAB | Refills: 1 | Status: SHIPPED | OUTPATIENT
Start: 2019-01-10 | End: 2019-06-18 | Stop reason: SDUPTHER

## 2019-01-10 NOTE — TELEPHONE ENCOUNTER
Levothyroxine    Was the patient seen in the last year in this department? Yes    Does patient have an active prescription for medications requested? No     Received Request Via: Pharmacy

## 2019-01-20 ENCOUNTER — OUTPATIENT INFUSION SERVICES (OUTPATIENT)
Dept: ONCOLOGY | Facility: MEDICAL CENTER | Age: 44
End: 2019-01-20
Attending: INTERNAL MEDICINE
Payer: COMMERCIAL

## 2019-01-20 VITALS
BODY MASS INDEX: 21.75 KG/M2 | RESPIRATION RATE: 18 BRPM | SYSTOLIC BLOOD PRESSURE: 109 MMHG | HEIGHT: 66 IN | WEIGHT: 135.36 LBS | HEART RATE: 109 BPM | TEMPERATURE: 97.5 F | DIASTOLIC BLOOD PRESSURE: 76 MMHG | OXYGEN SATURATION: 97 %

## 2019-01-20 DIAGNOSIS — D72.18 CHURG-STRAUSS SYNDROME (HCC): Chronic | ICD-10-CM

## 2019-01-20 DIAGNOSIS — M30.1 CHURG-STRAUSS SYNDROME (HCC): Chronic | ICD-10-CM

## 2019-01-20 PROCEDURE — 96365 THER/PROPH/DIAG IV INF INIT: CPT

## 2019-01-20 PROCEDURE — 700111 HCHG RX REV CODE 636 W/ 250 OVERRIDE (IP): Performed by: INTERNAL MEDICINE

## 2019-01-20 PROCEDURE — 82784 ASSAY IGA/IGD/IGG/IGM EACH: CPT

## 2019-01-20 PROCEDURE — 96366 THER/PROPH/DIAG IV INF ADDON: CPT

## 2019-01-20 RX ORDER — IMMUNE GLOBULIN INFUSION (HUMAN) 100 MG/ML
20 INJECTION, SOLUTION INTRAVENOUS; SUBCUTANEOUS ONCE
Status: COMPLETED | OUTPATIENT
Start: 2019-01-20 | End: 2019-01-20

## 2019-01-20 RX ORDER — IMMUNE GLOBULIN INFUSION (HUMAN) 100 MG/ML
5 INJECTION, SOLUTION INTRAVENOUS; SUBCUTANEOUS ONCE
Status: COMPLETED | OUTPATIENT
Start: 2019-01-20 | End: 2019-01-20

## 2019-01-20 RX ADMIN — IMMUNE GLOBULIN INFUSION (HUMAN) 5 G: 100 INJECTION, SOLUTION INTRAVENOUS; SUBCUTANEOUS at 09:04

## 2019-01-20 RX ADMIN — IMMUNE GLOBULIN INFUSION (HUMAN) 20 G: 100 INJECTION, SOLUTION INTRAVENOUS; SUBCUTANEOUS at 10:15

## 2019-01-20 ASSESSMENT — PAIN SCALES - GENERAL: PAINLEVEL_OUTOF10: 0

## 2019-01-20 NOTE — PROGRESS NOTES
Pt is here for her scheduled IVIG. Lab drawn. IVIG titrated up to the max rate of 120ml/h to avoid post infusion headache. Pt sleeps through her treatment. Infusion completed without an incident. Discharged home to self care. Next appointment confirmed.

## 2019-01-22 LAB — IGA SERPL-MCNC: 58 MG/DL (ref 68–408)

## 2019-02-03 ENCOUNTER — OUTPATIENT INFUSION SERVICES (OUTPATIENT)
Dept: ONCOLOGY | Facility: MEDICAL CENTER | Age: 44
End: 2019-02-03
Attending: INTERNAL MEDICINE
Payer: COMMERCIAL

## 2019-02-03 VITALS
SYSTOLIC BLOOD PRESSURE: 108 MMHG | HEIGHT: 66 IN | TEMPERATURE: 97 F | DIASTOLIC BLOOD PRESSURE: 69 MMHG | BODY MASS INDEX: 21.75 KG/M2 | HEART RATE: 63 BPM | RESPIRATION RATE: 18 BRPM | OXYGEN SATURATION: 100 % | WEIGHT: 135.36 LBS

## 2019-02-03 DIAGNOSIS — M35.00 SJOGREN'S SYNDROME, WITH UNSPECIFIED ORGAN INVOLVEMENT (HCC): Chronic | ICD-10-CM

## 2019-02-03 PROCEDURE — 82784 ASSAY IGA/IGD/IGG/IGM EACH: CPT

## 2019-02-03 PROCEDURE — 96366 THER/PROPH/DIAG IV INF ADDON: CPT

## 2019-02-03 PROCEDURE — 700111 HCHG RX REV CODE 636 W/ 250 OVERRIDE (IP): Performed by: INTERNAL MEDICINE

## 2019-02-03 PROCEDURE — 96365 THER/PROPH/DIAG IV INF INIT: CPT

## 2019-02-03 RX ORDER — IMMUNE GLOBULIN INFUSION (HUMAN) 100 MG/ML
20 INJECTION, SOLUTION INTRAVENOUS; SUBCUTANEOUS ONCE
Status: COMPLETED | OUTPATIENT
Start: 2019-02-03 | End: 2019-02-03

## 2019-02-03 RX ORDER — IMMUNE GLOBULIN INFUSION (HUMAN) 100 MG/ML
5 INJECTION, SOLUTION INTRAVENOUS; SUBCUTANEOUS ONCE
Status: COMPLETED | OUTPATIENT
Start: 2019-02-03 | End: 2019-02-03

## 2019-02-03 RX ADMIN — IMMUNE GLOBULIN INFUSION (HUMAN) 20 G: 100 INJECTION, SOLUTION INTRAVENOUS; SUBCUTANEOUS at 10:00

## 2019-02-03 RX ADMIN — IMMUNE GLOBULIN INFUSION (HUMAN) 5 G: 100 INJECTION, SOLUTION INTRAVENOUS; SUBCUTANEOUS at 08:55

## 2019-02-04 ENCOUNTER — TELEPHONE (OUTPATIENT)
Dept: MEDICAL GROUP | Facility: MEDICAL CENTER | Age: 44
End: 2019-02-04

## 2019-02-04 DIAGNOSIS — C43.4 MALIGNANT MELANOMA OF NECK (HCC): ICD-10-CM

## 2019-02-04 DIAGNOSIS — L98.9 SKIN LESION: ICD-10-CM

## 2019-02-04 NOTE — TELEPHONE ENCOUNTER
Megan Vuongpresley Mata  [phone] (home) [phone] (work)    Pt has tentative appt on Thur with [facility] for skin check. Hoping to see Sherie Goldstein. Please send for urgent REF so she can make that appt tomorrow.

## 2019-02-05 LAB — IGA SERPL-MCNC: 54 MG/DL (ref 68–408)

## 2019-02-17 ENCOUNTER — OUTPATIENT INFUSION SERVICES (OUTPATIENT)
Dept: ONCOLOGY | Facility: MEDICAL CENTER | Age: 44
End: 2019-02-17
Attending: INTERNAL MEDICINE
Payer: COMMERCIAL

## 2019-02-17 VITALS
RESPIRATION RATE: 18 BRPM | OXYGEN SATURATION: 99 % | TEMPERATURE: 97 F | HEIGHT: 66 IN | BODY MASS INDEX: 21.75 KG/M2 | DIASTOLIC BLOOD PRESSURE: 76 MMHG | HEART RATE: 80 BPM | SYSTOLIC BLOOD PRESSURE: 122 MMHG | WEIGHT: 135.36 LBS

## 2019-02-17 DIAGNOSIS — D72.18 CHURG-STRAUSS SYNDROME (HCC): Chronic | ICD-10-CM

## 2019-02-17 DIAGNOSIS — M30.1 CHURG-STRAUSS SYNDROME (HCC): Chronic | ICD-10-CM

## 2019-02-17 PROCEDURE — 700111 HCHG RX REV CODE 636 W/ 250 OVERRIDE (IP): Performed by: INTERNAL MEDICINE

## 2019-02-17 PROCEDURE — 82784 ASSAY IGA/IGD/IGG/IGM EACH: CPT

## 2019-02-17 PROCEDURE — 96366 THER/PROPH/DIAG IV INF ADDON: CPT

## 2019-02-17 PROCEDURE — 96365 THER/PROPH/DIAG IV INF INIT: CPT

## 2019-02-17 RX ORDER — IMMUNE GLOBULIN INFUSION (HUMAN) 100 MG/ML
20 INJECTION, SOLUTION INTRAVENOUS; SUBCUTANEOUS ONCE
Status: COMPLETED | OUTPATIENT
Start: 2019-02-17 | End: 2019-02-17

## 2019-02-17 RX ORDER — IMMUNE GLOBULIN INFUSION (HUMAN) 100 MG/ML
5 INJECTION, SOLUTION INTRAVENOUS; SUBCUTANEOUS ONCE
Status: COMPLETED | OUTPATIENT
Start: 2019-02-17 | End: 2019-02-17

## 2019-02-17 RX ADMIN — IMMUNE GLOBULIN INFUSION (HUMAN) 20 G: 100 INJECTION, SOLUTION INTRAVENOUS; SUBCUTANEOUS at 08:58

## 2019-02-17 RX ADMIN — IMMUNE GLOBULIN INFUSION (HUMAN) 5 G: 100 INJECTION, SOLUTION INTRAVENOUS; SUBCUTANEOUS at 11:34

## 2019-02-17 NOTE — PROGRESS NOTES
Patient arrived ambulatory to the [facility] for IV[facility]. Reviewed vital signs, labs, and physician order. Patient denies S&S of infection, or bleeding. IV access established in left AC, visualized brisk blood return, labs collected per MD order. IVIG administered per titration scale up to 120ml/hr, no adverse reaction observed. IV flushed per protocol, catheter removed with tip intact, gauze and coban dressing placed. Confirmed upcoming appointment date and time with patient. Patient left the [facility] ambulatory in no sign of distress.

## 2019-02-19 LAB — IGA SERPL-MCNC: 52 MG/DL (ref 68–408)

## 2019-03-03 ENCOUNTER — OUTPATIENT INFUSION SERVICES (OUTPATIENT)
Dept: ONCOLOGY | Facility: MEDICAL CENTER | Age: 44
End: 2019-03-03
Attending: INTERNAL MEDICINE
Payer: COMMERCIAL

## 2019-03-03 VITALS
WEIGHT: 138.23 LBS | TEMPERATURE: 97.4 F | RESPIRATION RATE: 18 BRPM | OXYGEN SATURATION: 99 % | BODY MASS INDEX: 20.47 KG/M2 | HEART RATE: 62 BPM | DIASTOLIC BLOOD PRESSURE: 76 MMHG | HEIGHT: 69 IN | SYSTOLIC BLOOD PRESSURE: 118 MMHG

## 2019-03-03 DIAGNOSIS — M30.1 CHURG-STRAUSS SYNDROME (HCC): Chronic | ICD-10-CM

## 2019-03-03 DIAGNOSIS — M35.00 SJOGREN'S SYNDROME, WITH UNSPECIFIED ORGAN INVOLVEMENT (HCC): Chronic | ICD-10-CM

## 2019-03-03 DIAGNOSIS — D72.18 CHURG-STRAUSS SYNDROME (HCC): Chronic | ICD-10-CM

## 2019-03-03 PROCEDURE — 96365 THER/PROPH/DIAG IV INF INIT: CPT

## 2019-03-03 PROCEDURE — 700111 HCHG RX REV CODE 636 W/ 250 OVERRIDE (IP): Performed by: INTERNAL MEDICINE

## 2019-03-03 PROCEDURE — 96366 THER/PROPH/DIAG IV INF ADDON: CPT

## 2019-03-03 PROCEDURE — 82784 ASSAY IGA/IGD/IGG/IGM EACH: CPT

## 2019-03-03 RX ORDER — IMMUNE GLOBULIN INFUSION (HUMAN) 100 MG/ML
20 INJECTION, SOLUTION INTRAVENOUS; SUBCUTANEOUS ONCE
Status: COMPLETED | OUTPATIENT
Start: 2019-03-03 | End: 2019-03-03

## 2019-03-03 RX ORDER — IMMUNE GLOBULIN INFUSION (HUMAN) 100 MG/ML
5 INJECTION, SOLUTION INTRAVENOUS; SUBCUTANEOUS ONCE
Status: COMPLETED | OUTPATIENT
Start: 2019-03-03 | End: 2019-03-03

## 2019-03-03 RX ADMIN — IMMUNE GLOBULIN INFUSION (HUMAN) 5 G: 100 INJECTION, SOLUTION INTRAVENOUS; SUBCUTANEOUS at 08:54

## 2019-03-03 RX ADMIN — IMMUNE GLOBULIN INFUSION (HUMAN) 20 G: 100 INJECTION, SOLUTION INTRAVENOUS; SUBCUTANEOUS at 09:55

## 2019-03-03 NOTE — PROGRESS NOTES
Pt is here for her scheduled IVIG. Fatigued. IgA drawn as ordered. IVIG titrated up to the max rate of 120ml/h and completed without an incident. Discharged home to self care. Next appointment confirmed.

## 2019-03-05 ENCOUNTER — TELEPHONE (OUTPATIENT)
Dept: HEMATOLOGY ONCOLOGY | Facility: MEDICAL CENTER | Age: 44
End: 2019-03-05

## 2019-03-05 ENCOUNTER — TELEPHONE (OUTPATIENT)
Dept: MEDICAL GROUP | Facility: MEDICAL CENTER | Age: 44
End: 2019-03-05

## 2019-03-05 DIAGNOSIS — C43.9 METASTATIC MELANOMA (HCC): ICD-10-CM

## 2019-03-05 LAB — IGA SERPL-MCNC: 56 MG/DL (ref 68–408)

## 2019-03-05 NOTE — TELEPHONE ENCOUNTER
The patient is due to see  for her 6 month follow up. She states that she is due for a PET-CT too?

## 2019-03-15 ENCOUNTER — TELEPHONE (OUTPATIENT)
Dept: MEDICAL GROUP | Facility: MEDICAL CENTER | Age: 44
End: 2019-03-15

## 2019-03-15 ENCOUNTER — HOSPITAL ENCOUNTER (OUTPATIENT)
Dept: RADIOLOGY | Facility: MEDICAL CENTER | Age: 44
End: 2019-03-15
Attending: INTERNAL MEDICINE
Payer: COMMERCIAL

## 2019-03-15 DIAGNOSIS — C43.9 METASTATIC MELANOMA (HCC): ICD-10-CM

## 2019-03-15 PROCEDURE — A9552 F18 FDG: HCPCS

## 2019-03-17 ENCOUNTER — OUTPATIENT INFUSION SERVICES (OUTPATIENT)
Dept: ONCOLOGY | Facility: MEDICAL CENTER | Age: 44
End: 2019-03-17
Attending: INTERNAL MEDICINE
Payer: COMMERCIAL

## 2019-03-17 VITALS
HEIGHT: 66 IN | RESPIRATION RATE: 18 BRPM | DIASTOLIC BLOOD PRESSURE: 73 MMHG | OXYGEN SATURATION: 99 % | TEMPERATURE: 97.1 F | SYSTOLIC BLOOD PRESSURE: 108 MMHG | BODY MASS INDEX: 22.39 KG/M2 | WEIGHT: 139.33 LBS | HEART RATE: 69 BPM

## 2019-03-17 DIAGNOSIS — M30.1 CHURG-STRAUSS SYNDROME (HCC): Chronic | ICD-10-CM

## 2019-03-17 DIAGNOSIS — D72.18 CHURG-STRAUSS SYNDROME (HCC): Chronic | ICD-10-CM

## 2019-03-17 PROCEDURE — 700111 HCHG RX REV CODE 636 W/ 250 OVERRIDE (IP): Performed by: INTERNAL MEDICINE

## 2019-03-17 PROCEDURE — 96365 THER/PROPH/DIAG IV INF INIT: CPT

## 2019-03-17 PROCEDURE — 96366 THER/PROPH/DIAG IV INF ADDON: CPT

## 2019-03-17 PROCEDURE — 82784 ASSAY IGA/IGD/IGG/IGM EACH: CPT

## 2019-03-17 RX ORDER — IMMUNE GLOBULIN INFUSION (HUMAN) 100 MG/ML
20 INJECTION, SOLUTION INTRAVENOUS; SUBCUTANEOUS ONCE
Status: COMPLETED | OUTPATIENT
Start: 2019-03-17 | End: 2019-03-17

## 2019-03-17 RX ORDER — IMMUNE GLOBULIN INFUSION (HUMAN) 100 MG/ML
5 INJECTION, SOLUTION INTRAVENOUS; SUBCUTANEOUS ONCE
Status: COMPLETED | OUTPATIENT
Start: 2019-03-17 | End: 2019-03-17

## 2019-03-17 RX ADMIN — IMMUNE GLOBULIN INFUSION (HUMAN) 20 G: 100 INJECTION, SOLUTION INTRAVENOUS; SUBCUTANEOUS at 08:59

## 2019-03-17 RX ADMIN — IMMUNE GLOBULIN INFUSION (HUMAN) 5 G: 100 INJECTION, SOLUTION INTRAVENOUS; SUBCUTANEOUS at 11:24

## 2019-03-17 NOTE — PROGRESS NOTES
Patient arrived ambulatory to the [facility] for IVIG. Reviewed vital signs, labs, and physician order. Patient denies S&S of infection, or bleeing. IV access established in left AC, visualized brisk blood return, labs collected per MD order. IVIG administered per titration scale up to rate of 120ml's/hr, no adverse reaction observed. IV flushed per protocol, catheter removed with tip intact, gauze and coban dressing placed. Confirmed upcoming appointment date and time with patient. Patient left the [facility] ambulatory in no sign of distress.

## 2019-03-19 LAB — IGA SERPL-MCNC: 50 MG/DL (ref 68–408)

## 2019-03-25 ENCOUNTER — APPOINTMENT (RX ONLY)
Dept: URBAN - METROPOLITAN AREA CLINIC 20 | Facility: CLINIC | Age: 44
Setting detail: DERMATOLOGY
End: 2019-03-25

## 2019-03-25 DIAGNOSIS — Z85.820 PERSONAL HISTORY OF MALIGNANT MELANOMA OF SKIN: ICD-10-CM

## 2019-03-25 DIAGNOSIS — D18.0 HEMANGIOMA: ICD-10-CM

## 2019-03-25 DIAGNOSIS — L82.1 OTHER SEBORRHEIC KERATOSIS: ICD-10-CM

## 2019-03-25 DIAGNOSIS — L81.4 OTHER MELANIN HYPERPIGMENTATION: ICD-10-CM

## 2019-03-25 DIAGNOSIS — L57.8 OTHER SKIN CHANGES DUE TO CHRONIC EXPOSURE TO NONIONIZING RADIATION: ICD-10-CM

## 2019-03-25 DIAGNOSIS — D22 MELANOCYTIC NEVI: ICD-10-CM

## 2019-03-25 PROBLEM — D22.62 MELANOCYTIC NEVI OF LEFT UPPER LIMB, INCLUDING SHOULDER: Status: ACTIVE | Noted: 2019-03-25

## 2019-03-25 PROBLEM — D22.5 MELANOCYTIC NEVI OF TRUNK: Status: ACTIVE | Noted: 2019-03-25

## 2019-03-25 PROBLEM — D18.01 HEMANGIOMA OF SKIN AND SUBCUTANEOUS TISSUE: Status: ACTIVE | Noted: 2019-03-25

## 2019-03-25 PROBLEM — D22.61 MELANOCYTIC NEVI OF RIGHT UPPER LIMB, INCLUDING SHOULDER: Status: ACTIVE | Noted: 2019-03-25

## 2019-03-25 PROCEDURE — ? COUNSELING

## 2019-03-25 PROCEDURE — 99203 OFFICE O/P NEW LOW 30 MIN: CPT

## 2019-03-25 PROCEDURE — ? OBSERVATION

## 2019-03-25 ASSESSMENT — LOCATION SIMPLE DESCRIPTION DERM
LOCATION SIMPLE: RIGHT UPPER EXTREMITY
LOCATION SIMPLE: LEFT POSTERIOR UPPER ARM
LOCATION SIMPLE: RIGHT CHEEK
LOCATION SIMPLE: LEFT CHEEK
LOCATION SIMPLE: LEFT UPPER EXTREMITY
LOCATION SIMPLE: RIGHT LOWER EXTREMITY
LOCATION SIMPLE: LEFT LOWER EXTREMITY
LOCATION SIMPLE: RIGHT HAND
LOCATION SIMPLE: BACK
LOCATION SIMPLE: GENITALIA
LOCATION SIMPLE: ABDOMEN
LOCATION SIMPLE: RIGHT POSTERIOR UPPER ARM
LOCATION SIMPLE: NECK
LOCATION SIMPLE: LEFT HAND

## 2019-03-25 ASSESSMENT — LOCATION DETAILED DESCRIPTION DERM
LOCATION DETAILED: LEFT SUPERIOR LATERAL NECK
LOCATION DETAILED: RIGHT DORSAL FOREARM
LOCATION DETAILED: LEFT DORSAL HAND
LOCATION DETAILED: LEFT DISTAL POSTERIOR UPPER ARM
LOCATION DETAILED: LEFT UPPER BACK
LOCATION DETAILED: RIGHT DORSAL HAND
LOCATION DETAILED: RIGHT CHEEK
LOCATION DETAILED: LEFT CHEEK
LOCATION DETAILED: GENITALIA
LOCATION DETAILED: LEFT ANTERIOR THIGH
LOCATION DETAILED: PERIUMBILICAL SKIN
LOCATION DETAILED: RIGHT DISTAL POSTERIOR UPPER ARM
LOCATION DETAILED: RIGHT ANTERIOR THIGH
LOCATION DETAILED: LEFT MID BACK
LOCATION DETAILED: RIGHT UPPER BACK
LOCATION DETAILED: LEFT DORSAL FOREARM

## 2019-03-25 ASSESSMENT — LOCATION ZONE DERM
LOCATION ZONE: TRUNK
LOCATION ZONE: HAND
LOCATION ZONE: NECK
LOCATION ZONE: FACE
LOCATION ZONE: LEG
LOCATION ZONE: ARM

## 2019-03-31 ENCOUNTER — OUTPATIENT INFUSION SERVICES (OUTPATIENT)
Dept: ONCOLOGY | Facility: MEDICAL CENTER | Age: 44
End: 2019-03-31
Attending: INTERNAL MEDICINE
Payer: COMMERCIAL

## 2019-03-31 VITALS
RESPIRATION RATE: 18 BRPM | DIASTOLIC BLOOD PRESSURE: 71 MMHG | SYSTOLIC BLOOD PRESSURE: 109 MMHG | OXYGEN SATURATION: 100 % | HEART RATE: 100 BPM | WEIGHT: 140.21 LBS | HEIGHT: 65 IN | TEMPERATURE: 97 F | BODY MASS INDEX: 23.36 KG/M2

## 2019-03-31 DIAGNOSIS — E03.9 HYPOTHYROIDISM, UNSPECIFIED TYPE: Chronic | ICD-10-CM

## 2019-03-31 DIAGNOSIS — M35.00 SJOGREN'S SYNDROME, WITH UNSPECIFIED ORGAN INVOLVEMENT (HCC): Chronic | ICD-10-CM

## 2019-03-31 PROCEDURE — 700111 HCHG RX REV CODE 636 W/ 250 OVERRIDE (IP)

## 2019-03-31 PROCEDURE — 96365 THER/PROPH/DIAG IV INF INIT: CPT

## 2019-03-31 PROCEDURE — 96366 THER/PROPH/DIAG IV INF ADDON: CPT

## 2019-03-31 PROCEDURE — 82784 ASSAY IGA/IGD/IGG/IGM EACH: CPT

## 2019-03-31 RX ORDER — IMMUNE GLOBULIN INFUSION (HUMAN) 100 MG/ML
25 INJECTION, SOLUTION INTRAVENOUS; SUBCUTANEOUS ONCE
Status: COMPLETED | OUTPATIENT
Start: 2019-03-31 | End: 2019-03-31

## 2019-03-31 RX ADMIN — IMMUNE GLOBULIN INFUSION (HUMAN) 25 G: 100 INJECTION, SOLUTION INTRAVENOUS; SUBCUTANEOUS at 09:31

## 2019-03-31 NOTE — PROGRESS NOTES
Arrives ambulatory to IS for IVIG. Denies any infections, bleeding, or changes in medical status. PIV placed with brisk blood return, labs sent per orders. IVIG given via titration protocol. No reactions occurred. PIV flushed and removed with tip intact; gauze and coban over site. Emailed scheduling for next appointment. Patient to f/u if there are no appointments in my chart on Monday. Discharged home to self care in no distress at this time.

## 2019-04-02 LAB — IGA SERPL-MCNC: 40 MG/DL (ref 68–408)

## 2019-04-04 ENCOUNTER — TELEPHONE (OUTPATIENT)
Dept: MEDICAL GROUP | Facility: MEDICAL CENTER | Age: 44
End: 2019-04-04

## 2019-04-04 DIAGNOSIS — R22.1 NECK MASS: ICD-10-CM

## 2019-04-04 NOTE — TELEPHONE ENCOUNTER
Megan August  [phone] (home) [phone] (work)    Pt's most recent PET scan showed another lump that needs to be removed. Needs a REF to In[facility] Costello for surgical removal. Please advise.

## 2019-04-14 ENCOUNTER — OUTPATIENT INFUSION SERVICES (OUTPATIENT)
Dept: ONCOLOGY | Facility: MEDICAL CENTER | Age: 44
End: 2019-04-14
Attending: INTERNAL MEDICINE
Payer: COMMERCIAL

## 2019-04-14 VITALS
HEIGHT: 66 IN | HEART RATE: 66 BPM | WEIGHT: 138.89 LBS | TEMPERATURE: 98.2 F | BODY MASS INDEX: 22.32 KG/M2 | RESPIRATION RATE: 16 BRPM | OXYGEN SATURATION: 99 % | SYSTOLIC BLOOD PRESSURE: 101 MMHG | DIASTOLIC BLOOD PRESSURE: 65 MMHG

## 2019-04-14 DIAGNOSIS — M30.1 CHURG-STRAUSS SYNDROME (HCC): Chronic | ICD-10-CM

## 2019-04-14 DIAGNOSIS — D72.18 CHURG-STRAUSS SYNDROME (HCC): Chronic | ICD-10-CM

## 2019-04-14 PROCEDURE — 96365 THER/PROPH/DIAG IV INF INIT: CPT

## 2019-04-14 PROCEDURE — 82784 ASSAY IGA/IGD/IGG/IGM EACH: CPT

## 2019-04-14 PROCEDURE — 36415 COLL VENOUS BLD VENIPUNCTURE: CPT

## 2019-04-14 PROCEDURE — 96366 THER/PROPH/DIAG IV INF ADDON: CPT

## 2019-04-14 PROCEDURE — 700111 HCHG RX REV CODE 636 W/ 250 OVERRIDE (IP): Performed by: INTERNAL MEDICINE

## 2019-04-14 RX ORDER — IMMUNE GLOBULIN INFUSION (HUMAN) 100 MG/ML
10 INJECTION, SOLUTION INTRAVENOUS; SUBCUTANEOUS ONCE
Status: COMPLETED | OUTPATIENT
Start: 2019-04-14 | End: 2019-04-14

## 2019-04-14 RX ORDER — IMMUNE GLOBULIN INFUSION (HUMAN) 100 MG/ML
5 INJECTION, SOLUTION INTRAVENOUS; SUBCUTANEOUS ONCE
Status: COMPLETED | OUTPATIENT
Start: 2019-04-14 | End: 2019-04-14

## 2019-04-14 RX ADMIN — IMMUNE GLOBULIN INFUSION (HUMAN) 5 G: 100 INJECTION, SOLUTION INTRAVENOUS; SUBCUTANEOUS at 11:38

## 2019-04-14 RX ADMIN — IMMUNE GLOBULIN INFUSION (HUMAN) 10 G: 100 INJECTION, SOLUTION INTRAVENOUS; SUBCUTANEOUS at 10:28

## 2019-04-14 RX ADMIN — IMMUNE GLOBULIN INFUSION (HUMAN) 10 G: 100 INJECTION, SOLUTION INTRAVENOUS; SUBCUTANEOUS at 08:55

## 2019-04-14 NOTE — PROGRESS NOTES
"Patient arrived ambulatory to the [facility] for IVIG. Reviewed vital signs, labs, and physician order. Patient denies S&S of infection, or bleeding. Pt has mild c/o pain to right eye \"stye\", denies S&S of infection. IV access established in left AC, visualized brisk blood return, labs collected per MD order. IVIG administered per titration scale up to rate of 120ml's/hr, no adverse reaction observed. IV flushed per protocol,  catheter removed with tip intact, gauze and coban dressing placed. Confirmed upcoming appointment date and time with patient. Patient left the [facility] ambulatory in no sign of distress.   "

## 2019-04-15 ENCOUNTER — TELEPHONE (OUTPATIENT)
Dept: URGENT CARE | Facility: MEDICAL CENTER | Age: 44
End: 2019-04-15

## 2019-04-15 NOTE — TELEPHONE ENCOUNTER
Megan August  [phone] (home) [phone] (work)    Megan's IVIG orders are expiring soon. She is requesting that you order it for 1 more year. Q 2 wks.  Please advise.

## 2019-04-16 LAB — IGA SERPL-MCNC: 43 MG/DL (ref 68–408)

## 2019-04-28 ENCOUNTER — OUTPATIENT INFUSION SERVICES (OUTPATIENT)
Dept: ONCOLOGY | Facility: MEDICAL CENTER | Age: 44
End: 2019-04-28
Attending: INTERNAL MEDICINE
Payer: COMMERCIAL

## 2019-04-28 VITALS
RESPIRATION RATE: 18 BRPM | DIASTOLIC BLOOD PRESSURE: 63 MMHG | HEART RATE: 65 BPM | BODY MASS INDEX: 23.21 KG/M2 | TEMPERATURE: 97.2 F | HEIGHT: 65 IN | SYSTOLIC BLOOD PRESSURE: 107 MMHG | WEIGHT: 139.33 LBS | OXYGEN SATURATION: 96 %

## 2019-04-28 PROCEDURE — 96365 THER/PROPH/DIAG IV INF INIT: CPT

## 2019-04-28 PROCEDURE — 82784 ASSAY IGA/IGD/IGG/IGM EACH: CPT

## 2019-04-28 PROCEDURE — 96366 THER/PROPH/DIAG IV INF ADDON: CPT

## 2019-04-28 PROCEDURE — 700111 HCHG RX REV CODE 636 W/ 250 OVERRIDE (IP): Performed by: INTERNAL MEDICINE

## 2019-04-28 PROCEDURE — 36415 COLL VENOUS BLD VENIPUNCTURE: CPT

## 2019-04-28 RX ORDER — IMMUNE GLOBULIN INFUSION (HUMAN) 100 MG/ML
5 INJECTION, SOLUTION INTRAVENOUS; SUBCUTANEOUS ONCE
Status: COMPLETED | OUTPATIENT
Start: 2019-04-28 | End: 2019-04-28

## 2019-04-28 RX ORDER — IMMUNE GLOBULIN INFUSION (HUMAN) 100 MG/ML
20 INJECTION, SOLUTION INTRAVENOUS; SUBCUTANEOUS ONCE
Status: COMPLETED | OUTPATIENT
Start: 2019-04-28 | End: 2019-04-28

## 2019-04-28 RX ADMIN — IMMUNE GLOBULIN INFUSION (HUMAN) 20 G: 100 INJECTION, SOLUTION INTRAVENOUS; SUBCUTANEOUS at 11:23

## 2019-04-28 RX ADMIN — IMMUNE GLOBULIN INFUSION (HUMAN) 5 G: 100 INJECTION, SOLUTION INTRAVENOUS; SUBCUTANEOUS at 13:45

## 2019-04-28 NOTE — PROGRESS NOTES
Patient arrived ambulatory to the [facility] for IVIG. Reviewed vital signs, labs, and physician order. Patient denies S&S of infection, or bleeding. IV access established in left AC, visualized brisk blood return, labs collected per MD order. IVIG administered per titration scale up to max rate of 120ml's/hr, no adverse reaction observed. IV flushed per protocol, catheter removed with tip intact, gauze and coban dressing placed. Confirmed upcoming appointment date and time with patient. Patient left the [facility] ambulatory in no sign of distress.

## 2019-04-30 LAB — IGA SERPL-MCNC: 53 MG/DL (ref 68–408)

## 2019-05-06 DIAGNOSIS — K92.89 GASTROINTESTINAL DYSMOTILITY: ICD-10-CM

## 2019-05-06 DIAGNOSIS — Z79.52 CURRENT CHRONIC USE OF SYSTEMIC STEROIDS: Chronic | ICD-10-CM

## 2019-05-06 DIAGNOSIS — K59.00 CONSTIPATION, UNSPECIFIED CONSTIPATION TYPE: ICD-10-CM

## 2019-05-06 DIAGNOSIS — K58.8 OTHER IRRITABLE BOWEL SYNDROME: ICD-10-CM

## 2019-05-06 DIAGNOSIS — D72.18 CHURG-STRAUSS SYNDROME (HCC): Chronic | ICD-10-CM

## 2019-05-06 DIAGNOSIS — M30.1 CHURG-STRAUSS SYNDROME (HCC): Chronic | ICD-10-CM

## 2019-05-08 ENCOUNTER — TELEPHONE (OUTPATIENT)
Dept: MEDICAL GROUP | Facility: MEDICAL CENTER | Age: 44
End: 2019-05-08

## 2019-05-12 ENCOUNTER — OUTPATIENT INFUSION SERVICES (OUTPATIENT)
Dept: ONCOLOGY | Facility: MEDICAL CENTER | Age: 44
End: 2019-05-12
Attending: INTERNAL MEDICINE
Payer: COMMERCIAL

## 2019-05-12 VITALS
WEIGHT: 139.33 LBS | DIASTOLIC BLOOD PRESSURE: 56 MMHG | HEIGHT: 65 IN | RESPIRATION RATE: 18 BRPM | HEART RATE: 67 BPM | TEMPERATURE: 97.2 F | SYSTOLIC BLOOD PRESSURE: 101 MMHG | OXYGEN SATURATION: 98 % | BODY MASS INDEX: 23.21 KG/M2

## 2019-05-12 DIAGNOSIS — M35.00 SJOGREN'S SYNDROME, WITH UNSPECIFIED ORGAN INVOLVEMENT (HCC): Chronic | ICD-10-CM

## 2019-05-12 PROCEDURE — 96366 THER/PROPH/DIAG IV INF ADDON: CPT

## 2019-05-12 PROCEDURE — 82784 ASSAY IGA/IGD/IGG/IGM EACH: CPT

## 2019-05-12 PROCEDURE — 96365 THER/PROPH/DIAG IV INF INIT: CPT

## 2019-05-12 PROCEDURE — 700111 HCHG RX REV CODE 636 W/ 250 OVERRIDE (IP): Performed by: INTERNAL MEDICINE

## 2019-05-12 PROCEDURE — 36415 COLL VENOUS BLD VENIPUNCTURE: CPT

## 2019-05-12 RX ORDER — IMMUNE GLOBULIN INFUSION (HUMAN) 100 MG/ML
20 INJECTION, SOLUTION INTRAVENOUS; SUBCUTANEOUS ONCE
Status: COMPLETED | OUTPATIENT
Start: 2019-05-12 | End: 2019-05-12

## 2019-05-12 RX ORDER — IMMUNE GLOBULIN INFUSION (HUMAN) 100 MG/ML
5 INJECTION, SOLUTION INTRAVENOUS; SUBCUTANEOUS ONCE
Status: COMPLETED | OUTPATIENT
Start: 2019-05-12 | End: 2019-05-12

## 2019-05-12 RX ADMIN — IMMUNE GLOBULIN INFUSION (HUMAN) 5 G: 100 INJECTION, SOLUTION INTRAVENOUS; SUBCUTANEOUS at 08:51

## 2019-05-12 RX ADMIN — IMMUNE GLOBULIN INFUSION (HUMAN) 20 G: 100 INJECTION, SOLUTION INTRAVENOUS; SUBCUTANEOUS at 10:00

## 2019-05-14 LAB — IGA SERPL-MCNC: 50 MG/DL (ref 68–408)

## 2019-05-23 ENCOUNTER — TELEPHONE (OUTPATIENT)
Dept: MEDICAL GROUP | Facility: MEDICAL CENTER | Age: 44
End: 2019-05-23

## 2019-05-24 NOTE — TELEPHONE ENCOUNTER
We submitted a prior authorization for Linzess on May 10, I have not heard from the insurance company.  We have an additional letter from her specialist in [place], I have attached that to the initial prior authorization request, please fax that again.      Also please call the insurance to check on the status of the first prior authorization we submitted.

## 2019-05-27 ENCOUNTER — OUTPATIENT INFUSION SERVICES (OUTPATIENT)
Dept: ONCOLOGY | Facility: MEDICAL CENTER | Age: 44
End: 2019-05-27
Attending: INTERNAL MEDICINE
Payer: COMMERCIAL

## 2019-05-27 VITALS
RESPIRATION RATE: 18 BRPM | SYSTOLIC BLOOD PRESSURE: 110 MMHG | HEIGHT: 65 IN | TEMPERATURE: 97.2 F | WEIGHT: 139.33 LBS | HEART RATE: 59 BPM | OXYGEN SATURATION: 98 % | DIASTOLIC BLOOD PRESSURE: 74 MMHG | BODY MASS INDEX: 23.21 KG/M2

## 2019-05-27 PROCEDURE — 96365 THER/PROPH/DIAG IV INF INIT: CPT

## 2019-05-27 PROCEDURE — 700111 HCHG RX REV CODE 636 W/ 250 OVERRIDE (IP): Performed by: INTERNAL MEDICINE

## 2019-05-27 PROCEDURE — 96366 THER/PROPH/DIAG IV INF ADDON: CPT

## 2019-05-27 RX ORDER — IMMUNE GLOBULIN INFUSION (HUMAN) 100 MG/ML
5 INJECTION, SOLUTION INTRAVENOUS; SUBCUTANEOUS ONCE
Status: COMPLETED | OUTPATIENT
Start: 2019-05-27 | End: 2019-05-27

## 2019-05-27 RX ORDER — IMMUNE GLOBULIN INFUSION (HUMAN) 100 MG/ML
20 INJECTION, SOLUTION INTRAVENOUS; SUBCUTANEOUS ONCE
Status: COMPLETED | OUTPATIENT
Start: 2019-05-27 | End: 2019-05-27

## 2019-05-27 RX ADMIN — IMMUNE GLOBULIN INFUSION (HUMAN) 20 G: 100 INJECTION, SOLUTION INTRAVENOUS; SUBCUTANEOUS at 16:06

## 2019-05-27 RX ADMIN — IMMUNE GLOBULIN INFUSION (HUMAN) 5 G: 100 INJECTION, SOLUTION INTRAVENOUS; SUBCUTANEOUS at 14:17

## 2019-05-28 NOTE — PROGRESS NOTES
Pt returns to infusion center for scheduled IVIG infusion.  PIV established, brisk blood return noted.  Pt tolerated infusion with max rate of 120ml/hr.  PIV flushed with saline per policy, removed, gauze placed.  Pt left infusion center ambulatory and in good condition.  Return appointment scheduled.

## 2019-06-05 ENCOUNTER — OFFICE VISIT (OUTPATIENT)
Dept: URGENT CARE | Facility: CLINIC | Age: 44
End: 2019-06-05
Payer: COMMERCIAL

## 2019-06-05 VITALS
HEART RATE: 66 BPM | SYSTOLIC BLOOD PRESSURE: 118 MMHG | DIASTOLIC BLOOD PRESSURE: 78 MMHG | WEIGHT: 138 LBS | BODY MASS INDEX: 22.18 KG/M2 | RESPIRATION RATE: 16 BRPM | HEIGHT: 66 IN | OXYGEN SATURATION: 98 % | TEMPERATURE: 98.9 F

## 2019-06-05 DIAGNOSIS — H57.89 IRRITATION OF EYE: ICD-10-CM

## 2019-06-05 DIAGNOSIS — H01.9 INFECTION OF EYELID: ICD-10-CM

## 2019-06-05 PROCEDURE — 99214 OFFICE O/P EST MOD 30 MIN: CPT | Performed by: PHYSICIAN ASSISTANT

## 2019-06-05 RX ORDER — DROSPIRENONE AND ETHINYL ESTRADIOL 0.03MG-3MG
1 KIT ORAL DAILY
Qty: 84 TAB | Refills: 1 | Status: SHIPPED | OUTPATIENT
Start: 2019-06-05 | End: 2019-09-02 | Stop reason: SDUPTHER

## 2019-06-05 RX ORDER — AMOXICILLIN AND CLAVULANATE POTASSIUM 875; 125 MG/1; MG/1
1 TABLET, FILM COATED ORAL 2 TIMES DAILY
Qty: 14 TAB | Refills: 0 | Status: SHIPPED | OUTPATIENT
Start: 2019-06-05 | End: 2019-06-12

## 2019-06-05 RX ORDER — CIPROFLOXACIN HYDROCHLORIDE 3.5 MG/ML
1 SOLUTION/ DROPS TOPICAL EVERY 4 HOURS
Qty: 1 BOTTLE | Refills: 0 | Status: SHIPPED | OUTPATIENT
Start: 2019-06-05 | End: 2019-06-12

## 2019-06-05 ASSESSMENT — ENCOUNTER SYMPTOMS
EYE PAIN: 1
PHOTOPHOBIA: 0
EYE DISCHARGE: 1
FEVER: 0
DOUBLE VISION: 0
CHILLS: 0
BLURRED VISION: 0
FOREIGN BODY SENSATION: 0
EYE REDNESS: 1

## 2019-06-05 ASSESSMENT — PAIN SCALES - GENERAL: PAINLEVEL: 5=MODERATE PAIN

## 2019-06-06 NOTE — PROGRESS NOTES
Subjective:   Megan August is a 43 y.o. female who presents today with   Chief Complaint   Patient presents with   • Eye Pain     L eye lid pain, inflammation, drainage       Eye Pain    The left eye is affected. This is a new problem. The current episode started yesterday. The problem occurs constantly. The problem has been gradually worsening. Injury mechanism: tatto on eyelid. The pain is moderate. Associated symptoms include an eye discharge and eye redness. Pertinent negatives include no blurred vision, double vision, fever, foreign body sensation or photophobia.     Patient had eyelid make-up tattooed on Monday and started noticing some drainage and swelling in her left eyelid.  Patient denies any vision changes.  She denies any fevers or chills.  PMH:  has a past medical history of Abscess of breast, postpartum; Anemia; Anemia; Anxiety (2009); Anxiety; Anxiety disorder; Bowel habit changes; Cancer ([facility]) (); Dental disorder; Dry eyes (2009); Dry eyes; Dysautonomia ([facility]); Eosinophilic esophagitis; Eosinophilic esophagitis; Exophoria; Hemorrhagic disorder ([facility]); Hiatus hernia syndrome; Hypothyroid (2009); Melanoma ([facility]) (2011); Mild preeclampsia; Other specified symptom associated with female genital organs; Polycystic ovarian syndrome; Polycystic ovary disease (2009);  labor; Psychiatric disorder; Sjogren's syndrome ([facility]); and Small intestinal bacterial overgrowth. She also has no past medical history of CAD (coronary artery disease); COPD; Hypertension; or Liver disease.  MEDS:   Current Outpatient Prescriptions:   •  drospirenone-ethinyl estradiol (ASHLEY) 3-0.03 MG per tablet, Take 1 Tab by mouth every day., Disp: 84 Tab, Rfl: 1  •  immune globulin 5% (5 g/100mL) in empty bag, by Intravenous route Once., Disp: , Rfl:   •  amoxicillin-clavulanate (AUGMENTIN) 875-125 MG Tab, Take 1 Tab by mouth 2 times a day for 7 days., Disp: 14 Tab, Rfl: 0  •  ciprofloxacin  "(CILOXIN) 0.3 % Solution, Place 1 Drop in both eyes every 4 hours for 7 days., Disp: 1 Bottle, Rfl: 0  •  venlafaxine XR (EFFEXOR XR) 75 MG CAPSULE SR 24 HR, Take 1 Cap by mouth every day., Disp: 90 Cap, Rfl: 2  •  levothyroxine (SYNTHROID) 112 MCG Tab, Take one po 6 days per week, Disp: 90 Tab, Rfl: 1  •  potassium chloride (KLOR-CON) 20 MEQ Pack, Take 1 Packet by mouth every day., Disp: 90 Packet, Rfl: 3  •  predniSONE (DELTASONE) 1 MG Tab, Take 9 Tabs by mouth every day., Disp: 270 Tab, Rfl: 11  •  cyclosporin (RESTASIS) 0.05 % ophthalmic emulsion, Place 1 Drop in both eyes 2 times a day. Dispense quantity 360 vials to [facility] pharmacy fax [phone], Disp: 0.4 mL, Rfl: 3  •  diphenhydrAMINE (BENADRYL) 25 MG Tab, Take 25 mg by mouth every 6 hours as needed for Sleep., Disp: , Rfl:   •  Cholecalciferol ([employer] VITAMIN D3 DROPS/INFANT) 400 UT/0.028ML Liquid, Take 5 Drops by mouth every day., Disp: 15 mL, Rfl: 11  •  ranitidine (ZANTAC) 150 MG Tab, Take 150 mg by mouth every evening., Disp: , Rfl:   •  Fexofenadine HCl (ALLEGRA PO), Take 1 Tab by mouth every evening., Disp: , Rfl:   •  Linaclotide (LINZESS) 72 MCG Cap, Take 72 mcg by mouth every day. (Patient not taking: Reported on 6/5/2019), Disp: 30 Cap, Rfl: 2  •  naproxen (ALEVE) 220 MG tablet, Take 220 mg by mouth as needed. Indications: Mild to Moderate Pain, Disp: , Rfl:   ALLERGIES:   Allergies   Allergen Reactions   • Other Food Anaphylaxis     \"all foods\"= blisters/ anaphylaxis     SURGHX:   Past Surgical History:   Procedure Laterality Date   • OTHER  10/31/2018    left open neck incision   • NECK MASS EXCISION Left 10/31/2018    Procedure: NECK MASS EXCISION;  Surgeon: Inna Cadena M.D.;  Location: SURGERY SAME DAY [facility];  Service: Ent   • OTHER  08/2018    Neck dissection-melanoma   • OTHER  07/2018    neck excision melanoma   • GASTROSCOPY WITH FEED TUBE PLACEMENT N/A 2/19/2016    Procedure: GASTROSCOPY WITH NASOENTERIC TUBE PLACEMENT " "W/FLUORO;  Surgeon: Gallito Rangel M.D.;  Location: SURGERY [facility];  Service:    • COLONOSCOPY  2015   • SETTLEMENT (INSURANCE)  2014    Performed by Olvin David M.D. at SURGERY [facility]   • OTHER      eye ducts cauterized   • OTHER  2012    Local excision melanoma   • OTHER      left periauricular melanoma excision   • INCISION AND DRAINAGE GENERAL  2009    left nipple   • PB  DELIVERY ONLY     • TONSILLECTOMY AND ADENOIDECTOMY     • ENDOSCOPY      multiple upper an lower procedures   • OTHER  [id number]    several procedures for infertility      SOCHX:  reports that she has never smoked. She has never used smokeless tobacco. She reports that she does not drink alcohol or use drugs.  FH: Reviewed with patient, not pertinent to this visit.       Review of Systems   Constitutional: Negative for chills and fever.   Eyes: Positive for pain, discharge and redness. Negative for blurred vision, double vision and photophobia.   All other systems reviewed and are negative.       Objective:   /78   Pulse 66   Temp 37.2 °C (98.9 °F) (Temporal)   Resp 16   Ht 1.676 m (5' 6\")   Wt 62.6 kg (138 lb)   LMP 2019 (Exact Date)   SpO2 98%   Breastfeeding? No   BMI 22.27 kg/m²   Physical Exam   Constitutional: She appears well-developed and well-nourished. No distress.   HENT:   Head: Normocephalic and atraumatic.   Right Ear: Hearing, tympanic membrane and ear canal normal.   Left Ear: Hearing, tympanic membrane and ear canal normal.   Eyes: Pupils are equal, round, and reactive to light. Left eye exhibits discharge. Right conjunctiva is injected. Left conjunctiva is injected.       Left eyelid swelling.  Slightly erythematous.   Cardiovascular: Normal rate, regular rhythm and normal heart sounds.    Pulmonary/Chest: Effort normal and breath sounds normal.   Musculoskeletal:   Normal movement in all 4 extremities   Neurological: She is alert. " Coordination normal.   Skin: Skin is warm and dry.   Psychiatric: She has a normal mood and affect.   Nursing note and vitals reviewed.  We will discharge present in left upper eyelid.    Assessment/Plan:   Assessment    1. Infection of eyelid  - amoxicillin-clavulanate (AUGMENTIN) 875-125 MG Tab; Take 1 Tab by mouth 2 times a day for 7 days.  Dispense: 14 Tab; Refill: 0  - REFERRAL TO OPHTHALMOLOGY    2. Irritation of eye  - ciprofloxacin (CILOXIN) 0.3 % Solution; Place 1 Drop in both eyes every 4 hours for 7 days.  Dispense: 1 Bottle; Refill: 0    Other orders  - immune globulin 5% (5 g/100mL) in empty bag; by Intravenous route Once.  Discussed with patient that she might have to follow-up with ophthalmology if her swelling and infection gets any worse to have it drained.  Differential diagnosis, natural history, supportive care, and indications for immediate follow-up discussed.   Patient given instructions and understanding of medications and treatment.    If not improving in 3-5 days, F/U with PCP or return to  if symptoms worsen.  Strict ER precautions given.  Patient agreeable to plan.      Please note that this dictation was created using voice recognition software. I have made every reasonable attempt to correct obvious errors, but I expect that there are errors of grammar and possibly content that I did not discover before finalizing the note.    Niraj Bah PA-C

## 2019-06-10 ENCOUNTER — APPOINTMENT (OUTPATIENT)
Dept: URGENT CARE | Facility: MEDICAL CENTER | Age: 44
End: 2019-06-10
Payer: COMMERCIAL

## 2019-06-11 ENCOUNTER — OFFICE VISIT (OUTPATIENT)
Dept: URGENT CARE | Facility: CLINIC | Age: 44
End: 2019-06-11
Payer: COMMERCIAL

## 2019-06-11 VITALS
BODY MASS INDEX: 22.18 KG/M2 | DIASTOLIC BLOOD PRESSURE: 70 MMHG | OXYGEN SATURATION: 97 % | RESPIRATION RATE: 14 BRPM | HEART RATE: 70 BPM | WEIGHT: 138 LBS | SYSTOLIC BLOOD PRESSURE: 116 MMHG | HEIGHT: 66 IN | TEMPERATURE: 98 F

## 2019-06-11 DIAGNOSIS — H57.89 IRRITATION OF EYE: ICD-10-CM

## 2019-06-11 DIAGNOSIS — H01.9 INFECTION OF EYELID: ICD-10-CM

## 2019-06-11 PROCEDURE — 99213 OFFICE O/P EST LOW 20 MIN: CPT | Performed by: FAMILY MEDICINE

## 2019-06-11 ASSESSMENT — ENCOUNTER SYMPTOMS
DOUBLE VISION: 0
EYE PAIN: 0
SHORTNESS OF BREATH: 0
BLURRED VISION: 0
FEVER: 0
EYE DISCHARGE: 1
PHOTOPHOBIA: 0

## 2019-06-11 NOTE — PROGRESS NOTES
"Subjective:     Megan August is a 43 y.o. female who presents for Conjunctivitis (Left side )    HPI  Pt presents for reevaluation of left eyelid swelling   Pt was seen in office 6 days ago and diagnosed as cellulitis of the eyelid  Started on Augmentin PO, cipro eyedrops, and referred to ophthalmology   Feels greatly improved, however still does have occasional discharge and eye irritation  Discharge is thin, clear/white, and \"stringy\"  Eyelid swelling has resolved  No changes in vision, blurry vision, spots in vision  Still using Cipro eyedrops and has 2 more days of Augmentin    Review of Systems   Constitutional: Negative for fever.   Eyes: Positive for discharge. Negative for blurred vision, double vision, photophobia and pain.   Respiratory: Negative for shortness of breath.    Skin: Negative for rash.       PMH:  has a past medical history of Abscess of breast, postpartum; Anemia; Anemia; Anxiety (2009); Anxiety; Anxiety disorder; Bowel habit changes; Cancer ([facility]) (); Dental disorder; Dry eyes (2009); Dry eyes; Dysautonomia ([facility]); Eosinophilic esophagitis; Eosinophilic esophagitis; Exophoria; Hemorrhagic disorder ([facility]); Hiatus hernia syndrome; Hypothyroid (2009); Melanoma ([facility]) (2011); Mild preeclampsia; Other specified symptom associated with female genital organs; Polycystic ovarian syndrome; Polycystic ovary disease (2009);  labor; Psychiatric disorder; Sjogren's syndrome ([facility]); and Small intestinal bacterial overgrowth. She also has no past medical history of CAD (coronary artery disease); COPD; Hypertension; or Liver disease.  MEDS:   Current Outpatient Prescriptions:   •  drospirenone-ethinyl estradiol (ASHLEY) 3-0.03 MG per tablet, Take 1 Tab by mouth every day., Disp: 84 Tab, Rfl: 1  •  immune globulin 5% (5 g/100mL) in empty bag, by Intravenous route Once., Disp: , Rfl:   •  amoxicillin-clavulanate (AUGMENTIN) 875-125 MG Tab, Take 1 Tab by mouth 2 times a " "day for 7 days., Disp: 14 Tab, Rfl: 0  •  ciprofloxacin (CILOXIN) 0.3 % Solution, Place 1 Drop in both eyes every 4 hours for 7 days., Disp: 1 Bottle, Rfl: 0  •  Linaclotide (LINZESS) 72 MCG Cap, Take 72 mcg by mouth every day. (Patient not taking: Reported on 6/5/2019), Disp: 30 Cap, Rfl: 2  •  venlafaxine XR (EFFEXOR XR) 75 MG CAPSULE SR 24 HR, Take 1 Cap by mouth every day., Disp: 90 Cap, Rfl: 2  •  levothyroxine (SYNTHROID) 112 MCG Tab, Take one po 6 days per week, Disp: 90 Tab, Rfl: 1  •  potassium chloride (KLOR-CON) 20 MEQ Pack, Take 1 Packet by mouth every day., Disp: 90 Packet, Rfl: 3  •  predniSONE (DELTASONE) 1 MG Tab, Take 9 Tabs by mouth every day., Disp: 270 Tab, Rfl: 11  •  cyclosporin (RESTASIS) 0.05 % ophthalmic emulsion, Place 1 Drop in both eyes 2 times a day. Dispense quantity 360 vials to [facility] pharmacy fax [phone], Disp: 0.4 mL, Rfl: 3  •  diphenhydrAMINE (BENADRYL) 25 MG Tab, Take 25 mg by mouth every 6 hours as needed for Sleep., Disp: , Rfl:   •  Cholecalciferol (CVS VITAMIN D3 DROPS/INFANT) 400 UT/0.028ML Liquid, Take 5 Drops by mouth every day., Disp: 15 mL, Rfl: 11  •  ranitidine (ZANTAC) 150 MG Tab, Take 150 mg by mouth every evening., Disp: , Rfl:   •  naproxen (ALEVE) 220 MG tablet, Take 220 mg by mouth as needed. Indications: Mild to Moderate Pain, Disp: , Rfl:   •  Fexofenadine HCl (ALLEGRA PO), Take 1 Tab by mouth every evening., Disp: , Rfl:   ALLERGIES:   Allergies   Allergen Reactions   • Other Food Anaphylaxis     \"all foods\"= blisters/ anaphylaxis     SURGHX:   Past Surgical History:   Procedure Laterality Date   • OTHER  10/31/2018    left open neck incision   • NECK MASS EXCISION Left 10/31/2018    Procedure: NECK MASS EXCISION;  Surgeon: Inna Cadena M.D.;  Location: SURGERY SAME DAY [facility];  Service: Ent   • OTHER  08/2018    Neck dissection-melanoma   • OTHER  07/2018    neck excision melanoma   • GASTROSCOPY WITH FEED TUBE PLACEMENT N/A 2/19/2016 " "   Procedure: GASTROSCOPY WITH NASOENTERIC TUBE PLACEMENT W/FLUORO;  Surgeon: Gallito Rangel M.D.;  Location: SURGERY [facility];  Service:    • COLONOSCOPY  2015   • SETTLEMENT (INSURANCE)  2014    Performed by Olvin David M.D. at SURGERY [facility]   • OTHER      eye ducts cauterized   • OTHER  2012    Local excision melanoma   • OTHER      left periauricular melanoma excision   • INCISION AND DRAINAGE GENERAL  2009    left nipple   • PB  DELIVERY ONLY     • TONSILLECTOMY AND ADENOIDECTOMY     • ENDOSCOPY      multiple upper an lower procedures   • OTHER  [id number]    several procedures for infertility      SOCHX:  reports that she has never smoked. She has never used smokeless tobacco. She reports that she does not drink alcohol or use drugs.     Objective:   /70   Pulse 70   Temp 36.7 °C (98 °F)   Resp 14   Ht 1.676 m (5' 6\")   Wt 62.6 kg (138 lb)   LMP 2019 (Exact Date)   SpO2 97%   BMI 22.27 kg/m²     Physical Exam   Constitutional: She is oriented to person, place, and time. She appears well-developed and well-nourished. No distress.   HENT:   Head: Normocephalic and atraumatic.   Eyes: Pupils are equal, round, and reactive to light. Conjunctivae and EOM are normal. Right eye exhibits no discharge. Left eye exhibits no discharge. No scleral icterus.   Neurological: She is alert and oriented to person, place, and time. No sensory deficit.   Skin: Skin is warm and dry. She is not diaphoretic. No erythema.   Psychiatric: She has a normal mood and affect. Her behavior is normal. Judgment and thought content normal.     Assessment/Plan:   Assessment    1. Infection of eyelid  2. Irritation of eye  Patient is a 43-year-old female here for follow-up of left eye irritation and eyelid cellulitis.  Symptoms have nearly resolved with antibiotic drops and pills.  Still having a little eye discharge and irritation.  Advised that she has a " normal exam, no red flag symptoms, and current discharge/irritation is most likely from eyedrop use.  Will discontinue Cipro drops, finish the rest of her Augmentin course, and follow-up on an as-needed basis.  Encouraged her to make follow-up appointment with ophthalmologist she was referred to last visit as a back-up plan in case her symptoms have not fully resolved in the next few days.

## 2019-06-12 ENCOUNTER — TELEPHONE (OUTPATIENT)
Dept: MEDICAL GROUP | Facility: MEDICAL CENTER | Age: 44
End: 2019-06-12

## 2019-06-13 NOTE — TELEPHONE ENCOUNTER
Patient sent me a MyChart question on the prior authorization request status on her Linzess.  We sent that in the last month.  The media section is currently not accessible for me, could you please check on this?

## 2019-06-14 NOTE — TELEPHONE ENCOUNTER
Spoke with Sandy Segura (who is her medication managemet company) They are faxing a form to us that must be completed and faxed back. Must check the box that says URGENT.

## 2019-06-16 ENCOUNTER — OUTPATIENT INFUSION SERVICES (OUTPATIENT)
Dept: ONCOLOGY | Facility: MEDICAL CENTER | Age: 44
End: 2019-06-16
Attending: INTERNAL MEDICINE
Payer: COMMERCIAL

## 2019-06-16 VITALS
BODY MASS INDEX: 23.51 KG/M2 | TEMPERATURE: 97.1 F | HEART RATE: 66 BPM | DIASTOLIC BLOOD PRESSURE: 71 MMHG | RESPIRATION RATE: 18 BRPM | OXYGEN SATURATION: 99 % | HEIGHT: 65 IN | SYSTOLIC BLOOD PRESSURE: 111 MMHG | WEIGHT: 141.09 LBS

## 2019-06-16 PROCEDURE — 96365 THER/PROPH/DIAG IV INF INIT: CPT

## 2019-06-16 PROCEDURE — 700111 HCHG RX REV CODE 636 W/ 250 OVERRIDE (IP): Performed by: INTERNAL MEDICINE

## 2019-06-16 PROCEDURE — 96366 THER/PROPH/DIAG IV INF ADDON: CPT

## 2019-06-16 RX ORDER — IMMUNE GLOBULIN INFUSION (HUMAN) 100 MG/ML
25 INJECTION, SOLUTION INTRAVENOUS; SUBCUTANEOUS ONCE
Status: COMPLETED | OUTPATIENT
Start: 2019-06-16 | End: 2019-06-16

## 2019-06-16 RX ADMIN — IMMUNE GLOBULIN INFUSION (HUMAN) 25 G: 100 INJECTION, SOLUTION INTRAVENOUS; SUBCUTANEOUS at 09:14

## 2019-06-16 NOTE — PROGRESS NOTES
Pt arrived to [facility] ambulatory, here for q2week IVIG. IV access established in LAc. IVIG infused using rate calculator in pt's chart. Max rate to 120 ml/hr. Infusion completed w/ no adverse reactions. PIV flushed and removed, gauze and coban dressing applied.  Pt left on foot in NAD. Pt's next appt made prior to d/c.

## 2019-06-18 ENCOUNTER — TELEPHONE (OUTPATIENT)
Dept: MEDICAL GROUP | Facility: MEDICAL CENTER | Age: 44
End: 2019-06-18

## 2019-06-18 NOTE — TELEPHONE ENCOUNTER
Rec'd message from Megan wondering what the status is on her Linzess.    Called and spoke with [employer], her PAR is in the queue, not finalized yet. Waiting for a decision from the nursing team.     Megan notified.

## 2019-06-19 ENCOUNTER — TELEPHONE (OUTPATIENT)
Dept: MEDICAL GROUP | Facility: MEDICAL CENTER | Age: 44
End: 2019-06-19

## 2019-06-19 NOTE — TELEPHONE ENCOUNTER
Called [employer], rx determination should be faxed to us by Friday. There is usually a 5 business day turn around.

## 2019-06-19 NOTE — TELEPHONE ENCOUNTER
Patient sent me another my chart message, please check on the status of the Linzess prior authorization that was faxed on 6/14/19.

## 2019-06-24 ENCOUNTER — TELEPHONE (OUTPATIENT)
Dept: MEDICAL GROUP | Facility: MEDICAL CENTER | Age: 44
End: 2019-06-24

## 2019-06-24 NOTE — TELEPHONE ENCOUNTER
Megan Velaant  [phone] (home) [phone] (work)      Pt called this am, she is unable to get her supplental nutrition.  Per the ins and the pharmacy, we need to send a new Rx.     Info in [facility].

## 2019-06-30 ENCOUNTER — OUTPATIENT INFUSION SERVICES (OUTPATIENT)
Dept: ONCOLOGY | Facility: MEDICAL CENTER | Age: 44
End: 2019-06-30
Attending: INTERNAL MEDICINE
Payer: COMMERCIAL

## 2019-06-30 VITALS
HEIGHT: 65 IN | BODY MASS INDEX: 23.43 KG/M2 | SYSTOLIC BLOOD PRESSURE: 117 MMHG | TEMPERATURE: 97 F | OXYGEN SATURATION: 99 % | WEIGHT: 140.65 LBS | RESPIRATION RATE: 18 BRPM | HEART RATE: 63 BPM | DIASTOLIC BLOOD PRESSURE: 84 MMHG

## 2019-06-30 DIAGNOSIS — D72.18 CHURG-STRAUSS SYNDROME (HCC): Chronic | ICD-10-CM

## 2019-06-30 DIAGNOSIS — M30.1 CHURG-STRAUSS SYNDROME (HCC): Chronic | ICD-10-CM

## 2019-06-30 PROCEDURE — 96366 THER/PROPH/DIAG IV INF ADDON: CPT

## 2019-06-30 PROCEDURE — 96365 THER/PROPH/DIAG IV INF INIT: CPT

## 2019-06-30 PROCEDURE — 700111 HCHG RX REV CODE 636 W/ 250 OVERRIDE (IP)

## 2019-06-30 RX ORDER — IMMUNE GLOBULIN INFUSION (HUMAN) 100 MG/ML
25 INJECTION, SOLUTION INTRAVENOUS; SUBCUTANEOUS ONCE
Status: COMPLETED | OUTPATIENT
Start: 2019-06-30 | End: 2019-06-30

## 2019-06-30 RX ADMIN — IMMUNE GLOBULIN INFUSION (HUMAN) 25 G: 100 INJECTION, SOLUTION INTRAVENOUS; SUBCUTANEOUS at 09:05

## 2019-06-30 NOTE — PROGRESS NOTES
"Patient arrived ambulatory to the [facility] for IVIG. Reviewed vital signs, labs, and physician order. Patient denies S&S of infection. Reviewed POC, pt declines to lab draw for Iga ,\"My doctor doesn't think it needs to be drawn anymore since it's always low\". Reviewed with pt importance of following the POC, and pt has the right to refuse lab draw. Discussed with pt importance of contacting MD, and have a new order sent to [facility] with any change to future infusion apt's, pt verbalized understanding. IV access established in left AC, visualized brisk blood return. IVIG administered per pharmacy titration scale up to max rate of 120ml's/hr, no adverse reaction observed. IV flushed per protocol, catheter removed with tip intact, gauze and coban dressing placed. Confirmed upcoming appointment date and time with patient. Patient left the [facility] ambulatory in no sign of distress.   "

## 2019-07-14 ENCOUNTER — APPOINTMENT (OUTPATIENT)
Dept: ONCOLOGY | Facility: MEDICAL CENTER | Age: 44
End: 2019-07-14
Attending: INTERNAL MEDICINE
Payer: COMMERCIAL

## 2019-08-04 ENCOUNTER — OUTPATIENT INFUSION SERVICES (OUTPATIENT)
Dept: ONCOLOGY | Facility: MEDICAL CENTER | Age: 44
End: 2019-08-04
Attending: INTERNAL MEDICINE
Payer: COMMERCIAL

## 2019-08-04 VITALS
HEART RATE: 61 BPM | DIASTOLIC BLOOD PRESSURE: 71 MMHG | SYSTOLIC BLOOD PRESSURE: 114 MMHG | RESPIRATION RATE: 18 BRPM | OXYGEN SATURATION: 98 % | TEMPERATURE: 97 F | WEIGHT: 139.77 LBS | HEIGHT: 65 IN | BODY MASS INDEX: 23.29 KG/M2

## 2019-08-04 PROCEDURE — 96366 THER/PROPH/DIAG IV INF ADDON: CPT

## 2019-08-04 PROCEDURE — 96365 THER/PROPH/DIAG IV INF INIT: CPT

## 2019-08-04 PROCEDURE — 700111 HCHG RX REV CODE 636 W/ 250 OVERRIDE (IP): Performed by: INTERNAL MEDICINE

## 2019-08-04 RX ORDER — IMMUNE GLOBULIN INFUSION (HUMAN) 100 MG/ML
20 INJECTION, SOLUTION INTRAVENOUS; SUBCUTANEOUS ONCE
Status: COMPLETED | OUTPATIENT
Start: 2019-08-04 | End: 2019-08-04

## 2019-08-04 RX ORDER — IMMUNE GLOBULIN INFUSION (HUMAN) 100 MG/ML
5 INJECTION, SOLUTION INTRAVENOUS; SUBCUTANEOUS ONCE
Status: COMPLETED | OUTPATIENT
Start: 2019-08-04 | End: 2019-08-04

## 2019-08-04 RX ADMIN — IMMUNE GLOBULIN INFUSION (HUMAN) 5 G: 100 INJECTION, SOLUTION INTRAVENOUS; SUBCUTANEOUS at 12:50

## 2019-08-04 RX ADMIN — IMMUNE GLOBULIN INFUSION (HUMAN) 20 G: 100 INJECTION, SOLUTION INTRAVENOUS; SUBCUTANEOUS at 10:25

## 2019-08-04 NOTE — PROGRESS NOTES
Pt to [facility] for IVIG infusion. Pt stated she has been in her usual state of health.  PIV place, flushed easily, rupali briskly.  IVIG infused per titration sheet from pharmacy, with max rate of 120ml/hr per patient's preference.  Pt tolerated infusion well. PIV flushed with NS, then d/c'd.  Catheter tip remained intact.  Pt left OPIC in NAD.

## 2019-08-25 ENCOUNTER — OUTPATIENT INFUSION SERVICES (OUTPATIENT)
Dept: ONCOLOGY | Facility: MEDICAL CENTER | Age: 44
End: 2019-08-25
Attending: INTERNAL MEDICINE
Payer: COMMERCIAL

## 2019-08-25 VITALS
RESPIRATION RATE: 18 BRPM | TEMPERATURE: 97.3 F | HEART RATE: 64 BPM | DIASTOLIC BLOOD PRESSURE: 61 MMHG | WEIGHT: 138.01 LBS | OXYGEN SATURATION: 98 % | BODY MASS INDEX: 22.99 KG/M2 | HEIGHT: 65 IN | SYSTOLIC BLOOD PRESSURE: 103 MMHG

## 2019-08-25 PROCEDURE — 96365 THER/PROPH/DIAG IV INF INIT: CPT

## 2019-08-25 PROCEDURE — 700111 HCHG RX REV CODE 636 W/ 250 OVERRIDE (IP): Performed by: INTERNAL MEDICINE

## 2019-08-25 PROCEDURE — 96366 THER/PROPH/DIAG IV INF ADDON: CPT

## 2019-08-25 RX ORDER — IMMUNE GLOBULIN INFUSION (HUMAN) 100 MG/ML
20 INJECTION, SOLUTION INTRAVENOUS; SUBCUTANEOUS ONCE
Status: COMPLETED | OUTPATIENT
Start: 2019-08-25 | End: 2019-08-25

## 2019-08-25 RX ORDER — IMMUNE GLOBULIN INFUSION (HUMAN) 100 MG/ML
5 INJECTION, SOLUTION INTRAVENOUS; SUBCUTANEOUS ONCE
Status: COMPLETED | OUTPATIENT
Start: 2019-08-25 | End: 2019-08-25

## 2019-08-25 RX ADMIN — IMMUNE GLOBULIN INFUSION (HUMAN) 5 G: 100 INJECTION, SOLUTION INTRAVENOUS; SUBCUTANEOUS at 11:50

## 2019-08-25 RX ADMIN — IMMUNE GLOBULIN INFUSION (HUMAN) 20 G: 100 INJECTION, SOLUTION INTRAVENOUS; SUBCUTANEOUS at 08:58

## 2019-08-25 NOTE — PROGRESS NOTES
Patient arrived ambulatory to the [facility] for IVIG. Reviewed vital signs, labs, and physician order. Patient denies S&S of infection, or bleeding. Pt declines IGA lab test to be drawn. IV access established in left AC, visualized brisk blood return. IVIG administered per pharmacy titration scale, no adverse reaction observed. IV flushed per protocol, catheter removed with tip intact, gauze and coban dressing placed. Confirmed upcoming appointment date and time with patient. Patient left the [facility] ambulatory in no sign of distress.

## 2019-08-27 ENCOUNTER — OFFICE VISIT (OUTPATIENT)
Dept: URGENT CARE | Facility: CLINIC | Age: 44
End: 2019-08-27
Payer: COMMERCIAL

## 2019-08-27 VITALS
TEMPERATURE: 97.1 F | DIASTOLIC BLOOD PRESSURE: 64 MMHG | SYSTOLIC BLOOD PRESSURE: 98 MMHG | HEIGHT: 65 IN | WEIGHT: 140 LBS | BODY MASS INDEX: 23.32 KG/M2 | OXYGEN SATURATION: 97 % | RESPIRATION RATE: 14 BRPM | HEART RATE: 63 BPM

## 2019-08-27 DIAGNOSIS — S00.201A: Primary | ICD-10-CM

## 2019-08-27 DIAGNOSIS — H01.9: Primary | ICD-10-CM

## 2019-08-27 DIAGNOSIS — S00.202A: ICD-10-CM

## 2019-08-27 DIAGNOSIS — H01.9: ICD-10-CM

## 2019-08-27 PROCEDURE — 99214 OFFICE O/P EST MOD 30 MIN: CPT | Performed by: PHYSICIAN ASSISTANT

## 2019-08-27 RX ORDER — CEPHALEXIN 500 MG/1
500 CAPSULE ORAL 4 TIMES DAILY
Qty: 28 CAP | Refills: 0 | Status: SHIPPED | OUTPATIENT
Start: 2019-08-27 | End: 2019-09-03

## 2019-08-27 NOTE — PROGRESS NOTES
Subjective:      Pt is a 43 y.o. female who presents with Eye Problem (infection)            HPI  This is a new problem. Pt notes recently tattooed upper and lower eyelids with permanent makeup and a few days later noted B/L upper and lower eyelid swelling, redness and signs of infection. Pt has not taken any Rx medications for this condition. Pt denies new detergents, soaps, make-up, hygiene products, medications, foods, exposure to chemicals. Pt states the pain is a 2-3/10, aching in nature and worse at night. Pt denies CP, SOB, NVD, paresthesias, headaches, dizziness, change in vision, hives, or other joint pain. The pt's medication list, problem list, and allergies have been evaluated and reviewed during today's visit.    PMH:  Past Medical History:   Diagnosis Date   • Abscess of breast, postpartum    • Anemia    • Anemia    • Anxiety 2009   • Anxiety    • Anxiety disorder    • Bowel habit changes     paralyzed colon   • Cancer (HCC) 2010    melanoma, left side neck   • Dental disorder     lack of saliva   • Dry eyes 2009 right eye scleral redness   • Dry eyes     extreme   • Dysautonomia ([facility])    • Eosinophilic esophagitis    • Eosinophilic esophagitis    • Exophoria    • Hemorrhagic disorder ([facility])     anemia   • Hiatus hernia syndrome    • Hypothyroid 2009   • Melanoma (HCC) 2011    melanoma - Amenanoic left neck jaw   • Mild preeclampsia    • Other specified symptom associated with female genital organs     PCOS   • Polycystic ovarian syndrome    • Polycystic ovary disease 2009   •  labor    • Psychiatric disorder     PTSD   • Sjogren's syndrome ([facility])    • Small intestinal bacterial overgrowth        PSH:  Past Surgical History:   Procedure Laterality Date   • OTHER  10/31/2018    left open neck incision   • NECK MASS EXCISION Left 10/31/2018    Procedure: NECK MASS EXCISION;  Surgeon: Inna Cadena M.D.;  Location: SURGERY SAME DAY [facility];  Service: Ent    • OTHER  2018    Neck dissection-melanoma   • OTHER  2018    neck excision melanoma   • GASTROSCOPY WITH FEED TUBE PLACEMENT N/A 2016    Procedure: GASTROSCOPY WITH NASOENTERIC TUBE PLACEMENT W/FLUORO;  Surgeon: Gallito Rangel M.D.;  Location: [facility];  Service:    • COLONOSCOPY  2015   • SETTLEMENT (INSURANCE)  2014    Performed by Olvin David M.D. at SURGERY [facility]   • OTHER      eye ducts cauterized   • OTHER  2012    Local excision melanoma   • OTHER      left periauricular melanoma excision   • INCISION AND DRAINAGE GENERAL  2009    left nipple   • PB  DELIVERY ONLY     • TONSILLECTOMY AND ADENOIDECTOMY     • ENDOSCOPY      multiple upper an lower procedures   • OTHER  [id number]    several procedures for infertility        Fam Hx:    family history includes Cancer in her father and maternal grandmother; No Known Problems in her sister.  Family Status   Relation Name Status   • Mo  Alive   • Fa throat Alive   • Sis  -  Alive   • MGMo lung,nonsmker    • MGFa old age    • PGMo old age    • PGFa         Soc HX:  Social History     Socioeconomic History   • Marital status:      Spouse name: Not on file   • Number of children: Not on file   • Years of education: Not on file   • Highest education level: Not on file   Occupational History     Comment: [employer]   Social Needs   • Financial resource strain: Not on file   • Food insecurity:     Worry: Not on file     Inability: Not on file   • Transportation needs:     Medical: Not on file     Non-medical: Not on file   Tobacco Use   • Smoking status: Never Smoker   • Smokeless tobacco: Never Used   Substance and Sexual Activity   • Alcohol use: No     Alcohol/week: 0.0 oz   • Drug use: No   • Sexual activity: Not on file   Lifestyle   • Physical activity:     Days per week: Not on file     Minutes per session: Not on file   •  Stress: Not on file   Relationships   • Social connections:     Talks on phone: Not on file     Gets together: Not on file     Attends Zoroastrianism service: Not on file     Active member of club or organization: Not on file     Attends meetings of clubs or organizations: Not on file     Relationship status: Not on file   • Intimate partner violence:     Fear of current or ex partner: Not on file     Emotionally abused: Not on file     Physically abused: Not on file     Forced sexual activity: Not on file   Other Topics Concern   • Not on file   Social History Narrative   • Not on file         Medications:    Current Outpatient Medications:   •  Loteprednol-Tobramycin 0.5-0.3 % Suspension, 2 Drops by Ophthalmic route every 4 hours for 7 days., Disp: 1 Bottle, Rfl: 0  •  cephALEXin (KEFLEX) 500 MG Cap, Take 1 Cap by mouth 4 times a day for 7 days., Disp: 28 Cap, Rfl: 0  •  levothyroxine (SYNTHROID) 112 MCG Tab, Take 1 p.o. 6 days/week, Disp: 90 Tab, Rfl: 1  •  drospirenone-ethinyl estradiol (ASHLEY) 3-0.03 MG per tablet, Take 1 Tab by mouth every day., Disp: 84 Tab, Rfl: 1  •  immune globulin 5% (5 g/100mL) in empty bag, by Intravenous route Once., Disp: , Rfl:   •  venlafaxine XR (EFFEXOR XR) 75 MG CAPSULE SR 24 HR, Take 1 Cap by mouth every day., Disp: 90 Cap, Rfl: 2  •  potassium chloride (KLOR-CON) 20 MEQ Pack, Take 1 Packet by mouth every day., Disp: 90 Packet, Rfl: 3  •  cyclosporin (RESTASIS) 0.05 % ophthalmic emulsion, Place 1 Drop in both eyes 2 times a day. Dispense quantity 360 vials to [facility] pharmacy fax [phone], Disp: 0.4 mL, Rfl: 3  •  diphenhydrAMINE (BENADRYL) 25 MG Tab, Take 25 mg by mouth every 6 hours as needed for Sleep., Disp: , Rfl:   •  Cholecalciferol (CVS VITAMIN D3 DROPS/INFANT) 400 UT/0.028ML Liquid, Take 5 Drops by mouth every day., Disp: 15 mL, Rfl: 11  •  ranitidine (ZANTAC) 150 MG Tab, Take 150 mg by mouth every evening., Disp: , Rfl:   •  naproxen (ALEVE) 220 MG tablet, Take 220  "mg by mouth as needed. Indications: Mild to Moderate Pain, Disp: , Rfl:   •  Fexofenadine HCl (ALLEGRA PO), Take 1 Tab by mouth every evening., Disp: , Rfl:       Allergies:  Other food    ROS  Constitutional: Negative for fever, chills and malaise/fatigue.   HENT: Negative for congestion and sore throat.    Eyes: Negative for blurred vision, double vision and photophobia. B/L lower and upper eyelid infections  Respiratory: Negative for cough and shortness of breath.  Cardiovascular: Negative for chest pain and palpitations.   Gastrointestinal: Negative for heartburn, nausea, vomiting, abdominal pain, diarrhea and constipation.   Genitourinary: Negative for dysuria and flank pain.   Musculoskeletal: Negative for joint pain and myalgias.   Skin: Negative for itching and rash.   Neurological: Negative for dizziness, tingling and headaches.   Endo/Heme/Allergies: Does not bruise/bleed easily.   Psychiatric/Behavioral: Negative for depression. The patient is not nervous/anxious.           Objective:     BP (!) 98/64 (BP Location: Right arm, Patient Position: Sitting)   Pulse 63   Temp 36.2 °C (97.1 °F)   Resp 14   Ht 1.651 m (5' 5\")   Wt 63.5 kg (140 lb)   SpO2 97%   BMI 23.30 kg/m²      Physical Exam   Eyes: Pupils are equal, round, and reactive to light. Conjunctivae and EOM are normal. Lids are everted and swept, no foreign bodies found. Right eye exhibits no chemosis, no discharge, no exudate and no hordeolum. No foreign body present in the right eye. Left eye exhibits no chemosis, no discharge, no exudate and no hordeolum. No foreign body present in the left eye. No scleral icterus.         Constitutional: PT is oriented to person, place, and time. PT appears well-developed and well-nourished. No distress.   HENT:   Head: Normocephalic and atraumatic.   Mouth/Throat: Oropharynx is clear and moist. No oropharyngeal exudate.   Eyes: Conjunctivae normal and EOM are normal. Pupils are equal, round, and reactive " to light.   Neck: Normal range of motion. Neck supple. No thyromegaly present.   Cardiovascular: Normal rate, regular rhythm, normal heart sounds and intact distal pulses.  Exam reveals no gallop and no friction rub.    No murmur heard.  Pulmonary/Chest: Effort normal and breath sounds normal. No respiratory distress. PT has no wheezes. PT has no rales. Pt exhibits no tenderness.   Abdominal: Soft. Bowel sounds are normal. PT exhibits no distension and no mass. There is no tenderness. There is no rebound and no guarding.   Musculoskeletal: Normal range of motion. PT exhibits no edema and no tenderness.   Neurological: PT is alert and oriented to person, place, and time. PT has normal reflexes. No cranial nerve deficit.   Skin: Skin is warm and dry. No rash noted. PT is not diaphoretic. No erythema.       Psychiatric: PT has a normal mood and affect. PT behavior is normal. Judgment and thought content normal.             Assessment/Plan:     1. Superficial injury of eyelid with infection, right, initial encounter    - Loteprednol-Tobramycin 0.5-0.3 % Suspension; 2 Drops by Ophthalmic route every 4 hours for 7 days.  Dispense: 1 Bottle; Refill: 0  - cephALEXin (KEFLEX) 500 MG Cap; Take 1 Cap by mouth 4 times a day for 7 days.  Dispense: 28 Cap; Refill: 0    2. Superficial injury of eyelid with infection, left, initial encounter    - Loteprednol-Tobramycin 0.5-0.3 % Suspension; 2 Drops by Ophthalmic route every 4 hours for 7 days.  Dispense: 1 Bottle; Refill: 0  - cephALEXin (KEFLEX) 500 MG Cap; Take 1 Cap by mouth 4 times a day for 7 days.  Dispense: 28 Cap; Refill: 0      Rest, fluids encouraged.  Warm compresses hourly discussed  AVS with medical info given.  Pt was in full understanding and agreement with the plan.  Differential diagnosis, natural history, supportive care, and indications for immediate follow-up discussed. All questions answered. Patient agrees with the plan of care.  Follow-up as needed if  symptoms worsen or fail to improve.

## 2019-09-08 ENCOUNTER — OUTPATIENT INFUSION SERVICES (OUTPATIENT)
Dept: ONCOLOGY | Facility: MEDICAL CENTER | Age: 44
End: 2019-09-08
Attending: INTERNAL MEDICINE
Payer: COMMERCIAL

## 2019-09-08 VITALS
RESPIRATION RATE: 18 BRPM | HEART RATE: 68 BPM | HEIGHT: 65 IN | WEIGHT: 138.45 LBS | OXYGEN SATURATION: 97 % | DIASTOLIC BLOOD PRESSURE: 69 MMHG | TEMPERATURE: 97.1 F | BODY MASS INDEX: 23.07 KG/M2 | SYSTOLIC BLOOD PRESSURE: 111 MMHG

## 2019-09-08 PROCEDURE — 700111 HCHG RX REV CODE 636 W/ 250 OVERRIDE (IP): Performed by: INTERNAL MEDICINE

## 2019-09-08 PROCEDURE — 96366 THER/PROPH/DIAG IV INF ADDON: CPT

## 2019-09-08 PROCEDURE — 96365 THER/PROPH/DIAG IV INF INIT: CPT

## 2019-09-08 RX ORDER — IMMUNE GLOBULIN INFUSION (HUMAN) 100 MG/ML
20 INJECTION, SOLUTION INTRAVENOUS; SUBCUTANEOUS ONCE
Status: COMPLETED | OUTPATIENT
Start: 2019-09-08 | End: 2019-09-08

## 2019-09-08 RX ADMIN — IMMUNE GLOBULIN INFUSION (HUMAN) 20 G: 100 INJECTION, SOLUTION INTRAVENOUS; SUBCUTANEOUS at 09:02

## 2019-09-08 NOTE — PROGRESS NOTES
Patient arrived ambulatory to the [facility] for IVIG. Reviewed vital signs, labs, and physician order. Patient reports completing antibiotics robby eye infection on 9/7/19. IV access established in left forearm, visualized brisk blood return. Pt declines to have IgA lab drawn today. IVIG administered per pharmacy titration scale up to max rate of 120ml/hr, no adverse reaction observed. IV flushed per protocol, catheter removed with tip intact, gauze and coban dressing placed. Confirmed upcoming appointment date and time with patient. Patient left the [facility] ambulatory in no sign of distress.

## 2019-09-20 ENCOUNTER — TELEPHONE (OUTPATIENT)
Dept: MEDICAL GROUP | Facility: MEDICAL CENTER | Age: 44
End: 2019-09-20

## 2019-09-20 DIAGNOSIS — C43.9 METASTATIC MELANOMA (HCC): ICD-10-CM

## 2019-09-20 NOTE — TELEPHONE ENCOUNTER
Megan August  [phone]    Megan called to ask that an US of Head and Neck be ordered for he 1 yr Melanoma check.

## 2019-09-22 ENCOUNTER — OUTPATIENT INFUSION SERVICES (OUTPATIENT)
Dept: ONCOLOGY | Facility: MEDICAL CENTER | Age: 44
End: 2019-09-22
Attending: INTERNAL MEDICINE
Payer: COMMERCIAL

## 2019-09-22 VITALS
DIASTOLIC BLOOD PRESSURE: 59 MMHG | BODY MASS INDEX: 22.53 KG/M2 | RESPIRATION RATE: 18 BRPM | OXYGEN SATURATION: 100 % | WEIGHT: 140.21 LBS | SYSTOLIC BLOOD PRESSURE: 107 MMHG | HEART RATE: 57 BPM | HEIGHT: 66 IN | TEMPERATURE: 97.2 F

## 2019-09-22 DIAGNOSIS — M35.00 SJOGREN'S SYNDROME, WITH UNSPECIFIED ORGAN INVOLVEMENT (HCC): ICD-10-CM

## 2019-09-22 PROCEDURE — 96366 THER/PROPH/DIAG IV INF ADDON: CPT

## 2019-09-22 PROCEDURE — 96365 THER/PROPH/DIAG IV INF INIT: CPT

## 2019-09-22 PROCEDURE — 700111 HCHG RX REV CODE 636 W/ 250 OVERRIDE (IP): Performed by: INTERNAL MEDICINE

## 2019-09-22 RX ORDER — IMMUNE GLOBULIN INFUSION (HUMAN) 100 MG/ML
20 INJECTION, SOLUTION INTRAVENOUS; SUBCUTANEOUS ONCE
Status: COMPLETED | OUTPATIENT
Start: 2019-09-22 | End: 2019-09-22

## 2019-09-22 RX ADMIN — IMMUNE GLOBULIN INFUSION (HUMAN) 20 G: 100 INJECTION, SOLUTION INTRAVENOUS; SUBCUTANEOUS at 09:23

## 2019-09-27 ENCOUNTER — HOSPITAL ENCOUNTER (OUTPATIENT)
Dept: RADIOLOGY | Facility: MEDICAL CENTER | Age: 44
End: 2019-09-27
Attending: INTERNAL MEDICINE
Payer: COMMERCIAL

## 2019-09-27 ENCOUNTER — TELEPHONE (OUTPATIENT)
Dept: MEDICAL GROUP | Facility: MEDICAL CENTER | Age: 44
End: 2019-09-27

## 2019-09-27 DIAGNOSIS — C43.9 METASTATIC MELANOMA (HCC): ICD-10-CM

## 2019-09-27 PROCEDURE — 76536 US EXAM OF HEAD AND NECK: CPT

## 2019-09-28 NOTE — TELEPHONE ENCOUNTER
Please notify patient that the ultrasound of her neck shows lymph nodes not significantly enlarged, otherwise no change compared to previous ultrasound.

## 2019-09-30 ENCOUNTER — TELEPHONE (OUTPATIENT)
Dept: URGENT CARE | Facility: MEDICAL CENTER | Age: 44
End: 2019-09-30

## 2019-09-30 NOTE — TELEPHONE ENCOUNTER
----- Message from Zohaib Soares M.D. sent at 9/27/2019  5:03 PM PDT -----  Please notify patient that the ultrasound of her neck shows lymph nodes not significantly enlarged, otherwise no change compared to previous ultrasound.

## 2019-10-02 ENCOUNTER — OFFICE VISIT (OUTPATIENT)
Dept: URGENT CARE | Facility: CLINIC | Age: 44
End: 2019-10-02
Payer: COMMERCIAL

## 2019-10-02 VITALS
OXYGEN SATURATION: 97 % | WEIGHT: 138 LBS | HEART RATE: 67 BPM | SYSTOLIC BLOOD PRESSURE: 112 MMHG | BODY MASS INDEX: 22.18 KG/M2 | DIASTOLIC BLOOD PRESSURE: 78 MMHG | TEMPERATURE: 97.5 F | RESPIRATION RATE: 14 BRPM | HEIGHT: 66 IN

## 2019-10-02 DIAGNOSIS — H57.02 ANISOCORIA: ICD-10-CM

## 2019-10-02 DIAGNOSIS — S00.209S: ICD-10-CM

## 2019-10-02 DIAGNOSIS — H01.9: ICD-10-CM

## 2019-10-02 PROCEDURE — 99214 OFFICE O/P EST MOD 30 MIN: CPT | Performed by: NURSE PRACTITIONER

## 2019-10-02 RX ORDER — POLYMYXIN B SULFATE AND TRIMETHOPRIM 1; 10000 MG/ML; [USP'U]/ML
1 SOLUTION OPHTHALMIC EVERY 4 HOURS
Qty: 1 BOTTLE | Refills: 0 | Status: SHIPPED | OUTPATIENT
Start: 2019-10-02 | End: 2019-10-09

## 2019-10-02 RX ORDER — CEPHALEXIN 500 MG/1
500 CAPSULE ORAL 4 TIMES DAILY
Qty: 28 CAP | Refills: 0 | Status: SHIPPED | OUTPATIENT
Start: 2019-10-02 | End: 2019-10-09

## 2019-10-02 ASSESSMENT — ENCOUNTER SYMPTOMS
HEADACHES: 0
EYE ITCHING: 0
CONSTIPATION: 0
DIZZINESS: 0
MUSCULOSKELETAL NEGATIVE: 1
SORE THROAT: 0
COUGH: 0
CHILLS: 0
PHOTOPHOBIA: 0
EYE REDNESS: 1
EYE DISCHARGE: 1
WHEEZING: 0
PALPITATIONS: 0
VOMITING: 0
STRIDOR: 0
FOREIGN BODY SENSATION: 0
BLURRED VISION: 0
ABDOMINAL PAIN: 0
DIARRHEA: 0
FEVER: 0
DOUBLE VISION: 0
NAUSEA: 0

## 2019-10-02 NOTE — PROGRESS NOTES
"Subjective:   Megan August is a 43 y.o. female who presents for Eye Problem        Eye Problem    Both eyes are affected.This is a recurrent problem. The current episode started in the past 7 days. The problem occurs constantly. The problem has been unchanged. There was no injury mechanism (States with recent tattoos to bilateral eyelids). The pain is moderate. There is no known exposure to pink eye. She does not wear contacts. Associated symptoms include an eye discharge and eye redness. Pertinent negatives include no blurred vision, double vision, fever, foreign body sensation, itching, nausea, photophobia or vomiting. She has tried eye drops for the symptoms. The treatment provided no relief.        Review of Systems   Constitutional: Negative for chills and fever.   HENT: Negative for congestion, ear discharge, ear pain and sore throat.    Eyes: Positive for discharge and redness. Negative for blurred vision, double vision, photophobia and itching.   Respiratory: Negative for cough, wheezing and stridor.    Cardiovascular: Negative for chest pain and palpitations.   Gastrointestinal: Negative for abdominal pain, constipation, diarrhea, nausea and vomiting.   Musculoskeletal: Negative.    Skin: Negative.  Negative for itching and rash.   Neurological: Negative for dizziness and headaches.   All other systems reviewed and are negative.    Patient's PMH, SocHx, SurgHx, FamHx, Drug allergies and medications reviewed.     Objective:   /78 (BP Location: Right arm, Patient Position: Sitting)   Pulse 67   Temp 36.4 °C (97.5 °F)   Resp 14   Ht 1.67 m (5' 5.75\")   Wt 62.6 kg (138 lb)   SpO2 97%   BMI 22.44 kg/m²   Physical Exam   Constitutional: She is oriented to person, place, and time. She appears well-developed and well-nourished. No distress.   HENT:   Head: Normocephalic.   Right Ear: Hearing, tympanic membrane and ear canal normal. Tympanic membrane is not erythematous. No middle ear " effusion.   Left Ear: Hearing, tympanic membrane and ear canal normal. Tympanic membrane is not erythematous.  No middle ear effusion.   Nose: No rhinorrhea. Right sinus exhibits no maxillary sinus tenderness and no frontal sinus tenderness. Left sinus exhibits no maxillary sinus tenderness and no frontal sinus tenderness.   Mouth/Throat: Uvula is midline, oropharynx is clear and moist and mucous membranes are normal. No oropharyngeal exudate or posterior oropharyngeal erythema.   Eyes: EOM are normal. Pupils are unequal.       Bilateral eyes with conjunctival erythema and mild upper and lower eyelid swelling   Neck: Normal range of motion. No thyromegaly present.   Cardiovascular: Normal rate, regular rhythm and normal heart sounds.   Pulmonary/Chest: Effort normal and breath sounds normal. No respiratory distress. She has no wheezes.   Lymphadenopathy:        Head (right side): No submandibular and no tonsillar adenopathy present.        Head (left side): No submandibular and no tonsillar adenopathy present.   Neurological: She is alert and oriented to person, place, and time.   Skin: Skin is warm and dry. No rash noted. She is not diaphoretic.   Psychiatric: She has a normal mood and affect. Her speech is normal and behavior is normal. Judgment and thought content normal.   Vitals reviewed.        Assessment/Plan:   Assessment    1. Superficial injury of eyelid with infection, sequela  - polymixin-trimethoprim (POLYTRIM) 96588-4.1 UNIT/ML-% Solution; Place 1 Drop in both eyes every 4 hours for 7 days.  Dispense: 1 Bottle; Refill: 0  - cephALEXin (KEFLEX) 500 MG Cap; Take 1 Cap by mouth 4 times a day for 7 days.  Dispense: 28 Cap; Refill: 0    2. Anisocoria    Patient with known history of anisocoria.    We discussed proper eye drop hygiene. Discussed to stop wearing contacts and to throw away contacts and makeup until infection has cleared. If symptoms persist, follow up with Opthalmologist or  Optometrist.    Differential diagnosis, natural history, supportive care, and indications for immediate follow-up discussed.     **Please note that all invasive procedures during this visit were performed by myself and/or the Medical Assistant under the supervision of the PA or MD in office**

## 2019-10-06 ENCOUNTER — OUTPATIENT INFUSION SERVICES (OUTPATIENT)
Dept: ONCOLOGY | Facility: MEDICAL CENTER | Age: 44
End: 2019-10-06
Attending: INTERNAL MEDICINE
Payer: COMMERCIAL

## 2019-10-06 VITALS
TEMPERATURE: 98 F | BODY MASS INDEX: 22.32 KG/M2 | RESPIRATION RATE: 18 BRPM | HEART RATE: 53 BPM | DIASTOLIC BLOOD PRESSURE: 66 MMHG | OXYGEN SATURATION: 100 % | WEIGHT: 138.89 LBS | HEIGHT: 66 IN | SYSTOLIC BLOOD PRESSURE: 114 MMHG

## 2019-10-06 PROCEDURE — 96366 THER/PROPH/DIAG IV INF ADDON: CPT

## 2019-10-06 PROCEDURE — 700111 HCHG RX REV CODE 636 W/ 250 OVERRIDE (IP): Performed by: INTERNAL MEDICINE

## 2019-10-06 PROCEDURE — 96365 THER/PROPH/DIAG IV INF INIT: CPT

## 2019-10-06 RX ORDER — IMMUNE GLOBULIN INFUSION (HUMAN) 100 MG/ML
20 INJECTION, SOLUTION INTRAVENOUS; SUBCUTANEOUS ONCE
Status: COMPLETED | OUTPATIENT
Start: 2019-10-06 | End: 2019-10-06

## 2019-10-06 RX ORDER — IMMUNE GLOBULIN INFUSION (HUMAN) 100 MG/ML
5 INJECTION, SOLUTION INTRAVENOUS; SUBCUTANEOUS ONCE
Status: COMPLETED | OUTPATIENT
Start: 2019-10-06 | End: 2019-10-06

## 2019-10-06 RX ADMIN — IMMUNE GLOBULIN INFUSION (HUMAN) 20 G: 100 INJECTION, SOLUTION INTRAVENOUS; SUBCUTANEOUS at 08:49

## 2019-10-06 RX ADMIN — IMMUNE GLOBULIN INFUSION (HUMAN) 5 G: 100 INJECTION, SOLUTION INTRAVENOUS; SUBCUTANEOUS at 11:22

## 2019-10-06 NOTE — PROGRESS NOTES
Pt arrived ambulatory to [facility] for q 2 week IVIG infusion.  POC discussed.  PIV started to LAC, blood return confirmed.  Pt states IgA lab does not need to be drawn.  IVIG titrated per rate sheet protocol without adverse reaction.  PIV flushed, removed, and site covered with gauze and coban.  Next appointment confirmed.  Pt discharged from [facility] in NAD under self care.

## 2019-10-07 ENCOUNTER — TELEPHONE (OUTPATIENT)
Dept: MEDICAL GROUP | Facility: MEDICAL CENTER | Age: 44
End: 2019-10-07

## 2019-10-08 NOTE — TELEPHONE ENCOUNTER
Regarding: RE: Prescription Question  ----- Message from Zoe Aguiar, Med Ass't sent at 10/7/2019  7:40 AM PDT -----       ----- Message from MataMeganpatsy to Zoe Aguiar, Med Ass't sent at 10/4/2019  3:08 PM -----   [facility] says they need a new 1-yr authorization from [facility].  Could you facilitate getting and faxing this to [facility]j too, please?  ----- Message -----  From: Zoe Aguiar, Med Ass't  Sent: 10/3/2019  9:15 AM PDT  To: Megan August  Subject: RE: Prescription Question  Hi Megan lane,     I have re-faxed your updated order to [facility].     Please let us know if there is anything further we could assist you with     Thank you  Paula WILEY   Medical Assistant       ----- Message -----     From: Megan August     Sent: 10/2/2019  5:28 PM PDT       To: Zohaib Soares M.D.  Subject: RE: Prescription Question    Durga Soares   [facility] says they have no record of fax.  Can you please have someone resend and call to make sure they got it?    I was out of pocket $3,000 last year before they got my new 1-yr authorization so a little worried!    Thanks so much   ----- Message -----  From: Zohaib Soares M.D.  Sent: 10/2/2019  4:54 PM PDT  To: [place] Basia August  Subject: RE: Prescription Question    Thank you for the notification.  That request was already faxed to them on 8/27/19.  We will fax that again.  Melissa will be out of the office for 12 days.    Zohaib        ----- Message -----     From: Megan August     Sent: 10/2/2019  7:38 AM PDT       To: Zohaib Soares M.D.  Subject: Prescription Question    [facility] need a new 1-yr authorization from [facility]:  - 1 yr starting oct 11 2019  - 36 cans/month juan jose eli     Can you please ask Melissa to confirm with [facility] since they always mess it up?    Thanks so much,  Megan

## 2019-10-11 NOTE — TELEPHONE ENCOUNTER
Request was completed placed in [facility] awaiting okay to fax

## 2019-10-20 ENCOUNTER — OUTPATIENT INFUSION SERVICES (OUTPATIENT)
Dept: ONCOLOGY | Facility: MEDICAL CENTER | Age: 44
End: 2019-10-20
Attending: INTERNAL MEDICINE
Payer: COMMERCIAL

## 2019-10-20 VITALS
RESPIRATION RATE: 18 BRPM | OXYGEN SATURATION: 99 % | DIASTOLIC BLOOD PRESSURE: 67 MMHG | HEART RATE: 61 BPM | TEMPERATURE: 97.5 F | HEIGHT: 66 IN | WEIGHT: 141.54 LBS | SYSTOLIC BLOOD PRESSURE: 119 MMHG | BODY MASS INDEX: 22.75 KG/M2

## 2019-10-20 PROCEDURE — 96366 THER/PROPH/DIAG IV INF ADDON: CPT

## 2019-10-20 PROCEDURE — 700111 HCHG RX REV CODE 636 W/ 250 OVERRIDE (IP): Performed by: INTERNAL MEDICINE

## 2019-10-20 PROCEDURE — 96365 THER/PROPH/DIAG IV INF INIT: CPT

## 2019-10-20 RX ORDER — IMMUNE GLOBULIN INFUSION (HUMAN) 100 MG/ML
5 INJECTION, SOLUTION INTRAVENOUS; SUBCUTANEOUS ONCE
Status: COMPLETED | OUTPATIENT
Start: 2019-10-20 | End: 2019-10-20

## 2019-10-20 RX ORDER — IMMUNE GLOBULIN INFUSION (HUMAN) 100 MG/ML
20 INJECTION, SOLUTION INTRAVENOUS; SUBCUTANEOUS ONCE
Status: COMPLETED | OUTPATIENT
Start: 2019-10-20 | End: 2019-10-20

## 2019-10-20 RX ADMIN — IMMUNE GLOBULIN INFUSION (HUMAN) 5 G: 100 INJECTION, SOLUTION INTRAVENOUS; SUBCUTANEOUS at 11:43

## 2019-10-20 RX ADMIN — IMMUNE GLOBULIN INFUSION (HUMAN) 20 G: 100 INJECTION, SOLUTION INTRAVENOUS; SUBCUTANEOUS at 09:05

## 2019-10-21 ENCOUNTER — TELEPHONE (OUTPATIENT)
Dept: MEDICAL GROUP | Facility: MEDICAL CENTER | Age: 44
End: 2019-10-21

## 2019-10-22 NOTE — TELEPHONE ENCOUNTER
Called and spoke with Gabbi @ [facility]. Per [employer] ins. Policy, NO PA required.     LM for Megan to call me.

## 2019-10-22 NOTE — TELEPHONE ENCOUNTER
Could you please check on the prior authorization for the Lissa [employer]ccarthy [employer] request.  We have had trouble getting this approved since you were on vacation.      I believe someone faxed over some of the paperwork last week but I am not sure if this was sent to the correct supplier.    Please refer to the patient's my chart messages, apparently [facility] has not received the prior authorization request.

## 2019-11-03 ENCOUNTER — OUTPATIENT INFUSION SERVICES (OUTPATIENT)
Dept: ONCOLOGY | Facility: MEDICAL CENTER | Age: 44
End: 2019-11-03
Attending: INTERNAL MEDICINE
Payer: COMMERCIAL

## 2019-11-03 VITALS
OXYGEN SATURATION: 99 % | WEIGHT: 136.91 LBS | HEART RATE: 72 BPM | BODY MASS INDEX: 22 KG/M2 | HEIGHT: 66 IN | TEMPERATURE: 97.1 F | DIASTOLIC BLOOD PRESSURE: 63 MMHG | SYSTOLIC BLOOD PRESSURE: 108 MMHG | RESPIRATION RATE: 18 BRPM

## 2019-11-03 PROCEDURE — 96366 THER/PROPH/DIAG IV INF ADDON: CPT

## 2019-11-03 PROCEDURE — 96365 THER/PROPH/DIAG IV INF INIT: CPT

## 2019-11-03 PROCEDURE — 700111 HCHG RX REV CODE 636 W/ 250 OVERRIDE (IP): Performed by: INTERNAL MEDICINE

## 2019-11-03 RX ORDER — IMMUNE GLOBULIN INFUSION (HUMAN) 100 MG/ML
25 INJECTION, SOLUTION INTRAVENOUS; SUBCUTANEOUS ONCE
Status: COMPLETED | OUTPATIENT
Start: 2019-11-03 | End: 2019-11-03

## 2019-11-03 RX ADMIN — IMMUNE GLOBULIN INFUSION (HUMAN) 25 G: 100 INJECTION, SOLUTION INTRAVENOUS; SUBCUTANEOUS at 09:28

## 2019-11-03 NOTE — PROGRESS NOTES
Pt arrived ambulatory to [facility] for IVIG infusion. POC discussed with pt and she agrees with plan. PIV established, brisk blood return noted. Pt medicated per MAR. Pt tolerated infusion without s/s adverse reaction. PIV dc'd catheter tip intact, gauze and coban dressing applied. Pt aware of next appt 11/17/19. Pt discharged to self care, [facility].

## 2019-11-17 ENCOUNTER — OUTPATIENT INFUSION SERVICES (OUTPATIENT)
Dept: ONCOLOGY | Facility: MEDICAL CENTER | Age: 44
End: 2019-11-17
Attending: INTERNAL MEDICINE
Payer: COMMERCIAL

## 2019-11-17 VITALS
TEMPERATURE: 98 F | OXYGEN SATURATION: 98 % | HEIGHT: 66 IN | HEART RATE: 64 BPM | DIASTOLIC BLOOD PRESSURE: 70 MMHG | SYSTOLIC BLOOD PRESSURE: 108 MMHG | RESPIRATION RATE: 18 BRPM | WEIGHT: 141.98 LBS | BODY MASS INDEX: 22.82 KG/M2

## 2019-11-17 PROCEDURE — 96365 THER/PROPH/DIAG IV INF INIT: CPT

## 2019-11-17 PROCEDURE — 700111 HCHG RX REV CODE 636 W/ 250 OVERRIDE (IP): Performed by: INTERNAL MEDICINE

## 2019-11-17 PROCEDURE — 96366 THER/PROPH/DIAG IV INF ADDON: CPT

## 2019-11-17 RX ORDER — IMMUNE GLOBULIN INFUSION (HUMAN) 100 MG/ML
20 INJECTION, SOLUTION INTRAVENOUS; SUBCUTANEOUS ONCE
Status: COMPLETED | OUTPATIENT
Start: 2019-11-17 | End: 2019-11-17

## 2019-11-17 RX ORDER — IMMUNE GLOBULIN INFUSION (HUMAN) 100 MG/ML
5 INJECTION, SOLUTION INTRAVENOUS; SUBCUTANEOUS ONCE
Status: COMPLETED | OUTPATIENT
Start: 2019-11-17 | End: 2019-11-17

## 2019-11-17 RX ADMIN — IMMUNE GLOBULIN INFUSION (HUMAN) 20 G: 100 INJECTION, SOLUTION INTRAVENOUS; SUBCUTANEOUS at 08:54

## 2019-11-17 RX ADMIN — IMMUNE GLOBULIN INFUSION (HUMAN) 5 G: 100 INJECTION, SOLUTION INTRAVENOUS; SUBCUTANEOUS at 11:20

## 2019-11-19 ENCOUNTER — TELEPHONE (OUTPATIENT)
Dept: MEDICAL GROUP | Facility: MEDICAL CENTER | Age: 44
End: 2019-11-19

## 2019-11-20 NOTE — TELEPHONE ENCOUNTER
Please notify patient that [facility] sent us another form to complete for her enteral nutrition, this requires that she have been seen within the last 6 months, however I have not seen her in over a year.      She needs to schedule an appointment for us to complete this form.

## 2019-11-21 NOTE — TELEPHONE ENCOUNTER
Spoke with pt and got her scheduled on 12/2/19. However, her formula is supposed to ship on the first, that will leave her w/o nutrition.

## 2019-11-22 NOTE — TELEPHONE ENCOUNTER
Left a message for patient to call back at [phone] to see if we can schedule her sooner.    Yris Saavedra  [facility] Assistant

## 2019-11-22 NOTE — TELEPHONE ENCOUNTER
We can fax the paperwork again but it says she needs to have been seen in the last six months and it has been over a year. If one of the other providers can see her in the next few days, that would be fine.

## 2019-11-25 ENCOUNTER — OFFICE VISIT (OUTPATIENT)
Dept: MEDICAL GROUP | Facility: MEDICAL CENTER | Age: 44
End: 2019-11-25
Payer: COMMERCIAL

## 2019-11-25 VITALS
HEART RATE: 65 BPM | DIASTOLIC BLOOD PRESSURE: 72 MMHG | HEIGHT: 66 IN | OXYGEN SATURATION: 99 % | TEMPERATURE: 98.8 F | BODY MASS INDEX: 22.5 KG/M2 | WEIGHT: 140 LBS | SYSTOLIC BLOOD PRESSURE: 128 MMHG

## 2019-11-25 DIAGNOSIS — K20.0 EOSINOPHILIC ESOPHAGITIS: Chronic | ICD-10-CM

## 2019-11-25 DIAGNOSIS — Z79.52 CURRENT CHRONIC USE OF SYSTEMIC STEROIDS: Chronic | ICD-10-CM

## 2019-11-25 DIAGNOSIS — M30.1 CHURG-STRAUSS SYNDROME (HCC): Chronic | ICD-10-CM

## 2019-11-25 DIAGNOSIS — D80.3 IGG DEFICIENCY (HCC): ICD-10-CM

## 2019-11-25 DIAGNOSIS — Z00.00 PREVENTATIVE HEALTH CARE: Chronic | ICD-10-CM

## 2019-11-25 DIAGNOSIS — F41.9 ANXIETY: Chronic | ICD-10-CM

## 2019-11-25 DIAGNOSIS — Z23 NEED FOR HEPATITIS VACCINATION: ICD-10-CM

## 2019-11-25 DIAGNOSIS — K92.89 GASTROINTESTINAL DYSMOTILITY: ICD-10-CM

## 2019-11-25 DIAGNOSIS — Z12.31 ENCOUNTER FOR SCREENING MAMMOGRAM FOR BREAST CANCER: ICD-10-CM

## 2019-11-25 DIAGNOSIS — E87.6 LOW BLOOD POTASSIUM: ICD-10-CM

## 2019-11-25 DIAGNOSIS — D72.18 CHURG-STRAUSS SYNDROME (HCC): Chronic | ICD-10-CM

## 2019-11-25 DIAGNOSIS — E03.9 HYPOTHYROIDISM, UNSPECIFIED TYPE: ICD-10-CM

## 2019-11-25 PROCEDURE — 90632 HEPA VACCINE ADULT IM: CPT | Performed by: INTERNAL MEDICINE

## 2019-11-25 PROCEDURE — 90472 IMMUNIZATION ADMIN EACH ADD: CPT | Performed by: INTERNAL MEDICINE

## 2019-11-25 PROCEDURE — 90471 IMMUNIZATION ADMIN: CPT | Performed by: INTERNAL MEDICINE

## 2019-11-25 PROCEDURE — 90746 HEPB VACCINE 3 DOSE ADULT IM: CPT | Performed by: INTERNAL MEDICINE

## 2019-11-25 PROCEDURE — 99214 OFFICE O/P EST MOD 30 MIN: CPT | Mod: 25 | Performed by: INTERNAL MEDICINE

## 2019-11-25 RX ORDER — POTASSIUM CHLORIDE 1.5 G/1.58G
20 POWDER, FOR SOLUTION ORAL DAILY
Qty: 90 PACKET | Refills: 3 | Status: SHIPPED | OUTPATIENT
Start: 2019-11-25 | End: 2020-12-15 | Stop reason: SDUPTHER

## 2019-11-25 RX ORDER — LEVOTHYROXINE SODIUM 112 UG/1
112 TABLET ORAL
Qty: 90 TAB | Refills: 3
Start: 2019-11-25 | End: 2019-12-14 | Stop reason: SDUPTHER

## 2019-11-25 ASSESSMENT — PATIENT HEALTH QUESTIONNAIRE - PHQ9: CLINICAL INTERPRETATION OF PHQ2 SCORE: 0

## 2019-11-26 NOTE — PROGRESS NOTES
Subjective:      Megan August is a 43 y.o. female who presents with Follow-Up            HPI     Patient is here for refill of her enteral nutrition which she receives through [employer] currently she is on Elicare laith vanilla flavor enteral nutrition, for Churg-Homer and gastrointestinal dysmotility, she takes these orally, currently using 36 cans the last 30 days.  Estimated caloric intake 2300 vera/day, administer 7 days/week averaging 20 to 35 vera/kg/day.  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  Previously she has been diagnosed with gastroesophageal dysmotility, and sees Dr. Evans at the [facility], gastroenterology motility specialist, she has had multiple studies, including endoscopies, CT scans, colonoscopies, and a smart pill study showing dysmotility in the stomach and colon necessitating IVIG, a trial of prednisone therapy which she has been able to wean off of, and she has been on enteral nutrition supplements for over 2 years secondary to the GI motility problem.  She has seen a nutritionist previously, and maintains on 2300 vera/day for her nutrition from the EliCare Laith vanilla flavor 36 cans per 30 days and tried other forms of enteral supplementation orally including Neocate Laith to just maintain her baseline weight, the Neocate was effective however after the formulation changed to include fiber she was not able to tolerate that particular  supplement.  She takes the current EliCare Laith vanilla flavor orally 36 cancel last 30 days.  She can take no other oral food or supplementation because of the gastrointestinal dysmotility and Churg-Homer syndrome.  Should she deviate from her current regimen and taking any other oral intake including other enteral nutritional supplements orally, she will develop significant bloating, gas, distention, belching, small bowel intestinal overgrowth, gastrointestinal paralysis placing her at risk for bowel obstruction, nausea, vomiting.  She has been stable on her current regimen of the EliCare Laith flavored vanilla 36 cans per 30 days and has been able to maintain her weight off the prednisone.  She still remains on IVIG every other week for the gastrointestinal dysmotility, Churg-Homer syndrome, she has had documented low IgG subclasses previously.  Other than fatigue she is doing well off the prednisone maintaining on IVIG every other week and the EliCare Laith flavor vanilla supplement.  Good social support with family and teenagers children, keeps active, works full-time, does have time to exercise on treadmill 30 minutes at a time, 3 mph at a incline.  If patient does not exercise, she will experience constipation, gas, belching.  No tobacco, no alcohol.         Current Outpatient Medications   Medication Sig Dispense Refill   • venlafaxine XR (EFFEXOR XR) 75 MG CAPSULE SR 24 HR Take 1 Cap by mouth every day. 90 Cap 1   • drospirenone-ethinyl estradiol (ASHLEY) 3-0.03 MG per tablet Take 1 Tab by mouth every day. 84 Tab 1   • levothyroxine (SYNTHROID) 112 MCG Tab Take 1 p.o. 6 days/week 90 Tab 1   • immune globulin 5% (5 g/100mL) in empty bag by Intravenous route Once.     • potassium chloride (KLOR-CON) 20 MEQ Pack Take 1 Packet by mouth every day. 90 Packet 3   • cyclosporin (RESTASIS) 0.05 % ophthalmic emulsion Place 1 Drop in both eyes 2 times a day. Dispense quantity 360 vials to [facility] pharmacy  fax [phone] 0.4 mL 3   • diphenhydrAMINE (BENADRYL) 25 MG Tab Take 25 mg by mouth every 6 hours as needed for Sleep.     • Cholecalciferol ([employer] VITAMIN D3 DROPS/INFANT) 400 UT/0.028ML Liquid Take 5 Drops by mouth every day. 15 mL 11   • ranitidine (ZANTAC) 150 MG Tab Take 150 mg by mouth every evening.     • naproxen (ALEVE) 220 MG tablet Take 220 mg by mouth as needed. Indications: Mild to Moderate Pain     • Fexofenadine HCl (ALLEGRA PO) Take 1 Tab by mouth every evening.       No current facility-administered medications for this visit.      Anxiety  8/6/16 effexor over 4 years     Autonomic neuropathy  2/11/16 neurology [facility] evaluation, qsweat response is reduced in the foot, heart rate and blood pressure response to deep breathing and valsalva intact and normal, blood pressure response to 10 minute tilt table head up mild increase in blood pressure oscillations with moderate postural tachycardia although this remains in the normal range, patient was asymptomatic; conclusion cardio vagal and cardiovascular adrenergic responses were intact, distal postganglionic sympathetic sudomotor function was decreased, a limited autonomic neuropathy is possible, medications (venlafaxine) may have caused the abnormalities  4/22/16 referral dr.gerald fitzgerald [facility] allergy for churg amber [phone]  5/9/16  [facility] neurology consultation note evaluation possible autoimmune CNS disease; exam notable for decreased laboratory sensation hyperreflexia, evaluate for underlying autoimmunity further testing repeat YOON, check thyroid antibodies, prilosec and intrinsic factor antibodies, copper, zinc, MARLEN panel, antibody testing with autoimmune dysautonomia panel sent off to [facility], MRI cervical and thoracic spine, lumbar puncture in office to evaluate for CSF evidence of inflammation, follow-up 3 months  7/28/16 thiopurine methyltransferase 34 (24-34)  5/30/17 note per  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, will be seeing genetticist next week then MRI and surgery  6/11/17 plasma epinephrine, norepinephrine, dopamine, chromogranin A negative done at [facility]  6/12/17 EGD at [facility] diffuse mild mucosal changes upper third esophagus, middle third and lower third esophagus, biopsies performed, normal stomach  6/17/17 MRI soft tissue neck with and without; negative  6/19/17 genetics evaluation dr.joshua lange [facility], patient underwent whole exome sequencing in january negative for mutations that would explain her medical condition, but revealed 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 denoted as 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 which may lead to hereditary paraganglioma and pheochromocytoma, also at risk for developing GISTs, unlikely that paraganglioma could compress on vagus nerve, surveillance recommendations based upon the SDHA mutation, annual biochemical study of plasma metanephrine and catecholamines, MRI base of skull andneck every 2 years, MRI body every 3-5 years, thus MRI neck, chest, abdomen, pelvis ordered, daughters will be tested as well  6/19/17 MRI pelvis with and without; no suspicious mass  6/19/17 MR abdomen with and without; no suspicious mass  6/19/17 MR chest with and without; nonspecific 7 x 12 mm hyperintense lesion subcarinal region, if concern for paraganglioma further evaluation with gallium-68-dotatate may be helpful for confirmation  6/29/17 CT/PET no evidence of abnormal FDG accumulation, no increased activity subcarinal region  5/19/18 MRI abdomen with and without; 2 left celiac ganglia upper ganglia and left of celiac trunk at its origin from the abdominal aorta 3 x 8 mm, second ganglion of her anterior left diaphragmatic sylvester adjacent to the aorta about level of the superior mesenteric artery 4 x 18 mm, 2 right celiac ganglia upper ganglia right diaphragmatic sylvester anterior to the inferior  vena cava level of celiac trunk, 3 x 9 mm, second right ganglion between right diaphragmatic sylvester and inferior vena cava measuring 3 x 19 mm     biallelic mutation mutation SDHA gene   5/30/17 note per 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, will be seeing genetticist next week then MRI and surgery  6/19/17 genetics evaluation dr.joshua lange [facility], patient underwent whole exome sequencing in january negative for mutations that would explain her medical condition, but revealed 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 denoted as [id number] which may lead to hereditary paraganglioma and pheochromocytoma, also at risk for developing GISTs, unlikely that paraganglioma could compress on vagus nerve, surveillance recommendations based upon the SDHA mutation, annual biochemical study of plasma metanephrine and catecholamines, MRI base of skull andneck every 2 years, MRI body every 3-5 years, thus MRI neck, chest, abdomen, pelvis ordered, daughters will be tested as well  6/17/17 MRI soft tissue neck with and without; negative  6/19/17 MRI pelvis with and without; no suspicious mass  6/19/17 MR abdomen with and without; no suspicious mass  6/19/17 MR chest with and without; nonspecific 7 x 12 mm hyperintense lesion subcarinal region, if concern for paraganglioma further evaluation with gallium-68-dotatate may be helpful for confirmation  6/29/17 CT/PET no evidence of abnormal FDG accumulation, no increased activity subcarinal region   5/19/18 MRI abdomen with and without; 2 left celiac ganglia upper ganglia and left of celiac trunk at its origin from the abdominal aorta 3 x 8 mm, second ganglion of her anterior left diaphragmatic sylvester adjacent to the aorta about level of the superior mesenteric artery 4 x 18 mm, 2 right celiac ganglia upper ganglia right diaphragmatic sylvester anterior to the inferior vena cava level of celiac trunk, 3 x 9 mm, second right  ganglion between right diaphragmatic sylvester and inferior vena cava measuring 3 x 19 mm     Churg-Homer disease  2/9/15 wbc 5.6 with 20% eosinophils  12/19/15 gastroenterology evaluation [facility] eosinophil area may be more related to autoimmune process, question whether this is allergic phenomena related to connective tissue disorder and elevated levels of transforming growth factor beta that will also recruit eosinophils, will repeat serologies, biopsy for direct immunofluorescence and MBP to evaluate eosinophilic esophagitis, nutritional consultation, recheck immunoglobulins as well, check MRI brain  12/19/15 iron 84,TIBC 324,transferrin 26,tsh 2.8,vit d 28,vitamin A levels normal, b12 418,vitamin b6 58 normal, alpha and gamma tocopherol normal,vitamin k normal, wbc 6.3 (9% eosinophils),IgG,IgA,IgM normal,IgG sublass 4 and IgE normal,SPEP negative, tTg IgA normal,ESR 3,crp 0.9,copper normal,selenium normal,zinc 46 (),folate and b1 normal,YOON 1:160 homogenous,anti-scl 70 negative, centromere Ab negative, TH/TO Ab negative, RNA plymerase III negative, U1-RNP Ab negative,fibrillarin U3 negative, PM/Scl negative,C3 and C4 negative, RG 13, CCP 4   2/10/16 [facility] nutrition consultation estimated needs [id number] kcal/day,  3/27/15 colon per [facility] tubular adenomas x2, negative for colitis  4/16/15 [facility] note CBC peripheral eosinophilia 21%, constipation by CT scan, colonoscopy unremarkable, biopsies negative, check YOON, immunoglobulins, tryptase, stool giardia antigen  4/16/15 wbc 3.9 with 5.7% eosinophils   5/28/15 [facility] note followup bloating and constipation, repeat labs WBC 3.9, normal eosinophils, TSH, YOON, immunoglobulins, stool for ova and parasites negative, tryptase negative, previous CT and colonoscopy negative, no evidence of eosinophilic colitis despite history of eosinophilic esophagitis and peripheral eosinophilia in the past, continue PPI and miralax  8/27/15 [facility] note, abdominal  distention bowel visit, previous CT scan showed constipation, and colonoscopy random biopsies unremarkable without increased eosinophils, additional labs for parasites, immunoglobulin, thyroid function, tryptase, YOON negative, treated empirically for enteritis/colitis with improvement of symptoms question muscular involvement of GI tract with eosinophils given peripheral eosinophilia along with history of eosinophilic esophagitis and improvement with short course of oral prednisone vs. IBS, trial of linzess 290 mcg daily, followup 3 months  10/21/15 patient written note went to [facility] and diagnosed with bacterial overgrowth, trial of xifaxin with improvement in symptoms   1/7/16 EGD by GI in [place], mucosal changes consistent with esophageal esophagitis, biopsies obtained polyps gastric fundus, small 1 cm hiatal hernia  4/14/16 EGD per [facility] diffuse exudates and edema upper third esophagus, biopsies performed, diffuse exudates and edema lower third esophagus, biopsies performed  8/8/16 on neocate judy one can per day 1800 vera and drinks water three times a day   9/16/16 on azathioprine 50 mg 2 per day per [facility]  9/23/16 patient spoke to [facility] GI 's assistant raman hayes, stop azathioprine and start prednisone 20 mg daily, consider methotrexate  11/16/16 apparently has been on prednisone for one year from [place] and on cellcept, will order dexa  2/21/17  [facility] note failed azathioprine due to hair loss and off mycophenolate, now on prednisone 20 mg daily, try to obtain approval for mepolizumab 100 mg q month  3/24/17  [facility] note, discuss prednisone withdrawal, when she is treated with mepolizumab wait 2 weeks then reduce prednisone to 17.5 mg, then in 2 weeks with second injection mepolizumab will reduce prednisone to 15 mg, and 2 weeks after that to 12.5 mg, and in 2 weeks 10 mg  4/4/17 nucala 100 mg sc administered first dose  6/11/17  plasma epinephrine, norepinephrine, dopamine, chromogranin A negative done at [facility]  6/12/17 EGD at [facility] diffuse mild mucosal changes upper third esophagus, middle third and lower third esophagus, biopsies performed, normal stomach  7/21/17 IVIG 0.4 g/kg x 3 days then 0.4g/kg q week x 5 weeks per [facility]   8/16/17 IVIG 0.4 g/kg x 3 days then 0.4g/kg q week per [facility]   9/12/17 [facility] note completed 5 week course of IVIG will take a break from IVIG and continue prednisone 4 mg  9/14/17 [facility]  phone note, needs smart pill  9/26/17 on prednisone 4 mg daily and 20 mg qsunday and on nucala, tried IVIG   10/25/17 on prednisone 9 mg qday and on nucala, tried IVIG  11/8/17 [facility]  phone note smart pill results dysmotility in the stomach and colon so IVIG is necessary, need another smart pill after 12 weeks to see motility is improved, need to dose prednisone 40 mg one day before and one day after the IVIG also need to check IgA levels periodically, order sent  11/20/17 per [facility] IVIG 0.4 g/kg x 12 weeks no premedication with solumedrol, IgA level prior to each infusion, remains on prednisone 9 mg qday and nucala per [facility]   12/5/17 IgA 49  2/2/18  [facility] dermatology note, we will try to obtain authorization approval for mepolizumab for eosinophilia  4/20/18 EGD  [facility] GI procedure note up with her esophagus normal, biopsies performed, diffuse moderate mucosal changes congestion, discoloration, distal esophagus biopsies performed, stomach normal, resume omeprazole 40 mg if tolerated, if not start ranitidine 150 mg bid  4/20/18 CT angiogram abdomen with contrast, celiac trunk normal, superior mesenteric artery normal, no evidence of median arcuate ligament syndrome  5/19/18 MRI abdomen with and without; 2 left celiac ganglia upper ganglia  and left of celiac trunk at its origin from the abdominal aorta 3 x 8 mm, second ganglion of her anterior left diaphragmatic sylvester adjacent to the aorta about level of the superior mesenteric artery 4 x 18 mm, 2 right celiac ganglia upper ganglia right diaphragmatic sylvester anterior to the inferior vena cava level of celiac trunk, 3 x 9 mm, second right ganglion between right diaphragmatic sylvester and inferior vena cava measuring 3 x 19 mm  4/20/18  [facility] GI procedure note ED upper third of esophagus normal, diffuse moderate mucosal changes, biopsies performed, duodenal normal, pathology duodenal mucosa, gastric antrum and fundus normal mucosa, esophageal squamous mucosa with intraepithelial eosinophils  7/18 off nucala due to melanoma and still on IVIG  10/20/18 IgG subclass 1-876, IgG subclass 2-472, IgG subclass 3-70, IgG subclass 4-14  5/6/19 per patient  [facility] will taper prednisone by 1 mg every 2 weeks and recommends start linzess 72 mcg daily     Constipation  3/16/15 CT abdomen pelvis with; large amount colonics stool     Coronary artery fistula  Sees [facility] last report I have in 2000, apparently worked up in [facility] asymptomatic     Current chronic steroids  11/15 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 per [facility]  11/22/16 dexa LS -0.3,hip -0.4; FRAX 3.0% major,0.1% hip on prednisone 20 mg   12/11/16 reclast infusion  12/12/16 reaction to reclast seen ER, patient believes she can try this medication again however  10/25/17 on prednisone 9 mg qday and on nucala, tried IVIG  10/16/18 on prednisone 4 mg  5/6/19 per patient  [facility] will taper prednisone by 1 mg every 2 weeks and recommends start linzess 72 mcg daily     Dyslipidemia  2/11 chol 213,trig 102,hdl 58,ldl 135  3/12 chol 239,trig 122,hdl 60,ldl 155  7/13 chol 230,trig 121,hdl 54,ldl 152  1/7/14 chol 219,trig 139,hdl 60,ldl 131  9/18/15 chol 239,trig 162,hdl 63,ldl 144,crp  0.7  9/28/17 chol 213,trig 81,hdl 76,ldl 121  10/22/18 chol 240,trig 129,hdl 81,ldl 133     Gastrointestinal dysmotility  2/9/15 wbc 5.6 with 20% eosinophils  12/19/15 gastroenterology evaluation [facility] eosinophil area may be more related to autoimmune process, question whether this is allergic phenomena related to connective tissue disorder and elevated levels of transforming growth factor beta that will also recruit eosinophils, will repeat serologies, biopsy for direct immunofluorescence and MBP to evaluate eosinophilic esophagitis, nutritional consultation, recheck immunoglobulins as well, check MRI brain  12/19/15 iron 84,TIBC 324,transferrin 26,tsh 2.8,vit d 28,vitamin A levels normal, b12 418,vitamin b6 58 normal, alpha and gamma tocopherol normal,vitamin k normal, wbc 6.3 (9% eosinophils),IgG,IgA,IgM normal,IgG sublass 4 and IgE normal,SPEP negative, tTg IgA normal,ESR 3,crp 0.9,copper normal,selenium normal,zinc 46 (),folate and b1 normal,YOON 1:160 homogenous,anti-scl 70 negative, centromere Ab negative, TH/TO Ab negative, RNA plymerase III negative, U1-RNP Ab negative,fibrillarin U3 negative, PM/Scl negative,C3 and C4 negative, RG 13, CCP 4   2/10/16 [facility] nutrition consultation estimated needs [id number] kcal/day,  3/27/15 colon per [facility] tubular adenomas x2, negative for colitis  4/16/15 [facility] note CBC peripheral eosinophilia 21%, constipation by CT scan, colonoscopy unremarkable, biopsies negative, check YOON, immunoglobulins, tryptase, stool giardia antigen  4/16/15 wbc 3.9 with 5.7% eosinophils   5/28/15 [facility] note followup bloating and constipation, repeat labs WBC 3.9, normal eosinophils, TSH, YOON, immunoglobulins, stool for ova and parasites negative, tryptase negative, previous CT and colonoscopy negative, no evidence of eosinophilic colitis despite history of eosinophilic esophagitis and peripheral eosinophilia in the past, continue PPI and miralax  8/27/15 [facility] note, abdominal  distention bowel visit, previous CT scan showed constipation, and colonoscopy random biopsies unremarkable without increased eosinophils, additional labs for parasites, immunoglobulin, thyroid function, tryptase, YOON negative, treated empirically for enteritis/colitis with improvement of symptoms question muscular involvement of GI tract with eosinophils given peripheral eosinophilia along with history of eosinophilic esophagitis and improvement with short course of oral prednisone vs. IBS, trial of linzess 290 mcg daily, followup 3 months  10/21/15 patient written note went to [facility] and diagnosed with bacterial overgrowth, trial of xifaxin with improvement in symptoms   1/7/16 EGD by GI in [place], mucosal changes consistent with esophageal esophagitis, biopsies obtained polyps gastric fundus, small 1 cm hiatal hernia  4/14/16 EGD per [facility] diffuse exudates and edema upper third esophagus, biopsies performed, diffuse exudates and edema lower third esophagus, biopsies performed  8/8/16 on neocate judy one can per day 1800 vera and drinks water three times a day   9/16/16 on azathioprine 50 mg 2 per day per [facility]  9/23/16 patient spoke to [facility] GI 's assistant raman hayes, stop azathioprine and start prednisone 20 mg daily, consider methotrexate  11/16/16 apparently has been on prednisone for one year from [place] and on cellcept, will order dexa  2/21/17  [facility] note failed azathioprine due to hair loss and off mycophenolate, now on prednisone 20 mg daily, try to obtain approval for mepolizumab 100 mg q month  3/24/17  [facility] note, discuss prednisone withdrawal, when she is treated with mepolizumab wait 2 weeks then reduce prednisone to 17.5 mg, then in 2 weeks with second injection mepolizumab will reduce prednisone to 15 mg, and 2 weeks after that to 12.5 mg, and in 2 weeks 10 mg  4/4/17 nucala 100 mg sc administered first dose  6/11/17  plasma epinephrine, norepinephrine, dopamine, chromogranin A negative done at [facility]  6/12/17 EGD at [facility] diffuse mild mucosal changes upper third esophagus, middle third and lower third esophagus, biopsies performed, normal stomach  7/21/17 IVIG 0.4 g/kg x 3 days then 0.4g/kg q week x 5 weeks per [facility]   8/16/17 IVIG 0.4 g/kg x 3 days then 0.4g/kg q week per [facility]   9/12/17 [facility] note completed 5 week course of IVIG will take a break from IVIG and continue prednisone 4 mg  9/14/17 [facility]  phone note, needs smart pill  9/26/17 on prednisone 4 mg daily and 20 mg qsunday and on nucala, tried IVIG   10/25/17 on prednisone 9 mg qday and on nucala, tried IVIG  11/8/17 [facility]  phone note smart pill results dysmotility in the stomach and colon so IVIG is necessary, need another smart pill after 12 weeks to see motility is improved, need to dose prednisone 40 mg one day before and one day after the IVIG also need to check IgA levels periodically, order sent  11/20/17 per [facility] IVIG 0.4 g/kg x 12 weeks no premedication with solumedrol, IgA level prior to each infusion, remains on prednisone 9 mg qday and nucala per [facility]   2/2/18  [facility] dermatology note, we will try to obtain authorization approval for mepolizumab for eosinophilia  4/20/18 EGD  [facility] GI procedure note up with her esophagus normal, biopsies performed, diffuse moderate mucosal changes congestion, discoloration, distal esophagus biopsies performed, stomach normal, resume omeprazole 40 mg if tolerated, if not start ranitidine 150 mg bid  4/20/18 CT angiogram abdomen with contrast, celiac trunk normal, superior mesenteric artery normal, no evidence of median arcuate ligament syndrome  5/19/18 MRI abdomen with and without; 2 left celiac ganglia upper ganglia and left of celiac  trunk at its origin from the abdominal aorta 3 x 8 mm, second ganglion of her anterior left diaphragmatic sylvester adjacent to the aorta about level of the superior mesenteric artery 4 x 18 mm, 2 right celiac ganglia upper ganglia right diaphragmatic sylvester anterior to the inferior vena cava level of celiac trunk, 3 x 9 mm, second right ganglion between right diaphragmatic sylvester and inferior vena cava measuring 3 x 19 mm  4/20/18  [facility] GI procedure note ED upper third of esophagus normal, diffuse moderate mucosal changes, biopsies performed, duodenal normal, pathology duodenal mucosa, gastric antrum and fundus normal mucosa, esophageal squamous mucosa with intraepithelial eosinophils  7/18 off nucala due to melanoma and still on IVIG  8/31/18  [facility] letter patient suffers from unclear autoimmune disease with diffuse eosinophilia, poor GI motility, requires neocate jr to sustain weight, and remain off TPN, chocolate flavored, 36 cans/month  10/16/18 on IVIG and prednisone 9 mg per  [facility]  11/21/18 current supplement changing to now include fiber which she cannot have, change to elicare judy vanilla flavor 36 cans per 30 days  5/6/19 per patient  [facility] will taper prednisone by 1 mg every 2 weeks and recommends start linzess 72 mcg daily     Eosinophilic esophagitis  5/13 EGD per [facility] eosinophils, started on prilosec 20 mg  6/6/13 EGD per [facility] marked esophageal eosinophilia, consistent with likely eosinophilic esophagitis, trial PPI x8 weeks, if symptoms persist consider trial swallowed fluticasone x 6 weeks.  1/8/14 trial of swallowed fluticsone 220 mcg two puffs bid  7/28/14 flare up after eating sushi, changed to pulmicort respules 1 mg bid with splenda and prilosec bid  8/28/14 EGD per [facility] benign stricture 13 mm that appeared 38 cm from the incisors, compatible with eosinophilic esophagitis, savary dilation successful,  biopsies benign squamous mucosa also negative for eosinophilic esophagitis, no intestinal metaplasia, dysplasia or malignancy continue omeprazole bid and swallowed fluticasone  2/9/15 wbc 5.6 with 20% eosinophils  3/27/15 colon per [facility] tubular adenomas x2, negative for colitis  4/16/15 [facility] note CBC peripheral eosinophilia 21%, constipation by CT scan, colonoscopy unremarkable, biopsies negative, will check YOON, immunoglobulins, tryptase, stool giardia antigen  4/16/15 wbc 3.9 with 5.7% eosinophils    5/28/15 [facility] note followup bloating and constipation, repeat labs WBC 3.9, normal eosinophils, TSH, YOON, immunoglobulins, stool for ova and parasites negative, tryptase negative, previous CT and colonoscopy negative, no evidence of eosinophilic colitis despite history of eosinophilic esophagitis and peripheral eosinophilia in the past, continue PPI and MiraLAX  8/27/15 [facility] note, abdominal distention bowel visit, previous CT scan showed constipation, and colonoscopy random biopsies unremarkable without increased eosinophils, additional labs for parasites, immunoglobulin, thyroid function, tryptase, YOON negative, treated empirically for enteritis/colitis with improvement of symptoms,question muscular involvement of GI tract with eosinophils given peripheral eosinophilia along with history of eosinophilic esophagitis and improvement with short course of oral prednisone vs. IBS, trial of linzess 290 mcg daily, followup 3 months  10/21/15 patient written note went to [facility] and diagnosed with bacterial overgrowth, trial of xifaxin with improvement in symptoms    16 EGD per [facility] diffuse exudates and edema upper third esophagus, biopsies performed, diffuse exudates and edema lower third esophagus, biopsies performed  16 on  judy one can per day 1800 vera and drinks water three times a day   17 EGD at [facility] diffuse mild mucosal changes upper third esophagus, middle third and lower third  esophagus, biopsies performed, normal stomach  7/21/17 IVIG 0.4 g/kg x 3 days then 0.4g/kg q week x 5 weeks per [facility]   8/16/17 IVIG 0.4 g/kg x 3 days then 0.4g/kg q week per [facility] premedicated with 250 mg solumedrol  9/26/17 on prednisone 4 mg daily and 20 mg qsunday and on nucala, tried IVIG   10/25/17 on prednisone 9 mg qday and on nucala, tried IVIG  11/20/17 per [facility] IVIG 0.4 g/kg x 12 weeks no premedication with solumedrol, IgA level prior to each infusion, remains on prednisone 9 mg qday and nucala per [facility]   12/5/17 IgA 49  2/2/18  [facility] dermatology note, we will try to obtain authorization approval for mepolizumab for eosinophilia  4/20/18 EGD  [facility] GI procedure note up with her esophagus normal, biopsies performed, diffuse moderate mucosal changes congestion, discoloration, distal esophagus biopsies performed, stomach normal, resume omeprazole 40 mg if tolerated, if not start ranitidine 150 mg bid  4/20/18 CT angiogram abdomen with contrast, celiac trunk normal, superior mesenteric artery normal, no evidence of median arcuate ligament syndrome  5/19/18 MRI abdomen with and without; 2 left celiac ganglia upper ganglia and left of celiac trunk at its origin from the abdominal aorta 3 x 8 mm, second ganglion of her anterior left diaphragmatic sylvester adjacent to the aorta about level of the superior mesenteric artery 4 x 18 mm, 2 right celiac ganglia upper ganglia right diaphragmatic sylvester anterior to the inferior vena cava level of celiac trunk, 3 x 9 mm, second right ganglion between right diaphragmatic sylvester and inferior vena cava measuring 3 x 19 mm  4/20/18  [facility] GI procedure note ED upper third of esophagus normal, diffuse moderate mucosal changes, biopsies performed, duodenal normal, pathology duodenal mucosa, gastric antrum and fundus normal mucosa, esophageal squamous  mucosa with intraepithelial eosinophils  7/18 off nucala due to melanoma and still on IVIG  8/31/18  [facility] letter patient suffers from unclear autoimmune disease with diffuse eosinophilia, poor GI motility, requires neocate jr to sustain weight, and remain off TPN, chocolate flavored, 36 cans/month  10/16/18 on IVIG and prednisone 9 mg per  [facility]  11/21/18 current supplement changing to now include fiber which she cannot have, change to elicare judy vanilla flavor 36 cans per 30 days  5/6/19 per patient  [facility] will taper prednisone by 1 mg every 2 weeks and recommends start linzess 72 mcg daily     Hypothyroid  2/25/09 tsh 0.019, levothyroxine adjusted to 0.137 mcg sat,sund and levoxyl 0.125 mcg M-F needs repeat  9/09 tsh 0.033 change tolevothyroxine 125 mcg daily repeat in 6 weeks  11/09 tsh 0.032, change to levothyroxine 112 mcg repeat in 6 weeks  1/10 tsh 0.03, change to levothyroxine 100 mcg  10/10 tsh 3.0 cont levothyroxine 100 mcg  11/10 change to levothyroxine 112 mcg repeat tsh 6 weeks; 11/18/10 pt did not change dose, still on 100 mcg  2/11 tsh 0.9 cont on levothyroxine 100 mcg  12/30/11 tsh 0.7, on levothyroxine 112 mcg, decreased to 100 mcg  3/12 tsh 0.25 on levothyroxine 100 mcg; decrease to 88 mcg  7/12/12 tsh 1.4,free t4 0.79, free t3 2.1; on levothyroxine 88 mcg  7/13 tsh 1.2 on levothyroxine 88 mcg  1/7/14 tsh 0.7, free t4 0.8, free t3,on levothyroxine 88 mcg  2/9/15 tsh 5.7 on levothyroxine 88 mcg,increase to 100 mcg and repeat tsh in 6 weeks  4/16/15 tsh 0.8 on levothyroxine 100 mcg  9/18/15 tsh 5.3 on levothyroxine 100 mcg, increase to 112 mcg repeat tsh in 6 weeks  12/3/15 tsh 0.7 on levothyroxine 112 mcg  12/2/16 tsh 0.15 on levothyroxine 112 mcg daily, change to 112 mcg take six days per week, repeat tsh in 6 weeks  4/14/17 tsh 1.0 on levothyroxine 112 mcg six days per week  9/28/17 tsh 2.1 on levothyroxine 112 mcg six  days per week  10/22/18 tsh 2.3 on levothyroxine 112 mcg six days per week     Impaired oral feeding  8/31/18  [facility] letter patient suffers from unclear autoimmune disease with diffuse eosinophilia, poor GI motility, requires neocate jr to sustain weight, and remain off TPN, chocolate flavored, 36 cans/month  8/31/18  [facility] letter patient suffers from unclear autoimmune disease with diffuse eosinophilia, poor GI motility, requires neocate jr to sustain weight, and remain off TPN, chocolate flavored, 36 cans/month     melanoma  3/11 derm note  malignant melanoma honey level III, 0.65 mm.  8/12 [facility] derm note h/o melanoma amelanotic variant, stage 1b 0.7 mm in depth left neck 5/11; no recurrence follow up one year  2013 sees  once a year and [facility] once per year  3/16/15 CT abdomen pelvis with; large amount colonics stool  6/4/18 ultrasound soft tissue neck left preauricular region 8.2 x 6.7 x 5.4 mm cystic lesion superficial aspect parotid, radiology recommended CT or MRI  7/9/18  ENT left neck biopsy pathology report amelanotic melanoma positive for malignancy poorly differentiated tumor  7/23/18 CT/PET new left parotid lesion which may represent primary or melanoma metastases, no other hypermetabolic lesions identified  7/27/18  [facility] ENT note amelanotic melanoma concerning for kellie disease or direct skin extension, analyzed outside tissue histopathology, reviewed imaging studies, tumor board presentation, 1 week of augmentin, scheduled for left selective neck dissection  7/18 off nucala due to melanoma and still on IVIG  8/3/18  ENT [facility] operative note left inferior parotidectomy, left upper neck dissection with facial nerve monitoring  8/21/18 [email or web address] [facility] oncology note; 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 status post left upper neck excision and dissection  8/3/18; final pathologic diagnosis left upper skin and parotid tail excision benign, lymph nodes left 0/23, defer adjuvant immunotherapy given her underlying autoimmune disease chance of remaining in remission without adjuvant therapy 30%, may decrease to 15% with adjuvant therapy however increased risk for immune related complications, recommend ultrasound neck by oncology every 3 months, CT/PET scan every 6 months for 2 years and annually, schedule appointment with [facility] melanoma clinic annually  10/8/18  oncology note defect SDHA gene not candidate for adjuvant chemotherapy, plan active surveillance ultrasound neck every 6 months alternating with CT/PET scan every 6 months  10/20/18 ultrasound soft tissue neck 9 x 5 x 4 mm left neck lymph node  3/15/19 CT/PET whole body negative for metastatic disease  9/27/19 ultrasound soft tissue neck multiple cervical lymph nodes largest 11 x 9 x 3 mm, not significantly enlarged, previously measuring 9 x 5 x 4 mm    neutropenia  2/18/11 wbc 4.5  7/9/13 wbc 4.3  1/7/14 wbc 4.6  4/16/15 wbc 3.9 (48%N,34%L)  4/5/16 wbc 3.4 (51%N,28%L)  7/26/16 wbc 6.0  1/9/17 wbc 7.0  9/27/17 wbc 3.8  10/21/18 wbc 3.7 (67%N,21%L)     Polycystic ovary  Sees gyn no old records  2010  started on ocella  5/31/11 pap negative  4/26/17 pap     Preventative health  2/3/15 tdap  2/3/15 hep b first in series  3/27/15 colon per [facility] tubular adenomas x2, negative for colitis  11/23/16 pneumovax  4/26/17 pap  9/22/17 quantiferon gold negative, hep B Ab positive, MMR titers positive, varicella IgG positive  10/22/18 vit d 42  11/26/18 dexa LS+0.7,hip -0.4 (10 year risk 3.1% major, 0.1% hip)  11/27/18 mammogram      Sjogren's  Uses restasis  3/10 optho eval  [id number]  11/11 saw  ductal plugs inserted  1/12 vigamox prn ophthalmic  3/14  eye duct surgery on restasis  6/12/17 EGD at [facility] diffuse mild mucosal changes upper third esophagus, middle third and  lower third esophagus, biopsies performed, normal stomach               Patient Active Problem List   Diagnosis   • Hypothyroid   • Coronary artery fistula   • Polycystic ovary disease   • Anxiety   • Sjogren's disease (HCC)   • Preventative health care   • Dyslipidemia   • Melanoma (HCC)   • Eosinophilic esophagitis   • Constipation   • Autonomic neuropathy   • Churg-Homer syndrome (HCC)   • Current chronic use of systemic steroids   • Biallelic mutation of SDHA gene   • Gastrointestinal dysmotility   • Impaired oral feeding   • Neutropenia (HCC)     Depression Screening    Little interest or pleasure in doing things?  0 - not at all  Feeling down, depressed , or hopeless? 0 - not at all  Patient Health Questionnaire Score: 0          Health Maintenance Summary                MAMMOGRAM Next Due 11/26/2019      Done 11/26/2018 MA-SCREENING MAMMO BILAT W/TOMOSYNTHESIS W/CAD     Patient has more history with this topic...    COLONOSCOPY Next Due 3/27/2020      Done 3/27/2015 AMB REFERRAL TO GI FOR COLONOSCOPY    PAP SMEAR Next Due 4/24/2020      Done 4/24/2017 PATHOLOGY GYN SPECIMEN     Patient has more history with this topic...    IMM DTaP/Tdap/Td Vaccine Next Due 2/3/2025      Done 2/3/2015 Imm Admin: Tdap Vaccine     Patient has more history with this topic...          Patient Care Team:  Zohaib Soares M.D. as PCP - General    ROS       Objective:          Physical Exam  Vitals signs and nursing note reviewed.   Constitutional:       General: She is not in acute distress.     Appearance: Normal appearance.   HENT:      Head: Normocephalic and atraumatic.      Mouth/Throat:      Pharynx: No oropharyngeal exudate.   Eyes:      General: No scleral icterus.  Cardiovascular:      Rate and Rhythm: Normal rate and regular rhythm.   Pulmonary:      Effort: Pulmonary effort is normal.      Breath sounds: Normal breath sounds.   Abdominal:      General: Abdomen is flat. There is no distension.      Palpations: There is  no mass.   Skin:     General: Skin is warm.   Neurological:      Mental Status: She is alert.   Psychiatric:         Mood and Affect: Mood normal.         Behavior: Behavior normal.       Neck no adenopathy, no supraclavicular adenopathy          Assessment/Plan:     Assessment  #!  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 Evans, currently tapered off prednisone remains on IVIG every other week.  Her symptoms are severe enough such that the only oral intake she can tolerate without exacerbating abdominal pain, bloating, gas, flatulence, belching, in order to maintain her nutritional status and weight is EliCare Laith vanilla flavor 36 cans per 30 days enteral supplementation which she takes orally, she also remains on IVIG every 2 weeks 0.4 mg/kg per GI recommendation.    #2 history of melanoma followed by oncology, status post left inferior parotidectomy, left upper neck dissection in 2018 by [facility] ENT, seen by [facility] oncology, and followed locally by oncology Dr. Torres with alternating ultrasound soft tissue neck and CT/PET every 6 months, last CT PET in March, last ultrasound soft tissue neck September showing no evidence of recurrence or metastatic disease.    #3 history of prednisone therapy currently off that medication having been tapered off by GI, no worsening of gastrointestinal dysmotility symptoms related to Churg-Homer off prednisone as long as she maintains on IVIG    #4 hypothyroid stable on replacement levothyroxine 112 mcg daily    #5 anxiety stable on Effexor    Plan  #! Fax order to [employer] indicating the necessity for EleCare Laith vanilla flavor enteral supplement 36 cans per 30 days to be taken orally secondary to severe gastrointestinal dysmotility as she is unable to tolerate any other food or oral supplemental nutrition other than this formula.  Any other food or supplement she has tried will  cause significant GI dysmotility, abdominal pain, nausea, belching, gas, increasing her risk for small bowel obstruction.  We are requesting this for 1 year    #2 IVIG for Churg-Homer every other week 0.4mg/kg doing well on monotherapy with this off prednisone, with symptoms stable and controlled on this current regimen, she needs to be on this for the next year all    #3 hepatitis A second in series, hepatitis B second in series    #4 mammogram    #5 recheck labs including IgG subclasses    #6 follow up 6 months

## 2019-12-08 ENCOUNTER — OUTPATIENT INFUSION SERVICES (OUTPATIENT)
Dept: ONCOLOGY | Facility: MEDICAL CENTER | Age: 44
End: 2019-12-08
Attending: INTERNAL MEDICINE
Payer: COMMERCIAL

## 2019-12-08 VITALS
TEMPERATURE: 97.1 F | HEIGHT: 66 IN | WEIGHT: 139.99 LBS | OXYGEN SATURATION: 100 % | BODY MASS INDEX: 22.5 KG/M2 | DIASTOLIC BLOOD PRESSURE: 67 MMHG | RESPIRATION RATE: 18 BRPM | HEART RATE: 68 BPM | SYSTOLIC BLOOD PRESSURE: 114 MMHG

## 2019-12-08 PROCEDURE — 700111 HCHG RX REV CODE 636 W/ 250 OVERRIDE (IP): Performed by: INTERNAL MEDICINE

## 2019-12-08 PROCEDURE — 96366 THER/PROPH/DIAG IV INF ADDON: CPT

## 2019-12-08 PROCEDURE — 96365 THER/PROPH/DIAG IV INF INIT: CPT

## 2019-12-08 RX ORDER — IMMUNE GLOBULIN INFUSION (HUMAN) 100 MG/ML
5 INJECTION, SOLUTION INTRAVENOUS; SUBCUTANEOUS ONCE
Status: COMPLETED | OUTPATIENT
Start: 2019-12-08 | End: 2019-12-08

## 2019-12-08 RX ORDER — IMMUNE GLOBULIN INFUSION (HUMAN) 100 MG/ML
20 INJECTION, SOLUTION INTRAVENOUS; SUBCUTANEOUS ONCE
Status: COMPLETED | OUTPATIENT
Start: 2019-12-08 | End: 2019-12-08

## 2019-12-08 RX ADMIN — IMMUNE GLOBULIN INFUSION (HUMAN) 20 G: 100 INJECTION, SOLUTION INTRAVENOUS; SUBCUTANEOUS at 09:01

## 2019-12-08 RX ADMIN — IMMUNE GLOBULIN INFUSION (HUMAN) 5 G: 100 INJECTION, SOLUTION INTRAVENOUS; SUBCUTANEOUS at 11:50

## 2019-12-08 NOTE — PROGRESS NOTES
Pt arrived to [facility] ambulatory, here for monthly IVIG. IV access established in LAC. IVIG infused using rate calculator in pt's chart. Max rate to 120 ml/hr. Infusion completed w/ no adverse reactions. PIV flushed and removed, gauze and coban dressing applied.  Pt left on foot in NAD. Pt's next appt made prior to d/c.

## 2019-12-14 ENCOUNTER — HOSPITAL ENCOUNTER (OUTPATIENT)
Dept: LAB | Facility: MEDICAL CENTER | Age: 44
End: 2019-12-14
Attending: INTERNAL MEDICINE
Payer: COMMERCIAL

## 2019-12-14 DIAGNOSIS — Z00.00 PREVENTATIVE HEALTH CARE: Chronic | ICD-10-CM

## 2019-12-14 DIAGNOSIS — E03.9 HYPOTHYROIDISM, UNSPECIFIED TYPE: Chronic | ICD-10-CM

## 2019-12-14 LAB
25(OH)D3 SERPL-MCNC: 29 NG/ML (ref 30–100)
ALBUMIN SERPL BCP-MCNC: 4.2 G/DL (ref 3.2–4.9)
ALBUMIN/GLOB SERPL: 1.1 G/DL
ALP SERPL-CCNC: 54 U/L (ref 30–99)
ALT SERPL-CCNC: 17 U/L (ref 2–50)
ANION GAP SERPL CALC-SCNC: 8 MMOL/L (ref 0–11.9)
AST SERPL-CCNC: 27 U/L (ref 12–45)
BASOPHILS # BLD AUTO: 2.6 % (ref 0–1.8)
BASOPHILS # BLD: 0.07 K/UL (ref 0–0.12)
BILIRUB SERPL-MCNC: 0.4 MG/DL (ref 0.1–1.5)
BUN SERPL-MCNC: 13 MG/DL (ref 8–22)
CALCIUM SERPL-MCNC: 9.8 MG/DL (ref 8.5–10.5)
CHLORIDE SERPL-SCNC: 106 MMOL/L (ref 96–112)
CHOLEST SERPL-MCNC: 227 MG/DL (ref 100–199)
CO2 SERPL-SCNC: 24 MMOL/L (ref 20–33)
CREAT SERPL-MCNC: 0.78 MG/DL (ref 0.5–1.4)
EOSINOPHIL # BLD AUTO: 0.2 K/UL (ref 0–0.51)
EOSINOPHIL NFR BLD: 7.5 % (ref 0–6.9)
ERYTHROCYTE [DISTWIDTH] IN BLOOD BY AUTOMATED COUNT: 46.3 FL (ref 35.9–50)
GLOBULIN SER CALC-MCNC: 3.8 G/DL (ref 1.9–3.5)
GLUCOSE SERPL-MCNC: 82 MG/DL (ref 65–99)
HCT VFR BLD AUTO: 45.5 % (ref 37–47)
HDLC SERPL-MCNC: 64 MG/DL
HGB BLD-MCNC: 15.2 G/DL (ref 12–16)
IMM GRANULOCYTES # BLD AUTO: 0.01 K/UL (ref 0–0.11)
IMM GRANULOCYTES NFR BLD AUTO: 0.4 % (ref 0–0.9)
LDLC SERPL CALC-MCNC: 127 MG/DL
LYMPHOCYTES # BLD AUTO: 0.88 K/UL (ref 1–4.8)
LYMPHOCYTES NFR BLD: 33.1 % (ref 22–41)
MCH RBC QN AUTO: 31.9 PG (ref 27–33)
MCHC RBC AUTO-ENTMCNC: 33.4 G/DL (ref 33.6–35)
MCV RBC AUTO: 95.4 FL (ref 81.4–97.8)
MONOCYTES # BLD AUTO: 0.33 K/UL (ref 0–0.85)
MONOCYTES NFR BLD AUTO: 12.4 % (ref 0–13.4)
NEUTROPHILS # BLD AUTO: 1.17 K/UL (ref 2–7.15)
NEUTROPHILS NFR BLD: 44 % (ref 44–72)
NRBC # BLD AUTO: 0 K/UL
NRBC BLD-RTO: 0 /100 WBC
PLATELET # BLD AUTO: 247 K/UL (ref 164–446)
PMV BLD AUTO: 11.8 FL (ref 9–12.9)
POTASSIUM SERPL-SCNC: 4.1 MMOL/L (ref 3.6–5.5)
PROT SERPL-MCNC: 8 G/DL (ref 6–8.2)
RBC # BLD AUTO: 4.77 M/UL (ref 4.2–5.4)
SODIUM SERPL-SCNC: 138 MMOL/L (ref 135–145)
TRIGL SERPL-MCNC: 181 MG/DL (ref 0–149)
TSH SERPL DL<=0.005 MIU/L-ACNC: 4.41 UIU/ML (ref 0.38–5.33)
WBC # BLD AUTO: 2.7 K/UL (ref 4.8–10.8)

## 2019-12-14 PROCEDURE — 36415 COLL VENOUS BLD VENIPUNCTURE: CPT

## 2019-12-14 PROCEDURE — 83036 HEMOGLOBIN GLYCOSYLATED A1C: CPT

## 2019-12-14 PROCEDURE — 82784 ASSAY IGA/IGD/IGG/IGM EACH: CPT

## 2019-12-14 PROCEDURE — 82306 VITAMIN D 25 HYDROXY: CPT

## 2019-12-14 PROCEDURE — 82785 ASSAY OF IGE: CPT

## 2019-12-14 PROCEDURE — 80061 LIPID PANEL: CPT

## 2019-12-14 PROCEDURE — 80053 COMPREHEN METABOLIC PANEL: CPT

## 2019-12-14 PROCEDURE — 84443 ASSAY THYROID STIM HORMONE: CPT

## 2019-12-14 PROCEDURE — 85025 COMPLETE CBC W/AUTO DIFF WBC: CPT

## 2019-12-14 RX ORDER — LEVOTHYROXINE SODIUM 112 UG/1
TABLET ORAL
Qty: 90 TAB | Refills: 2 | Status: SHIPPED | OUTPATIENT
Start: 2019-12-14 | End: 2020-09-01

## 2019-12-16 LAB
IGA SERPL-MCNC: 51 MG/DL (ref 68–408)
IGE SERPL-ACNC: 6 KU/L
IGG SERPL-MCNC: 1920 MG/DL (ref 768–1632)
IGM SERPL-MCNC: 97 MG/DL (ref 35–263)

## 2019-12-17 LAB
EST. AVERAGE GLUCOSE BLD GHB EST-MCNC: 100 MG/DL
HBA1C MFR BLD: 5.1 % (ref 0–5.6)

## 2019-12-18 DIAGNOSIS — D70.9 NEUTROPENIA, UNSPECIFIED TYPE (HCC): ICD-10-CM

## 2019-12-22 ENCOUNTER — OUTPATIENT INFUSION SERVICES (OUTPATIENT)
Dept: ONCOLOGY | Facility: MEDICAL CENTER | Age: 44
End: 2019-12-22
Attending: INTERNAL MEDICINE
Payer: COMMERCIAL

## 2019-12-22 VITALS
RESPIRATION RATE: 18 BRPM | HEART RATE: 65 BPM | WEIGHT: 138.89 LBS | TEMPERATURE: 97.4 F | OXYGEN SATURATION: 100 % | BODY MASS INDEX: 22.32 KG/M2 | SYSTOLIC BLOOD PRESSURE: 119 MMHG | HEIGHT: 66 IN | DIASTOLIC BLOOD PRESSURE: 61 MMHG

## 2019-12-22 PROCEDURE — 96366 THER/PROPH/DIAG IV INF ADDON: CPT

## 2019-12-22 PROCEDURE — 96365 THER/PROPH/DIAG IV INF INIT: CPT

## 2019-12-22 PROCEDURE — 700111 HCHG RX REV CODE 636 W/ 250 OVERRIDE (IP): Performed by: INTERNAL MEDICINE

## 2019-12-22 PROCEDURE — 306780 HCHG STAT FOR TRANSFUSION PER CASE

## 2019-12-22 RX ORDER — IMMUNE GLOBULIN INFUSION (HUMAN) 100 MG/ML
20 INJECTION, SOLUTION INTRAVENOUS; SUBCUTANEOUS ONCE
Status: COMPLETED | OUTPATIENT
Start: 2019-12-22 | End: 2019-12-22

## 2019-12-22 RX ORDER — IMMUNE GLOBULIN INFUSION (HUMAN) 100 MG/ML
5 INJECTION, SOLUTION INTRAVENOUS; SUBCUTANEOUS ONCE
Status: COMPLETED | OUTPATIENT
Start: 2019-12-22 | End: 2019-12-22

## 2019-12-22 RX ADMIN — IMMUNE GLOBULIN INFUSION (HUMAN) 5 G: 100 INJECTION, SOLUTION INTRAVENOUS; SUBCUTANEOUS at 11:39

## 2019-12-22 RX ADMIN — IMMUNE GLOBULIN INFUSION (HUMAN) 20 G: 100 INJECTION, SOLUTION INTRAVENOUS; SUBCUTANEOUS at 09:03

## 2019-12-22 NOTE — PROGRESS NOTES
IVIG completed. Patient likes to rest after completion of infusion. Patient is resting comfortably. Call light in place.

## 2019-12-23 ENCOUNTER — OFFICE VISIT (OUTPATIENT)
Dept: URGENT CARE | Facility: CLINIC | Age: 44
End: 2019-12-23
Payer: COMMERCIAL

## 2019-12-23 VITALS
HEIGHT: 66 IN | OXYGEN SATURATION: 97 % | HEART RATE: 73 BPM | RESPIRATION RATE: 14 BRPM | WEIGHT: 143 LBS | SYSTOLIC BLOOD PRESSURE: 104 MMHG | TEMPERATURE: 97.4 F | DIASTOLIC BLOOD PRESSURE: 68 MMHG | BODY MASS INDEX: 22.98 KG/M2

## 2019-12-23 DIAGNOSIS — H00.011 HORDEOLUM EXTERNUM OF RIGHT UPPER EYELID: ICD-10-CM

## 2019-12-23 DIAGNOSIS — J01.90 ACUTE NON-RECURRENT SINUSITIS, UNSPECIFIED LOCATION: ICD-10-CM

## 2019-12-23 PROCEDURE — 99214 OFFICE O/P EST MOD 30 MIN: CPT | Performed by: FAMILY MEDICINE

## 2019-12-23 RX ORDER — AMOXICILLIN AND CLAVULANATE POTASSIUM 875; 125 MG/1; MG/1
1 TABLET, FILM COATED ORAL 2 TIMES DAILY
Qty: 14 TAB | Refills: 0 | Status: SHIPPED | OUTPATIENT
Start: 2019-12-23 | End: 2019-12-30

## 2019-12-23 NOTE — PROGRESS NOTES
"Subjective:      Megan August is a 43 y.o. female who presents with Sinus Problem            This is a new problem.  43-year-old presenting with 2-week history of sinus congestion and pain.  She gets off-and-on sinus infection and has tried nasal sinus rinse and time but has not improved.  Denies any fever chills.  She recently had a stye on the right upper eyelid where she also had an infection for which she was treated but stye still present even though slightly improved.  No change in vision.  No history of sinus surgeries      Review of Systems   All other systems reviewed and are negative.         Objective:     /68 (BP Location: Right arm, Patient Position: Sitting)   Pulse 73   Temp 36.3 °C (97.4 °F)   Resp 14   Ht 1.664 m (5' 5.5\")   Wt 64.9 kg (143 lb)   SpO2 97%   BMI 23.43 kg/m²      Physical Exam  Constitutional:       General: She is not in acute distress.     Appearance: She is not ill-appearing, toxic-appearing or diaphoretic.   HENT:      Head: Normocephalic and atraumatic.      Right Ear: Tympanic membrane, ear canal and external ear normal.      Left Ear: Tympanic membrane, ear canal and external ear normal.      Nose: Mucosal edema present.      Right Sinus: Maxillary sinus tenderness present.      Left Sinus: Maxillary sinus tenderness present.      Mouth/Throat:      Mouth: Mucous membranes are moist.      Pharynx: Oropharynx is clear. Uvula midline. No pharyngeal swelling, oropharyngeal exudate, posterior oropharyngeal erythema or uvula swelling.   Eyes:      General: No scleral icterus.     Conjunctiva/sclera: Conjunctivae normal.        Comments: Small stye noted on the right upper eyelid, no surrounding erythema.   Neck:      Musculoskeletal: Neck supple. No muscular tenderness.   Cardiovascular:      Rate and Rhythm: Normal rate and regular rhythm.      Heart sounds: No murmur. No friction rub.   Pulmonary:      Effort: Pulmonary effort is normal. No respiratory " distress.      Breath sounds: No stridor. No wheezing, rhonchi or rales.   Lymphadenopathy:      Cervical: No cervical adenopathy.   Skin:     General: Skin is warm.      Coloration: Skin is not jaundiced or pale.   Neurological:      Mental Status: She is alert and oriented to person, place, and time.   Psychiatric:         Mood and Affect: Mood normal.             Assessment/Plan:       1. Acute non-recurrent sinusitis, unspecified location  - amoxicillin-clavulanate (AUGMENTIN) 875-125 MG Tab; Take 1 Tab by mouth 2 times a day for 7 days.  Dispense: 14 Tab; Refill: 0    2. Hordeolum externum of right upper eyelid  - REFERRAL TO OPHTHALMOLOGY  No need for further antibiotics.  No signs of infection in the conjunctivo-.  Patient was referred to specialist.      Continue symptomatic care  Plan per orders and instructions  Warning signs reviewed

## 2020-01-05 ENCOUNTER — OUTPATIENT INFUSION SERVICES (OUTPATIENT)
Dept: ONCOLOGY | Facility: MEDICAL CENTER | Age: 45
End: 2020-01-05
Attending: INTERNAL MEDICINE
Payer: COMMERCIAL

## 2020-01-05 VITALS
HEIGHT: 65 IN | WEIGHT: 133.6 LBS | DIASTOLIC BLOOD PRESSURE: 61 MMHG | BODY MASS INDEX: 22.26 KG/M2 | SYSTOLIC BLOOD PRESSURE: 94 MMHG | RESPIRATION RATE: 18 BRPM | OXYGEN SATURATION: 100 % | TEMPERATURE: 97 F | HEART RATE: 52 BPM

## 2020-01-05 PROCEDURE — 96365 THER/PROPH/DIAG IV INF INIT: CPT

## 2020-01-05 PROCEDURE — 96366 THER/PROPH/DIAG IV INF ADDON: CPT

## 2020-01-05 PROCEDURE — 700111 HCHG RX REV CODE 636 W/ 250 OVERRIDE (IP): Performed by: INTERNAL MEDICINE

## 2020-01-05 RX ORDER — IMMUNE GLOBULIN INFUSION (HUMAN) 100 MG/ML
5 INJECTION, SOLUTION INTRAVENOUS; SUBCUTANEOUS ONCE
Status: COMPLETED | OUTPATIENT
Start: 2020-01-05 | End: 2020-01-05

## 2020-01-05 RX ORDER — IMMUNE GLOBULIN INFUSION (HUMAN) 100 MG/ML
20 INJECTION, SOLUTION INTRAVENOUS; SUBCUTANEOUS ONCE
Status: COMPLETED | OUTPATIENT
Start: 2020-01-05 | End: 2020-01-05

## 2020-01-05 RX ADMIN — IMMUNE GLOBULIN INFUSION (HUMAN) 20 G: 100 INJECTION, SOLUTION INTRAVENOUS; SUBCUTANEOUS at 09:03

## 2020-01-05 RX ADMIN — IMMUNE GLOBULIN INFUSION (HUMAN) 5 G: 100 INJECTION, SOLUTION INTRAVENOUS; SUBCUTANEOUS at 11:42

## 2020-01-19 ENCOUNTER — OFFICE VISIT (OUTPATIENT)
Dept: URGENT CARE | Facility: CLINIC | Age: 45
End: 2020-01-19
Payer: COMMERCIAL

## 2020-01-19 ENCOUNTER — OUTPATIENT INFUSION SERVICES (OUTPATIENT)
Dept: ONCOLOGY | Facility: MEDICAL CENTER | Age: 45
End: 2020-01-19
Attending: INTERNAL MEDICINE
Payer: COMMERCIAL

## 2020-01-19 VITALS
DIASTOLIC BLOOD PRESSURE: 64 MMHG | TEMPERATURE: 98 F | WEIGHT: 138 LBS | BODY MASS INDEX: 22.18 KG/M2 | HEIGHT: 66 IN | SYSTOLIC BLOOD PRESSURE: 124 MMHG | HEART RATE: 65 BPM | RESPIRATION RATE: 16 BRPM | OXYGEN SATURATION: 99 %

## 2020-01-19 VITALS
TEMPERATURE: 97.6 F | DIASTOLIC BLOOD PRESSURE: 78 MMHG | HEIGHT: 65 IN | RESPIRATION RATE: 18 BRPM | HEART RATE: 59 BPM | BODY MASS INDEX: 22.88 KG/M2 | SYSTOLIC BLOOD PRESSURE: 115 MMHG | OXYGEN SATURATION: 100 % | WEIGHT: 137.35 LBS

## 2020-01-19 DIAGNOSIS — J34.89 SINUS PAIN: ICD-10-CM

## 2020-01-19 DIAGNOSIS — B96.89 ACUTE BACTERIAL SINUSITIS: ICD-10-CM

## 2020-01-19 DIAGNOSIS — J01.90 ACUTE BACTERIAL SINUSITIS: ICD-10-CM

## 2020-01-19 PROCEDURE — 99214 OFFICE O/P EST MOD 30 MIN: CPT | Performed by: NURSE PRACTITIONER

## 2020-01-19 PROCEDURE — 306780 HCHG STAT FOR TRANSFUSION PER CASE

## 2020-01-19 PROCEDURE — 96365 THER/PROPH/DIAG IV INF INIT: CPT

## 2020-01-19 PROCEDURE — 96366 THER/PROPH/DIAG IV INF ADDON: CPT

## 2020-01-19 PROCEDURE — 700111 HCHG RX REV CODE 636 W/ 250 OVERRIDE (IP): Performed by: INTERNAL MEDICINE

## 2020-01-19 RX ORDER — AMOXICILLIN AND CLAVULANATE POTASSIUM 875; 125 MG/1; MG/1
1 TABLET, FILM COATED ORAL 2 TIMES DAILY
Qty: 14 TAB | Refills: 0 | Status: SHIPPED | OUTPATIENT
Start: 2020-01-19 | End: 2020-01-26

## 2020-01-19 RX ORDER — IMMUNE GLOBULIN INFUSION (HUMAN) 100 MG/ML
20 INJECTION, SOLUTION INTRAVENOUS; SUBCUTANEOUS ONCE
Status: COMPLETED | OUTPATIENT
Start: 2020-01-19 | End: 2020-01-19

## 2020-01-19 RX ORDER — IMMUNE GLOBULIN INFUSION (HUMAN) 100 MG/ML
5 INJECTION, SOLUTION INTRAVENOUS; SUBCUTANEOUS ONCE
Status: COMPLETED | OUTPATIENT
Start: 2020-01-19 | End: 2020-01-19

## 2020-01-19 RX ADMIN — IMMUNE GLOBULIN INFUSION (HUMAN) 5 G: 100 INJECTION, SOLUTION INTRAVENOUS; SUBCUTANEOUS at 11:20

## 2020-01-19 RX ADMIN — IMMUNE GLOBULIN INFUSION (HUMAN) 20 G: 100 INJECTION, SOLUTION INTRAVENOUS; SUBCUTANEOUS at 08:41

## 2020-01-19 ASSESSMENT — ENCOUNTER SYMPTOMS
SINUS PAIN: 1
FEVER: 0
SORE THROAT: 0
HEADACHES: 1
COUGH: 0
HOARSE VOICE: 1
SINUS PRESSURE: 1

## 2020-01-19 NOTE — PROGRESS NOTES
Subjective:      Megan August is a 44 y.o. female who presents with Sinusitis (x 10 days)            Sinusitis   This is a new problem. Episode onset: pt reports new onset of URI that started 10 days ago. Reports sinus headaches, purulent nasal drainage, no recent fevers. Gets IVIG infusions every other week for autoimmune problems. The problem has been gradually worsening since onset. There has been no fever. Associated symptoms include congestion, headaches, a hoarse voice and sinus pressure. Pertinent negatives include no coughing or sore throat. (Pt reports she is immunosuppressed and receives IVIG infusions bimonthly. ) Past treatments include spray decongestants and acetaminophen (neti pot, decongestant). The treatment provided no relief.       Review of Systems   Constitutional: Negative for fever.   HENT: Positive for congestion, hoarse voice, sinus pressure and sinus pain. Negative for sore throat.    Respiratory: Negative for cough.    Neurological: Positive for headaches.   All other systems reviewed and are negative.    Past Medical History:   Diagnosis Date   • Abscess of breast, postpartum    • Anemia    • Anemia    • Anxiety 2009   • Anxiety    • Anxiety disorder    • Bowel habit changes     paralyzed colon   • Cancer ([facility])     melanoma, left side neck   • Dental disorder     lack of saliva   • Dry eyes 2009 right eye scleral redness   • Dry eyes     extreme   • Dysautonomia ([facility])    • Eosinophilic esophagitis    • Eosinophilic esophagitis    • Exophoria    • Hemorrhagic disorder ([facility])     anemia   • Hiatus hernia syndrome    • Hypothyroid 2009   • Melanoma ([facility]) 2011    melanoma - Amenanoic left neck jaw   • Mild preeclampsia    • Other specified symptom associated with female genital organs     PCOS   • Polycystic ovarian syndrome    • Polycystic ovary disease 2009   •  labor    • Psychiatric disorder     PTSD   • Sjogren's syndrome ([facility])     • Small intestinal bacterial overgrowth       Past Surgical History:   Procedure Laterality Date   • OTHER  10/31/2018    left open neck incision   • NECK MASS EXCISION Left 10/31/2018    Procedure: NECK MASS EXCISION;  Surgeon: Inna Cadena M.D.;  Location: SURGERY SAME DAY [facility];  Service: Ent   • OTHER  2018    Neck dissection-melanoma   • OTHER  2018    neck excision melanoma   • GASTROSCOPY WITH FEED TUBE PLACEMENT N/A 2016    Procedure: GASTROSCOPY WITH NASOENTERIC TUBE PLACEMENT W/FLUORO;  Surgeon: Gallito Rangel M.D.;  Location: SURGERY [facility];  Service:    • COLONOSCOPY  2015   • SETTLEMENT (INSURANCE)  2014    Performed by Olvin David M.D. at SURGERY [facility]   • OTHER      eye ducts cauterized   • OTHER  2012    Local excision melanoma   • OTHER      left periauricular melanoma excision   • INCISION AND DRAINAGE GENERAL  2009    left nipple   • PB  DELIVERY ONLY     • TONSILLECTOMY AND ADENOIDECTOMY     • ENDOSCOPY      multiple upper an lower procedures   • OTHER  [id number]    several procedures for infertility       Social History     Socioeconomic History   • Marital status:      Spouse name: Not on file   • Number of children: Not on file   • Years of education: Not on file   • Highest education level: Not on file   Occupational History     Comment: [employer]   Social Needs   • Financial resource strain: Not on file   • Food insecurity:     Worry: Not on file     Inability: Not on file   • Transportation needs:     Medical: Not on file     Non-medical: Not on file   Tobacco Use   • Smoking status: Never Smoker   • Smokeless tobacco: Never Used   Substance and Sexual Activity   • Alcohol use: No     Alcohol/week: 0.0 oz   • Drug use: No   • Sexual activity: Not on file   Lifestyle   • Physical activity:     Days per week: Not on file     Minutes per session: Not on file   • Stress: Not on  "file   Relationships   • Social connections:     Talks on phone: Not on file     Gets together: Not on file     Attends Mandaen service: Not on file     Active member of club or organization: Not on file     Attends meetings of clubs or organizations: Not on file     Relationship status: Not on file   • Intimate partner violence:     Fear of current or ex partner: Not on file     Emotionally abused: Not on file     Physically abused: Not on file     Forced sexual activity: Not on file   Other Topics Concern   • Not on file   Social History Narrative   • Not on file          Objective:     /64 (BP Location: Right arm, Patient Position: Sitting, BP Cuff Size: Adult)   Pulse 65   Temp 36.7 °C (98 °F) (Temporal)   Resp 16   Ht 1.676 m (5' 6\")   Wt 62.6 kg (138 lb)   SpO2 99%   BMI 22.27 kg/m²      Physical Exam  Vitals signs and nursing note reviewed.   Constitutional:       Appearance: Normal appearance. She is normal weight.   HENT:      Head: Normocephalic and atraumatic.      Right Ear: Tympanic membrane and external ear normal.      Left Ear: Tympanic membrane and external ear normal.      Nose: Nasal tenderness and congestion present.      Right Sinus: Frontal sinus tenderness present.      Left Sinus: Frontal sinus tenderness present.      Mouth/Throat:      Mouth: Mucous membranes are moist.      Pharynx: Oropharynx is clear.   Eyes:      Extraocular Movements: Extraocular movements intact.      Pupils: Pupils are equal, round, and reactive to light.   Neck:      Musculoskeletal: Normal range of motion.   Cardiovascular:      Rate and Rhythm: Normal rate and regular rhythm.   Pulmonary:      Effort: Pulmonary effort is normal.      Breath sounds: Normal breath sounds.   Musculoskeletal: Normal range of motion.   Skin:     General: Skin is warm and dry.      Capillary Refill: Capillary refill takes less than 2 seconds.   Neurological:      General: No focal deficit present.      Mental Status: She " is alert and oriented to person, place, and time. Mental status is at baseline.   Psychiatric:         Mood and Affect: Mood normal.         Speech: Speech normal.         Thought Content: Thought content normal.         Judgment: Judgment normal.                 Assessment/Plan:       1. Sinus pain    2. Acute bacterial sinusitis  - amoxicillin-clavulanate (AUGMENTIN) 875-125 MG Tab; Take 1 Tab by mouth 2 times a day for 7 days.  Dispense: 14 Tab; Refill: 0    Encouraged pt to take full course of abx  Continue neti pot rinses  OTC decongestant as needed  Encouraged rest, fluids and supportive care  Tylenol and ibuprofen as needed for pain  Supportive care, differential diagnoses, and indications for immediate follow-up discussed with patient.    Pathogenesis of diagnosis discussed including typical length and natural progression.      Instructed to return to  or nearest emergency department if symptoms fail to improve, for any change in condition, further concerns, or new concerning symptoms.  Patient states understanding of the plan of care and discharge instructions.

## 2020-01-19 NOTE — PROGRESS NOTES
IVIG completed without rx.  Resting post tx for 1.5 hrs as reports does not sleep well at [facility].   No rx noted.  DC to self care.

## 2020-01-19 NOTE — PROGRESS NOTES
Returns for IVIG.  Declines IgA lab draw. Reports enteral feedings are working well for her along with IVIG which has helped her constipation issues as well.  No other changes or issues reported other than ongoing fatigue.  Tx infusing.

## 2020-02-02 ENCOUNTER — OUTPATIENT INFUSION SERVICES (OUTPATIENT)
Dept: ONCOLOGY | Facility: MEDICAL CENTER | Age: 45
End: 2020-02-02
Attending: INTERNAL MEDICINE
Payer: COMMERCIAL

## 2020-02-02 VITALS
BODY MASS INDEX: 22.33 KG/M2 | HEART RATE: 72 BPM | DIASTOLIC BLOOD PRESSURE: 64 MMHG | OXYGEN SATURATION: 100 % | WEIGHT: 134.04 LBS | TEMPERATURE: 97 F | HEIGHT: 65 IN | SYSTOLIC BLOOD PRESSURE: 106 MMHG | RESPIRATION RATE: 18 BRPM

## 2020-02-02 DIAGNOSIS — D72.18 CHURG-STRAUSS SYNDROME (HCC): Chronic | ICD-10-CM

## 2020-02-02 DIAGNOSIS — M30.1 CHURG-STRAUSS SYNDROME (HCC): Chronic | ICD-10-CM

## 2020-02-02 PROCEDURE — 96366 THER/PROPH/DIAG IV INF ADDON: CPT

## 2020-02-02 PROCEDURE — 700111 HCHG RX REV CODE 636 W/ 250 OVERRIDE (IP): Performed by: INTERNAL MEDICINE

## 2020-02-02 PROCEDURE — 96365 THER/PROPH/DIAG IV INF INIT: CPT

## 2020-02-02 RX ORDER — IMMUNE GLOBULIN INFUSION (HUMAN) 100 MG/ML
10 INJECTION, SOLUTION INTRAVENOUS; SUBCUTANEOUS ONCE
Status: COMPLETED | OUTPATIENT
Start: 2020-02-02 | End: 2020-02-02

## 2020-02-02 RX ORDER — IMMUNE GLOBULIN INFUSION (HUMAN) 100 MG/ML
5 INJECTION, SOLUTION INTRAVENOUS; SUBCUTANEOUS ONCE
Status: DISCONTINUED | OUTPATIENT
Start: 2020-02-02 | End: 2020-02-02

## 2020-02-02 RX ORDER — IMMUNE GLOBULIN INFUSION (HUMAN) 100 MG/ML
5 INJECTION, SOLUTION INTRAVENOUS; SUBCUTANEOUS ONCE
Status: COMPLETED | OUTPATIENT
Start: 2020-02-02 | End: 2020-02-02

## 2020-02-02 RX ORDER — IMMUNE GLOBULIN INFUSION (HUMAN) 100 MG/ML
20 INJECTION, SOLUTION INTRAVENOUS; SUBCUTANEOUS ONCE
Status: DISCONTINUED | OUTPATIENT
Start: 2020-02-02 | End: 2020-02-02

## 2020-02-02 RX ADMIN — IMMUNE GLOBULIN INFUSION (HUMAN) 10 G: 100 INJECTION, SOLUTION INTRAVENOUS; SUBCUTANEOUS at 11:29

## 2020-02-02 RX ADMIN — IMMUNE GLOBULIN INFUSION (HUMAN) 5 G: 100 INJECTION, SOLUTION INTRAVENOUS; SUBCUTANEOUS at 12:24

## 2020-02-02 RX ADMIN — IMMUNE GLOBULIN INFUSION (HUMAN) 10 G: 100 INJECTION, SOLUTION INTRAVENOUS; SUBCUTANEOUS at 09:42

## 2020-02-02 NOTE — PROGRESS NOTES
Pt completed IVIG infusion without any adverse reaction. PIV dc'd catheter tip intact, gauze and coban dressing applied. Pt discharged to self care, NAD. Pt aware of next appt.

## 2020-02-02 NOTE — PROGRESS NOTES
Pt arrived to [facility] ambulatory, here for q2week IVIG. IV access established in LAC. Pt refused IgA lab draw.  IVIG infused using rate calculator in pt's chart. Max rate to 120 ml/hr. Infusion completed w/ no adverse reactions.  1300  Report given to Norris REARDON who will finish caring for the pt.  Pt is resting comfortably, all needs met.  Pt has next appt in place.

## 2020-02-16 ENCOUNTER — OUTPATIENT INFUSION SERVICES (OUTPATIENT)
Dept: ONCOLOGY | Facility: MEDICAL CENTER | Age: 45
End: 2020-02-16
Attending: INTERNAL MEDICINE
Payer: COMMERCIAL

## 2020-02-16 VITALS
RESPIRATION RATE: 17 BRPM | HEIGHT: 65 IN | HEART RATE: 65 BPM | TEMPERATURE: 97.8 F | DIASTOLIC BLOOD PRESSURE: 74 MMHG | SYSTOLIC BLOOD PRESSURE: 114 MMHG | WEIGHT: 138.01 LBS | OXYGEN SATURATION: 100 % | BODY MASS INDEX: 22.99 KG/M2

## 2020-02-16 PROCEDURE — 96366 THER/PROPH/DIAG IV INF ADDON: CPT

## 2020-02-16 PROCEDURE — 700111 HCHG RX REV CODE 636 W/ 250 OVERRIDE (IP): Performed by: INTERNAL MEDICINE

## 2020-02-16 PROCEDURE — 96365 THER/PROPH/DIAG IV INF INIT: CPT

## 2020-02-16 RX ORDER — IMMUNE GLOBULIN INFUSION (HUMAN) 100 MG/ML
10 INJECTION, SOLUTION INTRAVENOUS; SUBCUTANEOUS ONCE
Status: COMPLETED | OUTPATIENT
Start: 2020-02-16 | End: 2020-02-16

## 2020-02-16 RX ORDER — IMMUNE GLOBULIN INFUSION (HUMAN) 100 MG/ML
5 INJECTION, SOLUTION INTRAVENOUS; SUBCUTANEOUS ONCE
Status: COMPLETED | OUTPATIENT
Start: 2020-02-16 | End: 2020-02-16

## 2020-02-16 RX ADMIN — IMMUNE GLOBULIN INFUSION (HUMAN) 10 G: 100 INJECTION, SOLUTION INTRAVENOUS; SUBCUTANEOUS at 10:34

## 2020-02-16 RX ADMIN — IMMUNE GLOBULIN INFUSION (HUMAN) 10 G: 100 INJECTION, SOLUTION INTRAVENOUS; SUBCUTANEOUS at 08:53

## 2020-02-16 RX ADMIN — IMMUNE GLOBULIN INFUSION (HUMAN) 5 G: 100 INJECTION, SOLUTION INTRAVENOUS; SUBCUTANEOUS at 11:26

## 2020-03-01 ENCOUNTER — OUTPATIENT INFUSION SERVICES (OUTPATIENT)
Dept: ONCOLOGY | Facility: MEDICAL CENTER | Age: 45
End: 2020-03-01
Attending: INTERNAL MEDICINE
Payer: COMMERCIAL

## 2020-03-01 VITALS
OXYGEN SATURATION: 99 % | HEIGHT: 66 IN | DIASTOLIC BLOOD PRESSURE: 54 MMHG | WEIGHT: 139.55 LBS | BODY MASS INDEX: 22.43 KG/M2 | RESPIRATION RATE: 18 BRPM | HEART RATE: 57 BPM | SYSTOLIC BLOOD PRESSURE: 99 MMHG | TEMPERATURE: 97.5 F

## 2020-03-01 PROCEDURE — 96365 THER/PROPH/DIAG IV INF INIT: CPT

## 2020-03-01 PROCEDURE — 96366 THER/PROPH/DIAG IV INF ADDON: CPT

## 2020-03-01 PROCEDURE — 700111 HCHG RX REV CODE 636 W/ 250 OVERRIDE (IP): Performed by: INTERNAL MEDICINE

## 2020-03-01 RX ORDER — IMMUNE GLOBULIN INFUSION (HUMAN) 100 MG/ML
10 INJECTION, SOLUTION INTRAVENOUS; SUBCUTANEOUS ONCE
Status: COMPLETED | OUTPATIENT
Start: 2020-03-01 | End: 2020-03-01

## 2020-03-01 RX ORDER — IMMUNE GLOBULIN INFUSION (HUMAN) 100 MG/ML
5 INJECTION, SOLUTION INTRAVENOUS; SUBCUTANEOUS ONCE
Status: COMPLETED | OUTPATIENT
Start: 2020-03-01 | End: 2020-03-01

## 2020-03-01 RX ADMIN — IMMUNE GLOBULIN INFUSION (HUMAN) 10 G: 100 INJECTION, SOLUTION INTRAVENOUS; SUBCUTANEOUS at 10:18

## 2020-03-01 RX ADMIN — IMMUNE GLOBULIN INFUSION (HUMAN) 5 G: 100 INJECTION, SOLUTION INTRAVENOUS; SUBCUTANEOUS at 09:10

## 2020-03-01 RX ADMIN — IMMUNE GLOBULIN INFUSION (HUMAN) 10 G: 100 INJECTION, SOLUTION INTRAVENOUS; SUBCUTANEOUS at 11:20

## 2020-03-01 ASSESSMENT — FIBROSIS 4 INDEX: FIB4 SCORE: 1.19

## 2020-03-01 NOTE — PROGRESS NOTES
Patient arrived ambulatory to [facility] for IVIG.  Denies any acute changes.  PIV established with good blood return noted.  Declines IgA lab draw.  IVIG infused, titrating per pharmacy rate sheet.  Pt tolerated well.  PIV flushed, removed, and gauze/coban placed.  Confirmed next appointment and pt ambulated out of clinic in no apparent distress.

## 2020-03-15 ENCOUNTER — OUTPATIENT INFUSION SERVICES (OUTPATIENT)
Dept: ONCOLOGY | Facility: MEDICAL CENTER | Age: 45
End: 2020-03-15
Attending: INTERNAL MEDICINE
Payer: COMMERCIAL

## 2020-03-15 VITALS
RESPIRATION RATE: 18 BRPM | BODY MASS INDEX: 22.18 KG/M2 | HEIGHT: 66 IN | WEIGHT: 138.01 LBS | SYSTOLIC BLOOD PRESSURE: 101 MMHG | DIASTOLIC BLOOD PRESSURE: 67 MMHG | TEMPERATURE: 97.1 F | OXYGEN SATURATION: 98 % | HEART RATE: 66 BPM

## 2020-03-15 PROCEDURE — 700111 HCHG RX REV CODE 636 W/ 250 OVERRIDE (IP): Performed by: INTERNAL MEDICINE

## 2020-03-15 PROCEDURE — 96365 THER/PROPH/DIAG IV INF INIT: CPT

## 2020-03-15 PROCEDURE — 96366 THER/PROPH/DIAG IV INF ADDON: CPT

## 2020-03-15 RX ORDER — IMMUNE GLOBULIN INFUSION (HUMAN) 100 MG/ML
20 INJECTION, SOLUTION INTRAVENOUS; SUBCUTANEOUS ONCE
Status: COMPLETED | OUTPATIENT
Start: 2020-03-15 | End: 2020-03-15

## 2020-03-15 RX ORDER — IMMUNE GLOBULIN INFUSION (HUMAN) 100 MG/ML
5 INJECTION, SOLUTION INTRAVENOUS; SUBCUTANEOUS ONCE
Status: COMPLETED | OUTPATIENT
Start: 2020-03-15 | End: 2020-03-15

## 2020-03-15 RX ADMIN — IMMUNE GLOBULIN INFUSION (HUMAN) 5 G: 100 INJECTION, SOLUTION INTRAVENOUS; SUBCUTANEOUS at 12:00

## 2020-03-15 RX ADMIN — IMMUNE GLOBULIN INFUSION (HUMAN) 20 G: 100 INJECTION, SOLUTION INTRAVENOUS; SUBCUTANEOUS at 09:23

## 2020-03-15 ASSESSMENT — FIBROSIS 4 INDEX: FIB4 SCORE: 1.19

## 2020-03-28 ENCOUNTER — TELEPHONE (OUTPATIENT)
Dept: ONCOLOGY | Facility: MEDICAL CENTER | Age: 45
End: 2020-03-28

## 2020-03-29 ENCOUNTER — OUTPATIENT INFUSION SERVICES (OUTPATIENT)
Dept: ONCOLOGY | Facility: MEDICAL CENTER | Age: 45
End: 2020-03-29
Attending: INTERNAL MEDICINE
Payer: COMMERCIAL

## 2020-03-29 VITALS
HEIGHT: 66 IN | HEART RATE: 68 BPM | DIASTOLIC BLOOD PRESSURE: 85 MMHG | RESPIRATION RATE: 18 BRPM | TEMPERATURE: 97 F | SYSTOLIC BLOOD PRESSURE: 122 MMHG | BODY MASS INDEX: 21.65 KG/M2 | WEIGHT: 134.7 LBS | OXYGEN SATURATION: 99 %

## 2020-03-29 PROCEDURE — 96365 THER/PROPH/DIAG IV INF INIT: CPT

## 2020-03-29 PROCEDURE — 96366 THER/PROPH/DIAG IV INF ADDON: CPT

## 2020-03-29 PROCEDURE — 700111 HCHG RX REV CODE 636 W/ 250 OVERRIDE (IP): Performed by: INTERNAL MEDICINE

## 2020-03-29 RX ORDER — IMMUNE GLOBULIN INFUSION (HUMAN) 100 MG/ML
5 INJECTION, SOLUTION INTRAVENOUS; SUBCUTANEOUS ONCE
Status: COMPLETED | OUTPATIENT
Start: 2020-03-29 | End: 2020-03-29

## 2020-03-29 RX ORDER — IMMUNE GLOBULIN INFUSION (HUMAN) 100 MG/ML
20 INJECTION, SOLUTION INTRAVENOUS; SUBCUTANEOUS ONCE
Status: COMPLETED | OUTPATIENT
Start: 2020-03-29 | End: 2020-03-29

## 2020-03-29 RX ADMIN — IMMUNE GLOBULIN INFUSION (HUMAN) 5 G: 100 INJECTION, SOLUTION INTRAVENOUS; SUBCUTANEOUS at 11:47

## 2020-03-29 RX ADMIN — IMMUNE GLOBULIN INFUSION (HUMAN) 20 G: 100 INJECTION, SOLUTION INTRAVENOUS; SUBCUTANEOUS at 08:59

## 2020-03-29 ASSESSMENT — FIBROSIS 4 INDEX: FIB4 SCORE: 1.19

## 2020-03-29 NOTE — PROGRESS NOTES
Patient arrived ambulatory to the [facility] for IVIG. Reviewed vital signs, labs, and physician order. Patient denies S&S of infection. Pt declines IGA level to be drawn. IV access established in left forearm, visualized brisk blood return. IVIG administered per titration scale up to max rate of 120ml/hr, no adverse reaction observed. IV flushed per protocol, catheter removed with tip intact, gauze and coban dressing placed. Confirmed upcoming appointment date and time with patient. Patient left the [facility] ambulatory in no sign of distress.

## 2020-04-11 ENCOUNTER — TELEPHONE (OUTPATIENT)
Dept: ONCOLOGY | Facility: MEDICAL CENTER | Age: 45
End: 2020-04-11

## 2020-04-12 ENCOUNTER — OUTPATIENT INFUSION SERVICES (OUTPATIENT)
Dept: ONCOLOGY | Facility: MEDICAL CENTER | Age: 45
End: 2020-04-12
Attending: INTERNAL MEDICINE
Payer: COMMERCIAL

## 2020-04-12 VITALS
BODY MASS INDEX: 22.71 KG/M2 | HEART RATE: 71 BPM | RESPIRATION RATE: 18 BRPM | OXYGEN SATURATION: 99 % | TEMPERATURE: 97.1 F | WEIGHT: 141.31 LBS | HEIGHT: 66 IN | SYSTOLIC BLOOD PRESSURE: 106 MMHG | DIASTOLIC BLOOD PRESSURE: 64 MMHG

## 2020-04-12 DIAGNOSIS — M35.00 SJOGREN'S SYNDROME, WITH UNSPECIFIED ORGAN INVOLVEMENT (HCC): Chronic | ICD-10-CM

## 2020-04-12 PROCEDURE — 96366 THER/PROPH/DIAG IV INF ADDON: CPT

## 2020-04-12 PROCEDURE — 96365 THER/PROPH/DIAG IV INF INIT: CPT

## 2020-04-12 PROCEDURE — 700111 HCHG RX REV CODE 636 W/ 250 OVERRIDE (IP): Performed by: INTERNAL MEDICINE

## 2020-04-12 RX ORDER — IMMUNE GLOBULIN INFUSION (HUMAN) 100 MG/ML
5 INJECTION, SOLUTION INTRAVENOUS; SUBCUTANEOUS ONCE
Status: COMPLETED | OUTPATIENT
Start: 2020-04-12 | End: 2020-04-12

## 2020-04-12 RX ORDER — IMMUNE GLOBULIN INFUSION (HUMAN) 100 MG/ML
10 INJECTION, SOLUTION INTRAVENOUS; SUBCUTANEOUS ONCE
Status: COMPLETED | OUTPATIENT
Start: 2020-04-12 | End: 2020-04-12

## 2020-04-12 RX ADMIN — IMMUNE GLOBULIN INFUSION (HUMAN) 10 G: 100 INJECTION, SOLUTION INTRAVENOUS; SUBCUTANEOUS at 09:04

## 2020-04-12 RX ADMIN — IMMUNE GLOBULIN INFUSION (HUMAN) 10 G: 100 INJECTION, SOLUTION INTRAVENOUS; SUBCUTANEOUS at 11:00

## 2020-04-12 RX ADMIN — IMMUNE GLOBULIN INFUSION (HUMAN) 5 G: 100 INJECTION, SOLUTION INTRAVENOUS; SUBCUTANEOUS at 11:56

## 2020-04-12 ASSESSMENT — FIBROSIS 4 INDEX: FIB4 SCORE: 1.19

## 2020-04-12 NOTE — PROGRESS NOTES
Pt returns ambulatory to [facility], here for q2 week IVIG. IV access established. Pt declined to have IgA level drawn. IVIG infused per rate sheet in pt's chart, max rate of 120 ml/hr per pt request due to headaches with higher rates. IVIG completed and pt remained for 2 additional hours to rest. IV flushed and removed prior to pt being discharged. Pressure dressing applied. Pt knows when to return, discharged home under self care in no apparent distress.

## 2020-04-12 NOTE — LETTER
Infusion Services   [address] [place] [address]  [place] [address]  Phone: [phone]  Fax: [phone]              Dear Dr. Soares,    Your patient, Megan August (: 1975), was scheduled at [facility].  Megan's encounter diagnosis is:  1. Sjogren's syndrome, with unspecified organ involvement (HCC)  Pack Heat    Pack Heat     She arrived for her appointment, and  the scheduled treatment was given. These medications were administered to the patient: We administered immune globulin, immune globulin, and immune globulin..  Megan August  tolerated treatment. In addition, the following labs were drawn  No results found for this or any previous visit (from the past 24 hour(s)).         Her next appointment is scheduled for 2020. I believe you have been notified we will need new orders from your office as of the 15 of this month. I also wanted to alert you to the fact that Megan consistently declines having her IgA level drawn. If you feel strongly about having her do it, you may want to talk to her. If you feel like this is OK, you can leave that request off of your next orders to us. Thank you.     For more information, you may review the nurse's progress notes in chart review under the notes section.       Sincerely,  Pastora Vitale R.N.

## 2020-04-25 ENCOUNTER — TELEPHONE (OUTPATIENT)
Dept: ONCOLOGY | Facility: MEDICAL CENTER | Age: 45
End: 2020-04-25

## 2020-04-26 ENCOUNTER — OUTPATIENT INFUSION SERVICES (OUTPATIENT)
Dept: ONCOLOGY | Facility: MEDICAL CENTER | Age: 45
End: 2020-04-26
Attending: INTERNAL MEDICINE
Payer: COMMERCIAL

## 2020-04-26 VITALS
WEIGHT: 138.67 LBS | DIASTOLIC BLOOD PRESSURE: 67 MMHG | BODY MASS INDEX: 22.29 KG/M2 | TEMPERATURE: 97.2 F | SYSTOLIC BLOOD PRESSURE: 118 MMHG | HEART RATE: 64 BPM | OXYGEN SATURATION: 99 % | RESPIRATION RATE: 18 BRPM | HEIGHT: 66 IN

## 2020-04-26 PROCEDURE — 96365 THER/PROPH/DIAG IV INF INIT: CPT

## 2020-04-26 PROCEDURE — 700111 HCHG RX REV CODE 636 W/ 250 OVERRIDE (IP): Performed by: INTERNAL MEDICINE

## 2020-04-26 PROCEDURE — 96366 THER/PROPH/DIAG IV INF ADDON: CPT

## 2020-04-26 RX ORDER — IMMUNE GLOBULIN INFUSION (HUMAN) 100 MG/ML
20 INJECTION, SOLUTION INTRAVENOUS; SUBCUTANEOUS ONCE
Status: COMPLETED | OUTPATIENT
Start: 2020-04-26 | End: 2020-04-26

## 2020-04-26 RX ORDER — IMMUNE GLOBULIN INFUSION (HUMAN) 100 MG/ML
5 INJECTION, SOLUTION INTRAVENOUS; SUBCUTANEOUS ONCE
Status: COMPLETED | OUTPATIENT
Start: 2020-04-26 | End: 2020-04-26

## 2020-04-26 RX ADMIN — IMMUNE GLOBULIN INFUSION (HUMAN) 20 G: 100 INJECTION, SOLUTION INTRAVENOUS; SUBCUTANEOUS at 09:00

## 2020-04-26 RX ADMIN — IMMUNE GLOBULIN INFUSION (HUMAN) 5 G: 100 INJECTION, SOLUTION INTRAVENOUS; SUBCUTANEOUS at 11:34

## 2020-04-26 ASSESSMENT — FIBROSIS 4 INDEX: FIB4 SCORE: 1.19

## 2020-05-09 ENCOUNTER — TELEPHONE (OUTPATIENT)
Dept: ONCOLOGY | Facility: MEDICAL CENTER | Age: 45
End: 2020-05-09

## 2020-05-10 ENCOUNTER — OUTPATIENT INFUSION SERVICES (OUTPATIENT)
Dept: ONCOLOGY | Facility: MEDICAL CENTER | Age: 45
End: 2020-05-10
Attending: INTERNAL MEDICINE
Payer: COMMERCIAL

## 2020-05-10 VITALS
HEART RATE: 64 BPM | HEIGHT: 66 IN | BODY MASS INDEX: 22.85 KG/M2 | TEMPERATURE: 97.6 F | WEIGHT: 142.2 LBS | DIASTOLIC BLOOD PRESSURE: 72 MMHG | SYSTOLIC BLOOD PRESSURE: 121 MMHG | OXYGEN SATURATION: 98 % | RESPIRATION RATE: 18 BRPM

## 2020-05-10 PROCEDURE — 96366 THER/PROPH/DIAG IV INF ADDON: CPT

## 2020-05-10 PROCEDURE — 700111 HCHG RX REV CODE 636 W/ 250 OVERRIDE (IP): Performed by: INTERNAL MEDICINE

## 2020-05-10 PROCEDURE — 96365 THER/PROPH/DIAG IV INF INIT: CPT

## 2020-05-10 RX ORDER — IMMUNE GLOBULIN INFUSION (HUMAN) 100 MG/ML
5 INJECTION, SOLUTION INTRAVENOUS; SUBCUTANEOUS ONCE
Status: COMPLETED | OUTPATIENT
Start: 2020-05-10 | End: 2020-05-10

## 2020-05-10 RX ORDER — IMMUNE GLOBULIN INFUSION (HUMAN) 100 MG/ML
20 INJECTION, SOLUTION INTRAVENOUS; SUBCUTANEOUS ONCE
Status: COMPLETED | OUTPATIENT
Start: 2020-05-10 | End: 2020-05-10

## 2020-05-10 RX ADMIN — IMMUNE GLOBULIN INFUSION (HUMAN) 5 G: 100 INJECTION, SOLUTION INTRAVENOUS; SUBCUTANEOUS at 11:49

## 2020-05-10 RX ADMIN — IMMUNE GLOBULIN INFUSION (HUMAN) 20 G: 100 INJECTION, SOLUTION INTRAVENOUS; SUBCUTANEOUS at 09:05

## 2020-05-10 ASSESSMENT — FIBROSIS 4 INDEX: FIB4 SCORE: 1.19

## 2020-05-10 NOTE — PROGRESS NOTES
Pt arrived ambulatory to hospitals for IVIG. She denied current illness/infection. PIV placed, flushed easily, rupali briskly. IVIG started at 30 ml/hr. Rate increased by 30 ml/hr Q 30 minutes to max rate of 120 ml/hr per patient's preference. Pt tolerated infusion well.  PIV flushed with NS, then d/c'd. Catheter tip remained intact. Gauze and coban dressing to site. Pt left hospitals in NAD.

## 2020-05-23 ENCOUNTER — TELEPHONE (OUTPATIENT)
Dept: ONCOLOGY | Facility: MEDICAL CENTER | Age: 45
End: 2020-05-23

## 2020-05-24 ENCOUNTER — OUTPATIENT INFUSION SERVICES (OUTPATIENT)
Dept: ONCOLOGY | Facility: MEDICAL CENTER | Age: 45
End: 2020-05-24
Attending: INTERNAL MEDICINE
Payer: COMMERCIAL

## 2020-05-24 VITALS
SYSTOLIC BLOOD PRESSURE: 109 MMHG | RESPIRATION RATE: 18 BRPM | DIASTOLIC BLOOD PRESSURE: 68 MMHG | BODY MASS INDEX: 22.6 KG/M2 | HEART RATE: 62 BPM | WEIGHT: 140.65 LBS | OXYGEN SATURATION: 99 % | TEMPERATURE: 97.1 F | HEIGHT: 66 IN

## 2020-05-24 PROCEDURE — 96365 THER/PROPH/DIAG IV INF INIT: CPT

## 2020-05-24 PROCEDURE — 96366 THER/PROPH/DIAG IV INF ADDON: CPT

## 2020-05-24 PROCEDURE — 700111 HCHG RX REV CODE 636 W/ 250 OVERRIDE (IP): Performed by: INTERNAL MEDICINE

## 2020-05-24 RX ORDER — IMMUNE GLOBULIN INFUSION (HUMAN) 100 MG/ML
10 INJECTION, SOLUTION INTRAVENOUS; SUBCUTANEOUS ONCE
Status: COMPLETED | OUTPATIENT
Start: 2020-05-24 | End: 2020-05-24

## 2020-05-24 RX ORDER — IMMUNE GLOBULIN INFUSION (HUMAN) 100 MG/ML
5 INJECTION, SOLUTION INTRAVENOUS; SUBCUTANEOUS ONCE
Status: COMPLETED | OUTPATIENT
Start: 2020-05-24 | End: 2020-05-24

## 2020-05-24 RX ADMIN — IMMUNE GLOBULIN INFUSION (HUMAN) 10 G: 100 INJECTION, SOLUTION INTRAVENOUS; SUBCUTANEOUS at 10:16

## 2020-05-24 RX ADMIN — IMMUNE GLOBULIN INFUSION (HUMAN) 5 G: 100 INJECTION, SOLUTION INTRAVENOUS; SUBCUTANEOUS at 09:00

## 2020-05-24 RX ADMIN — IMMUNE GLOBULIN INFUSION (HUMAN) 10 G: 100 INJECTION, SOLUTION INTRAVENOUS; SUBCUTANEOUS at 11:24

## 2020-05-24 ASSESSMENT — FIBROSIS 4 INDEX: FIB4 SCORE: 1.19

## 2020-05-24 NOTE — PROGRESS NOTES
Patient arrived ambulatory to [facility] for IVIG.  Denies any s/s of infection or fevers.  PIV established with good blood return noted.  IVIG infused, titrating to max rate of 90 ml/hr, per patient request.  Pt tolerated well.  After flushing IVIG, pt reported to nurse that arm felt swollen.  PIV noted to be infiltrated.  PIV removed and heat applied to arm.  No discoloration or discomfort noted.  Educated pt to elevate arm and apply heat at home, she verbalized understanding.  Confirmed next appointment and pt ambulated out of clinic in no apparent distress.

## 2020-06-06 ENCOUNTER — TELEPHONE (OUTPATIENT)
Dept: ONCOLOGY | Facility: MEDICAL CENTER | Age: 45
End: 2020-06-06

## 2020-06-07 ENCOUNTER — OUTPATIENT INFUSION SERVICES (OUTPATIENT)
Dept: ONCOLOGY | Facility: MEDICAL CENTER | Age: 45
End: 2020-06-07
Attending: INTERNAL MEDICINE
Payer: COMMERCIAL

## 2020-06-07 VITALS
HEIGHT: 66 IN | RESPIRATION RATE: 18 BRPM | BODY MASS INDEX: 22.32 KG/M2 | OXYGEN SATURATION: 100 % | TEMPERATURE: 97.8 F | DIASTOLIC BLOOD PRESSURE: 66 MMHG | WEIGHT: 138.89 LBS | SYSTOLIC BLOOD PRESSURE: 127 MMHG | HEART RATE: 63 BPM

## 2020-06-07 DIAGNOSIS — M35.00 SJOGREN'S SYNDROME, WITH UNSPECIFIED ORGAN INVOLVEMENT (HCC): ICD-10-CM

## 2020-06-07 PROCEDURE — 700111 HCHG RX REV CODE 636 W/ 250 OVERRIDE (IP): Performed by: INTERNAL MEDICINE

## 2020-06-07 PROCEDURE — 96366 THER/PROPH/DIAG IV INF ADDON: CPT

## 2020-06-07 PROCEDURE — 96365 THER/PROPH/DIAG IV INF INIT: CPT

## 2020-06-07 RX ORDER — IMMUNE GLOBULIN INFUSION (HUMAN) 100 MG/ML
10 INJECTION, SOLUTION INTRAVENOUS; SUBCUTANEOUS ONCE
Status: COMPLETED | OUTPATIENT
Start: 2020-06-07 | End: 2020-06-07

## 2020-06-07 RX ORDER — IMMUNE GLOBULIN INFUSION (HUMAN) 100 MG/ML
5 INJECTION, SOLUTION INTRAVENOUS; SUBCUTANEOUS ONCE
Status: COMPLETED | OUTPATIENT
Start: 2020-06-07 | End: 2020-06-07

## 2020-06-07 RX ADMIN — IMMUNE GLOBULIN INFUSION (HUMAN) 5 G: 100 INJECTION, SOLUTION INTRAVENOUS; SUBCUTANEOUS at 09:23

## 2020-06-07 RX ADMIN — IMMUNE GLOBULIN INFUSION (HUMAN) 10 G: 100 INJECTION, SOLUTION INTRAVENOUS; SUBCUTANEOUS at 10:25

## 2020-06-07 RX ADMIN — IMMUNE GLOBULIN INFUSION (HUMAN) 10 G: 100 INJECTION, SOLUTION INTRAVENOUS; SUBCUTANEOUS at 11:39

## 2020-06-07 ASSESSMENT — FIBROSIS 4 INDEX: FIB4 SCORE: 1.19

## 2020-06-07 NOTE — PROGRESS NOTES
Patient arrived ambulatory to [facility] for IVIG.  Denies any s/s of infection or fevers.  PIV established with good blood return noted.  IVIG infused, titrating per rate sheet, pt tolerated well.  PIV flushed, removed, and gauze/coban placed.  Confirmed next appointment and she ambulated out of clinic in no apparent distress.

## 2020-06-20 ENCOUNTER — TELEPHONE (OUTPATIENT)
Dept: ONCOLOGY | Facility: MEDICAL CENTER | Age: 45
End: 2020-06-20

## 2020-06-21 ENCOUNTER — OUTPATIENT INFUSION SERVICES (OUTPATIENT)
Dept: ONCOLOGY | Facility: MEDICAL CENTER | Age: 45
End: 2020-06-21
Attending: INTERNAL MEDICINE
Payer: COMMERCIAL

## 2020-06-21 VITALS
SYSTOLIC BLOOD PRESSURE: 106 MMHG | WEIGHT: 139.77 LBS | HEART RATE: 67 BPM | OXYGEN SATURATION: 100 % | TEMPERATURE: 97.3 F | DIASTOLIC BLOOD PRESSURE: 71 MMHG | BODY MASS INDEX: 22.46 KG/M2 | HEIGHT: 66 IN | RESPIRATION RATE: 18 BRPM

## 2020-06-21 PROCEDURE — 96366 THER/PROPH/DIAG IV INF ADDON: CPT

## 2020-06-21 PROCEDURE — 700111 HCHG RX REV CODE 636 W/ 250 OVERRIDE (IP): Performed by: INTERNAL MEDICINE

## 2020-06-21 PROCEDURE — 96365 THER/PROPH/DIAG IV INF INIT: CPT

## 2020-06-21 RX ORDER — IMMUNE GLOBULIN INFUSION (HUMAN) 100 MG/ML
20 INJECTION, SOLUTION INTRAVENOUS; SUBCUTANEOUS ONCE
Status: COMPLETED | OUTPATIENT
Start: 2020-06-21 | End: 2020-06-21

## 2020-06-21 RX ORDER — IMMUNE GLOBULIN INFUSION (HUMAN) 100 MG/ML
5 INJECTION, SOLUTION INTRAVENOUS; SUBCUTANEOUS ONCE
Status: COMPLETED | OUTPATIENT
Start: 2020-06-21 | End: 2020-06-21

## 2020-06-21 RX ADMIN — IMMUNE GLOBULIN INFUSION (HUMAN) 5 G: 100 INJECTION, SOLUTION INTRAVENOUS; SUBCUTANEOUS at 11:21

## 2020-06-21 RX ADMIN — IMMUNE GLOBULIN INFUSION (HUMAN) 20 G: 100 INJECTION, SOLUTION INTRAVENOUS; SUBCUTANEOUS at 08:50

## 2020-06-21 ASSESSMENT — FIBROSIS 4 INDEX: FIB4 SCORE: 1.19

## 2020-06-21 NOTE — PROGRESS NOTES
Pt arrived ambulatory to [facility] for q 2 week IVIG infusion.  POC discussed.  PIV started to LFA, blood return confirmed.  IVIG titrated per rate sheet protocol without adverse reaction.  PIV flushed, removed, and site covered with gauze and coban.  Next appointment confirmed.  Pt discharged from IS in NAD under self care.

## 2020-07-04 ENCOUNTER — TELEPHONE (OUTPATIENT)
Dept: ONCOLOGY | Facility: MEDICAL CENTER | Age: 45
End: 2020-07-04

## 2020-07-05 ENCOUNTER — OUTPATIENT INFUSION SERVICES (OUTPATIENT)
Dept: ONCOLOGY | Facility: MEDICAL CENTER | Age: 45
End: 2020-07-05
Attending: INTERNAL MEDICINE
Payer: COMMERCIAL

## 2020-07-05 VITALS
SYSTOLIC BLOOD PRESSURE: 106 MMHG | HEART RATE: 72 BPM | DIASTOLIC BLOOD PRESSURE: 62 MMHG | WEIGHT: 134.26 LBS | HEIGHT: 66 IN | TEMPERATURE: 97.7 F | BODY MASS INDEX: 21.58 KG/M2 | RESPIRATION RATE: 18 BRPM | OXYGEN SATURATION: 100 %

## 2020-07-05 DIAGNOSIS — M30.1 CHURG-STRAUSS SYNDROME (HCC): ICD-10-CM

## 2020-07-05 DIAGNOSIS — D72.18 CHURG-STRAUSS SYNDROME (HCC): ICD-10-CM

## 2020-07-05 PROCEDURE — 96365 THER/PROPH/DIAG IV INF INIT: CPT

## 2020-07-05 PROCEDURE — 700111 HCHG RX REV CODE 636 W/ 250 OVERRIDE (IP): Performed by: INTERNAL MEDICINE

## 2020-07-05 PROCEDURE — 96366 THER/PROPH/DIAG IV INF ADDON: CPT

## 2020-07-05 RX ORDER — IMMUNE GLOBULIN INFUSION (HUMAN) 100 MG/ML
10 INJECTION, SOLUTION INTRAVENOUS; SUBCUTANEOUS ONCE
Status: COMPLETED | OUTPATIENT
Start: 2020-07-05 | End: 2020-07-05

## 2020-07-05 RX ORDER — IMMUNE GLOBULIN INFUSION (HUMAN) 100 MG/ML
5 INJECTION, SOLUTION INTRAVENOUS; SUBCUTANEOUS ONCE
Status: COMPLETED | OUTPATIENT
Start: 2020-07-05 | End: 2020-07-05

## 2020-07-05 RX ADMIN — IMMUNE GLOBULIN INFUSION (HUMAN) 5 G: 100 INJECTION, SOLUTION INTRAVENOUS; SUBCUTANEOUS at 12:04

## 2020-07-05 RX ADMIN — IMMUNE GLOBULIN INFUSION (HUMAN) 10 G: 100 INJECTION, SOLUTION INTRAVENOUS; SUBCUTANEOUS at 11:13

## 2020-07-05 RX ADMIN — IMMUNE GLOBULIN INFUSION (HUMAN) 10 G: 100 INJECTION, SOLUTION INTRAVENOUS; SUBCUTANEOUS at 09:26

## 2020-07-05 ASSESSMENT — PAIN SCALES - GENERAL: PAINLEVEL: NO PAIN

## 2020-07-05 ASSESSMENT — FIBROSIS 4 INDEX: FIB4 SCORE: 1.19

## 2020-07-05 NOTE — PROGRESS NOTES
Patient here for every 2 weeks IVIG. PIV established. IVIG given per MAR to max rate of 120 ml/hr. PIV removed; gauze/coban applied over site. Next appointment scheduled. Discharged to self care; no apparent distress noted.

## 2020-07-18 ENCOUNTER — TELEPHONE (OUTPATIENT)
Dept: ONCOLOGY | Facility: MEDICAL CENTER | Age: 45
End: 2020-07-18

## 2020-07-18 ENCOUNTER — TELEPHONE (OUTPATIENT)
Dept: MEDICAL GROUP | Facility: MEDICAL CENTER | Age: 45
End: 2020-07-18

## 2020-07-18 DIAGNOSIS — Z85.820 HISTORY OF MELANOMA: ICD-10-CM

## 2020-07-19 ENCOUNTER — OUTPATIENT INFUSION SERVICES (OUTPATIENT)
Dept: ONCOLOGY | Facility: MEDICAL CENTER | Age: 45
End: 2020-07-19
Attending: INTERNAL MEDICINE
Payer: COMMERCIAL

## 2020-07-19 VITALS
OXYGEN SATURATION: 100 % | HEIGHT: 66 IN | DIASTOLIC BLOOD PRESSURE: 61 MMHG | SYSTOLIC BLOOD PRESSURE: 102 MMHG | BODY MASS INDEX: 20.83 KG/M2 | WEIGHT: 129.63 LBS | HEART RATE: 80 BPM | RESPIRATION RATE: 18 BRPM | TEMPERATURE: 97.4 F

## 2020-07-19 PROCEDURE — 96366 THER/PROPH/DIAG IV INF ADDON: CPT

## 2020-07-19 PROCEDURE — 96365 THER/PROPH/DIAG IV INF INIT: CPT

## 2020-07-19 PROCEDURE — 700111 HCHG RX REV CODE 636 W/ 250 OVERRIDE (IP): Performed by: INTERNAL MEDICINE

## 2020-07-19 RX ORDER — IMMUNE GLOBULIN INFUSION (HUMAN) 100 MG/ML
5 INJECTION, SOLUTION INTRAVENOUS; SUBCUTANEOUS ONCE
Status: COMPLETED | OUTPATIENT
Start: 2020-07-19 | End: 2020-07-19

## 2020-07-19 RX ORDER — IMMUNE GLOBULIN INFUSION (HUMAN) 100 MG/ML
20 INJECTION, SOLUTION INTRAVENOUS; SUBCUTANEOUS ONCE
Status: COMPLETED | OUTPATIENT
Start: 2020-07-19 | End: 2020-07-19

## 2020-07-19 RX ADMIN — IMMUNE GLOBULIN INFUSION (HUMAN) 20 G: 100 INJECTION, SOLUTION INTRAVENOUS; SUBCUTANEOUS at 11:47

## 2020-07-19 RX ADMIN — IMMUNE GLOBULIN INFUSION (HUMAN) 5 G: 100 INJECTION, SOLUTION INTRAVENOUS; SUBCUTANEOUS at 10:47

## 2020-07-19 ASSESSMENT — FIBROSIS 4 INDEX: FIB4 SCORE: 1.19

## 2020-07-20 NOTE — PROGRESS NOTES
Pt arrived ambulatory to [facility] for IVIG. Pt denied fever, signs or symptoms of infection or acute illness today. POC discussed and pt verbalized understanding.     PIV established and pt tolerated well. IVIG administered per provider orders; rate titrated per pt preference and pt tolerated well. PIV flushed and removed; gauze/tape applied.     Follow-up care discussed and future appointments confirmed with pt; pt verbalized understanding. Pt chose to stay a few hours post-infusion and discharged home to self care in no apparent distress at this time.

## 2020-07-25 ENCOUNTER — HOSPITAL ENCOUNTER (OUTPATIENT)
Dept: RADIOLOGY | Facility: MEDICAL CENTER | Age: 45
End: 2020-07-25
Attending: INTERNAL MEDICINE
Payer: COMMERCIAL

## 2020-07-25 DIAGNOSIS — Z85.820 HISTORY OF MELANOMA: ICD-10-CM

## 2020-07-25 PROCEDURE — 76536 US EXAM OF HEAD AND NECK: CPT

## 2020-07-29 ENCOUNTER — APPOINTMENT (RX ONLY)
Dept: URBAN - METROPOLITAN AREA CLINIC 20 | Facility: CLINIC | Age: 45
Setting detail: DERMATOLOGY
End: 2020-07-29

## 2020-07-29 DIAGNOSIS — L57.8 OTHER SKIN CHANGES DUE TO CHRONIC EXPOSURE TO NONIONIZING RADIATION: ICD-10-CM

## 2020-07-29 DIAGNOSIS — L29.89 OTHER PRURITUS: ICD-10-CM

## 2020-07-29 DIAGNOSIS — L82.1 OTHER SEBORRHEIC KERATOSIS: ICD-10-CM

## 2020-07-29 DIAGNOSIS — Z85.820 PERSONAL HISTORY OF MALIGNANT MELANOMA OF SKIN: ICD-10-CM

## 2020-07-29 DIAGNOSIS — L72.3 SEBACEOUS CYST: ICD-10-CM

## 2020-07-29 DIAGNOSIS — L81.4 OTHER MELANIN HYPERPIGMENTATION: ICD-10-CM

## 2020-07-29 DIAGNOSIS — D18.0 HEMANGIOMA: ICD-10-CM

## 2020-07-29 DIAGNOSIS — L29.8 OTHER PRURITUS: ICD-10-CM

## 2020-07-29 DIAGNOSIS — D22 MELANOCYTIC NEVI: ICD-10-CM

## 2020-07-29 PROBLEM — D22.5 MELANOCYTIC NEVI OF TRUNK: Status: ACTIVE | Noted: 2020-07-29

## 2020-07-29 PROBLEM — D22.71 MELANOCYTIC NEVI OF RIGHT LOWER LIMB, INCLUDING HIP: Status: ACTIVE | Noted: 2020-07-29

## 2020-07-29 PROBLEM — D22.62 MELANOCYTIC NEVI OF LEFT UPPER LIMB, INCLUDING SHOULDER: Status: ACTIVE | Noted: 2020-07-29

## 2020-07-29 PROBLEM — D18.01 HEMANGIOMA OF SKIN AND SUBCUTANEOUS TISSUE: Status: ACTIVE | Noted: 2020-07-29

## 2020-07-29 PROBLEM — D22.72 MELANOCYTIC NEVI OF LEFT LOWER LIMB, INCLUDING HIP: Status: ACTIVE | Noted: 2020-07-29

## 2020-07-29 PROBLEM — D22.61 MELANOCYTIC NEVI OF RIGHT UPPER LIMB, INCLUDING SHOULDER: Status: ACTIVE | Noted: 2020-07-29

## 2020-07-29 PROBLEM — D48.5 NEOPLASM OF UNCERTAIN BEHAVIOR OF SKIN: Status: ACTIVE | Noted: 2020-07-29

## 2020-07-29 PROCEDURE — ? PRESCRIPTION

## 2020-07-29 PROCEDURE — ? BIOPSY BY SHAVE METHOD

## 2020-07-29 PROCEDURE — ? COUNSELING

## 2020-07-29 PROCEDURE — ? ADDITIONAL NOTES

## 2020-07-29 PROCEDURE — 99214 OFFICE O/P EST MOD 30 MIN: CPT | Mod: 25

## 2020-07-29 PROCEDURE — 11102 TANGNTL BX SKIN SINGLE LES: CPT

## 2020-07-29 RX ORDER — TRIAMCINOLONE ACETONIDE 1 MG/G
CREAM TOPICAL BID
Qty: 1 | Refills: 3 | Status: ERX | COMMUNITY
Start: 2020-07-29

## 2020-07-29 RX ADMIN — TRIAMCINOLONE ACETONIDE THIN LAYER: 1 CREAM TOPICAL at 00:00

## 2020-07-29 ASSESSMENT — LOCATION DETAILED DESCRIPTION DERM
LOCATION DETAILED: LEFT ANTERIOR DISTAL THIGH
LOCATION DETAILED: RIGHT ANTERIOR THIGH
LOCATION DETAILED: RIGHT LATERAL ABDOMEN
LOCATION DETAILED: LEFT DISTAL POSTERIOR UPPER ARM
LOCATION DETAILED: RIGHT DORSAL HAND
LOCATION DETAILED: RIGHT DORSAL FOREARM
LOCATION DETAILED: RIGHT MID-UPPER BACK
LOCATION DETAILED: LEFT DORSAL HAND
LOCATION DETAILED: RIGHT DISTAL POSTERIOR UPPER ARM
LOCATION DETAILED: RIGHT UPPER BACK
LOCATION DETAILED: PERIUMBILICAL SKIN
LOCATION DETAILED: LEFT MID BACK
LOCATION DETAILED: RIGHT ANTERIOR DISTAL THIGH
LOCATION DETAILED: LEFT SUPERIOR LATERAL NECK
LOCATION DETAILED: LEFT UPPER BACK
LOCATION DETAILED: LEFT CHEEK
LOCATION DETAILED: MONS PUBIS
LOCATION DETAILED: LEFT LATERAL TRAPEZIAL NECK
LOCATION DETAILED: LEFT DORSAL FOREARM
LOCATION DETAILED: LEFT MEDIAL DISTAL PRETIBIAL REGION
LOCATION DETAILED: RIGHT CHEEK
LOCATION DETAILED: LEFT ANTERIOR THIGH

## 2020-07-29 ASSESSMENT — LOCATION SIMPLE DESCRIPTION DERM
LOCATION SIMPLE: LEFT POSTERIOR UPPER ARM
LOCATION SIMPLE: RIGHT LOWER EXTREMITY
LOCATION SIMPLE: RIGHT THIGH
LOCATION SIMPLE: RIGHT CHEEK
LOCATION SIMPLE: ABDOMEN
LOCATION SIMPLE: LEFT PRETIBIAL REGION
LOCATION SIMPLE: BACK
LOCATION SIMPLE: RIGHT UPPER EXTREMITY
LOCATION SIMPLE: POSTERIOR NECK
LOCATION SIMPLE: RIGHT HAND
LOCATION SIMPLE: RIGHT POSTERIOR UPPER ARM
LOCATION SIMPLE: NECK
LOCATION SIMPLE: LEFT LOWER EXTREMITY
LOCATION SIMPLE: LEFT UPPER EXTREMITY
LOCATION SIMPLE: LEFT CHEEK
LOCATION SIMPLE: LEFT THIGH
LOCATION SIMPLE: RIGHT UPPER BACK
LOCATION SIMPLE: LEFT HAND
LOCATION SIMPLE: GROIN

## 2020-07-29 ASSESSMENT — LOCATION ZONE DERM
LOCATION ZONE: TRUNK
LOCATION ZONE: VULVA
LOCATION ZONE: LEG
LOCATION ZONE: NECK
LOCATION ZONE: HAND
LOCATION ZONE: FACE
LOCATION ZONE: ARM

## 2020-07-29 NOTE — HPI: SKIN LESION
Is This A New Presentation, Or A Follow-Up?: Skin Lesion
What Type Of Note Output Would You Prefer (Optional)?: Bullet Format
How Severe Is Your Skin Lesion?: mild
Has Your Skin Lesion Been Treated?: not been treated
Additional History: Lump seems to be getting smaller.

## 2020-07-29 NOTE — HPI: MELANOMA F/U (HISTORY OF MALIGNANT MELANOMA)
What Is The Reason For Today's Visit?: Surveillance against skin cancer recurrences
Breslow Depth?: 0.7mm
Additional History: Pt was instructed to follow up 2 months after last visit 3/2019.  However this is the first OV since 3/2019.  \\n\\nAlso pt c/o bump to right inner arm.  She reports she had an ultrasound to the area and was told it was a sebaceous cyst.
Year Excised?: MM; left neck, 2011, with reoccurance, 2018 neck dissection IB-V

## 2020-08-01 ENCOUNTER — TELEPHONE (OUTPATIENT)
Dept: ONCOLOGY | Facility: MEDICAL CENTER | Age: 45
End: 2020-08-01

## 2020-08-02 ENCOUNTER — OUTPATIENT INFUSION SERVICES (OUTPATIENT)
Dept: ONCOLOGY | Facility: MEDICAL CENTER | Age: 45
End: 2020-08-02
Attending: INTERNAL MEDICINE
Payer: COMMERCIAL

## 2020-08-02 VITALS
OXYGEN SATURATION: 98 % | SYSTOLIC BLOOD PRESSURE: 104 MMHG | BODY MASS INDEX: 21.78 KG/M2 | RESPIRATION RATE: 18 BRPM | HEART RATE: 62 BPM | TEMPERATURE: 97 F | HEIGHT: 65 IN | WEIGHT: 130.73 LBS | DIASTOLIC BLOOD PRESSURE: 66 MMHG

## 2020-08-02 PROCEDURE — 700111 HCHG RX REV CODE 636 W/ 250 OVERRIDE (IP): Performed by: INTERNAL MEDICINE

## 2020-08-02 PROCEDURE — 96366 THER/PROPH/DIAG IV INF ADDON: CPT

## 2020-08-02 PROCEDURE — 96365 THER/PROPH/DIAG IV INF INIT: CPT

## 2020-08-02 RX ORDER — IMMUNE GLOBULIN INFUSION (HUMAN) 100 MG/ML
5 INJECTION, SOLUTION INTRAVENOUS; SUBCUTANEOUS ONCE
Status: COMPLETED | OUTPATIENT
Start: 2020-08-02 | End: 2020-08-02

## 2020-08-02 RX ORDER — IMMUNE GLOBULIN INFUSION (HUMAN) 100 MG/ML
10 INJECTION, SOLUTION INTRAVENOUS; SUBCUTANEOUS ONCE
Status: COMPLETED | OUTPATIENT
Start: 2020-08-02 | End: 2020-08-02

## 2020-08-02 RX ADMIN — IMMUNE GLOBULIN INFUSION (HUMAN) 10 G: 100 INJECTION, SOLUTION INTRAVENOUS; SUBCUTANEOUS at 10:14

## 2020-08-02 RX ADMIN — IMMUNE GLOBULIN INFUSION (HUMAN) 5 G: 100 INJECTION, SOLUTION INTRAVENOUS; SUBCUTANEOUS at 13:06

## 2020-08-02 RX ADMIN — IMMUNE GLOBULIN INFUSION (HUMAN) 10 G: 100 INJECTION, SOLUTION INTRAVENOUS; SUBCUTANEOUS at 11:56

## 2020-08-02 ASSESSMENT — FIBROSIS 4 INDEX: FIB4 SCORE: 1.19

## 2020-08-03 NOTE — PROGRESS NOTES
Patient arrived ambulatory to the [facility] for IV[facility]. Reviewed vital signs, labs, and physician order. Patient denies S&S of infection. IV access established in right forearm, visualized brisk blood return. IVIG administered per pharmacy titration scale up to max infusion rate of 90ml's/hr per tolerance, no adverse reaction observed. IV flushed per protocol, catheter removed with tip intact, gauze and coban dressing placed. Confirmed upcoming appointment date and time with patient. Patient left the [facility] ambulatory in no sign of distress.

## 2020-08-15 ENCOUNTER — TELEPHONE (OUTPATIENT)
Dept: ONCOLOGY | Facility: MEDICAL CENTER | Age: 45
End: 2020-08-15

## 2020-08-16 ENCOUNTER — OUTPATIENT INFUSION SERVICES (OUTPATIENT)
Dept: ONCOLOGY | Facility: MEDICAL CENTER | Age: 45
End: 2020-08-16
Attending: INTERNAL MEDICINE
Payer: COMMERCIAL

## 2020-08-16 VITALS
HEIGHT: 65 IN | WEIGHT: 131.61 LBS | HEART RATE: 57 BPM | DIASTOLIC BLOOD PRESSURE: 64 MMHG | BODY MASS INDEX: 21.93 KG/M2 | TEMPERATURE: 98.2 F | RESPIRATION RATE: 18 BRPM | OXYGEN SATURATION: 99 % | SYSTOLIC BLOOD PRESSURE: 103 MMHG

## 2020-08-16 DIAGNOSIS — G90.9 AUTONOMIC NEUROPATHY: Chronic | ICD-10-CM

## 2020-08-16 PROCEDURE — 96366 THER/PROPH/DIAG IV INF ADDON: CPT

## 2020-08-16 PROCEDURE — 96365 THER/PROPH/DIAG IV INF INIT: CPT

## 2020-08-16 PROCEDURE — 700111 HCHG RX REV CODE 636 W/ 250 OVERRIDE (IP): Performed by: INTERNAL MEDICINE

## 2020-08-16 RX ORDER — IMMUNE GLOBULIN INFUSION (HUMAN) 100 MG/ML
5 INJECTION, SOLUTION INTRAVENOUS; SUBCUTANEOUS ONCE
Status: COMPLETED | OUTPATIENT
Start: 2020-08-16 | End: 2020-08-16

## 2020-08-16 RX ORDER — IMMUNE GLOBULIN INFUSION (HUMAN) 100 MG/ML
20 INJECTION, SOLUTION INTRAVENOUS; SUBCUTANEOUS ONCE
Status: COMPLETED | OUTPATIENT
Start: 2020-08-16 | End: 2020-08-16

## 2020-08-16 RX ADMIN — IMMUNE GLOBULIN INFUSION (HUMAN) 5 G: 100 INJECTION, SOLUTION INTRAVENOUS; SUBCUTANEOUS at 14:09

## 2020-08-16 RX ADMIN — IMMUNE GLOBULIN INFUSION (HUMAN) 20 G: 100 INJECTION, SOLUTION INTRAVENOUS; SUBCUTANEOUS at 15:17

## 2020-08-16 ASSESSMENT — FIBROSIS 4 INDEX: FIB4 SCORE: 1.19

## 2020-08-16 NOTE — PROGRESS NOTES
Patient presents for IVIG infusion. Reviewed plan of care, patient voices no new concerns. PIV established, flushes well with brisk blood return. IVIG titrated up per guidelines and patient tolerance. Patient sleeping in stable condition. Report to Pastora REARDON.

## 2020-08-17 NOTE — PROGRESS NOTES
Assumed care of pt from Kya REARDON. IVIG completed without event. Line flushed clear. IV flushed and removed. Pressure dressing applied. Pt knows when to return, discharged home under self care in no apparent distress.

## 2020-08-20 ENCOUNTER — HOSPITAL ENCOUNTER (OUTPATIENT)
Dept: RADIOLOGY | Facility: MEDICAL CENTER | Age: 45
End: 2020-08-20
Attending: INTERNAL MEDICINE
Payer: COMMERCIAL

## 2020-08-20 ENCOUNTER — OFFICE VISIT (OUTPATIENT)
Dept: MEDICAL GROUP | Facility: MEDICAL CENTER | Age: 45
End: 2020-08-20
Payer: COMMERCIAL

## 2020-08-20 ENCOUNTER — HOSPITAL ENCOUNTER (OUTPATIENT)
Facility: MEDICAL CENTER | Age: 45
End: 2020-08-20
Attending: INTERNAL MEDICINE
Payer: COMMERCIAL

## 2020-08-20 VITALS
BODY MASS INDEX: 21.21 KG/M2 | DIASTOLIC BLOOD PRESSURE: 62 MMHG | OXYGEN SATURATION: 96 % | WEIGHT: 132 LBS | HEART RATE: 61 BPM | HEIGHT: 66 IN | TEMPERATURE: 98.1 F | SYSTOLIC BLOOD PRESSURE: 104 MMHG

## 2020-08-20 DIAGNOSIS — M81.0 OSTEOPOROSIS, UNSPECIFIED OSTEOPOROSIS TYPE, UNSPECIFIED PATHOLOGICAL FRACTURE PRESENCE: ICD-10-CM

## 2020-08-20 DIAGNOSIS — F41.9 ANXIETY: Chronic | ICD-10-CM

## 2020-08-20 DIAGNOSIS — R63.39 IMPAIRED ORAL FEEDING: ICD-10-CM

## 2020-08-20 DIAGNOSIS — Z23 NEED FOR HEPATITIS B VACCINATION: ICD-10-CM

## 2020-08-20 DIAGNOSIS — E53.8 B12 DEFICIENCY: ICD-10-CM

## 2020-08-20 DIAGNOSIS — D70.9 NEUTROPENIA, UNSPECIFIED TYPE (HCC): ICD-10-CM

## 2020-08-20 DIAGNOSIS — D80.3 IGG DEFICIENCY (HCC): ICD-10-CM

## 2020-08-20 DIAGNOSIS — E03.9 HYPOTHYROIDISM, UNSPECIFIED TYPE: Chronic | ICD-10-CM

## 2020-08-20 DIAGNOSIS — G47.00 INSOMNIA, UNSPECIFIED TYPE: ICD-10-CM

## 2020-08-20 DIAGNOSIS — M30.1 CHURG-STRAUSS SYNDROME (HCC): Chronic | ICD-10-CM

## 2020-08-20 DIAGNOSIS — Z12.11 COLON CANCER SCREENING: ICD-10-CM

## 2020-08-20 DIAGNOSIS — E55.9 VITAMIN D DEFICIENCY: ICD-10-CM

## 2020-08-20 DIAGNOSIS — Z85.820 HISTORY OF MELANOMA: ICD-10-CM

## 2020-08-20 DIAGNOSIS — Z12.31 ENCOUNTER FOR SCREENING MAMMOGRAM FOR BREAST CANCER: ICD-10-CM

## 2020-08-20 DIAGNOSIS — D72.18 CHURG-STRAUSS SYNDROME (HCC): Chronic | ICD-10-CM

## 2020-08-20 DIAGNOSIS — Z00.00 PREVENTATIVE HEALTH CARE: Chronic | ICD-10-CM

## 2020-08-20 DIAGNOSIS — Z11.59 NEED FOR HEPATITIS C SCREENING TEST: ICD-10-CM

## 2020-08-20 DIAGNOSIS — Z12.4 CERVICAL CANCER SCREENING: ICD-10-CM

## 2020-08-20 DIAGNOSIS — Z79.899 LONG-TERM CURRENT USE OF INTRAVENOUS IMMUNOGLOBULIN (IVIG): ICD-10-CM

## 2020-08-20 DIAGNOSIS — E55.9 HYPOVITAMINOSIS D: ICD-10-CM

## 2020-08-20 PROCEDURE — 90747 HEPB VACC 4 DOSE IMMUNSUP IM: CPT | Performed by: INTERNAL MEDICINE

## 2020-08-20 PROCEDURE — 87591 N.GONORRHOEAE DNA AMP PROB: CPT

## 2020-08-20 PROCEDURE — 88175 CYTOPATH C/V AUTO FLUID REDO: CPT

## 2020-08-20 PROCEDURE — 90471 IMMUNIZATION ADMIN: CPT | Performed by: INTERNAL MEDICINE

## 2020-08-20 PROCEDURE — 77067 SCR MAMMO BI INCL CAD: CPT

## 2020-08-20 PROCEDURE — 87491 CHLMYD TRACH DNA AMP PROBE: CPT

## 2020-08-20 PROCEDURE — 99396 PREV VISIT EST AGE 40-64: CPT | Mod: 25 | Performed by: INTERNAL MEDICINE

## 2020-08-20 RX ORDER — ERGOCALCIFEROL 1.25 MG/1
50000 CAPSULE ORAL
Qty: 3 CAP | Refills: 4 | Status: SHIPPED | OUTPATIENT
Start: 2020-08-20 | End: 2020-12-15 | Stop reason: SDUPTHER

## 2020-08-20 ASSESSMENT — PATIENT HEALTH QUESTIONNAIRE - PHQ9: CLINICAL INTERPRETATION OF PHQ2 SCORE: 0

## 2020-08-20 ASSESSMENT — FIBROSIS 4 INDEX: FIB4 SCORE: 1.19

## 2020-08-20 NOTE — PROGRESS NOTES
Subjective:      Megan August is a 44 y.o. female who presents with Pap smear        HPI        Here for Pap smear although she really did not notify me about the reason for the visit until we are [facility] through the visit, last Pap smear 3 days ago was negative, no history of abnormal Pap smears.  Currently on Isabelle without any abnormal bleeding.  No history of DVT.   Medication, allergies, medical history, surgical history reviewed and updated  Social history , works for [employer] from home, has been under some stress trying to work from home but also care for her children were starting school but with the district regulations will be on a type of hybrid learning protocol spending some days at home, and she does find it difficult to balance making sure they are meeting their educational needs and requirements and yet be able to perform her work as well.  She does try to find time to remain active by quitting outdoors, does kettelbell five days per week as well.  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  Uses sunscreen, avoids excessive sun exposure.  Sees skin cancer dermatology    Sinus congestion recently, chronic postnasal drip, no recurrent sinus infections, sinuses are actually clear today, no productive cough has occasional cough at night related to reflux, no headache but frontal sinus pressure, no fevers  Patient with gastrointestinal dysmotility followed by Dr. Evans [facility], autoimmune disease Churg-Homer with diffuse eosinophilia, poor GI motility, requires Elecare judy vanilla 36 cans per month to maintain nutrition weight, has tried multiple supplements, has significant food allergies, remains on IVIG infusions for the eosinophilic esophagitis and Churg-Homer  disease per [facility] recommendations has been on disability since 2018  Chronic insomnia, history of meditation, laxation therapy, stretching, no caffeine late in the day, no depression stable on Effexor, has tried melatonin up to 10 mg nightly, Benadryl, no benefit      Current Outpatient Medications   Medication Sig Dispense Refill   • venlafaxine XR (EFFEXOR XR) 75 MG CAPSULE SR 24 HR TAKE 1 CAPSULE BY MOUTH EVERY DAY 90 Cap 1   • drospirenone-ethinyl estradiol (ASHLEY) 3-0.03 MG per tablet Take 1 Tab by mouth every day. 84 Tab 3   • levothyroxine (SYNTHROID) 112 MCG Tab Take one po 6 days per week 90 Tab 2   • potassium chloride (KLOR-CON) 20 MEQ Pack Take 1 Packet by mouth every day. 90 Packet 3   • immune globulin 5% (5 g/100mL) in empty bag by Intravenous route Once.     • cyclosporin (RESTASIS) 0.05 % ophthalmic emulsion Place 1 Drop in both eyes 2 times a day. Dispense quantity 360 vials to [facility] pharmacy fax [phone] 0.4 mL 3   • diphenhydrAMINE (BENADRYL) 25 MG Tab Take 25 mg by mouth every 6 hours as needed for Sleep.     • ranitidine (ZANTAC) 150 MG Tab Take 150 mg by mouth every evening.     • naproxen (ALEVE) 220 MG tablet Take 220 mg by mouth as needed. Indications: Mild to Moderate Pain     • Fexofenadine HCl (ALLEGRA PO) Take 1 Tab by mouth every evening.       No current facility-administered medications for this visit.      Patient Active Problem List   Diagnosis   • Hypothyroid   • Coronary artery fistula   • History anxiety   • Sjogren's disease (HCC)   • Preventative health care   • Dyslipidemia   • Melanoma (HCC)   • Eosinophilic esophagitis   • Constipation   • Autonomic neuropathy   • Churg-Homer syndrome (HCC)   • History of prednisone use   • Biallelic mutation of SDHA gene   • Gastrointestinal dysmotility   • Impaired oral feeding   • Neutropenia (HCC)             Anxiety  8/6/16 effexor over 4 years     Autonomic neuropathy  2/11/16 neurology [facility]  evaluation, qsweat response is reduced in the foot, heart rate and blood pressure response to deep breathing and valsalva intact and normal, blood pressure response to 10 minute tilt table head up mild increase in blood pressure oscillations with moderate postural tachycardia although this remains in the normal range, patient was asymptomatic; conclusion cardio vagal and cardiovascular adrenergic responses were intact, distal postganglionic sympathetic sudomotor function was decreased, a limited autonomic neuropathy is possible, medications (venlafaxine) may have caused the abnormalities  4/22/16 referral dr.gerald fitzgerald [facility] allergy for churg amber [phone]  5/9/16  [facility] neurology consultation note evaluation possible autoimmune CNS disease; exam notable for decreased laboratory sensation hyperreflexia, evaluate for underlying autoimmunity further testing repeat YOON, check thyroid antibodies, prilosec and intrinsic factor antibodies, copper, zinc, MARLEN panel, antibody testing with autoimmune dysautonomia panel sent off to [facility], MRI cervical and thoracic spine, lumbar puncture in office to evaluate for CSF evidence of inflammation, follow-up 3 months  7/28/16 thiopurine methyltransferase 34 (24-34)  5/30/17 note per 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, will be seeing genetticist next week then MRI and surgery  6/11/17 plasma epinephrine, norepinephrine, dopamine, chromogranin A negative done at [facility]  6/12/17 EGD at [facility] diffuse mild mucosal changes upper third esophagus, middle third and lower third esophagus, biopsies performed, normal stomach  6/17/17 MRI soft tissue neck with and without; negative  6/19/17 genetics evaluation dr.joshua lange [facility], patient underwent whole exome sequencing in january negative for mutations that would explain her medical condition, but revealed a  paternally inherited mutation in the SDHA gene denoted as [id number] which may lead to hereditary paraganglioma and pheochromocytoma, also at risk for developing GISTs, unlikely that paraganglioma could compress on vagus nerve, surveillance recommendations based upon the SDHA mutation, annual biochemical study of plasma metanephrine and catecholamines, MRI base of skull andneck every 2 years, MRI body every 3-5 years, thus MRI neck, chest, abdomen, pelvis ordered, daughters will be tested as well  6/19/17 MRI pelvis with and without; no suspicious mass  6/19/17 MR abdomen with and without; no suspicious mass  6/19/17 MR chest with and without; nonspecific 7 x 12 mm hyperintense lesion subcarinal region, if concern for paraganglioma further evaluation with gallium-68-dotatate may be helpful for confirmation  6/29/17 CT/PET no evidence of abnormal FDG accumulation, no increased activity subcarinal region  5/19/18 MRI abdomen with and without; 2 left celiac ganglia upper ganglia and left of celiac trunk at its origin from the abdominal aorta 3 x 8 mm, second ganglion of her anterior left diaphragmatic sylvester adjacent to the aorta about level of the superior mesenteric artery 4 x 18 mm, 2 right celiac ganglia upper ganglia right diaphragmatic sylvester anterior to the inferior vena cava level of celiac trunk, 3 x 9 mm, second right ganglion between right diaphragmatic sylvester and inferior vena cava measuring 3 x 19 mm     biallelic mutation mutation SDHA gene   5/30/17 note per 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, will be seeing genetticist next week then MRI and surgery  6/19/17 genetics evaluation dr.joshua lange [facility], patient underwent whole exome sequencing in january negative for mutations that would explain her medical condition, but revealed 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 denoted as [id number] which may lead to hereditary paraganglioma  and pheochromocytoma, also at risk for developing GISTs, unlikely that paraganglioma could compress on vagus nerve, surveillance recommendations based upon the SDHA mutation, annual biochemical study of plasma metanephrine and catecholamines, MRI base of skull andneck every 2 years, MRI body every 3-5 years, thus MRI neck, chest, abdomen, pelvis ordered, daughters will be tested as well  6/17/17 MRI soft tissue neck with and without; negative  6/19/17 MRI pelvis with and without; no suspicious mass  6/19/17 MR abdomen with and without; no suspicious mass  6/19/17 MR chest with and without; nonspecific 7 x 12 mm hyperintense lesion subcarinal region, if concern for paraganglioma further evaluation with gallium-68-dotatate may be helpful for confirmation  6/29/17 CT/PET no evidence of abnormal FDG accumulation, no increased activity subcarinal region   5/19/18 MRI abdomen with and without; 2 left celiac ganglia upper ganglia and left of celiac trunk at its origin from the abdominal aorta 3 x 8 mm, second ganglion of her anterior left diaphragmatic sylvester adjacent to the aorta about level of the superior mesenteric artery 4 x 18 mm, 2 right celiac ganglia upper ganglia right diaphragmatic sylvester anterior to the inferior vena cava level of celiac trunk, 3 x 9 mm, second right ganglion between right diaphragmatic sylvester and inferior vena cava measuring 3 x 19 mm     Churg-Homer disease  2/9/15 wbc 5.6 with 20% eosinophils  12/19/15 gastroenterology evaluation [facility] eosinophil area may be more related to autoimmune process, question whether this is allergic phenomena related to connective tissue disorder and elevated levels of transforming growth factor beta that will also recruit eosinophils, will repeat serologies, biopsy for direct immunofluorescence and MBP to evaluate eosinophilic esophagitis, nutritional consultation, recheck immunoglobulins as well, check MRI brain  12/19/15 iron 84,TIBC 324,transferrin  26,tsh 2.8,vit d 28,vitamin A levels normal, b12 418,vitamin b6 58 normal, alpha and gamma tocopherol normal,vitamin k normal, wbc 6.3 (9% eosinophils),IgG,IgA,IgM normal,IgG sublass 4 and IgE normal,SPEP negative, tTg IgA normal,ESR 3,crp 0.9,copper normal,selenium normal,zinc 46 (),folate and b1 normal,YOON 1:160 homogenous,anti-scl 70 negative, centromere Ab negative, TH/TO Ab negative, RNA plymerase III negative, U1-RNP Ab negative,fibrillarin U3 negative, PM/Scl negative,C3 and C4 negative, RG 13, CCP 4   2/10/16 [facility] nutrition consultation estimated needs [id number] kcal/day,  3/27/15 colon per [facility] tubular adenomas x2, negative for colitis  4/16/15 [facility] note CBC peripheral eosinophilia 21%, constipation by CT scan, colonoscopy unremarkable, biopsies negative, check YOON, immunoglobulins, tryptase, stool giardia antigen  4/16/15 wbc 3.9 with 5.7% eosinophils   5/28/15 [facility] note followup bloating and constipation, repeat labs WBC 3.9, normal eosinophils, TSH, YOON, immunoglobulins, stool for ova and parasites negative, tryptase negative, previous CT and colonoscopy negative, no evidence of eosinophilic colitis despite history of eosinophilic esophagitis and peripheral eosinophilia in the past, continue PPI and miralax  8/27/15 [facility] note, abdominal distention bowel visit, previous CT scan showed constipation, and colonoscopy random biopsies unremarkable without increased eosinophils, additional labs for parasites, immunoglobulin, thyroid function, tryptase, YOON negative, treated empirically for enteritis/colitis with improvement of symptoms question muscular involvement of GI tract with eosinophils given peripheral eosinophilia along with history of eosinophilic esophagitis and improvement with short course of oral prednisone vs. IBS, trial of linzess 290 mcg daily, followup 3 months  10/21/15 patient written note went to [facility] and diagnosed with bacterial overgrowth, trial of xifaxin with  improvement in symptoms   1/7/16 EGD by GI in [place], mucosal changes consistent with esophageal esophagitis, biopsies obtained polyps gastric fundus, small 1 cm hiatal hernia  4/14/16 EGD per [facility] diffuse exudates and edema upper third esophagus, biopsies performed, diffuse exudates and edema lower third esophagus, biopsies performed  8/8/16 on neocate judy one can per day 1800 vera and drinks water three times a day   9/16/16 on azathioprine 50 mg 2 per day per [facility]  9/23/16 patient spoke to [facility] GI 's assistant raman freddy, stop azathioprine and start prednisone 20 mg daily, consider methotrexate  11/16/16 apparently has been on prednisone for one year from [place] and on cellcept, will order dexa  2/21/17  [facility] note failed azathioprine due to hair loss and off mycophenolate, now on prednisone 20 mg daily, try to obtain approval for mepolizumab 100 mg q month  3/24/17  [facility] note, discuss prednisone withdrawal, when she is treated with mepolizumab wait 2 weeks then reduce prednisone to 17.5 mg, then in 2 weeks with second injection mepolizumab will reduce prednisone to 15 mg, and 2 weeks after that to 12.5 mg, and in 2 weeks 10 mg  4/4/17 nucala 100 mg sc administered first dose  6/11/17 plasma epinephrine, norepinephrine, dopamine, chromogranin A negative done at [facility]  6/12/17 EGD at [facility] diffuse mild mucosal changes upper third esophagus, middle third and lower third esophagus, biopsies performed, normal stomach  7/21/17 IVIG 0.4 g/kg x 3 days then 0.4g/kg q week x 5 weeks per [facility]   8/16/17 IVIG 0.4 g/kg x 3 days then 0.4g/kg q week per [facility]   9/12/17 [facility] note completed 5 week course of IVIG will take a break from IVIG and continue prednisone 4 mg  9/14/17 [facility]  phone note, needs smart pill  9/26/17 on prednisone 4 mg daily and 20 mg qsunday  and on nucala, tried IVIG   10/25/17 on prednisone 9 mg qday and on nucala, tried IVIG  11/8/17 [facility]  phone note smart pill results dysmotility in the stomach and colon so IVIG is necessary, need another smart pill after 12 weeks to see motility is improved, need to dose prednisone 40 mg one day before and one day after the IVIG also need to check IgA levels periodically, order sent  11/20/17 per [facility] IVIG 0.4 g/kg x 12 weeks no premedication with solumedrol, IgA level prior to each infusion, remains on prednisone 9 mg qday and nucala per [facility]   12/5/17 IgA 49  2/2/18  [facility] dermatology note, we will try to obtain authorization approval for mepolizumab for eosinophilia  4/20/18 EGD  [facility] GI procedure note up with her esophagus normal, biopsies performed, diffuse moderate mucosal changes congestion, discoloration, distal esophagus biopsies performed, stomach normal, resume omeprazole 40 mg if tolerated, if not start ranitidine 150 mg bid  4/20/18 CT angiogram abdomen with contrast, celiac trunk normal, superior mesenteric artery normal, no evidence of median arcuate ligament syndrome  5/19/18 MRI abdomen with and without; 2 left celiac ganglia upper ganglia and left of celiac trunk at its origin from the abdominal aorta 3 x 8 mm, second ganglion of her anterior left diaphragmatic sylvester adjacent to the aorta about level of the superior mesenteric artery 4 x 18 mm, 2 right celiac ganglia upper ganglia right diaphragmatic sylvester anterior to the inferior vena cava level of celiac trunk, 3 x 9 mm, second right ganglion between right diaphragmatic sylvseter and inferior vena cava measuring 3 x 19 mm  4/20/18  [facility] GI procedure note ED upper third of esophagus normal, diffuse moderate mucosal changes, biopsies performed, duodenal normal, pathology duodenal mucosa, gastric antrum and fundus normal  mucosa, esophageal squamous mucosa with intraepithelial eosinophils  7/18 off nucala due to melanoma and still on IVIG  10/20/18 IgG subclass 1-876, IgG subclass 2-472, IgG subclass 3-70, IgG subclass 4-14  5/6/19 per patient  [facility] will taper prednisone by 1 mg every 2 weeks and recommends start linzess 72 mcg daily  11/25/19 on IVIG every other week and off prednisone  12/17/19 IgG 1920,IgA 51 (),IgM 97,IgE 6 on IVIG every other week     Constipation  3/16/15 CT abdomen pelvis with; large amount colonics stool     Coronary artery fistula  Sees [facility] last report I have in 2000, apparently [facility] up [facility] asymptomatic     Current chronic steroids  11/15 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 per [facility]  11/22/16 dexa LS -0.3,hip -0.4; FRAX 3.0% major,0.1% hip on prednisone 20 mg   12/11/16 reclast infusion  12/12/16 reaction to reclast seen ER, patient believes she can try this medication again however  10/25/17 on prednisone 9 mg qday and on nucala, tried IVIG  10/16/18 on prednisone 4 mg  5/6/19 per patient  [facility] will taper prednisone by 1 mg every 2 weeks and recommends start linzess 72 mcg daily     Dyslipidemia  2/11 chol 213,trig 102,hdl 58,ldl 135  3/12 chol 239,trig 122,hdl 60,ldl 155  7/13 chol 230,trig 121,hdl 54,ldl 152  1/7/14 chol 219,trig 139,hdl 60,ldl 131  9/18/15 chol 239,trig 162,hdl 63,ldl 144,crp 0.7  9/28/17 chol 213,trig 81,hdl 76,ldl 121  10/22/18 chol 240,trig 129,hdl 81,ldl 133  12/17/19 chol 227,trig 181,hdl 64,ldl 127     Gastrointestinal dysmotility  2/9/15 wbc 5.6 with 20% eosinophils  12/19/15 gastroenterology evaluation [facility] eosinophil area may be more related to autoimmune process, question whether this is allergic phenomena related to connective tissue disorder and elevated levels of transforming growth factor beta that will also recruit eosinophils, will repeat serologies, biopsy for direct  immunofluorescence and MBP to evaluate eosinophilic esophagitis, nutritional consultation, recheck immunoglobulins as well, check MRI brain  12/19/15 iron 84,TIBC 324,transferrin 26,tsh 2.8,vit d 28,vitamin A levels normal, b12 418,vitamin b6 58 normal, alpha and gamma tocopherol normal,vitamin k normal, wbc 6.3 (9% eosinophils),IgG,IgA,IgM normal,IgG sublass 4 and IgE normal,SPEP negative, tTg IgA normal,ESR 3,crp 0.9,copper normal,selenium normal,zinc 46 (),folate and b1 normal,YOON 1:160 homogenous,anti-scl 70 negative, centromere Ab negative, TH/TO Ab negative, RNA plymerase III negative, U1-RNP Ab negative,fibrillarin U3 negative, PM/Scl negative,C3 and C4 negative, RG 13, CCP 4   2/10/16 [facility] nutrition consultation estimated needs [id number] kcal/day,  3/27/15 colon per [facility] tubular adenomas x2, negative for colitis  4/16/15 [facility] note CBC peripheral eosinophilia 21%, constipation by CT scan, colonoscopy unremarkable, biopsies negative, check YOON, immunoglobulins, tryptase, stool giardia antigen  4/16/15 wbc 3.9 with 5.7% eosinophils   5/28/15 [facility] note followup bloating and constipation, repeat labs WBC 3.9, normal eosinophils, TSH, YOON, immunoglobulins, stool for ova and parasites negative, tryptase negative, previous CT and colonoscopy negative, no evidence of eosinophilic colitis despite history of eosinophilic esophagitis and peripheral eosinophilia in the past, continue PPI and miralax  8/27/15 [facility] note, abdominal distention bowel visit, previous CT scan showed constipation, and colonoscopy random biopsies unremarkable without increased eosinophils, additional labs for parasites, immunoglobulin, thyroid function, tryptase, YOON negative, treated empirically for enteritis/colitis with improvement of symptoms question muscular involvement of GI tract with eosinophils given peripheral eosinophilia along with history of eosinophilic esophagitis and improvement with short course of oral  prednisone vs. IBS, trial of linzess 290 mcg daily, followup 3 months  10/21/15 patient written note went to [facility] and diagnosed with bacterial overgrowth, trial of xifaxin with improvement in symptoms   1/7/16 EGD by GI in [place], mucosal changes consistent with esophageal esophagitis, biopsies obtained polyps gastric fundus, small 1 cm hiatal hernia  4/14/16 EGD per [facility] diffuse exudates and edema upper third esophagus, biopsies performed, diffuse exudates and edema lower third esophagus, biopsies performed  8/8/16 on neocate judy one can per day 1800 vera and drinks water three times a day   9/16/16 on azathioprine 50 mg 2 per day per [facility]  9/23/16 patient spoke to [facility] GI 's assistant raman hayes, stop azathioprine and start prednisone 20 mg daily, consider methotrexate  11/16/16 apparently has been on prednisone for one year from [place] and on cellcept, will order dexa  2/21/17  [facility] note failed azathioprine due to hair loss and off mycophenolate, now on prednisone 20 mg daily, try to obtain approval for mepolizumab 100 mg q month  3/24/17  [facility] note, discuss prednisone withdrawal, when she is treated with mepolizumab wait 2 weeks then reduce prednisone to 17.5 mg, then in 2 weeks with second injection mepolizumab will reduce prednisone to 15 mg, and 2 weeks after that to 12.5 mg, and in 2 weeks 10 mg  4/4/17 nucala 100 mg sc administered first dose  6/11/17 plasma epinephrine, norepinephrine, dopamine, chromogranin A negative done at [facility]  6/12/17 EGD at [facility] diffuse mild mucosal changes upper third esophagus, middle third and lower third esophagus, biopsies performed, normal stomach  7/21/17 IVIG 0.4 g/kg x 3 days then 0.4g/kg q week x 5 weeks per [facility]   8/16/17 IVIG 0.4 g/kg x 3 days then 0.4g/kg q week per [facility]   9/12/17 [facility] note completed 5 week course  of IVIG will take a break from IVIG and continue prednisone 4 mg  9/14/17 [facility]  phone note, needs smart pill  9/26/17 on prednisone 4 mg daily and 20 mg qsunday and on nucala, tried IVIG   10/25/17 on prednisone 9 mg qday and on nucala, tried IVIG  11/8/17 [facility]  phone note smart pill results dysmotility in the stomach and colon so IVIG is necessary, need another smart pill after 12 weeks to see motility is improved, need to dose prednisone 40 mg one day before and one day after the IVIG also need to check IgA levels periodically, order sent  11/20/17 per [facility] IVIG 0.4 g/kg x 12 weeks no premedication with solumedrol, IgA level prior to each infusion, remains on prednisone 9 mg qday and nucala per [facility]   2/2/18  [facility] dermatology note, we will try to obtain authorization approval for mepolizumab for eosinophilia  4/20/18 EGD  [facility] GI procedure note up with her esophagus normal, biopsies performed, diffuse moderate mucosal changes congestion, discoloration, distal esophagus biopsies performed, stomach normal, resume omeprazole 40 mg if tolerated, if not start ranitidine 150 mg bid  4/20/18 CT angiogram abdomen with contrast, celiac trunk normal, superior mesenteric artery normal, no evidence of median arcuate ligament syndrome  5/19/18 MRI abdomen with and without; 2 left celiac ganglia upper ganglia and left of celiac trunk at its origin from the abdominal aorta 3 x 8 mm, second ganglion of her anterior left diaphragmatic sylvester adjacent to the aorta about level of the superior mesenteric artery 4 x 18 mm, 2 right celiac ganglia upper ganglia right diaphragmatic sylvester anterior to the inferior vena cava level of celiac trunk, 3 x 9 mm, second right ganglion between right diaphragmatic sylvester and inferior vena cava measuring 3 x 19 mm  4/20/18  [facility] GI procedure  note ED upper third of esophagus normal, diffuse moderate mucosal changes, biopsies performed, duodenal normal, pathology duodenal mucosa, gastric antrum and fundus normal mucosa, esophageal squamous mucosa with intraepithelial eosinophils  7/18 off nucala due to melanoma and still on IVIG  8/31/18  [facility] letter patient suffers from unclear autoimmune disease with diffuse eosinophilia, poor GI motility, requires neocate jr to sustain weight, and remain off TPN, chocolate flavored, 36 cans/month  10/16/18 on IVIG and prednisone 9 mg per  [facility]  11/21/18 current supplement changing to now include fiber which she cannot have, change to elicare judy vanilla flavor 36 cans per 30 days  5/6/19 per patient  [facility] will taper prednisone by 1 mg every 2 weeks and recommends start linzess 72 mcg daily     Eosinophilic esophagitis  5/13 EGD per [facility] eosinophils, started on prilosec 20 mg  6/6/13 EGD per [facility] marked esophageal eosinophilia, consistent with likely eosinophilic esophagitis, trial PPI x8 weeks, if symptoms persist consider trial swallowed fluticasone x 6 weeks.  1/8/14 trial of swallowed fluticsone 220 mcg two puffs bid  7/28/14 flare up after eating sushi, changed to pulmicort respules 1 mg bid with splenda and prilosec bid  8/28/14 EGD per [facility] benign stricture 13 mm that appeared 38 cm from the incisors, compatible with eosinophilic esophagitis, savary dilation successful, biopsies benign squamous mucosa also negative for eosinophilic esophagitis, no intestinal metaplasia, dysplasia or malignancy continue omeprazole bid and swallowed fluticasone  2/9/15 wbc 5.6 with 20% eosinophils  3/27/15 colon per [facility] tubular adenomas x2, negative for colitis  4/16/15 [facility] note CBC peripheral eosinophilia 21%, constipation by CT scan, colonoscopy unremarkable, biopsies negative, will check YOON, immunoglobulins, tryptase, stool giardia antigen  4/16/15  wbc 3.9 with 5.7% eosinophils    5/28/15 [facility] note followup bloating and constipation, repeat labs WBC 3.9, normal eosinophils, TSH, YOON, immunoglobulins, stool for ova and parasites negative, tryptase negative, previous CT and colonoscopy negative, no evidence of eosinophilic colitis despite history of eosinophilic esophagitis and peripheral eosinophilia in the past, continue PPI and MiraLAX  8/27/15 [facility] note, abdominal distention bowel visit, previous CT scan showed constipation, and colonoscopy random biopsies unremarkable without increased eosinophils, additional labs for parasites, immunoglobulin, thyroid function, tryptase, YOON negative, treated empirically for enteritis/colitis with improvement of symptoms,question muscular involvement of GI tract with eosinophils given peripheral eosinophilia along with history of eosinophilic esophagitis and improvement with short course of oral prednisone vs. IBS, trial of linzess 290 mcg daily, followup 3 months  10/21/15 patient written note went to e|tab and diagnosed with bacterial overgrowth, trial of xifaxin with improvement in symptoms    16 EGD per [facility] diffuse exudates and edema upper third esophagus, biopsies performed, diffuse exudates and edema lower third esophagus, biopsies performed  16 on  judy one can per day 1800 vera and drinks water three times a day   17 EGD at [facility] diffuse mild mucosal changes upper third esophagus, middle third and lower third esophagus, biopsies performed, normal stomach  17 IVIG 0.4 g/kg x 3 days then 0.4g/kg q week x 5 weeks per [facility]   17 IVIG 0.4 g/kg x 3 days then 0.4g/kg q week per [facility] premedicated with 250 mg solumedrol  17 on prednisone 4 mg daily and 20 mg qsunday and on nucala, tried IVIG   10/25/17 on prednisone 9 mg qday and on nucala, tried IVIG  17 per [facility] IVIG 0.4 g/kg x 12 weeks no premedication with solumedrol,  IgA level prior to each infusion, remains on prednisone 9 mg qday and nucala per [facility]   12/5/17 IgA 49  2/2/18  [facility] dermatology note, we will try to obtain authorization approval for mepolizumab for eosinophilia  4/20/18 EGD  [facility] GI procedure note up with her esophagus normal, biopsies performed, diffuse moderate mucosal changes congestion, discoloration, distal esophagus biopsies performed, stomach normal, resume omeprazole 40 mg if tolerated, if not start ranitidine 150 mg bid  4/20/18 CT angiogram abdomen with contrast, celiac trunk normal, superior mesenteric artery normal, no evidence of median arcuate ligament syndrome  5/19/18 MRI abdomen with and without; 2 left celiac ganglia upper ganglia and left of celiac trunk at its origin from the abdominal aorta 3 x 8 mm, second ganglion of her anterior left diaphragmatic sylvester adjacent to the aorta about level of the superior mesenteric artery 4 x 18 mm, 2 right celiac ganglia upper ganglia right diaphragmatic sylvester anterior to the inferior vena cava level of celiac trunk, 3 x 9 mm, second right ganglion between right diaphragmatic sylvester and inferior vena cava measuring 3 x 19 mm  4/20/18  [facility] GI procedure note ED upper third of esophagus normal, diffuse moderate mucosal changes, biopsies performed, duodenal normal, pathology duodenal mucosa, gastric antrum and fundus normal mucosa, esophageal squamous mucosa with intraepithelial eosinophils  7/18 off nucala due to melanoma and still on IVIG  8/31/18  [facility] letter patient suffers from unclear autoimmune disease with diffuse eosinophilia, poor GI motility, requires neocate jr to sustain weight, and remain off TPN, chocolate flavored, 36 cans/month  10/16/18 on IVIG and prednisone 9 mg per  [facility]  11/21/18 current supplement changing to now include fiber which she cannot  have, change to elicare judy vanilla flavor 36 cans per 30 days  5/6/19 per patient  [facility] will taper prednisone by 1 mg every 2 weeks and recommends start linzess 72 mcg daily  11/25/19 on IVIG every other week and off prednisone  12/17/19 IgG 1920,IgA 51 (),IgM 97,IgE 6 on IVIG every other week    Gastrointestinal dysmotility  2/9/15 wbc 5.6 with 20% eosinophils  12/19/15 gastroenterology evaluation [facility] eosinophil area may be more related to autoimmune process, question whether this is allergic phenomena related to connective tissue disorder and elevated levels of transforming growth factor beta that will also recruit eosinophils, will repeat serologies, biopsy for direct immunofluorescence and MBP to evaluate eosinophilic esophagitis, nutritional consultation, recheck immunoglobulins as well, check MRI brain  12/19/15 iron 84,TIBC 324,transferrin 26,tsh 2.8,vit d 28,vitamin A levels normal, b12 418,vitamin b6 58 normal, alpha and gamma tocopherol normal,vitamin k normal, wbc 6.3 (9% eosinophils),IgG,IgA,IgM normal,IgG sublass 4 and IgE normal,SPEP negative, tTg IgA normal,ESR 3,crp 0.9,copper normal,selenium normal,zinc 46 (),folate and b1 normal,YOON 1:160 homogenous,anti-scl 70 negative, centromere Ab negative, TH/TO Ab negative, RNA plymerase III negative, U1-RNP Ab negative,fibrillarin U3 negative, PM/Scl negative,C3 and C4 negative, RG 13, CCP 4   2/10/16 [facility] nutrition consultation estimated needs [id number] kcal/day,  3/27/15 colon per [facility] tubular adenomas x2, negative for colitis  4/16/15 [facility] note CBC peripheral eosinophilia 21%, constipation by CT scan, colonoscopy unremarkable, biopsies negative, check YOON, immunoglobulins, tryptase, stool giardia antigen  4/16/15 wbc 3.9 with 5.7% eosinophils   5/28/15 [facility] note followup bloating and constipation, repeat labs WBC 3.9, normal eosinophils, TSH, YOON, immunoglobulins, stool for ova and parasites  negative, tryptase negative, previous CT and colonoscopy negative, no evidence of eosinophilic colitis despite history of eosinophilic esophagitis and peripheral eosinophilia in the past, continue PPI and miralax  8/27/15 [facility] note, abdominal distention bowel visit, previous CT scan showed constipation, and colonoscopy random biopsies unremarkable without increased eosinophils, additional labs for parasites, immunoglobulin, thyroid function, tryptase, YOON negative, treated empirically for enteritis/colitis with improvement of symptoms question muscular involvement of GI tract with eosinophils given peripheral eosinophilia along with history of eosinophilic esophagitis and improvement with short course of oral prednisone vs. IBS, trial of linzess 290 mcg daily, followup 3 months  10/21/15 patient written note went to [facility] and diagnosed with bacterial overgrowth, trial of xifaxin with improvement in symptoms   1/7/16 EGD by GI in [place], mucosal changes consistent with esophageal esophagitis, biopsies obtained polyps gastric fundus, small 1 cm hiatal hernia  4/14/16 EGD per [facility] diffuse exudates and edema upper third esophagus, biopsies performed, diffuse exudates and edema lower third esophagus, biopsies performed  8/8/16 on neocate judy one can per day 1800 vera and drinks water three times a day   9/16/16 on azathioprine 50 mg 2 per day per [facility]  9/23/16 patient spoke to [facility] GI 's assistant raman hayes, stop azathioprine and start prednisone 20 mg daily, consider methotrexate  11/16/16 apparently has been on prednisone for one year from [place] and on cellcept, will order dexa  2/21/17  [facility] note failed azathioprine due to hair loss and off mycophenolate, now on prednisone 20 mg daily, try to obtain approval for mepolizumab 100 mg q month  3/24/17  [facility] note, discuss prednisone withdrawal, when she is treated with mepolizumab  wait 2 weeks then reduce prednisone to 17.5 mg, then in 2 weeks with second injection mepolizumab will reduce prednisone to 15 mg, and 2 weeks after that to 12.5 mg, and in 2 weeks 10 mg  4/4/17 nucala 100 mg sc administered first dose  6/11/17 plasma epinephrine, norepinephrine, dopamine, chromogranin A negative done at [facility]  6/12/17 EGD at [facility] diffuse mild mucosal changes upper third esophagus, middle third and lower third esophagus, biopsies performed, normal stomach  7/21/17 IVIG 0.4 g/kg x 3 days then 0.4g/kg q week x 5 weeks per [facility]   8/16/17 IVIG 0.4 g/kg x 3 days then 0.4g/kg q week per [facility]   9/12/17 [facility] note completed 5 week course of IVIG will take a break from IVIG and continue prednisone 4 mg  9/14/17 [facility]  phone note, needs smart pill  9/26/17 on prednisone 4 mg daily and 20 mg qsunday and on nucala, tried IVIG   10/25/17 on prednisone 9 mg qday and on nucala, tried IVIG  11/8/17 [facility]  phone note smart pill results dysmotility in the stomach and colon so IVIG is necessary, need another smart pill after 12 weeks to see motility is improved, need to dose prednisone 40 mg one day before and one day after the IVIG also need to check IgA levels periodically, order sent  11/20/17 per [facility] IVIG 0.4 g/kg x 12 weeks no premedication with solumedrol, IgA level prior to each infusion, remains on prednisone 9 mg qday and nucala per [facility]   2/2/18  [facility] dermatology note, we will try to obtain authorization approval for mepolizumab for eosinophilia  4/20/18 EGD  [facility] GI procedure note up with her esophagus normal, biopsies performed, diffuse moderate mucosal changes congestion, discoloration, distal esophagus biopsies performed, stomach normal, resume omeprazole 40 mg if tolerated, if not start ranitidine 150 mg  bid  4/20/18 CT angiogram abdomen with contrast, celiac trunk normal, superior mesenteric artery normal, no evidence of median arcuate ligament syndrome  5/19/18 MRI abdomen with and without; 2 left celiac ganglia upper ganglia and left of celiac trunk at its origin from the abdominal aorta 3 x 8 mm, second ganglion of her anterior left diaphragmatic sylvester adjacent to the aorta about level of the superior mesenteric artery 4 x 18 mm, 2 right celiac ganglia upper ganglia right diaphragmatic sylvester anterior to the inferior vena cava level of celiac trunk, 3 x 9 mm, second right ganglion between right diaphragmatic sylvester and inferior vena cava measuring 3 x 19 mm  4/20/18  [facility] GI procedure note ED upper third of esophagus normal, diffuse moderate mucosal changes, biopsies performed, duodenal normal, pathology duodenal mucosa, gastric antrum and fundus normal mucosa, esophageal squamous mucosa with intraepithelial eosinophils  7/18 off nucala due to melanoma and still on IVIG  8/31/18  [facility] letter patient suffers from unclear autoimmune disease with diffuse eosinophilia, poor GI motility, requires neocate jr to sustain weight, and remain off TPN, chocolate flavored, 36 cans/month  10/16/18 on IVIG and prednisone 9 mg per  [facility]  11/21/18 current supplement changing to now include fiber which she cannot have, change to elicare judy vanilla flavor 36 cans per 30 days  5/6/19 per patient  [facility] will taper prednisone by 1 mg every 2 weeks and recommends start linzess 72 mcg daily  11/25/19 on IVIG every other week and off prednisone    history prednisone  11/15 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 per [facility]  11/22/16 dexa LS -0.3,hip -0.4; FRAX 3.0% major,0.1% hip on prednisone 20 mg   12/11/16 reclast infusion  12/12/16 reaction to reclast seen ER, patient believes she can try this medication again  however  10/25/17 on prednisone 9 mg qday and on nucala, tried IVIG  10/16/18 on prednisone 4 mg  5/6/19 per patient  [facility] will taper prednisone by 1 mg every 2 weeks and recommends start linzess 72 mcg daily  1/25/19 off prednisone     Hypothyroid  2/25/09 tsh 0.019, levothyroxine adjusted to 0.137 mcg sat,sund and levoxyl 0.125 mcg M-F needs repeat  9/09 tsh 0.033 change tolevothyroxine 125 mcg daily repeat in 6 weeks  11/09 tsh 0.032, change to levothyroxine 112 mcg repeat in 6 weeks  1/10 tsh 0.03, change to levothyroxine 100 mcg  10/10 tsh 3.0 cont levothyroxine 100 mcg  11/10 change to levothyroxine 112 mcg repeat tsh 6 weeks; 11/18/10 pt did not change dose, still on 100 mcg  2/11 tsh 0.9 cont on levothyroxine 100 mcg  12/30/11 tsh 0.7, on levothyroxine 112 mcg, decreased to 100 mcg  3/12 tsh 0.25 on levothyroxine 100 mcg; decrease to 88 mcg  7/12/12 tsh 1.4,free t4 0.79, free t3 2.1; on levothyroxine 88 mcg  7/13 tsh 1.2 on levothyroxine 88 mcg  1/7/14 tsh 0.7, free t4 0.8, free t3,on levothyroxine 88 mcg  2/9/15 tsh 5.7 on levothyroxine 88 mcg,increase to 100 mcg and repeat tsh in 6 weeks  4/16/15 tsh 0.8 on levothyroxine 100 mcg  9/18/15 tsh 5.3 on levothyroxine 100 mcg, increase to 112 mcg repeat tsh in 6 weeks  12/3/15 tsh 0.7 on levothyroxine 112 mcg  12/2/16 tsh 0.15 on levothyroxine 112 mcg daily, change to 112 mcg take six days per week, repeat tsh in 6 weeks  4/14/17 tsh 1.0 on levothyroxine 112 mcg six days per week  9/28/17 tsh 2.1 on levothyroxine 112 mcg six days per week  10/22/18 tsh 2.3 on levothyroxine 112 mcg six days per week  1125/19 on levothyroxine 112 mcg daily six days per week  12/17/19 tsh 4.4 on levothyroxine 112 mcg daily six days per week     Impaired oral feeding  8/31/18  [facility] letter patient suffers from unclear autoimmune disease with diffuse eosinophilia, poor GI motility, requires neocate jr to sustain weight, and remain off  TPN, chocolate flavored, 36 cans/month  11/21/18 current supplement changing to now include fiber which she cannot have, change to elicare judy vanilla flavor 36 cans per 30 days     melanoma  3/11 derm note  malignant melanoma honey level III, 0.65 mm.  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 5/11; no recurrence follow up one year  2013 sees  once a year and [facility] once per year  3/16/15 CT abdomen pelvis with; large amount colonics stool  6/4/18 ultrasound soft tissue neck left preauricular region 8.2 x 6.7 x 5.4 mm cystic lesion superficial aspect parotid, radiology recommended CT or MRI  7/9/18  ENT left neck biopsy pathology report amelanotic melanoma positive for malignancy poorly differentiated tumor  7/23/18 CT/PET new left parotid lesion which may represent primary or melanoma metastases, no other hypermetabolic lesions identified  7/27/18  [facility] ENT note amelanotic melanoma concerning for kellie disease or direct skin extension, analyzed outside tissue histopathology, reviewed imaging studies, tumor board presentation, 1 week of augmentin, scheduled for left selective neck dissection  7/18 off nucala due to melanoma and still on IVIG  8/3/18  ENT [facility] operative note left inferior parotidectomy, left upper neck dissection with facial nerve monitoring  8/21/18 dr.[email or web address] [facility] oncology note; 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 status post left upper neck excision and dissection 8/3/18; final pathologic diagnosis left upper skin and parotid tail excision benign, lymph nodes left 0/23, defer adjuvant immunotherapy given her underlying autoimmune disease chance of remaining in remission without adjuvant therapy 30%, may decrease to 15% with adjuvant therapy however increased risk for immune related complications, recommend ultrasound neck by oncology every 3  months, CT/PET scan every 6 months for 2 years and annually, schedule appointment with [facility] melanoma clinic annually  10/8/18  oncology note defect SDHA gene not candidate for adjuvant chemotherapy, plan active surveillance ultrasound neck every 6 months alternating with CT/PET scan every 6 months  10/20/18 ultrasound soft tissue neck 9 x 5 x 4 mm left neck lymph node  3/15/19 CT/PET whole body negative for metastatic disease  9/27/19 ultrasound soft tissue neck multiple cervical lymph nodes largest 11 x 9 x 3 mm, not significantly enlarged, previously measuring 9 x 5 x 4 mm  7/25/20 ultrasound soft tissue neck to normal-appearing lymph nodes, incidental sebaceous cyst right upper arm     neutropenia  2/18/11 wbc 4.5  7/9/13 wbc 4.3  1/7/14 wbc 4.6  4/16/15 wbc 3.9 (48%N,34%L)  4/5/16 wbc 3.4 (51%N,28%L)  7/26/16 wbc 6.0  1/9/17 wbc 7.0  9/27/17 wbc 3.8  10/21/18 wbc 3.7 (67%N,21%L)  12/17/19 wbc 2.7(44%N,33%L)     Preventative health  2/3/15 tdap  2/3/15 hep b first in series  3/27/15 colon per [facility] tubular adenomas x2, negative for colitis  11/23/16 pneumovax  4/26/17 pap  9/22/17 quantiferon gold negative, hep B Ab positive, MMR titers positive, varicella IgG positive  11/26/18 dexa LS+0.7,hip -0.4 (10 year risk 3.1% major, 0.1% hip)  11/27/18 mammogram   11/25/19 hep a and hep b second in series  12/17/19 vit d 29 take extra 2000 units      Sjogren's  Uses restasis  3/10 optho eval  [id number]  11/11 saw  ductal plugs inserted  1/12 vigamox prn ophthalmic  3/14  eye duct surgery on restasis  6/12/17 EGD at [facility] diffuse mild mucosal changes upper third esophagus, middle third and lower third esophagus, biopsies performed, normal stomach         Depression Screening    Little interest or pleasure in doing things?  0 - not at all  Feeling down, depressed , or hopeless? 0 - not at all  Patient Health Questionnaire Score: 0        Health Maintenance Summary                 "MAMMOGRAM Overdue 11/26/2019      Done 11/26/2018 MA-SCREENING MAMMO BILAT W/TOMOSYNTHESIS W/CAD     Patient has more history with this topic...    COLONOSCOPY Overdue 3/27/2020      Done 3/27/2015 AMB REFERRAL TO GI FOR COLONOSCOPY    PAP SMEAR Overdue 4/24/2020      Done 4/24/2017 PATHOLOGY GYN SPECIMEN     Patient has more history with this topic...    IMM INFLUENZA Next Due 9/1/2020      Done 10/11/2019 Ext Imm: Influenza Quad Inj P     Patient has more history with this topic...    IMM DTaP/Tdap/Td Vaccine Next Due 2/3/2025      Done 2/3/2015 Imm Admin: Tdap Vaccine     Patient has more history with this topic...          Patient Care Team:  Zohaib Soares M.D. as PCP - General           ROS       Objective:     /62 (BP Location: Right arm, Patient Position: Sitting, BP Cuff Size: Adult)   Pulse 61   Temp 36.7 °C (98.1 °F)   Ht 1.676 m (5' 6\")   Wt 59.9 kg (132 lb)   SpO2 96%   BMI 21.31 kg/m²      Physical Exam  Vitals signs and nursing note reviewed. Exam conducted with a chaperone present (Medical assistant present jake ).   Constitutional:       Appearance: Normal appearance.   HENT:      Head: Normocephalic and atraumatic.      Right Ear: Tympanic membrane and external ear normal.      Left Ear: Tympanic membrane and external ear normal.      Nose: Nose normal. No congestion.   Eyes:      Conjunctiva/sclera: Conjunctivae normal.   Neck:      Musculoskeletal: Neck supple.   Cardiovascular:      Rate and Rhythm: Normal rate and regular rhythm.      Heart sounds: Normal heart sounds. No murmur.   Pulmonary:      Effort: Pulmonary effort is normal. No respiratory distress.      Breath sounds: Normal breath sounds.   Abdominal:      General: There is no distension.   Skin:     General: Skin is warm.   Neurological:      General: No focal deficit present.      Mental Status: She is alert.   Psychiatric:         Behavior: Behavior normal.       Pap smear performed with medical assistant in the " room, external genitalia normal, Pap performed, no external lesions, no cervical masses, cervix no bleeding, no adnexal masses on manual exam, no hemorrhoids, rectal exam normal no masses, no prolapsed bladder          Assessment/Plan:        Assessment  #!  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    #5 hypothyroid on replacement 112 mcg daily    #6 birth control Isabelle    #7 sinus congestion Zithromax    #8 chronic insomnia    #9  History melanoma followed by oncology, has not had follow-up visit, ultrasound soft tissue neck in July negative, last CT/PET March 2019    #10 vitamin D deficiency difficulty tolerating over-the-counter medications    #7 colon polyp adenoma x 2 2015    Plan  #1 referral oncology follow-up melanoma    #2  IgG labs prior to next infusion, continue IVIG infusions for drug stress new significant esophagitis    #3 vit d 03306 units monthly     #4 [facility] referral colon adenoma    #5 dexa    #6 Zithromax for sinus congestion possible sinusitis has had before can cause nausea, vomiting, diarrhea, rash    #7 sleep psychology recommendation resume she declines, continue work on sleep hygiene, no more than melatonin 10 mg daily as studies have shown greater than 5 mg likely not effective, discussed Ambien, she would prefer not to use that for now    #8 nutrition, diet, exercise discussed, because of her GI motility issues, she will continue elecare judy vanilla 36 cans per month    #9 labs    #10 hepatitis B vaccine third in series    #11 Pap smear    #12 she is due for CT/PET    #13  total time spent over 45 minutes    #14 continue social distancing measures, mask wearing

## 2020-08-21 ENCOUNTER — TELEPHONE (OUTPATIENT)
Dept: MEDICAL GROUP | Facility: MEDICAL CENTER | Age: 45
End: 2020-08-21

## 2020-08-21 LAB
C TRACH DNA GENITAL QL NAA+PROBE: NEGATIVE
CYTOLOGY REG CYTOL: NORMAL
N GONORRHOEA DNA GENITAL QL NAA+PROBE: NEGATIVE
SPECIMEN SOURCE: NORMAL

## 2020-08-21 NOTE — TELEPHONE ENCOUNTER
----- Message from Zohaib Soares M.D. sent at 8/21/2020  1:06 PM PDT -----  Please notify the patient that:  (1) her mammogram is negative but does show dense breast tissue which may decrease the accuracy of the mammogram  (2) she can get a screening ultrasound of her breast which may provide further detail  (3) her insurance will not cover a screening breast ultrasound, she would have to pay out of pocket, the cost would be approximately $125  (4) please let me know if she is interested

## 2020-08-21 NOTE — TELEPHONE ENCOUNTER
Please call the patient, I believe she is due for her CT PET scan, she has not had that done in over a year, I thought the oncologist would order that for her but since she has not seen the oncologist recently, I can order that test for her if she would like, or she can wait until she sees the oncologist again.      Let me know her preference.

## 2020-08-21 NOTE — TELEPHONE ENCOUNTER
Please call [facility], could they send us the pathology results from his colonoscopy done March 3, 2015?

## 2020-08-24 ENCOUNTER — TELEPHONE (OUTPATIENT)
Dept: MEDICAL GROUP | Facility: MEDICAL CENTER | Age: 45
End: 2020-08-24

## 2020-08-24 NOTE — TELEPHONE ENCOUNTER
----- Message from Zohaib Soares M.D. sent at 8/21/2020  9:39 PM PDT -----  Please notify patient that her Pap smear is negative she can repeat that in 3 years

## 2020-08-28 NOTE — PROGRESS NOTES
Pt arrived to [facility], ambulatory, for IVIG infusion. Pt voices no complaints. 24g PIV established in the L-AC, positive blood return noted. Pt states that lab no longer needs to be drawn. IVIG infused with no s/sx of adverse reaction, titrated to 120 mL/hr per pt request. PIV flushed and removed. Pt left IS with no s/sx of distress. Follow up appointment confirmed.   yes

## 2020-08-29 ENCOUNTER — HOSPITAL ENCOUNTER (EMERGENCY)
Facility: MEDICAL CENTER | Age: 45
End: 2020-08-29
Payer: COMMERCIAL

## 2020-08-29 ENCOUNTER — TELEPHONE (OUTPATIENT)
Dept: ONCOLOGY | Facility: MEDICAL CENTER | Age: 45
End: 2020-08-29

## 2020-08-29 VITALS
HEIGHT: 66 IN | BODY MASS INDEX: 21.47 KG/M2 | SYSTOLIC BLOOD PRESSURE: 131 MMHG | HEART RATE: 56 BPM | DIASTOLIC BLOOD PRESSURE: 87 MMHG | RESPIRATION RATE: 16 BRPM | OXYGEN SATURATION: 98 % | WEIGHT: 133.6 LBS | TEMPERATURE: 97.2 F

## 2020-08-29 PROCEDURE — 302449 STATCHG TRIAGE ONLY (STATISTIC)

## 2020-08-29 ASSESSMENT — FIBROSIS 4 INDEX: FIB4 SCORE: 1.19

## 2020-08-30 ENCOUNTER — OUTPATIENT INFUSION SERVICES (OUTPATIENT)
Dept: ONCOLOGY | Facility: MEDICAL CENTER | Age: 45
End: 2020-08-30
Attending: INTERNAL MEDICINE
Payer: COMMERCIAL

## 2020-08-30 VITALS
HEIGHT: 65 IN | DIASTOLIC BLOOD PRESSURE: 65 MMHG | TEMPERATURE: 97.6 F | WEIGHT: 128.75 LBS | OXYGEN SATURATION: 99 % | HEART RATE: 66 BPM | RESPIRATION RATE: 10 BRPM | SYSTOLIC BLOOD PRESSURE: 98 MMHG | BODY MASS INDEX: 21.45 KG/M2

## 2020-08-30 DIAGNOSIS — M30.1 CHURG-STRAUSS SYNDROME (HCC): Chronic | ICD-10-CM

## 2020-08-30 DIAGNOSIS — D72.18 CHURG-STRAUSS SYNDROME (HCC): Chronic | ICD-10-CM

## 2020-08-30 PROCEDURE — 96366 THER/PROPH/DIAG IV INF ADDON: CPT

## 2020-08-30 PROCEDURE — 96365 THER/PROPH/DIAG IV INF INIT: CPT

## 2020-08-30 PROCEDURE — 700111 HCHG RX REV CODE 636 W/ 250 OVERRIDE (IP): Performed by: INTERNAL MEDICINE

## 2020-08-30 RX ORDER — IMMUNE GLOBULIN INFUSION (HUMAN) 100 MG/ML
20 INJECTION, SOLUTION INTRAVENOUS; SUBCUTANEOUS ONCE
Status: COMPLETED | OUTPATIENT
Start: 2020-08-30 | End: 2020-08-30

## 2020-08-30 RX ORDER — IMMUNE GLOBULIN INFUSION (HUMAN) 100 MG/ML
5 INJECTION, SOLUTION INTRAVENOUS; SUBCUTANEOUS ONCE
Status: COMPLETED | OUTPATIENT
Start: 2020-08-30 | End: 2020-08-30

## 2020-08-30 RX ADMIN — IMMUNE GLOBULIN INFUSION (HUMAN) 5 G: 100 INJECTION, SOLUTION INTRAVENOUS; SUBCUTANEOUS at 12:58

## 2020-08-30 RX ADMIN — IMMUNE GLOBULIN INFUSION (HUMAN) 20 G: 100 INJECTION, SOLUTION INTRAVENOUS; SUBCUTANEOUS at 10:16

## 2020-08-30 ASSESSMENT — FIBROSIS 4 INDEX: FIB4 SCORE: 1.19

## 2020-08-30 NOTE — ED TRIAGE NOTES
"Bib self for above complaints.     Chief Complaint   Patient presents with   • Abdominal Pain     pt has extensive hx of abdominal problems, has dysautonomia of her bowels and causes bowel paralysis, pt states she has had increased pain more than normal and is at risk for bowel perforation and want to make sure she is ok. endorses abomdinal distention and bloating.      /87   Pulse (!) 56   Temp 36.2 °C (97.2 °F) (Oral)   Resp 16   Ht 1.664 m (5' 5.5\")   Wt 60.6 kg (133 lb 9.6 oz)   LMP 08/29/2020 (Approximate)   SpO2 98%   Breastfeeding No   BMI 21.89 kg/m²     "

## 2020-08-30 NOTE — PROGRESS NOTES
Pt presents to infusion center for Q 2 week IVIG. PIV started, brisk blood return observed. IVIG infused and titrated per [employer] rate calculator to max rate of 90 ml/hr per pt request. Pt tolerated well with no s/s of adverse reaction. PIV flushed and removed, gauze and coban dressing placed. Has next appt, left on foot to self care.

## 2020-09-13 ENCOUNTER — OUTPATIENT INFUSION SERVICES (OUTPATIENT)
Dept: ONCOLOGY | Facility: MEDICAL CENTER | Age: 45
End: 2020-09-13
Attending: INTERNAL MEDICINE
Payer: COMMERCIAL

## 2020-09-13 VITALS
RESPIRATION RATE: 18 BRPM | DIASTOLIC BLOOD PRESSURE: 64 MMHG | HEIGHT: 65 IN | TEMPERATURE: 97.5 F | OXYGEN SATURATION: 99 % | HEART RATE: 60 BPM | WEIGHT: 128.53 LBS | SYSTOLIC BLOOD PRESSURE: 106 MMHG | BODY MASS INDEX: 21.41 KG/M2

## 2020-09-13 DIAGNOSIS — D72.18 CHURG-STRAUSS SYNDROME (HCC): ICD-10-CM

## 2020-09-13 DIAGNOSIS — M30.1 CHURG-STRAUSS SYNDROME (HCC): ICD-10-CM

## 2020-09-13 PROCEDURE — 96365 THER/PROPH/DIAG IV INF INIT: CPT

## 2020-09-13 PROCEDURE — 700111 HCHG RX REV CODE 636 W/ 250 OVERRIDE (IP): Performed by: INTERNAL MEDICINE

## 2020-09-13 PROCEDURE — 96366 THER/PROPH/DIAG IV INF ADDON: CPT

## 2020-09-13 RX ORDER — IMMUNE GLOBULIN INFUSION (HUMAN) 100 MG/ML
20 INJECTION, SOLUTION INTRAVENOUS; SUBCUTANEOUS ONCE
Status: COMPLETED | OUTPATIENT
Start: 2020-09-13 | End: 2020-09-13

## 2020-09-13 RX ORDER — IMMUNE GLOBULIN INFUSION (HUMAN) 100 MG/ML
5 INJECTION, SOLUTION INTRAVENOUS; SUBCUTANEOUS ONCE
Status: COMPLETED | OUTPATIENT
Start: 2020-09-13 | End: 2020-09-13

## 2020-09-13 RX ORDER — SCOLOPAMINE TRANSDERMAL SYSTEM 1 MG/1
PATCH, EXTENDED RELEASE TRANSDERMAL
COMMUNITY
Start: 2020-06-24 | End: 2020-12-15

## 2020-09-13 RX ADMIN — IMMUNE GLOBULIN INFUSION (HUMAN) 20 G: 100 INJECTION, SOLUTION INTRAVENOUS; SUBCUTANEOUS at 10:41

## 2020-09-13 RX ADMIN — IMMUNE GLOBULIN INFUSION (HUMAN) 5 G: 100 INJECTION, SOLUTION INTRAVENOUS; SUBCUTANEOUS at 13:27

## 2020-09-13 ASSESSMENT — FIBROSIS 4 INDEX: FIB4 SCORE: 1.19

## 2020-09-13 NOTE — PROGRESS NOTES
Pt arrives to [facility] for IVIG infusion.  Pt reports fatigue today.  PIV established to R-AC.  IVIG infused per pharmacy titration rates.  Max rate of 90ml/hr per pt tolerance.  Pt rested throughout infusion.  IVIG completed without adverse reaction.  PIV flushed and site removed.  Confirmed next appt with pt.  Pt dc home to self care.

## 2020-09-14 DIAGNOSIS — E03.9 HYPOTHYROIDISM, UNSPECIFIED TYPE: Chronic | ICD-10-CM

## 2020-09-14 RX ORDER — LEVOTHYROXINE SODIUM 112 UG/1
112 TABLET ORAL
Qty: 90 TAB | Refills: 0 | Status: SHIPPED | OUTPATIENT
Start: 2020-09-14 | End: 2020-10-27

## 2020-09-14 NOTE — TELEPHONE ENCOUNTER
----- Message from Megan August sent at 9/13/2020  6:37 PM PDT -----  Regarding: Prescription Question  Contact: [phone]  Hello,   I ran out of levothyroxine this weekend.   It has no refills.  Can you please send 1 yr of rx written for 90 days to [facility]?  I take EVERY day (NOT 6 days out of 7).    I’m freezing when I don’t take it- so ASAP please if possible?    Thanks,  Megan

## 2020-09-14 NOTE — TELEPHONE ENCOUNTER
Levothyroxine 112 mg x 7 days a week x 90 days for 1 yr    Received request via: Patient    Was the patient seen in the last year in this department? Yes    Does the patient have an active prescription (recently filled or refills available) for medication(s) requested? No

## 2020-09-27 ENCOUNTER — OUTPATIENT INFUSION SERVICES (OUTPATIENT)
Dept: ONCOLOGY | Facility: MEDICAL CENTER | Age: 45
End: 2020-09-27
Attending: INTERNAL MEDICINE
Payer: COMMERCIAL

## 2020-09-27 VITALS
WEIGHT: 127.65 LBS | TEMPERATURE: 98.7 F | HEIGHT: 65 IN | SYSTOLIC BLOOD PRESSURE: 116 MMHG | BODY MASS INDEX: 21.27 KG/M2 | HEART RATE: 60 BPM | OXYGEN SATURATION: 99 % | DIASTOLIC BLOOD PRESSURE: 69 MMHG | RESPIRATION RATE: 18 BRPM

## 2020-09-27 DIAGNOSIS — D72.18 CHURG-STRAUSS SYNDROME (HCC): ICD-10-CM

## 2020-09-27 DIAGNOSIS — M30.1 CHURG-STRAUSS SYNDROME (HCC): ICD-10-CM

## 2020-09-27 PROCEDURE — 96365 THER/PROPH/DIAG IV INF INIT: CPT

## 2020-09-27 PROCEDURE — 700111 HCHG RX REV CODE 636 W/ 250 OVERRIDE (IP): Performed by: INTERNAL MEDICINE

## 2020-09-27 PROCEDURE — 96366 THER/PROPH/DIAG IV INF ADDON: CPT

## 2020-09-27 RX ORDER — IMMUNE GLOBULIN INFUSION (HUMAN) 100 MG/ML
10 INJECTION, SOLUTION INTRAVENOUS; SUBCUTANEOUS ONCE
Status: COMPLETED | OUTPATIENT
Start: 2020-09-27 | End: 2020-09-27

## 2020-09-27 RX ORDER — IMMUNE GLOBULIN INFUSION (HUMAN) 100 MG/ML
5 INJECTION, SOLUTION INTRAVENOUS; SUBCUTANEOUS ONCE
Status: COMPLETED | OUTPATIENT
Start: 2020-09-27 | End: 2020-09-27

## 2020-09-27 RX ADMIN — IMMUNE GLOBULIN INFUSION (HUMAN) 5 G: 100 INJECTION, SOLUTION INTRAVENOUS; SUBCUTANEOUS at 13:42

## 2020-09-27 RX ADMIN — IMMUNE GLOBULIN INFUSION (HUMAN) 10 G: 100 INJECTION, SOLUTION INTRAVENOUS; SUBCUTANEOUS at 12:31

## 2020-09-27 RX ADMIN — IMMUNE GLOBULIN INFUSION (HUMAN) 10 G: 100 INJECTION, SOLUTION INTRAVENOUS; SUBCUTANEOUS at 10:30

## 2020-09-27 ASSESSMENT — FIBROSIS 4 INDEX: FIB4 SCORE: 1.19

## 2020-09-27 NOTE — PROGRESS NOTES
Megan received her IVIG today without issue. 24g PIV placed in R FA, blood return noted before and after infusion. No premedications. IVIG titrated per protocol to a max rate of 90ml/hr per patient preference. Patient discharges stable and ambulatory.

## 2020-10-11 ENCOUNTER — OUTPATIENT INFUSION SERVICES (OUTPATIENT)
Dept: ONCOLOGY | Facility: MEDICAL CENTER | Age: 45
End: 2020-10-11
Attending: INTERNAL MEDICINE
Payer: COMMERCIAL

## 2020-10-11 VITALS
RESPIRATION RATE: 18 BRPM | DIASTOLIC BLOOD PRESSURE: 71 MMHG | BODY MASS INDEX: 21.19 KG/M2 | WEIGHT: 127.21 LBS | SYSTOLIC BLOOD PRESSURE: 111 MMHG | OXYGEN SATURATION: 100 % | HEIGHT: 65 IN | HEART RATE: 67 BPM | TEMPERATURE: 97 F

## 2020-10-11 DIAGNOSIS — M30.1 CHURG-STRAUSS SYNDROME (HCC): ICD-10-CM

## 2020-10-11 DIAGNOSIS — D72.18 CHURG-STRAUSS SYNDROME (HCC): ICD-10-CM

## 2020-10-11 PROCEDURE — 700111 HCHG RX REV CODE 636 W/ 250 OVERRIDE (IP): Performed by: INTERNAL MEDICINE

## 2020-10-11 PROCEDURE — 96366 THER/PROPH/DIAG IV INF ADDON: CPT

## 2020-10-11 PROCEDURE — 96365 THER/PROPH/DIAG IV INF INIT: CPT

## 2020-10-11 RX ORDER — IMMUNE GLOBULIN INFUSION (HUMAN) 100 MG/ML
5 INJECTION, SOLUTION INTRAVENOUS; SUBCUTANEOUS ONCE
Status: COMPLETED | OUTPATIENT
Start: 2020-10-11 | End: 2020-10-11

## 2020-10-11 RX ORDER — IMMUNE GLOBULIN INFUSION (HUMAN) 100 MG/ML
20 INJECTION, SOLUTION INTRAVENOUS; SUBCUTANEOUS ONCE
Status: COMPLETED | OUTPATIENT
Start: 2020-10-11 | End: 2020-10-11

## 2020-10-11 RX ADMIN — IMMUNE GLOBULIN INFUSION (HUMAN) 5 G: 100 INJECTION, SOLUTION INTRAVENOUS; SUBCUTANEOUS at 11:55

## 2020-10-11 RX ADMIN — IMMUNE GLOBULIN INFUSION (HUMAN) 20 G: 100 INJECTION, SOLUTION INTRAVENOUS; SUBCUTANEOUS at 09:05

## 2020-10-11 ASSESSMENT — FIBROSIS 4 INDEX: FIB4 SCORE: 1.19

## 2020-10-11 NOTE — PROGRESS NOTES
Pt arrived ambulatory to  for q 2 week IVIG infusion.  POC discussed.  PIV started to RAC, blood return confirmed.  IVIG titrated per rate sheet protocol without adverse reaction.  PIV flushed, removed, and site covered with gauze and coban.  Next appointment confirmed.  Pt discharged from IS in NAD under self care.

## 2020-10-17 ENCOUNTER — HOSPITAL ENCOUNTER (OUTPATIENT)
Dept: LAB | Facility: MEDICAL CENTER | Age: 45
End: 2020-10-17
Attending: INTERNAL MEDICINE
Payer: COMMERCIAL

## 2020-10-17 DIAGNOSIS — E55.9 VITAMIN D DEFICIENCY: ICD-10-CM

## 2020-10-17 DIAGNOSIS — E53.8 B12 DEFICIENCY: ICD-10-CM

## 2020-10-17 DIAGNOSIS — Z00.00 PREVENTATIVE HEALTH CARE: Chronic | ICD-10-CM

## 2020-10-17 DIAGNOSIS — D70.9 NEUTROPENIA, UNSPECIFIED TYPE (HCC): ICD-10-CM

## 2020-10-17 DIAGNOSIS — D80.3 IGG DEFICIENCY (HCC): ICD-10-CM

## 2020-10-17 LAB
25(OH)D3 SERPL-MCNC: 45 NG/ML (ref 30–100)
ALBUMIN SERPL BCP-MCNC: 4.2 G/DL (ref 3.2–4.9)
ALBUMIN/GLOB SERPL: 1.2 G/DL
ALP SERPL-CCNC: 48 U/L (ref 30–99)
ALT SERPL-CCNC: 16 U/L (ref 2–50)
ANION GAP SERPL CALC-SCNC: 8 MMOL/L (ref 7–16)
AST SERPL-CCNC: 26 U/L (ref 12–45)
BASOPHILS # BLD AUTO: 1.5 % (ref 0–1.8)
BASOPHILS # BLD: 0.05 K/UL (ref 0–0.12)
BILIRUB SERPL-MCNC: 0.3 MG/DL (ref 0.1–1.5)
BUN SERPL-MCNC: 9 MG/DL (ref 8–22)
CALCIUM SERPL-MCNC: 9.3 MG/DL (ref 8.5–10.5)
CHLORIDE SERPL-SCNC: 102 MMOL/L (ref 96–112)
CHOLEST SERPL-MCNC: 223 MG/DL (ref 100–199)
CO2 SERPL-SCNC: 26 MMOL/L (ref 20–33)
CREAT SERPL-MCNC: 0.59 MG/DL (ref 0.5–1.4)
EOSINOPHIL # BLD AUTO: 0.26 K/UL (ref 0–0.51)
EOSINOPHIL NFR BLD: 7.7 % (ref 0–6.9)
ERYTHROCYTE [DISTWIDTH] IN BLOOD BY AUTOMATED COUNT: 45.5 FL (ref 35.9–50)
FASTING STATUS PATIENT QL REPORTED: NORMAL
GLOBULIN SER CALC-MCNC: 3.6 G/DL (ref 1.9–3.5)
GLUCOSE SERPL-MCNC: 79 MG/DL (ref 65–99)
HCT VFR BLD AUTO: 42.7 % (ref 37–47)
HCV AB SER QL: NORMAL
HDLC SERPL-MCNC: 70 MG/DL
HGB BLD-MCNC: 14.7 G/DL (ref 12–16)
IMM GRANULOCYTES # BLD AUTO: 0 K/UL (ref 0–0.11)
IMM GRANULOCYTES NFR BLD AUTO: 0 % (ref 0–0.9)
LDLC SERPL CALC-MCNC: 107 MG/DL
LYMPHOCYTES # BLD AUTO: 0.83 K/UL (ref 1–4.8)
LYMPHOCYTES NFR BLD: 24.6 % (ref 22–41)
MCH RBC QN AUTO: 32.9 PG (ref 27–33)
MCHC RBC AUTO-ENTMCNC: 34.4 G/DL (ref 33.6–35)
MCV RBC AUTO: 95.5 FL (ref 81.4–97.8)
MONOCYTES # BLD AUTO: 0.3 K/UL (ref 0–0.85)
MONOCYTES NFR BLD AUTO: 8.9 % (ref 0–13.4)
NEUTROPHILS # BLD AUTO: 1.93 K/UL (ref 2–7.15)
NEUTROPHILS NFR BLD: 57.3 % (ref 44–72)
NRBC # BLD AUTO: 0 K/UL
NRBC BLD-RTO: 0 /100 WBC
PLATELET # BLD AUTO: 211 K/UL (ref 164–446)
PMV BLD AUTO: 12.1 FL (ref 9–12.9)
POTASSIUM SERPL-SCNC: 3.9 MMOL/L (ref 3.6–5.5)
PROT SERPL-MCNC: 7.8 G/DL (ref 6–8.2)
RBC # BLD AUTO: 4.47 M/UL (ref 4.2–5.4)
SODIUM SERPL-SCNC: 136 MMOL/L (ref 135–145)
TRIGL SERPL-MCNC: 230 MG/DL (ref 0–149)
TSH SERPL DL<=0.005 MIU/L-ACNC: 5.76 UIU/ML (ref 0.38–5.33)
VIT B12 SERPL-MCNC: 1113 PG/ML (ref 211–911)
WBC # BLD AUTO: 3.4 K/UL (ref 4.8–10.8)

## 2020-10-17 PROCEDURE — 82306 VITAMIN D 25 HYDROXY: CPT

## 2020-10-17 PROCEDURE — 82607 VITAMIN B-12: CPT

## 2020-10-17 PROCEDURE — 36415 COLL VENOUS BLD VENIPUNCTURE: CPT

## 2020-10-17 PROCEDURE — 80061 LIPID PANEL: CPT

## 2020-10-17 PROCEDURE — 84443 ASSAY THYROID STIM HORMONE: CPT

## 2020-10-17 PROCEDURE — 86334 IMMUNOFIX E-PHORESIS SERUM: CPT

## 2020-10-17 PROCEDURE — 80053 COMPREHEN METABOLIC PANEL: CPT

## 2020-10-17 PROCEDURE — 84165 PROTEIN E-PHORESIS SERUM: CPT

## 2020-10-17 PROCEDURE — 86803 HEPATITIS C AB TEST: CPT

## 2020-10-17 PROCEDURE — 85025 COMPLETE CBC W/AUTO DIFF WBC: CPT

## 2020-10-17 PROCEDURE — 84155 ASSAY OF PROTEIN SERUM: CPT

## 2020-10-18 ENCOUNTER — TELEPHONE (OUTPATIENT)
Dept: MEDICAL GROUP | Facility: MEDICAL CENTER | Age: 45
End: 2020-10-18

## 2020-10-18 NOTE — TELEPHONE ENCOUNTER
Please notify the patient that her test shows:  (1) normal liver function, kidney function, vitamin D, and vitamin B12 levels  (2) cholesterol total is 223, good cholesterol is 70 (goal is above 40), bad cholesterol is 107 (goal is less than 100), have her continue a good exercise program  (3) her thyroid level is slightly low, if she is taking the thyroid medication 112 mcg daily, I would like to increase that to 125 mcg daily.  If she is in agreement let me know and I will send a prescription to her pharmacy and we will need to recheck her thyroid blood test in 6 weeks.

## 2020-10-19 ENCOUNTER — TELEPHONE (OUTPATIENT)
Dept: MEDICAL GROUP | Facility: MEDICAL CENTER | Age: 45
End: 2020-10-19

## 2020-10-19 NOTE — TELEPHONE ENCOUNTER
----- Message from Zohaib Soares M.D. sent at 10/18/2020  3:43 PM PDT -----  Please notify the patient that her test shows:  (1) normal liver function, kidney function, vitamin D, and vitamin B12 levels  (2) cholesterol total is 223, good cholesterol is 70 (goal is above 40), bad cholesterol is 107 (goal is less than 100), have her continue a good exercise program  (3) her thyroid level is slightly low, if she is taking the thyroid medication 112 mcg daily, I would like to increase that to 125 mcg daily.  If she is in agreement let me know and I will send a prescription to her pharmacy and we will need to recheck her thyroid blood test in 6 weeks.

## 2020-10-21 LAB
ALBUMIN SERPL ELPH-MCNC: 3.9 G/DL (ref 3.75–5.01)
ALPHA1 GLOB SERPL ELPH-MCNC: 0.31 G/DL (ref 0.19–0.46)
ALPHA2 GLOB SERPL ELPH-MCNC: 0.61 G/DL (ref 0.48–1.05)
B-GLOBULIN SERPL ELPH-MCNC: 0.63 G/DL (ref 0.48–1.1)
GAMMA GLOB SERPL ELPH-MCNC: 1.85 G/DL (ref 0.62–1.51)
INTERPRETATION SERPL IFE-IMP: ABNORMAL
INTERPRETATION SERPL IFE-IMP: ABNORMAL
PATHOLOGY STUDY: ABNORMAL
PROT SERPL-MCNC: 7.3 G/DL (ref 6.3–8.2)

## 2020-10-25 ENCOUNTER — OUTPATIENT INFUSION SERVICES (OUTPATIENT)
Dept: ONCOLOGY | Facility: MEDICAL CENTER | Age: 45
End: 2020-10-25
Attending: INTERNAL MEDICINE
Payer: COMMERCIAL

## 2020-10-25 VITALS
SYSTOLIC BLOOD PRESSURE: 101 MMHG | WEIGHT: 128.59 LBS | BODY MASS INDEX: 20.67 KG/M2 | HEIGHT: 66 IN | HEART RATE: 67 BPM | OXYGEN SATURATION: 98 % | RESPIRATION RATE: 18 BRPM | DIASTOLIC BLOOD PRESSURE: 66 MMHG | TEMPERATURE: 97.4 F

## 2020-10-25 DIAGNOSIS — D72.18 CHURG-STRAUSS SYNDROME (HCC): ICD-10-CM

## 2020-10-25 DIAGNOSIS — M30.1 CHURG-STRAUSS SYNDROME (HCC): ICD-10-CM

## 2020-10-25 PROCEDURE — 96365 THER/PROPH/DIAG IV INF INIT: CPT

## 2020-10-25 PROCEDURE — 700111 HCHG RX REV CODE 636 W/ 250 OVERRIDE (IP): Performed by: INTERNAL MEDICINE

## 2020-10-25 PROCEDURE — 96366 THER/PROPH/DIAG IV INF ADDON: CPT

## 2020-10-25 RX ORDER — IMMUNE GLOBULIN INFUSION (HUMAN) 100 MG/ML
5 INJECTION, SOLUTION INTRAVENOUS; SUBCUTANEOUS ONCE
Status: COMPLETED | OUTPATIENT
Start: 2020-10-25 | End: 2020-10-25

## 2020-10-25 RX ORDER — IMMUNE GLOBULIN INFUSION (HUMAN) 100 MG/ML
20 INJECTION, SOLUTION INTRAVENOUS; SUBCUTANEOUS ONCE
Status: COMPLETED | OUTPATIENT
Start: 2020-10-25 | End: 2020-10-25

## 2020-10-25 RX ADMIN — IMMUNE GLOBULIN INFUSION (HUMAN) 5 G: 100 INJECTION, SOLUTION INTRAVENOUS; SUBCUTANEOUS at 13:19

## 2020-10-25 RX ADMIN — IMMUNE GLOBULIN INFUSION (HUMAN) 20 G: 100 INJECTION, SOLUTION INTRAVENOUS; SUBCUTANEOUS at 10:15

## 2020-10-25 ASSESSMENT — FIBROSIS 4 INDEX: FIB4 SCORE: 1.39

## 2020-10-25 NOTE — PROGRESS NOTES
Pt arrived to IS, ambulatory, for IVIG infusion. Pt voices no complaints. 24g PIV established in the R-AC, positive blood return noted. IVIG infused with no s/sx of adverse reaction, titrated to 90 mL/hr per pt request. PIV flushed and removed. Pt left IS with no s/sx of distress. Follow up appointment confirmed.

## 2020-10-27 DIAGNOSIS — E03.9 HYPOTHYROIDISM, UNSPECIFIED TYPE: Chronic | ICD-10-CM

## 2020-10-27 RX ORDER — LEVOTHYROXINE SODIUM 0.12 MG/1
125 TABLET ORAL
Qty: 90 TAB | Refills: 0 | Status: SHIPPED | OUTPATIENT
Start: 2020-10-27 | End: 2020-12-15 | Stop reason: SDUPTHER

## 2020-10-27 NOTE — TELEPHONE ENCOUNTER
Pt notified about Rx.     As for her nutrition, the PA is for 1 yr. If I start on it now, we might be able to get it by 1/1/21.

## 2020-10-27 NOTE — TELEPHONE ENCOUNTER
In the media section we submitted the PAR for the tube feeds in April 2020, I do not see any confirmation or approval scanned in Epic but assume that was the month it was approved.

## 2020-10-27 NOTE — TELEPHONE ENCOUNTER
No, I had to do it RETRO so she could get reimbursed for the $2500 a month she had to pay out of pocket for JAN, FEB and MARCH.

## 2020-10-27 NOTE — TELEPHONE ENCOUNTER
Please notify the patient I will send a prescription for a new thyroid medication dose to her pharmacy.  This will be for 90 days but she will need to repeat her thyroid test in 6 weeks, I will place that order in the computer system.    Is the prior authorization every 6 months?  Or every 12 months?  If it is every 6 months, as this was last done in April, so you could start the process.

## 2020-10-27 NOTE — TELEPHONE ENCOUNTER
Spoke with pt, She would like a 90 day script sent to her pharmacy.     Just a side note, it is probably time to start her PA for her nutritional supplement. I'm just thinking that if we start now for 2021 and we run into a problem, we will have time to address it before she has to start paying out of pocket like always seems to happen. Please advise.

## 2020-10-27 NOTE — TELEPHONE ENCOUNTER
In that case it might not be a bad idea to start the process although I am not sure they will even accept an early prior authorization request.  If you do not mind trying, it may be worthwhile.

## 2020-11-08 ENCOUNTER — OUTPATIENT INFUSION SERVICES (OUTPATIENT)
Dept: ONCOLOGY | Facility: MEDICAL CENTER | Age: 45
End: 2020-11-08
Attending: INTERNAL MEDICINE
Payer: COMMERCIAL

## 2020-11-08 VITALS
HEART RATE: 67 BPM | TEMPERATURE: 97.5 F | HEIGHT: 65 IN | WEIGHT: 126.1 LBS | RESPIRATION RATE: 18 BRPM | SYSTOLIC BLOOD PRESSURE: 109 MMHG | BODY MASS INDEX: 21.01 KG/M2 | OXYGEN SATURATION: 98 % | DIASTOLIC BLOOD PRESSURE: 61 MMHG

## 2020-11-08 DIAGNOSIS — D72.18 CHURG-STRAUSS SYNDROME (HCC): ICD-10-CM

## 2020-11-08 DIAGNOSIS — M30.1 CHURG-STRAUSS SYNDROME (HCC): ICD-10-CM

## 2020-11-08 PROCEDURE — 700111 HCHG RX REV CODE 636 W/ 250 OVERRIDE (IP): Performed by: INTERNAL MEDICINE

## 2020-11-08 PROCEDURE — 96365 THER/PROPH/DIAG IV INF INIT: CPT

## 2020-11-08 PROCEDURE — 96366 THER/PROPH/DIAG IV INF ADDON: CPT

## 2020-11-08 RX ORDER — IMMUNE GLOBULIN INFUSION (HUMAN) 100 MG/ML
5 INJECTION, SOLUTION INTRAVENOUS; SUBCUTANEOUS ONCE
Status: COMPLETED | OUTPATIENT
Start: 2020-11-08 | End: 2020-11-08

## 2020-11-08 RX ORDER — IMMUNE GLOBULIN INFUSION (HUMAN) 100 MG/ML
20 INJECTION, SOLUTION INTRAVENOUS; SUBCUTANEOUS ONCE
Status: COMPLETED | OUTPATIENT
Start: 2020-11-08 | End: 2020-11-08

## 2020-11-08 RX ADMIN — IMMUNE GLOBULIN INFUSION (HUMAN) 20 G: 100 INJECTION, SOLUTION INTRAVENOUS; SUBCUTANEOUS at 08:55

## 2020-11-08 RX ADMIN — IMMUNE GLOBULIN INFUSION (HUMAN) 5 G: 100 INJECTION, SOLUTION INTRAVENOUS; SUBCUTANEOUS at 11:59

## 2020-11-08 ASSESSMENT — FIBROSIS 4 INDEX: FIB4 SCORE: 1.39

## 2020-11-13 ENCOUNTER — TELEPHONE (OUTPATIENT)
Dept: MEDICAL GROUP | Facility: MEDICAL CENTER | Age: 45
End: 2020-11-13

## 2020-11-13 NOTE — TELEPHONE ENCOUNTER
Faxed Rx order for [facility] for 2021. Just to start the process early so that maybe the pr could get it on time this year. Spoke with Andrzej and they said that would be fine.

## 2020-11-20 ENCOUNTER — TELEPHONE (OUTPATIENT)
Dept: ONCOLOGY | Facility: MEDICAL CENTER | Age: 45
End: 2020-11-20

## 2020-11-20 NOTE — TELEPHONE ENCOUNTER
Megan completed door screening questions for COVID-19. Educated regarding updated department procedures. Megan ok to proceed with treatment at this time for Sunday's appointment.

## 2020-11-22 ENCOUNTER — OUTPATIENT INFUSION SERVICES (OUTPATIENT)
Dept: ONCOLOGY | Facility: MEDICAL CENTER | Age: 45
End: 2020-11-22
Attending: INTERNAL MEDICINE
Payer: COMMERCIAL

## 2020-11-22 VITALS
WEIGHT: 128.31 LBS | TEMPERATURE: 97.8 F | DIASTOLIC BLOOD PRESSURE: 68 MMHG | RESPIRATION RATE: 18 BRPM | SYSTOLIC BLOOD PRESSURE: 111 MMHG | HEIGHT: 65 IN | OXYGEN SATURATION: 98 % | BODY MASS INDEX: 21.38 KG/M2 | HEART RATE: 85 BPM

## 2020-11-22 DIAGNOSIS — M30.1 CHURG-STRAUSS SYNDROME (HCC): ICD-10-CM

## 2020-11-22 DIAGNOSIS — D72.18 CHURG-STRAUSS SYNDROME (HCC): ICD-10-CM

## 2020-11-22 PROCEDURE — 96366 THER/PROPH/DIAG IV INF ADDON: CPT

## 2020-11-22 PROCEDURE — 700111 HCHG RX REV CODE 636 W/ 250 OVERRIDE (IP): Performed by: INTERNAL MEDICINE

## 2020-11-22 PROCEDURE — 96365 THER/PROPH/DIAG IV INF INIT: CPT

## 2020-11-22 RX ORDER — IMMUNE GLOBULIN INFUSION (HUMAN) 100 MG/ML
5 INJECTION, SOLUTION INTRAVENOUS; SUBCUTANEOUS ONCE
Status: COMPLETED | OUTPATIENT
Start: 2020-11-22 | End: 2020-11-22

## 2020-11-22 RX ORDER — IMMUNE GLOBULIN INFUSION (HUMAN) 100 MG/ML
20 INJECTION, SOLUTION INTRAVENOUS; SUBCUTANEOUS ONCE
Status: COMPLETED | OUTPATIENT
Start: 2020-11-22 | End: 2020-11-22

## 2020-11-22 RX ADMIN — IMMUNE GLOBULIN INFUSION (HUMAN) 5 G: 100 INJECTION, SOLUTION INTRAVENOUS; SUBCUTANEOUS at 13:25

## 2020-11-22 RX ADMIN — IMMUNE GLOBULIN INFUSION (HUMAN) 20 G: 100 INJECTION, SOLUTION INTRAVENOUS; SUBCUTANEOUS at 10:33

## 2020-11-22 ASSESSMENT — FIBROSIS 4 INDEX: FIB4 SCORE: 1.39

## 2020-11-22 NOTE — PROGRESS NOTES
Patient to [facility] for IVIG infusion. PIV inserted into right AC, flushed with + blood return. IVIG infused at 30ml/hr for 30 min, 60ml/hr for 30min, 90 ml/hr for remainder of infusion. No s/s of infusion reaction. PIV flushed with + blood return and removed. Gauze and Coban applied as a dressing. Patient has future appointment. Patient to home in care of self.

## 2020-12-05 ENCOUNTER — TELEPHONE (OUTPATIENT)
Dept: ONCOLOGY | Facility: MEDICAL CENTER | Age: 45
End: 2020-12-05

## 2020-12-06 ENCOUNTER — OUTPATIENT INFUSION SERVICES (OUTPATIENT)
Dept: ONCOLOGY | Facility: MEDICAL CENTER | Age: 45
End: 2020-12-06
Attending: INTERNAL MEDICINE
Payer: COMMERCIAL

## 2020-12-06 VITALS
HEIGHT: 66 IN | SYSTOLIC BLOOD PRESSURE: 110 MMHG | TEMPERATURE: 97.4 F | DIASTOLIC BLOOD PRESSURE: 73 MMHG | OXYGEN SATURATION: 97 % | HEART RATE: 67 BPM | RESPIRATION RATE: 18 BRPM | WEIGHT: 126.32 LBS | BODY MASS INDEX: 20.3 KG/M2

## 2020-12-06 DIAGNOSIS — M30.1 CHURG-STRAUSS SYNDROME (HCC): ICD-10-CM

## 2020-12-06 DIAGNOSIS — D72.18 CHURG-STRAUSS SYNDROME (HCC): ICD-10-CM

## 2020-12-06 PROCEDURE — 700111 HCHG RX REV CODE 636 W/ 250 OVERRIDE (IP): Performed by: INTERNAL MEDICINE

## 2020-12-06 PROCEDURE — 96365 THER/PROPH/DIAG IV INF INIT: CPT

## 2020-12-06 PROCEDURE — 96366 THER/PROPH/DIAG IV INF ADDON: CPT

## 2020-12-06 RX ADMIN — IMMUNE GLOBULIN INFUSION (HUMAN) 25 G: 100 INJECTION, SOLUTION INTRAVENOUS; SUBCUTANEOUS at 09:13

## 2020-12-06 ASSESSMENT — FIBROSIS 4 INDEX: FIB4 SCORE: 1.39

## 2020-12-06 NOTE — PROGRESS NOTES
Patient to [facility] for IVIG infusion. PIV inserted into right AC, flushed with + blood return. IVIG infused at 30ml/hr for 30 min, 60ml/hr for 30min, 90 ml/hr for remainder of infusion. No s/s of infusion reaction. PIV flushed with + blood return and removed. Gauze and Coban applied as a dressing. Patient has future appointment. Patient to home in care of self.

## 2020-12-09 ENCOUNTER — HOSPITAL ENCOUNTER (EMERGENCY)
Facility: MEDICAL CENTER | Age: 45
End: 2020-12-09
Attending: EMERGENCY MEDICINE
Payer: COMMERCIAL

## 2020-12-09 ENCOUNTER — APPOINTMENT (OUTPATIENT)
Dept: RADIOLOGY | Facility: MEDICAL CENTER | Age: 45
End: 2020-12-09
Attending: EMERGENCY MEDICINE
Payer: COMMERCIAL

## 2020-12-09 VITALS
TEMPERATURE: 96.5 F | HEART RATE: 71 BPM | RESPIRATION RATE: 19 BRPM | HEIGHT: 66 IN | DIASTOLIC BLOOD PRESSURE: 89 MMHG | SYSTOLIC BLOOD PRESSURE: 128 MMHG | WEIGHT: 125.44 LBS | OXYGEN SATURATION: 100 % | BODY MASS INDEX: 20.16 KG/M2

## 2020-12-09 DIAGNOSIS — F41.9 ANXIETY: ICD-10-CM

## 2020-12-09 DIAGNOSIS — F43.0 STRESS REACTION: ICD-10-CM

## 2020-12-09 DIAGNOSIS — R14.0 GASEOUS ABDOMINAL DISTENTION: ICD-10-CM

## 2020-12-09 LAB
ANION GAP SERPL CALC-SCNC: 17 MMOL/L (ref 7–16)
BASOPHILS # BLD AUTO: 1.3 % (ref 0–1.8)
BASOPHILS # BLD: 0.04 K/UL (ref 0–0.12)
BUN SERPL-MCNC: 10 MG/DL (ref 8–22)
CALCIUM SERPL-MCNC: 9.7 MG/DL (ref 8.4–10.2)
CHLORIDE SERPL-SCNC: 97 MMOL/L (ref 96–112)
CO2 SERPL-SCNC: 24 MMOL/L (ref 20–33)
CREAT SERPL-MCNC: 0.67 MG/DL (ref 0.5–1.4)
EOSINOPHIL # BLD AUTO: 0.11 K/UL (ref 0–0.51)
EOSINOPHIL NFR BLD: 3.4 % (ref 0–6.9)
ERYTHROCYTE [DISTWIDTH] IN BLOOD BY AUTOMATED COUNT: 44 FL (ref 35.9–50)
GLUCOSE SERPL-MCNC: 77 MG/DL (ref 65–99)
HCT VFR BLD AUTO: 45.4 % (ref 37–47)
HGB BLD-MCNC: 15.4 G/DL (ref 12–16)
IMM GRANULOCYTES # BLD AUTO: 0 K/UL (ref 0–0.11)
IMM GRANULOCYTES NFR BLD AUTO: 0 % (ref 0–0.9)
LYMPHOCYTES # BLD AUTO: 1.07 K/UL (ref 1–4.8)
LYMPHOCYTES NFR BLD: 33.4 % (ref 22–41)
MCH RBC QN AUTO: 32.5 PG (ref 27–33)
MCHC RBC AUTO-ENTMCNC: 33.9 G/DL (ref 33.6–35)
MCV RBC AUTO: 95.8 FL (ref 81.4–97.8)
MONOCYTES # BLD AUTO: 0.32 K/UL (ref 0–0.85)
MONOCYTES NFR BLD AUTO: 10 % (ref 0–13.4)
NEUTROPHILS # BLD AUTO: 1.66 K/UL (ref 2–7.15)
NEUTROPHILS NFR BLD: 51.9 % (ref 44–72)
NRBC # BLD AUTO: 0 K/UL
NRBC BLD-RTO: 0 /100 WBC
PLATELET # BLD AUTO: 274 K/UL (ref 164–446)
PMV BLD AUTO: 10.9 FL (ref 9–12.9)
POTASSIUM SERPL-SCNC: 3.4 MMOL/L (ref 3.6–5.5)
RBC # BLD AUTO: 4.74 M/UL (ref 4.2–5.4)
SODIUM SERPL-SCNC: 138 MMOL/L (ref 135–145)
WBC # BLD AUTO: 3.2 K/UL (ref 4.8–10.8)

## 2020-12-09 PROCEDURE — A9270 NON-COVERED ITEM OR SERVICE: HCPCS | Performed by: EMERGENCY MEDICINE

## 2020-12-09 PROCEDURE — 74018 RADEX ABDOMEN 1 VIEW: CPT

## 2020-12-09 PROCEDURE — 96375 TX/PRO/DX INJ NEW DRUG ADDON: CPT

## 2020-12-09 PROCEDURE — 85025 COMPLETE CBC W/AUTO DIFF WBC: CPT

## 2020-12-09 PROCEDURE — 80048 BASIC METABOLIC PNL TOTAL CA: CPT

## 2020-12-09 PROCEDURE — 700102 HCHG RX REV CODE 250 W/ 637 OVERRIDE(OP): Performed by: EMERGENCY MEDICINE

## 2020-12-09 PROCEDURE — 700105 HCHG RX REV CODE 258: Performed by: EMERGENCY MEDICINE

## 2020-12-09 PROCEDURE — 96374 THER/PROPH/DIAG INJ IV PUSH: CPT

## 2020-12-09 PROCEDURE — 700111 HCHG RX REV CODE 636 W/ 250 OVERRIDE (IP): Performed by: EMERGENCY MEDICINE

## 2020-12-09 PROCEDURE — 99285 EMERGENCY DEPT VISIT HI MDM: CPT

## 2020-12-09 RX ORDER — SODIUM CHLORIDE 9 MG/ML
1000 INJECTION, SOLUTION INTRAVENOUS ONCE
Status: COMPLETED | OUTPATIENT
Start: 2020-12-09 | End: 2020-12-09

## 2020-12-09 RX ORDER — LORAZEPAM 2 MG/ML
1 INJECTION INTRAMUSCULAR ONCE
Status: COMPLETED | OUTPATIENT
Start: 2020-12-09 | End: 2020-12-09

## 2020-12-09 RX ORDER — LORAZEPAM 1 MG/1
1 TABLET ORAL EVERY 6 HOURS PRN
Qty: 30 TAB | Refills: 0 | Status: SHIPPED | OUTPATIENT
Start: 2020-12-09 | End: 2020-12-15

## 2020-12-09 RX ORDER — ONDANSETRON 2 MG/ML
4 INJECTION INTRAMUSCULAR; INTRAVENOUS ONCE
Status: COMPLETED | OUTPATIENT
Start: 2020-12-09 | End: 2020-12-09

## 2020-12-09 RX ORDER — PANTOPRAZOLE SODIUM 40 MG/1
40 TABLET, DELAYED RELEASE ORAL DAILY
Qty: 30 TAB | Refills: 6 | Status: SHIPPED | OUTPATIENT
Start: 2020-12-09 | End: 2020-12-15

## 2020-12-09 RX ORDER — SIMETHICONE 80 MG
160 TABLET,CHEWABLE ORAL ONCE
Status: COMPLETED | OUTPATIENT
Start: 2020-12-09 | End: 2020-12-09

## 2020-12-09 RX ORDER — KETOROLAC TROMETHAMINE 30 MG/ML
30 INJECTION, SOLUTION INTRAMUSCULAR; INTRAVENOUS ONCE
Status: COMPLETED | OUTPATIENT
Start: 2020-12-09 | End: 2020-12-09

## 2020-12-09 RX ORDER — KETOROLAC TROMETHAMINE 10 MG/1
10 TABLET, FILM COATED ORAL 3 TIMES DAILY PRN
Qty: 15 TAB | Refills: 0 | Status: SHIPPED | OUTPATIENT
Start: 2020-12-09 | End: 2020-12-15

## 2020-12-09 RX ORDER — ONDANSETRON 4 MG/1
4 TABLET, ORALLY DISINTEGRATING ORAL EVERY 6 HOURS PRN
Qty: 10 TAB | Refills: 0 | Status: SHIPPED | OUTPATIENT
Start: 2020-12-09

## 2020-12-09 RX ADMIN — KETOROLAC TROMETHAMINE 30 MG: 30 INJECTION, SOLUTION INTRAMUSCULAR; INTRAVENOUS at 01:30

## 2020-12-09 RX ADMIN — SIMETHICONE 160 MG: 80 TABLET, CHEWABLE ORAL at 01:29

## 2020-12-09 RX ADMIN — SODIUM CHLORIDE 1000 ML: 9 INJECTION, SOLUTION INTRAVENOUS at 02:09

## 2020-12-09 RX ADMIN — LORAZEPAM 1 MG: 2 INJECTION INTRAMUSCULAR; INTRAVENOUS at 02:11

## 2020-12-09 RX ADMIN — ONDANSETRON 4 MG: 2 INJECTION INTRAMUSCULAR; INTRAVENOUS at 01:30

## 2020-12-09 ASSESSMENT — FIBROSIS 4 INDEX: FIB4 SCORE: 1.39

## 2020-12-09 NOTE — ED PROVIDER NOTES
CHIEF COMPLAINT  Chief Complaint   Patient presents with   • Abdominal Pain       HPI  Megan August is a 44 y.o. female who presents tonight with a chief complaint of abdominal distention, excessive belching, generalized abdominal pain, increased anxiety, stress and depression secondary to going through divorce with her  currently.  Patient has an autonomic GI disorder with small intestinal bacterial overgrowth syndrome, eosinophilic esophagitis and dysautonomia.  Patient is seen by doctors in [place] at [facility].  She states she normally can handle her medical issues with hot tub baths and PPIs of which she is already taken but this is not working secondary to her stress.  She came here because she was over the top in pain.    REVIEW OF SYSTEMS  See HPI for further details. All other system reviews are negative.    PAST MEDICAL HISTORY  Past Medical History:   Diagnosis Date   • Abscess of breast, postpartum    • Anemia    • Anemia    • Anxiety 2009   • Anxiety    • Anxiety disorder    • Bowel habit changes     paralyzed colon   • Cancer ([facility])     melanoma, left side neck   • Dental disorder     lack of saliva   • Dry eyes 2009 right eye scleral redness   • Dry eyes     extreme   • Dysautonomia ([facility])    • Eosinophilic esophagitis    • Eosinophilic esophagitis    • Exophoria    • Hemorrhagic disorder ([facility])     anemia   • Hiatus hernia syndrome    • Hypothyroid 2009   • Melanoma ([facility]) 2011    melanoma - Amenanoic left neck jaw   • Mild preeclampsia    • Other specified symptom associated with female genital organs     PCOS   • Polycystic ovarian syndrome    • Polycystic ovary disease 2009   •  labor    • Psychiatric disorder     PTSD   • Sjogren's syndrome ([facility])    • Small intestinal bacterial overgrowth        FAMILY HISTORY  Family History   Problem Relation Age of Onset   • Cancer Father    • No Known Problems Sister         in [place]     • Cancer Maternal Grandmother        SOCIAL HISTORY  Social History     Socioeconomic History   • Marital status:      Spouse name: Not on file   • Number of children: Not on file   • Years of education: Not on file   • Highest education level: Not on file   Occupational History     Comment: [employer]   Social Needs   • Financial resource strain: Not on file   • Food insecurity     Worry: Not on file     Inability: Not on file   • Transportation needs     Medical: Not on file     Non-medical: Not on file   Tobacco Use   • Smoking status: Never Smoker   • Smokeless tobacco: Never Used   Substance and Sexual Activity   • Alcohol use: No     Alcohol/week: 0.0 oz   • Drug use: No   • Sexual activity: Not on file   Lifestyle   • Physical activity     Days per week: Not on file     Minutes per session: Not on file   • Stress: Not on file   Relationships   • Social connections     Talks on phone: Not on file     Gets together: Not on file     Attends [employer] service: Not on file     Active member of club or organization: Not on file     Attends meetings of clubs or organizations: Not on file     Relationship status: Not on file   • Intimate partner violence     Fear of current or ex partner: Not on file     Emotionally abused: Not on file     Physically abused: Not on file     Forced sexual activity: Not on file   Other Topics Concern   • Not on file   Social History Narrative   • Not on file       SURGICAL HISTORY  Past Surgical History:   Procedure Laterality Date   • OTHER  10/31/2018    left open neck incision   • NECK MASS EXCISION Left 10/31/2018    Procedure: NECK MASS EXCISION;  Surgeon: Inna Cadena M.D.;  Location: SURGERY SAME DAY [facility];  Service: Ent   • OTHER  08/2018    Neck dissection-melanoma   • OTHER  07/2018    neck excision melanoma   • GASTROSCOPY WITH FEED TUBE PLACEMENT N/A 2/19/2016    Procedure: GASTROSCOPY WITH NASOENTERIC TUBE PLACEMENT W/FLUORO;  Surgeon:  "Gallito Rangel M.D.;  Location: SURGERY [facility];  Service:    • COLONOSCOPY  2015   • SETTLEMENT (INSURANCE)  2014    Performed by Olvin David M.D. at SURGERY [facility]   • OTHER      eye ducts cauterized   • OTHER  2012    Local excision melanoma   • OTHER      left periauricular melanoma excision   • INCISION AND DRAINAGE GENERAL  2009    left nipple   • PB  DELIVERY ONLY     • TONSILLECTOMY AND ADENOIDECTOMY     • ENDOSCOPY      multiple upper an lower procedures   • OTHER  [id number]    several procedures for infertility        CURRENT MEDICATIONS  See nurses notes    ALLERGIES  Allergies   Allergen Reactions   • Other Food Anaphylaxis     \"all foods\"= blisters/ anaphylaxis       PHYSICAL EXAM  VITAL SIGNS: /68   Pulse 71   Temp 35.8 °C (96.5 °F) (Temporal)   Resp 19   Ht 1.664 m (5' 5.5\")   Wt 56.9 kg (125 lb 7.1 oz)   SpO2 100%   BMI 20.56 kg/m²     Constitutional: Patient is well developed, well nourished   HENT: Normocephalic, atraumatic, Oropharynx moist without erythema or exudates, nose normal with no mucosal edema.  Eyes: PERRL, EOMI   Neck: Supple  Normal range of motion in flexion, extension and lateral rotation. No tenderness.  Cardiovascular: Normal heart rate and rhythm. No murmur  Thorax & Lungs: Clear and equal breath sounds with good excursion. No respiratory distress.  Abdomen: Distended, hyperactive bowel sounds, generalized tenderness, no rebound guarding or flank tenderness.  Skin: Warm, Dry   Back: No cervical, thoracic, or lumbosacral tenderness.   Extremities: Peripheral pulses 4/4 No edema, No tenderness  Musculoskeletal: Normal range of motion in all major joints.   Neurologic: Alert & oriented x 3, Normal motor function, Normal sensory function  Psychiatric: Affect odd, Mood depressed    Results for orders placed or performed during the hospital encounter of 20   CBC WITH DIFFERENTIAL   Result Value Ref " Range    WBC 3.2 (L) 4.8 - 10.8 K/uL    RBC 4.74 4.20 - 5.40 M/uL    Hemoglobin 15.4 12.0 - 16.0 g/dL    Hematocrit 45.4 37.0 - 47.0 %    MCV 95.8 81.4 - 97.8 fL    MCH 32.5 27.0 - 33.0 pg    MCHC 33.9 33.6 - 35.0 g/dL    RDW 44.0 35.9 - 50.0 fL    Platelet Count 274 164 - 446 K/uL    MPV 10.9 9.0 - 12.9 fL    Neutrophils-Polys 51.90 44.00 - 72.00 %    Lymphocytes 33.40 22.00 - 41.00 %    Monocytes 10.00 0.00 - 13.40 %    Eosinophils 3.40 0.00 - 6.90 %    Basophils 1.30 0.00 - 1.80 %    Immature Granulocytes 0.00 0.00 - 0.90 %    Nucleated RBC 0.00 /100 WBC    Neutrophils (Absolute) 1.66 (L) 2.00 - 7.15 K/uL    Lymphs (Absolute) 1.07 1.00 - 4.80 K/uL    Monos (Absolute) 0.32 0.00 - 0.85 K/uL    Eos (Absolute) 0.11 0.00 - 0.51 K/uL    Baso (Absolute) 0.04 0.00 - 0.12 K/uL    Immature Granulocytes (abs) 0.00 0.00 - 0.11 K/uL    NRBC (Absolute) 0.00 K/uL   BASIC METABOLIC PANEL   Result Value Ref Range    Sodium 138 135 - 145 mmol/L    Potassium 3.4 (L) 3.6 - 5.5 mmol/L    Chloride 97 96 - 112 mmol/L    Co2 24 20 - 33 mmol/L    Glucose 77 65 - 99 mg/dL    Bun 10 8 - 22 mg/dL    Creatinine 0.67 0.50 - 1.40 mg/dL    Calcium 9.7 8.4 - 10.2 mg/dL    Anion Gap 17.0 (H) 7.0 - 16.0   ESTIMATED GFR   Result Value Ref Range    GFR If African American >60 >60 mL/min/1.73 m 2    GFR If Non African American >60 >60 mL/min/1.73 m 2         RADIOLOGY/PROCEDURES  [id number] VIEW   Final Result      Multiple diffusely dilated loops of small and large intestine to the level of the rectum likely representing ileus.            COURSE & MEDICAL DECISION MAKING  Pertinent Labs & Imaging studies reviewed. (See chart for details)  Patient was given simethicone 160 mg orally with some warm water.  Laboratories were drawn and were found to be unremarkable.  Her white count is slightly low at 3.2 she has a stable H&H with no left shift.  Her electrolytes are all within normal limits with a slightly low potassium at 3.4.  Abdominal x-ray shows  dilated loops of small and large intestine possible ileus.  I gave her 1 L of normal saline to help with that.  At this time she is not vomiting therefore I do not think an NG tube would be beneficial.  I gave her Toradol, Zofran for her pain and Ativan for her anxiety and stress.  She is improving with all of the interventions.  My plan will be to discharge her home to decrease stress, follow-up with her primary care provider within next 2 to 3 days for recheck and return if any problems or worsening.  She is discharged in stable and improved condition.    FINAL IMPRESSION  1.  Abdominal distention  2.  Abdominal gas pains  3.  Stress reaction with acute anxiety         Electronically signed by: Chelle Bryan D.O., 12/9/2020 2:08 [facility] Provider Note

## 2020-12-09 NOTE — ED NOTES
Pt indicates pain resided and is able to lay down on gurney. Pt provided warm blankets for comfort. Lights dimmed. Denies any additional needs at this time.

## 2020-12-09 NOTE — DISCHARGE INSTRUCTIONS
Take simethicone/Gas-X/Mylicon/Mylanta gas 4 times daily as needed for abdominal distention and gas pain.  Try drinking warm water  Warm moist compresses to the abdomen or hot baths.  Take the anxiety medications as needed and the Toradol for pain.  Continue taking your GI medications.

## 2020-12-09 NOTE — ED TRIAGE NOTES
Pt has progressive paralysis of GI system for which she sees someone at [facility]. Autoimmune disorder that flairs with stress. Abd distension that started this morning with lots of belching that has progressed despite home meds of PPIs, benadryl, and allegra.    Pt denies covid symptoms or known exposure.

## 2020-12-09 NOTE — ED NOTES
Pt discharged home per provider in stable condition. Paperwork provided to pt via RN and discharge instructions went over with by RN - pt verbalized understanding of teaching with no questions or concerns and is A/Ox4, VSS. Paperwork in hand on discharge.    Paperwork given to pt and gone over with by this RN - pt verbalized understanding of teaching with no questions or concerns.

## 2020-12-15 ENCOUNTER — OFFICE VISIT (OUTPATIENT)
Dept: MEDICAL GROUP | Facility: MEDICAL CENTER | Age: 45
End: 2020-12-15
Payer: COMMERCIAL

## 2020-12-15 VITALS
SYSTOLIC BLOOD PRESSURE: 128 MMHG | HEART RATE: 59 BPM | OXYGEN SATURATION: 100 % | TEMPERATURE: 96.7 F | BODY MASS INDEX: 20.09 KG/M2 | DIASTOLIC BLOOD PRESSURE: 78 MMHG | HEIGHT: 66 IN | WEIGHT: 125 LBS

## 2020-12-15 DIAGNOSIS — E55.9 HYPOVITAMINOSIS D: ICD-10-CM

## 2020-12-15 DIAGNOSIS — E87.6 LOW BLOOD POTASSIUM: ICD-10-CM

## 2020-12-15 DIAGNOSIS — F41.9 ANXIETY: ICD-10-CM

## 2020-12-15 DIAGNOSIS — F43.0 STRESS REACTION: ICD-10-CM

## 2020-12-15 PROCEDURE — 99213 OFFICE O/P EST LOW 20 MIN: CPT | Performed by: INTERNAL MEDICINE

## 2020-12-15 RX ORDER — DROSPIRENONE AND ETHINYL ESTRADIOL 0.03MG-3MG
1 KIT ORAL DAILY
Qty: 84 TAB | Refills: 3 | Status: SHIPPED | OUTPATIENT
Start: 2020-12-15

## 2020-12-15 RX ORDER — ERGOCALCIFEROL 1.25 MG/1
50000 CAPSULE ORAL
Qty: 6 CAP | Refills: 4 | Status: SHIPPED | OUTPATIENT
Start: 2020-12-15

## 2020-12-15 RX ORDER — VENLAFAXINE HYDROCHLORIDE 150 MG/1
150 CAPSULE, EXTENDED RELEASE ORAL DAILY
Qty: 90 CAP | Refills: 3 | Status: SHIPPED | OUTPATIENT
Start: 2020-12-15

## 2020-12-15 RX ORDER — LEVOTHYROXINE SODIUM 0.12 MG/1
125 TABLET ORAL
Qty: 90 TAB | Refills: 3 | Status: SHIPPED | OUTPATIENT
Start: 2020-12-15 | End: 2021-01-27 | Stop reason: SDUPTHER

## 2020-12-15 RX ORDER — POTASSIUM CHLORIDE 1.5 G/1.58G
20 POWDER, FOR SOLUTION ORAL DAILY
Qty: 90 PACKET | Refills: 3 | Status: SHIPPED | OUTPATIENT
Start: 2020-12-15 | End: 2021-12-02

## 2020-12-15 RX ORDER — CLOTRIMAZOLE AND BETAMETHASONE DIPROPIONATE 10; .64 MG/G; MG/G
1 CREAM TOPICAL 2 TIMES DAILY PRN
Qty: 45 G | Refills: 3 | Status: SHIPPED | OUTPATIENT
Start: 2020-12-15

## 2020-12-15 ASSESSMENT — FIBROSIS 4 INDEX: FIB4 SCORE: 1.07
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
## 2020-12-16 ENCOUNTER — TELEPHONE (OUTPATIENT)
Dept: MEDICAL GROUP | Facility: MEDICAL CENTER | Age: 45
End: 2020-12-16

## 2020-12-16 RX ORDER — LORAZEPAM 1 MG/1
1 TABLET ORAL EVERY 6 HOURS PRN
Qty: 30 TAB | Refills: 0 | Status: SHIPPED | OUTPATIENT
Start: 2020-12-18 | End: 2020-12-26

## 2020-12-20 ENCOUNTER — OUTPATIENT INFUSION SERVICES (OUTPATIENT)
Dept: ONCOLOGY | Facility: MEDICAL CENTER | Age: 45
End: 2020-12-20
Attending: INTERNAL MEDICINE
Payer: COMMERCIAL

## 2020-12-20 VITALS
WEIGHT: 126.1 LBS | TEMPERATURE: 97.4 F | OXYGEN SATURATION: 100 % | DIASTOLIC BLOOD PRESSURE: 65 MMHG | HEIGHT: 65 IN | RESPIRATION RATE: 18 BRPM | SYSTOLIC BLOOD PRESSURE: 110 MMHG | HEART RATE: 68 BPM | BODY MASS INDEX: 21.01 KG/M2

## 2020-12-20 DIAGNOSIS — M30.1 CHURG-STRAUSS SYNDROME (HCC): ICD-10-CM

## 2020-12-20 DIAGNOSIS — D72.18 CHURG-STRAUSS SYNDROME (HCC): ICD-10-CM

## 2020-12-20 PROCEDURE — 96365 THER/PROPH/DIAG IV INF INIT: CPT

## 2020-12-20 PROCEDURE — 96366 THER/PROPH/DIAG IV INF ADDON: CPT

## 2020-12-20 PROCEDURE — 700111 HCHG RX REV CODE 636 W/ 250 OVERRIDE (IP): Performed by: INTERNAL MEDICINE

## 2020-12-20 RX ORDER — IMMUNE GLOBULIN INFUSION (HUMAN) 100 MG/ML
20 INJECTION, SOLUTION INTRAVENOUS; SUBCUTANEOUS ONCE
Status: COMPLETED | OUTPATIENT
Start: 2020-12-20 | End: 2020-12-20

## 2020-12-20 RX ORDER — IMMUNE GLOBULIN INFUSION (HUMAN) 100 MG/ML
5 INJECTION, SOLUTION INTRAVENOUS; SUBCUTANEOUS ONCE
Status: COMPLETED | OUTPATIENT
Start: 2020-12-20 | End: 2020-12-20

## 2020-12-20 RX ADMIN — IMMUNE GLOBULIN INFUSION (HUMAN) 20 G: 100 INJECTION, SOLUTION INTRAVENOUS; SUBCUTANEOUS at 10:37

## 2020-12-20 RX ADMIN — IMMUNE GLOBULIN INFUSION (HUMAN) 5 G: 100 INJECTION, SOLUTION INTRAVENOUS; SUBCUTANEOUS at 15:42

## 2020-12-20 ASSESSMENT — FIBROSIS 4 INDEX: FIB4 SCORE: 1.07

## 2020-12-20 NOTE — PROGRESS NOTES
Pt presents to infusion center for IVIG. PIV started, brisk blood return observed. IVIG infused and titrated per Gammagard rate calculator and patient tolerance to max rate of 90 ml/hr. Pt tolerating well with no s/s of adverse reaction. Report given to Kya REARDON at 1550. Megan has next appointment scheduled and denies having further needs at this time.

## 2020-12-21 NOTE — PROGRESS NOTES
IVIG completed with no new patient complaints, line flushed clear. PIV removed, tip intact, compression dressing to site. Patient scheduled for her next appointment and released in no acute distress.

## 2020-12-23 ENCOUNTER — TELEPHONE (OUTPATIENT)
Dept: MEDICAL GROUP | Facility: MEDICAL CENTER | Age: 45
End: 2020-12-23

## 2020-12-23 NOTE — TELEPHONE ENCOUNTER
"Megan Torres sent me this message.    Hi Dr Soares,  As expected I have to switch to my employer's health plan on 1/1.  that means I'm in a HUGE hurry to get clinical documentation over to them.  Can you please submit all my clinical documentation and prior authorization request (one for IVIG every two weeks, and another for Elecare Jr Vanilla 36 cans per month) on Epic- please select [facility]   Apparently there is a link you can click to walk you through how to do this.       [email or web address]  is the website, which has a \"for providers\" link, and then a form you fill out.  Please be careful to type in the form that I'll be on [place]'s health plan effective Jan 1 2021.    Could you start to work on this for the IVIG and Elecare Jr Vanilla supplement?   "

## 2020-12-23 NOTE — TELEPHONE ENCOUNTER
I already started it, in NOV.,and I sent it to her insurance carrier on file. She is changing insurance so that was invalid.She has no new insurance card on file with us. Called and spoke with Megan and she gave me some contact info. I will do what I can. She is aware that I need her cards as soon as she gets them.

## 2020-12-28 PROBLEM — D12.6 ADENOMA OF COLON: Status: ACTIVE | Noted: 2020-12-28

## 2020-12-31 ENCOUNTER — TELEPHONE (OUTPATIENT)
Dept: MEDICAL GROUP | Facility: MEDICAL CENTER | Age: 45
End: 2020-12-31

## 2020-12-31 NOTE — TELEPHONE ENCOUNTER
Sirena from [facility] called and needs you to fax orders to [facility] for Megan's IVIG. Was scheduled with [facility] for Sat. But had to cancel. If we can do this order for them today, she can still do Sat. With [facility].

## 2021-01-03 ENCOUNTER — APPOINTMENT (OUTPATIENT)
Dept: ONCOLOGY | Facility: MEDICAL CENTER | Age: 46
End: 2021-01-03
Attending: INTERNAL MEDICINE
Payer: COMMERCIAL

## 2021-01-05 NOTE — TELEPHONE ENCOUNTER
SIMA received from Herbster with [employer], who is responsible for dispensing medication to the patient. They received order for IVIG. They have faxed over an additional request for further information. This form must be signed and dated, once faxed back they will get patient her medication, as they are aware that her injection is technically due today.    Have you seen this in rightfax? Please leave on my desk and I will fax back to them promptly.

## 2021-01-11 ENCOUNTER — TELEPHONE (OUTPATIENT)
Dept: MEDICAL GROUP | Facility: MEDICAL CENTER | Age: 46
End: 2021-01-11

## 2021-01-11 NOTE — TELEPHONE ENCOUNTER
Spoke with Hemalatha @ [facility] [employer] and they are requesting that you write a short statement giving Megan's diagnosis, diagnosis code and the reason that she has to have this supplement. She says that it can simply state that this is all her body will tolerate and this is the only nutrition that she gets. Please let me know when you are done and I will fax it.

## 2021-01-27 RX ORDER — LEVOTHYROXINE SODIUM 0.12 MG/1
125 TABLET ORAL
Qty: 90 TAB | Refills: 0 | Status: SHIPPED | OUTPATIENT
Start: 2021-01-27

## 2021-05-25 NOTE — TELEPHONE ENCOUNTER
Spoke with Alvin at [facility]. Megan's ins did auth her supplement but only until 12/31/18. Auth # [id number]. The reason the auth is ending on 12/31 is because Megan's insurance will change on Jan 1, 2019.     She has to contact customer service at [facility] to get her reimbursement, I cannot do this for her. She has been advised of this and will call if she needs anything.    Anesthesia Volume In Cc: 5

## 2021-06-28 ENCOUNTER — APPOINTMENT (RX ONLY)
Dept: URBAN - METROPOLITAN AREA CLINIC 20 | Facility: CLINIC | Age: 46
Setting detail: DERMATOLOGY
End: 2021-06-28

## 2021-06-28 DIAGNOSIS — L81.4 OTHER MELANIN HYPERPIGMENTATION: ICD-10-CM

## 2021-06-28 DIAGNOSIS — D22 MELANOCYTIC NEVI: ICD-10-CM

## 2021-06-28 DIAGNOSIS — L82.1 OTHER SEBORRHEIC KERATOSIS: ICD-10-CM

## 2021-06-28 DIAGNOSIS — L57.8 OTHER SKIN CHANGES DUE TO CHRONIC EXPOSURE TO NONIONIZING RADIATION: ICD-10-CM

## 2021-06-28 DIAGNOSIS — D18.0 HEMANGIOMA: ICD-10-CM

## 2021-06-28 DIAGNOSIS — Z85.820 PERSONAL HISTORY OF MALIGNANT MELANOMA OF SKIN: ICD-10-CM

## 2021-06-28 PROBLEM — D22.61 MELANOCYTIC NEVI OF RIGHT UPPER LIMB, INCLUDING SHOULDER: Status: ACTIVE | Noted: 2021-06-28

## 2021-06-28 PROBLEM — D22.72 MELANOCYTIC NEVI OF LEFT LOWER LIMB, INCLUDING HIP: Status: ACTIVE | Noted: 2021-06-28

## 2021-06-28 PROBLEM — D22.71 MELANOCYTIC NEVI OF RIGHT LOWER LIMB, INCLUDING HIP: Status: ACTIVE | Noted: 2021-06-28

## 2021-06-28 PROBLEM — D22.5 MELANOCYTIC NEVI OF TRUNK: Status: ACTIVE | Noted: 2021-06-28

## 2021-06-28 PROBLEM — D22.62 MELANOCYTIC NEVI OF LEFT UPPER LIMB, INCLUDING SHOULDER: Status: ACTIVE | Noted: 2021-06-28

## 2021-06-28 PROBLEM — D18.01 HEMANGIOMA OF SKIN AND SUBCUTANEOUS TISSUE: Status: ACTIVE | Noted: 2021-06-28

## 2021-06-28 PROCEDURE — ? DIAGNOSIS COMMENT

## 2021-06-28 PROCEDURE — 99213 OFFICE O/P EST LOW 20 MIN: CPT

## 2021-06-28 PROCEDURE — ? ADDITIONAL NOTES

## 2021-06-28 PROCEDURE — ? COUNSELING

## 2021-06-28 ASSESSMENT — LOCATION DETAILED DESCRIPTION DERM
LOCATION DETAILED: RIGHT DORSAL FOREARM
LOCATION DETAILED: RIGHT DORSAL HAND
LOCATION DETAILED: PERIUMBILICAL SKIN
LOCATION DETAILED: RIGHT ANTERIOR THIGH
LOCATION DETAILED: RIGHT LATERAL ABDOMEN
LOCATION DETAILED: RIGHT UPPER BACK
LOCATION DETAILED: LEFT CHEEK
LOCATION DETAILED: LEFT SUPERIOR LATERAL NECK
LOCATION DETAILED: LEFT MID BACK
LOCATION DETAILED: RIGHT DISTAL POSTERIOR UPPER ARM
LOCATION DETAILED: LEFT DISTAL POSTERIOR UPPER ARM
LOCATION DETAILED: RIGHT ANTERIOR DISTAL THIGH
LOCATION DETAILED: LEFT UPPER BACK
LOCATION DETAILED: RIGHT MID-UPPER BACK
LOCATION DETAILED: LEFT ANTERIOR DISTAL THIGH
LOCATION DETAILED: LEFT DORSAL FOREARM
LOCATION DETAILED: MONS PUBIS
LOCATION DETAILED: RIGHT CHEEK
LOCATION DETAILED: LEFT ANTERIOR THIGH
LOCATION DETAILED: LEFT DORSAL HAND

## 2021-06-28 ASSESSMENT — LOCATION ZONE DERM
LOCATION ZONE: LEG
LOCATION ZONE: VULVA
LOCATION ZONE: FACE
LOCATION ZONE: TRUNK
LOCATION ZONE: ARM
LOCATION ZONE: NECK
LOCATION ZONE: HAND

## 2021-06-28 ASSESSMENT — LOCATION SIMPLE DESCRIPTION DERM
LOCATION SIMPLE: RIGHT HAND
LOCATION SIMPLE: ABDOMEN
LOCATION SIMPLE: RIGHT UPPER BACK
LOCATION SIMPLE: LEFT CHEEK
LOCATION SIMPLE: BACK
LOCATION SIMPLE: RIGHT POSTERIOR UPPER ARM
LOCATION SIMPLE: LEFT HAND
LOCATION SIMPLE: LEFT UPPER EXTREMITY
LOCATION SIMPLE: RIGHT UPPER EXTREMITY
LOCATION SIMPLE: RIGHT CHEEK
LOCATION SIMPLE: RIGHT THIGH
LOCATION SIMPLE: GROIN
LOCATION SIMPLE: LEFT THIGH
LOCATION SIMPLE: LEFT POSTERIOR UPPER ARM
LOCATION SIMPLE: RIGHT LOWER EXTREMITY
LOCATION SIMPLE: NECK
LOCATION SIMPLE: LEFT LOWER EXTREMITY

## 2021-06-28 NOTE — PROCEDURE: ADDITIONAL NOTES
Render Risk Assessment In Note?: no
Detail Level: Simple
Additional Notes: Advised pt to see Oncologist for recommendations regarding PET/CT.\\n\\nPer pt [facility] told her she did not need to return, request recent MR. \\n\\nI advised pt to yearly checks at [facility] and Oncologist. Pt verbalized understood.

## 2021-06-28 NOTE — PROCEDURE: DIAGNOSIS COMMENT
Comment: Hx of MM, 0.7 mm, left neck, Dr. Gonzalez, EXC, 2011\\nHx of Recurrent Amelanocytic Melanoma Stage IIIB s/p excision of left upper neck skin and left selective neck dissection IB-V on 8/3/18.
Detail Level: Simple
Render Risk Assessment In Note?: no

## 2021-06-28 NOTE — PROCEDURE: MIPS QUALITY
Detail Level: Detailed
Quality 431: Preventive Care And Screening: Unhealthy Alcohol Use - Screening: Patient screened for unhealthy alcohol use using a single question and scores less than 2 times per year
Quality 402: Tobacco Use And Help With Quitting Among Adolescents: Patient screened for tobacco and never smoked
Quality 226: Preventive Care And Screening: Tobacco Use: Screening And Cessation Intervention: Patient screened for tobacco use and is an ex/non-smoker
Quality 130: Documentation Of Current Medications In The Medical Record: Current Medications Documented

## 2021-07-08 ENCOUNTER — TELEPHONE (OUTPATIENT)
Dept: MEDICAL GROUP | Facility: MEDICAL CENTER | Age: 46
End: 2021-07-08

## 2021-07-08 NOTE — TELEPHONE ENCOUNTER
VOICEMAIL  1. Caller Name: Megan                      Call Back Number: [phone]    2. Message: Pt needs a call back she needs assistance with an insurance appeal issue.     3. Patient approves office to leave a detailed voicemail/MyChart message: N\A

## 2021-07-29 NOTE — PROGRESS NOTES
Pt presents to infusion center for Q 2 week IVIG. Infusion is late bc pt had radical neck dissection for recently diagnosed stage 3 melanoma. PIV started, brisk blood return observed, lab drawn as ordered. IVIG infused and titrated per Gammagard rate calculator to max rate of 120 ml/hr. Pt tolerated well with no s/s of adverse reaction. PIV flushed and removed, gauze and coban dressing placed. Has next appt, left on foot in good spirits.    2.31

## 2021-12-29 ENCOUNTER — TELEPHONE (OUTPATIENT)
Dept: MEDICAL GROUP | Facility: MEDICAL CENTER | Age: 46
End: 2021-12-29

## 2021-12-30 NOTE — TELEPHONE ENCOUNTER
Please call the patient, [facility] care sent me a form to complete for her immunoglobulin refill however the patient just saw [facility] and established with a new PCP there.    Please verify with the patient if she has changed her PCP, and then please change that in the computer system.

## 2022-01-04 ENCOUNTER — APPOINTMENT (RX ONLY)
Dept: URBAN - METROPOLITAN AREA CLINIC 20 | Facility: CLINIC | Age: 47
Setting detail: DERMATOLOGY
End: 2022-01-04

## 2022-01-04 DIAGNOSIS — D22 MELANOCYTIC NEVI: ICD-10-CM

## 2022-01-04 DIAGNOSIS — L82.1 OTHER SEBORRHEIC KERATOSIS: ICD-10-CM

## 2022-01-04 DIAGNOSIS — Z85.820 PERSONAL HISTORY OF MALIGNANT MELANOMA OF SKIN: ICD-10-CM

## 2022-01-04 DIAGNOSIS — L57.8 OTHER SKIN CHANGES DUE TO CHRONIC EXPOSURE TO NONIONIZING RADIATION: ICD-10-CM

## 2022-01-04 DIAGNOSIS — D18.0 HEMANGIOMA: ICD-10-CM

## 2022-01-04 DIAGNOSIS — L81.4 OTHER MELANIN HYPERPIGMENTATION: ICD-10-CM

## 2022-01-04 PROBLEM — D22.61 MELANOCYTIC NEVI OF RIGHT UPPER LIMB, INCLUDING SHOULDER: Status: ACTIVE | Noted: 2022-01-04

## 2022-01-04 PROBLEM — D18.01 HEMANGIOMA OF SKIN AND SUBCUTANEOUS TISSUE: Status: ACTIVE | Noted: 2022-01-04

## 2022-01-04 PROBLEM — D22.5 MELANOCYTIC NEVI OF TRUNK: Status: ACTIVE | Noted: 2022-01-04

## 2022-01-04 PROBLEM — D22.72 MELANOCYTIC NEVI OF LEFT LOWER LIMB, INCLUDING HIP: Status: ACTIVE | Noted: 2022-01-04

## 2022-01-04 PROBLEM — D22.71 MELANOCYTIC NEVI OF RIGHT LOWER LIMB, INCLUDING HIP: Status: ACTIVE | Noted: 2022-01-04

## 2022-01-04 PROBLEM — D22.62 MELANOCYTIC NEVI OF LEFT UPPER LIMB, INCLUDING SHOULDER: Status: ACTIVE | Noted: 2022-01-04

## 2022-01-04 PROCEDURE — ? COUNSELING

## 2022-01-04 PROCEDURE — 99213 OFFICE O/P EST LOW 20 MIN: CPT

## 2022-01-04 PROCEDURE — ? DIAGNOSIS COMMENT

## 2022-01-04 PROCEDURE — ? ADDITIONAL NOTES

## 2022-01-04 ASSESSMENT — LOCATION DETAILED DESCRIPTION DERM
LOCATION DETAILED: RIGHT CHEEK
LOCATION DETAILED: RIGHT UPPER BACK
LOCATION DETAILED: RIGHT ANTERIOR THIGH
LOCATION DETAILED: LEFT DISTAL POSTERIOR UPPER ARM
LOCATION DETAILED: RIGHT DORSAL HAND
LOCATION DETAILED: LEFT MID BACK
LOCATION DETAILED: RIGHT ANTERIOR DISTAL THIGH
LOCATION DETAILED: LEFT DORSAL HAND
LOCATION DETAILED: RIGHT LATERAL ABDOMEN
LOCATION DETAILED: RIGHT MID-UPPER BACK
LOCATION DETAILED: RIGHT DORSAL FOREARM
LOCATION DETAILED: LEFT ANTERIOR THIGH
LOCATION DETAILED: RIGHT DISTAL POSTERIOR UPPER ARM
LOCATION DETAILED: LEFT DORSAL FOREARM
LOCATION DETAILED: LEFT CHEEK
LOCATION DETAILED: LEFT UPPER BACK
LOCATION DETAILED: MONS PUBIS
LOCATION DETAILED: LEFT SUPERIOR LATERAL NECK
LOCATION DETAILED: LEFT ANTERIOR DISTAL THIGH
LOCATION DETAILED: PERIUMBILICAL SKIN

## 2022-01-04 ASSESSMENT — LOCATION SIMPLE DESCRIPTION DERM
LOCATION SIMPLE: LEFT THIGH
LOCATION SIMPLE: LEFT HAND
LOCATION SIMPLE: GROIN
LOCATION SIMPLE: ABDOMEN
LOCATION SIMPLE: LEFT LOWER EXTREMITY
LOCATION SIMPLE: LEFT CHEEK
LOCATION SIMPLE: LEFT UPPER EXTREMITY
LOCATION SIMPLE: RIGHT HAND
LOCATION SIMPLE: RIGHT CHEEK
LOCATION SIMPLE: RIGHT LOWER EXTREMITY
LOCATION SIMPLE: RIGHT THIGH
LOCATION SIMPLE: LEFT POSTERIOR UPPER ARM
LOCATION SIMPLE: RIGHT POSTERIOR UPPER ARM
LOCATION SIMPLE: NECK
LOCATION SIMPLE: BACK
LOCATION SIMPLE: RIGHT UPPER BACK
LOCATION SIMPLE: RIGHT UPPER EXTREMITY

## 2022-01-04 ASSESSMENT — LOCATION ZONE DERM
LOCATION ZONE: FACE
LOCATION ZONE: NECK
LOCATION ZONE: VULVA
LOCATION ZONE: ARM
LOCATION ZONE: HAND
LOCATION ZONE: LEG
LOCATION ZONE: TRUNK

## 2022-01-04 NOTE — HPI: MELANOMA F/U (HISTORY OF MALIGNANT MELANOMA)
What Is The Reason For Today's Visit?: Surveillance against skin cancer recurrences
Breslow Depth?: 0.7 mm
Year Excised?: 2018

## 2022-01-04 NOTE — PROCEDURE: ADDITIONAL NOTES
Render Risk Assessment In Note?: no
Detail Level: Simple
Additional Notes: Advised pt to see Oncologist for recommendations regarding PET/CT.\\n\\nPer pt [facility] told her she did not need to return, request recent MR. \\n\\nI advised pt to yearly checks at [facility] and Oncologist. Pt verbalized understood.

## 2022-10-05 ENCOUNTER — APPOINTMENT (RX ONLY)
Dept: URBAN - METROPOLITAN AREA CLINIC 20 | Facility: CLINIC | Age: 47
Setting detail: DERMATOLOGY
End: 2022-10-05

## 2022-10-05 DIAGNOSIS — D18.0 HEMANGIOMA: ICD-10-CM

## 2022-10-05 DIAGNOSIS — L57.8 OTHER SKIN CHANGES DUE TO CHRONIC EXPOSURE TO NONIONIZING RADIATION: ICD-10-CM

## 2022-10-05 DIAGNOSIS — D22 MELANOCYTIC NEVI: ICD-10-CM

## 2022-10-05 DIAGNOSIS — L82.1 OTHER SEBORRHEIC KERATOSIS: ICD-10-CM

## 2022-10-05 DIAGNOSIS — Z85.820 PERSONAL HISTORY OF MALIGNANT MELANOMA OF SKIN: ICD-10-CM

## 2022-10-05 DIAGNOSIS — L81.4 OTHER MELANIN HYPERPIGMENTATION: ICD-10-CM

## 2022-10-05 PROBLEM — D22.5 MELANOCYTIC NEVI OF TRUNK: Status: ACTIVE | Noted: 2022-10-05

## 2022-10-05 PROBLEM — D22.61 MELANOCYTIC NEVI OF RIGHT UPPER LIMB, INCLUDING SHOULDER: Status: ACTIVE | Noted: 2022-10-05

## 2022-10-05 PROBLEM — D22.71 MELANOCYTIC NEVI OF RIGHT LOWER LIMB, INCLUDING HIP: Status: ACTIVE | Noted: 2022-10-05

## 2022-10-05 PROBLEM — D22.62 MELANOCYTIC NEVI OF LEFT UPPER LIMB, INCLUDING SHOULDER: Status: ACTIVE | Noted: 2022-10-05

## 2022-10-05 PROBLEM — D22.72 MELANOCYTIC NEVI OF LEFT LOWER LIMB, INCLUDING HIP: Status: ACTIVE | Noted: 2022-10-05

## 2022-10-05 PROBLEM — D18.01 HEMANGIOMA OF SKIN AND SUBCUTANEOUS TISSUE: Status: ACTIVE | Noted: 2022-10-05

## 2022-10-05 PROBLEM — D48.5 NEOPLASM OF UNCERTAIN BEHAVIOR OF SKIN: Status: ACTIVE | Noted: 2022-10-05

## 2022-10-05 PROCEDURE — ? BIOPSY BY SHAVE METHOD

## 2022-10-05 PROCEDURE — ? COUNSELING

## 2022-10-05 PROCEDURE — ? DIAGNOSIS COMMENT

## 2022-10-05 PROCEDURE — ? PRESCRIPTION

## 2022-10-05 PROCEDURE — ? ADDITIONAL NOTES

## 2022-10-05 PROCEDURE — 99213 OFFICE O/P EST LOW 20 MIN: CPT | Mod: 25

## 2022-10-05 PROCEDURE — 11103 TANGNTL BX SKIN EA SEP/ADDL: CPT

## 2022-10-05 PROCEDURE — 11102 TANGNTL BX SKIN SINGLE LES: CPT

## 2022-10-05 RX ORDER — HYDROQUINONE 4 %
CREAM (GRAM) TOPICAL
Qty: 30 | Refills: 6 | Status: ERX | COMMUNITY
Start: 2022-10-05

## 2022-10-05 RX ADMIN — Medication 1: at 00:00

## 2022-10-05 ASSESSMENT — LOCATION SIMPLE DESCRIPTION DERM
LOCATION SIMPLE: RIGHT HAND
LOCATION SIMPLE: RIGHT UPPER EXTREMITY
LOCATION SIMPLE: LEFT POSTERIOR UPPER ARM
LOCATION SIMPLE: BACK
LOCATION SIMPLE: RIGHT LOWER EXTREMITY
LOCATION SIMPLE: LEFT THIGH
LOCATION SIMPLE: NECK
LOCATION SIMPLE: RIGHT CALF
LOCATION SIMPLE: RIGHT POSTERIOR UPPER ARM
LOCATION SIMPLE: GROIN
LOCATION SIMPLE: LEFT UPPER EXTREMITY
LOCATION SIMPLE: RIGHT UPPER BACK
LOCATION SIMPLE: RIGHT THIGH
LOCATION SIMPLE: RIGHT CHEEK
LOCATION SIMPLE: RIGHT BUTTOCK
LOCATION SIMPLE: LEFT CHEEK
LOCATION SIMPLE: LEFT LOWER EXTREMITY
LOCATION SIMPLE: LEFT HAND
LOCATION SIMPLE: ABDOMEN

## 2022-10-05 ASSESSMENT — LOCATION DETAILED DESCRIPTION DERM
LOCATION DETAILED: RIGHT ANTERIOR DISTAL THIGH
LOCATION DETAILED: RIGHT CHEEK
LOCATION DETAILED: RIGHT ANTERIOR THIGH
LOCATION DETAILED: LEFT DISTAL POSTERIOR UPPER ARM
LOCATION DETAILED: LEFT ANTERIOR DISTAL THIGH
LOCATION DETAILED: LEFT DORSAL FOREARM
LOCATION DETAILED: RIGHT DORSAL FOREARM
LOCATION DETAILED: RIGHT DISTAL POSTERIOR UPPER ARM
LOCATION DETAILED: LEFT ANTERIOR THIGH
LOCATION DETAILED: RIGHT BUTTOCK
LOCATION DETAILED: LEFT SUPERIOR LATERAL NECK
LOCATION DETAILED: LEFT UPPER BACK
LOCATION DETAILED: LEFT MID BACK
LOCATION DETAILED: LEFT DORSAL HAND
LOCATION DETAILED: RIGHT PROXIMAL CALF
LOCATION DETAILED: RIGHT DORSAL HAND
LOCATION DETAILED: RIGHT LATERAL ABDOMEN
LOCATION DETAILED: LEFT CHEEK
LOCATION DETAILED: RIGHT MID-UPPER BACK
LOCATION DETAILED: PERIUMBILICAL SKIN
LOCATION DETAILED: RIGHT UPPER BACK
LOCATION DETAILED: MONS PUBIS

## 2022-10-05 ASSESSMENT — LOCATION ZONE DERM
LOCATION ZONE: VULVA
LOCATION ZONE: NECK
LOCATION ZONE: TRUNK
LOCATION ZONE: ARM
LOCATION ZONE: LEG
LOCATION ZONE: HAND
LOCATION ZONE: FACE

## 2022-10-05 NOTE — PROCEDURE: BIOPSY BY SHAVE METHOD
Detail Level: Detailed
Depth Of Biopsy: dermis
Was A Bandage Applied: Yes
Size Of Lesion In Cm: 0
Biopsy Type: H and E
Biopsy Method: Personna blade
Anesthesia Type: 0.5% lidocaine without epinephrine
Anesthesia Volume In Cc: 0.2
Hemostasis: Drysol and Electrocautery
Wound Care: Aquaphor
Dressing: Band-Aid
Destruction After The Procedure: No
Type Of Destruction Used: Curettage
Curettage Text: The wound bed was treated with curettage after the biopsy was performed.
Cryotherapy Text: The wound bed was treated with cryotherapy after the biopsy was performed.
Electrodesiccation Text: The wound bed was treated with electrodesiccation after the biopsy was performed.
Electrodesiccation And Curettage Text: The wound bed was treated with electrodesiccation and curettage after the biopsy was performed.
Silver Nitrate Text: The wound bed was treated with silver nitrate after the biopsy was performed.
Lab: [id number]
Lab Facility: 
Consent: Written consent was obtained and risks were reviewed including but not limited to scarring, infection, bleeding, scabbing, incomplete removal, nerve damage and allergy to anesthesia.
Post-Care Instructions: I reviewed with the patient in detail post-care instructions. Patient is to keep the biopsy site dry overnight, and then apply bacitracin twice daily until healed. Patient may apply hydrogen peroxide soaks to remove any crusting.
Notification Instructions: Patient will be notified of biopsy results. However, patient instructed to call the office if not contacted within 2 weeks.
Billing Type: Third-Party Bill
Information: Selecting Yes will display possible errors in your note based on the variables you have selected. This validation is only offered as a suggestion for you. PLEASE NOTE THAT THE VALIDATION TEXT WILL BE REMOVED WHEN YOU FINALIZE YOUR NOTE. IF YOU WANT TO FAX A PRELIMINARY NOTE YOU WILL NEED TO TOGGLE THIS TO 'NO' IF YOU DO NOT WANT IT IN YOUR FAXED NOTE.
Body Location Override (Optional - Billing Will Still Be Based On Selected Body Map Location If Applicable): Rt superior lateral buttock
Anesthesia Type: 1% lidocaine with 1:100,000 epinephrine and a 1:6 solution of 8.4% sodium bicarbonate
Body Location Override (Optional - Billing Will Still Be Based On Selected Body Map Location If Applicable): Rt inferior lateral buttock

## 2022-10-05 NOTE — PROCEDURE: ADDITIONAL NOTES
Render Risk Assessment In Note?: no
Detail Level: Simple
Additional Notes: Per [facility]:\\n\\n-defer adjuvant immunotherapy (due to rare autoimmune disorder)\\n-US neck and PE by Oncologists every 3 months\\n	Per pt she does not remember last time she got one\\n\\n-PET/CT (head to toe) every 6 months for 2 years, then yearly\\n	Per pt she does not remember last time she got one\\n\\n-[facility] melanoma clinic every year \\n	Has not been seen since 2018, per pt she called and they said she did not need to return\\n\\nAttempt to get MR from [facility], Dr. Ambicka and [facility] \\n\\nI have recommended pt see Dermatologist at [facility] for final eval and plan moving forward.

## 2022-10-05 NOTE — HPI: FULL BODY SKIN EXAMINATION
What Is The Reason For Today's Visit?: Full Body Skin Examination
What Is The Reason For Today's Visit? (Being Monitored For X): concerning skin lesions on an annual basis
Additional History: Up to date on dental, gynecology and ocular exams.

## 2022-10-10 NOTE — TELEPHONE ENCOUNTER
Per Dagoberto @ [facility], REF already sent to Dr Mejias on Thur 10/25.   Otc Regimen: Sunscreen Daily Detail Level: Zone Initiate Treatment: Rebrightalyze pads daily and  sunscreen daily.

## 2022-11-22 NOTE — TELEPHONE ENCOUNTER
Handoff Report from Operating Room to PACU    Report received from Cheng Salcido RN and Vamsi Prajapati MD regarding Vinita Puga. Surgeon(s):  MD Isaiah Coy MD  And Procedure(s) (LRB):  ROBOTIC ASSISTED LAPAROSCOPIC SIGMOID COLECTOMY WITH INTRAOPERATIVE FLEXIBLE SIGMOIDOSCOPY, CYSTOSCOPY, INSERTION OF BILATERAL URETERAL CATHETERS (E R A S) (UROLOGY TO POST) (N/A)  . (Bilateral)  confirmed   with dressings discussed. Anesthesia type, drugs, patient history, complications, estimated blood loss, vital signs, intake and output, and last pain medication, lines, reversal medications, and temperature were reviewed. Referral placed for Randi Evans at [facility] gastroenterology in [facility]    Phone: [phone]  Fax to: [phone]    Will fax the printout of the referral to the number listed above, please notify patient the referral information will be sent

## 2023-04-04 ENCOUNTER — APPOINTMENT (RX ONLY)
Dept: URBAN - METROPOLITAN AREA CLINIC 20 | Facility: CLINIC | Age: 48
Setting detail: DERMATOLOGY
End: 2023-04-04

## 2023-04-04 DIAGNOSIS — L81.4 OTHER MELANIN HYPERPIGMENTATION: ICD-10-CM

## 2023-04-04 DIAGNOSIS — L57.8 OTHER SKIN CHANGES DUE TO CHRONIC EXPOSURE TO NONIONIZING RADIATION: ICD-10-CM

## 2023-04-04 DIAGNOSIS — D18.0 HEMANGIOMA: ICD-10-CM

## 2023-04-04 DIAGNOSIS — L82.1 OTHER SEBORRHEIC KERATOSIS: ICD-10-CM

## 2023-04-04 DIAGNOSIS — D22 MELANOCYTIC NEVI: ICD-10-CM

## 2023-04-04 DIAGNOSIS — Z85.820 PERSONAL HISTORY OF MALIGNANT MELANOMA OF SKIN: ICD-10-CM

## 2023-04-04 PROBLEM — D22.61 MELANOCYTIC NEVI OF RIGHT UPPER LIMB, INCLUDING SHOULDER: Status: ACTIVE | Noted: 2023-04-04

## 2023-04-04 PROBLEM — D22.5 MELANOCYTIC NEVI OF TRUNK: Status: ACTIVE | Noted: 2023-04-04

## 2023-04-04 PROBLEM — D22.72 MELANOCYTIC NEVI OF LEFT LOWER LIMB, INCLUDING HIP: Status: ACTIVE | Noted: 2023-04-04

## 2023-04-04 PROBLEM — D22.71 MELANOCYTIC NEVI OF RIGHT LOWER LIMB, INCLUDING HIP: Status: ACTIVE | Noted: 2023-04-04

## 2023-04-04 PROBLEM — D48.5 NEOPLASM OF UNCERTAIN BEHAVIOR OF SKIN: Status: ACTIVE | Noted: 2023-04-04

## 2023-04-04 PROBLEM — D22.62 MELANOCYTIC NEVI OF LEFT UPPER LIMB, INCLUDING SHOULDER: Status: ACTIVE | Noted: 2023-04-04

## 2023-04-04 PROBLEM — D18.01 HEMANGIOMA OF SKIN AND SUBCUTANEOUS TISSUE: Status: ACTIVE | Noted: 2023-04-04

## 2023-04-04 PROCEDURE — ? BIOPSY BY SHAVE METHOD

## 2023-04-04 PROCEDURE — 99213 OFFICE O/P EST LOW 20 MIN: CPT | Mod: 25

## 2023-04-04 PROCEDURE — ? ADDITIONAL NOTES

## 2023-04-04 PROCEDURE — ? PRESCRIPTION

## 2023-04-04 PROCEDURE — 11102 TANGNTL BX SKIN SINGLE LES: CPT

## 2023-04-04 PROCEDURE — ? COUNSELING

## 2023-04-04 PROCEDURE — ? DIAGNOSIS COMMENT

## 2023-04-04 RX ORDER — HYDROQUINONE 4 %
CREAM (GRAM) TOPICAL
Qty: 30 | Refills: 6 | Status: ERX

## 2023-04-04 ASSESSMENT — LOCATION SIMPLE DESCRIPTION DERM
LOCATION SIMPLE: RIGHT UPPER BACK
LOCATION SIMPLE: RIGHT HAND
LOCATION SIMPLE: LEFT SHOULDER
LOCATION SIMPLE: LEFT UPPER BACK
LOCATION SIMPLE: RIGHT POSTERIOR THIGH
LOCATION SIMPLE: RIGHT POSTERIOR UPPER ARM
LOCATION SIMPLE: BACK
LOCATION SIMPLE: LEFT CHEEK
LOCATION SIMPLE: RIGHT PRETIBIAL REGION
LOCATION SIMPLE: LEFT POSTERIOR UPPER ARM
LOCATION SIMPLE: RIGHT CALF
LOCATION SIMPLE: RIGHT UPPER EXTREMITY
LOCATION SIMPLE: RIGHT LOWER EXTREMITY
LOCATION SIMPLE: RIGHT CHEEK
LOCATION SIMPLE: LEFT THIGH
LOCATION SIMPLE: LEFT LOWER EXTREMITY
LOCATION SIMPLE: NECK
LOCATION SIMPLE: RIGHT LOWER BACK
LOCATION SIMPLE: LEFT CALF
LOCATION SIMPLE: LEFT UPPER EXTREMITY
LOCATION SIMPLE: RIGHT THIGH
LOCATION SIMPLE: LEFT POSTERIOR THIGH
LOCATION SIMPLE: LEFT HAND
LOCATION SIMPLE: GROIN
LOCATION SIMPLE: ABDOMEN

## 2023-04-04 ASSESSMENT — LOCATION DETAILED DESCRIPTION DERM
LOCATION DETAILED: PERIUMBILICAL SKIN
LOCATION DETAILED: LEFT DISTAL POSTERIOR THIGH
LOCATION DETAILED: RIGHT DISTAL POSTERIOR THIGH
LOCATION DETAILED: LEFT PROXIMAL CALF
LOCATION DETAILED: LEFT MID BACK
LOCATION DETAILED: RIGHT ANTERIOR THIGH
LOCATION DETAILED: RIGHT DORSAL HAND
LOCATION DETAILED: LEFT SUPERIOR LATERAL NECK
LOCATION DETAILED: LEFT DORSAL HAND
LOCATION DETAILED: RIGHT DISTAL POSTERIOR UPPER ARM
LOCATION DETAILED: RIGHT PROXIMAL PRETIBIAL REGION
LOCATION DETAILED: MONS PUBIS
LOCATION DETAILED: LEFT CHEEK
LOCATION DETAILED: RIGHT UPPER BACK
LOCATION DETAILED: LEFT DORSAL FOREARM
LOCATION DETAILED: RIGHT DISTAL CALF
LOCATION DETAILED: LEFT ANTERIOR DISTAL THIGH
LOCATION DETAILED: RIGHT CHEEK
LOCATION DETAILED: RIGHT SUPERIOR LATERAL LOWER BACK
LOCATION DETAILED: RIGHT MID-UPPER BACK
LOCATION DETAILED: LEFT UPPER BACK
LOCATION DETAILED: LEFT POSTERIOR SHOULDER
LOCATION DETAILED: LEFT ANTERIOR THIGH
LOCATION DETAILED: LEFT MID-UPPER BACK
LOCATION DETAILED: RIGHT DORSAL FOREARM
LOCATION DETAILED: RIGHT LATERAL ABDOMEN
LOCATION DETAILED: LEFT DISTAL POSTERIOR UPPER ARM
LOCATION DETAILED: RIGHT ANTERIOR DISTAL THIGH

## 2023-04-04 ASSESSMENT — LOCATION ZONE DERM
LOCATION ZONE: FACE
LOCATION ZONE: VULVA
LOCATION ZONE: NECK
LOCATION ZONE: HAND
LOCATION ZONE: LEG
LOCATION ZONE: ARM
LOCATION ZONE: TRUNK

## 2023-04-04 NOTE — PROCEDURE: ADDITIONAL NOTES
Render Risk Assessment In Note?: no
Detail Level: Simple
Additional Notes: Per [facility]:\\n\\n-defer adjuvant immunotherapy (due to rare autoimmune disorder)\\n-US neck and PE by Oncologists every 3 months\\n Per pt she does not remember last time she got one\\n\\n-PET/CT (head to toe) every 6 months for 2 years, then yearly\\n Per pt she does not remember last time she got one\\n\\n-[facility] melanoma clinic every year \\n Has not been seen since 2018, per pt she called and they said she did not need to return\\n\\nAttempt to get MR from [facility], Dr. Ambicka and [facility] \\n\\nI have recommended pt see Dermatologist at [facility] for final eval and plan moving forward.

## 2023-04-04 NOTE — PROCEDURE: DIAGNOSIS COMMENT
Comment: Hx of Amelanocytic MM, 0.7 mm, left neck, Dr. Gonzalez, EXC, 2011\\nHx of Recurrent Amelanocytic Melanoma Stage IIIB s/p excision of left upper neck skin and left selective neck dissection IB-V on 8/3/18.
Detail Level: Simple
Render Risk Assessment In Note?: no

## 2023-05-31 ENCOUNTER — APPOINTMENT (RX ONLY)
Dept: URBAN - METROPOLITAN AREA CLINIC 4 | Facility: CLINIC | Age: 48
Setting detail: DERMATOLOGY
End: 2023-05-31

## 2023-05-31 DIAGNOSIS — D22 MELANOCYTIC NEVI: ICD-10-CM

## 2023-05-31 PROBLEM — D22.72 MELANOCYTIC NEVI OF LEFT LOWER LIMB, INCLUDING HIP: Status: ACTIVE | Noted: 2023-05-31

## 2023-05-31 PROCEDURE — 13121 CMPLX RPR S/A/L 2.6-7.5 CM: CPT

## 2023-05-31 PROCEDURE — 11403 EXC TR-EXT B9+MARG 2.1-3CM: CPT

## 2023-05-31 PROCEDURE — ? EXCISION

## 2023-05-31 ASSESSMENT — LOCATION SIMPLE DESCRIPTION DERM: LOCATION SIMPLE: LEFT CALF

## 2023-05-31 ASSESSMENT — LOCATION ZONE DERM: LOCATION ZONE: LEG

## 2023-05-31 ASSESSMENT — LOCATION DETAILED DESCRIPTION DERM: LOCATION DETAILED: LEFT PROXIMAL CALF

## 2023-05-31 NOTE — PROCEDURE: EXCISION
Medical Necessity Information: It is in your best interest to select a reason for this procedure from the list below. All of these items fulfill various CMS LCD requirements except lesion extends to a margin.
Include Z78.9 (Other Specified Conditions Influencing Health Status) As An Associated Diagnosis?: No
Medical Necessity Clause: This procedure was medically necessary because the lesion that was treated was:
Lab: [id number]
Lab Facility: 
Referring Physician (Optional): VIANNEY Gaffney
Surgeon (Optional): [place]
Biopsy Photograph Reviewed: Yes
Size Of Lesion In Cm: 1.9
X Size Of Lesion In Cm (Optional): 0
Size Of Margin In Cm: 0.2
Anesthesia Volume In Cc: 8.7
Eye Clamp Note Details: An eye clamp was used during the procedure.
Excision Method: Elliptical
Saucerization Depth: dermis and superficial adipose tissue
Repair Type: Complex
Intermediate / Complex Repair - Final Wound Length In Cm: 5.7
Suturegard Retention Suture: 2-0 Nylon
Retention Suture Bite Size: 3 mm
Length To Time In Minutes Device Was In Place: 10
Number Of Hemigard Strips Per Side: 1
Distance Of Undermining In Cm (Required): 2.1
Undermining Type: Entire Wound
Debridement Text: The wound edges were debrided prior to proceeding with the closure to facilitate wound healing.
Helical Rim Text: The closure involved the helical rim.
Vermilion Border Text: The closure involved the vermilion border.
Nostril Rim Text: The closure involved the nostril rim.
Retention Suture Text: Retention sutures were placed to support the closure and prevent dehiscence.
Epidermal Closure Graft Donor Site (Optional): simple interrupted
Graft Donor Site Bandage (Optional-Leave Blank If You Don't Want In Note): Steri-strips and a pressure bandage were applied to the donor site.
Detail Level: Detailed
Excision Depth: adipose tissue
Scalpel Size: 15 blade
Anesthesia Type: 1% lidocaine with 1:100,000 epinephrine and 408mcg clindamycin/ml and a 1:10 solution of 8.4% sodium bicarbonate
Additional Anesthesia Volume In Cc: 6
Hemostasis: Electrocautery
Estimated Blood Loss (Cc): minimal
Repair Anesthesia Method: local infiltration
Anesthesia Volume In Cc: 12.1
Deep Sutures: 2-0 Vicryl
Dermal Closure: buried vertical mattress
Epidermal Sutures: 5-0 Caprosyn
Epidermal Closure: running subcuticular
Wound Care: Bacitracin
Dressing: steri-strips
Suturegard Intro: Intraoperative tissue expansion was performed, utilizing the SUTUREGARD device, in order to reduce wound tension.
Suturegard Body: The suture ends were repeatedly re-tightened and re-clamped to achieve the desired tissue expansion.
Hemigard Intro: Due to skin fragility and wound tension, it was decided to use HEMIGARD adhesive retention suture devices to permit a linear closure. The skin was cleaned and dried for a 6cm distance away from the wound. Excessive hair, if present, was removed to allow for adhesion.
Hemigard Postcare Instructions: The HEMIGARD strips are to remain completely dry for at least 5-7 days.
Positioning (Leave Blank If You Do Not Want): The patient was placed in a comfortable position exposing the surgical site.
Complex Repair Preamble Text (Leave Blank If You Do Not Want): Extensive wide undermining was performed.
Intermediate Repair Preamble Text (Leave Blank If You Do Not Want): Undermining was performed with blunt dissection.
Fusiform Excision Additional Text (Leave Blank If You Do Not Want): The margin was drawn around the clinically apparent lesion.  A fusiform shape was then drawn on the skin incorporating the lesion and margins.  Incisions were then made along these lines to the appropriate tissue plane and the lesion was extirpated.
Eliptical Excision Additional Text (Leave Blank If You Do Not Want): The margin was drawn around the clinically apparent lesion.  An elliptical shape was then drawn on the skin incorporating the lesion and margins.  Incisions were then made along these lines to the appropriate tissue plane and the lesion was extirpated.
Saucerization Excision Additional Text (Leave Blank If You Do Not Want): The margin was drawn around the clinically apparent lesion.  Incisions were then made along these lines, in a tangential fashion, to the appropriate tissue plane and the lesion was extirpated.
Slit Excision Additional Text (Leave Blank If You Do Not Want): A linear line was drawn on the skin overlying the lesion. An incision was made slowly until the lesion was visualized.  Once visualized, the lesion was removed with blunt dissection.
Excisional Biopsy Additional Text (Leave Blank If You Do Not Want): The margin was drawn around the clinically apparent lesion. An elliptical shape was then drawn on the skin incorporating the lesion and margins.  Incisions were then made along these lines to the appropriate tissue plane and the lesion was extirpated.
Perilesional Excision Additional Text (Leave Blank If You Do Not Want): The margin was drawn around the clinically apparent lesion. Incisions were then made along these lines to the appropriate tissue plane and the lesion was extirpated.
Repair Performed By Another Provider Text (Leave Blank If You Do Not Want): After the tissue was excised the defect was repaired by another provider.
No Repair - Repaired With Adjacent Surgical Defect Text (Leave Blank If You Do Not Want): After the excision the defect was repaired concurrently with another surgical defect which was in close approximation.
Adjacent Tissue Transfer Text: The defect edges were debeveled with a #15 scalpel blade. Given the location of the defect and the proximity to free margins an adjacent tissue transfer was deemed most appropriate. Using a sterile surgical marker, an appropriate flap was drawn incorporating the defect and placing the expected incisions within the relaxed skin tension lines where possible. The area thus outlined was incised deep to adipose tissue with a #15 scalpel blade. The skin margins were undermined to an appropriate distance in all directions utilizing iris scissors and carried over to close the primary defect.
Advancement Flap (Single) Text: The defect edges were debeveled with a #15 scalpel blade.  Given the location of the defect and the proximity to free margins a single advancement flap was deemed most appropriate.  Using a sterile surgical marker, an appropriate advancement flap was drawn incorporating the defect and placing the expected incisions within the relaxed skin tension lines where possible.    The area thus outlined was incised deep to adipose tissue with a #15 scalpel blade.  The skin margins were undermined to an appropriate distance in all directions utilizing iris scissors.
Advancement Flap (Double) Text: The defect edges were debeveled with a #15 scalpel blade.  Given the location of the defect and the proximity to free margins a double advancement flap was deemed most appropriate.  Using a sterile surgical marker, the appropriate advancement flaps were drawn incorporating the defect and placing the expected incisions within the relaxed skin tension lines where possible.    The area thus outlined was incised deep to adipose tissue with a #15 scalpel blade.  The skin margins were undermined to an appropriate distance in all directions utilizing iris scissors.
Burow's Advancement Flap Text: The defect edges were debeveled with a #15 scalpel blade.  Given the location of the defect and the proximity to free margins a Burow's advancement flap was deemed most appropriate.  Using a sterile surgical marker, the appropriate advancement flap was drawn incorporating the defect and placing the expected incisions within the relaxed skin tension lines where possible.    The area thus outlined was incised deep to adipose tissue with a #15 scalpel blade.  The skin margins were undermined to an appropriate distance in all directions utilizing iris scissors.
Chonodrocutaneous Helical Advancement Flap Text: The defect edges were debeveled with a #15 scalpel blade.  Given the location of the defect and the proximity to free margins a chondrocutaneous helical advancement flap was deemed most appropriate.  Using a sterile surgical marker, the appropriate advancement flap was drawn incorporating the defect and placing the expected incisions within the relaxed skin tension lines where possible.    The area thus outlined was incised deep to adipose tissue with a #15 scalpel blade.  The skin margins were undermined to an appropriate distance in all directions utilizing iris scissors.
Crescentic Advancement Flap Text: The defect edges were debeveled with a #15 scalpel blade.  Given the location of the defect and the proximity to free margins a crescentic advancement flap was deemed most appropriate.  Using a sterile surgical marker, the appropriate advancement flap was drawn incorporating the defect and placing the expected incisions within the relaxed skin tension lines where possible.    The area thus outlined was incised deep to adipose tissue with a #15 scalpel blade.  The skin margins were undermined to an appropriate distance in all directions utilizing iris scissors.
A-T Advancement Flap Text: The defect edges were debeveled with a #15 scalpel blade.  Given the location of the defect, shape of the defect and the proximity to free margins an A-T advancement flap was deemed most appropriate.  Using a sterile surgical marker, an appropriate advancement flap was drawn incorporating the defect and placing the expected incisions within the relaxed skin tension lines where possible.    The area thus outlined was incised deep to adipose tissue with a #15 scalpel blade.  The skin margins were undermined to an appropriate distance in all directions utilizing iris scissors.
O-T Advancement Flap Text: The defect edges were debeveled with a #15 scalpel blade.  Given the location of the defect, shape of the defect and the proximity to free margins an O-T advancement flap was deemed most appropriate.  Using a sterile surgical marker, an appropriate advancement flap was drawn incorporating the defect and placing the expected incisions within the relaxed skin tension lines where possible.    The area thus outlined was incised deep to adipose tissue with a #15 scalpel blade.  The skin margins were undermined to an appropriate distance in all directions utilizing iris scissors.
O-L Flap Text: The defect edges were debeveled with a #15 scalpel blade.  Given the location of the defect, shape of the defect and the proximity to free margins an O-L flap was deemed most appropriate.  Using a sterile surgical marker, an appropriate advancement flap was drawn incorporating the defect and placing the expected incisions within the relaxed skin tension lines where possible.    The area thus outlined was incised deep to adipose tissue with a #15 scalpel blade.  The skin margins were undermined to an appropriate distance in all directions utilizing iris scissors.
O-Z Flap Text: The defect edges were debeveled with a #15 scalpel blade.  Given the location of the defect, shape of the defect and the proximity to free margins an O-Z flap was deemed most appropriate.  Using a sterile surgical marker, an appropriate transposition flap was drawn incorporating the defect and placing the expected incisions within the relaxed skin tension lines where possible. The area thus outlined was incised deep to adipose tissue with a #15 scalpel blade.  The skin margins were undermined to an appropriate distance in all directions utilizing iris scissors.
Double O-Z Flap Text: The defect edges were debeveled with a #15 scalpel blade.  Given the location of the defect, shape of the defect and the proximity to free margins a Double O-Z flap was deemed most appropriate.  Using a sterile surgical marker, an appropriate transposition flap was drawn incorporating the defect and placing the expected incisions within the relaxed skin tension lines where possible. The area thus outlined was incised deep to adipose tissue with a #15 scalpel blade.  The skin margins were undermined to an appropriate distance in all directions utilizing iris scissors.
V-Y Flap Text: The defect edges were debeveled with a #15 scalpel blade.  Given the location of the defect, shape of the defect and the proximity to free margins a V-Y flap was deemed most appropriate.  Using a sterile surgical marker, an appropriate advancement flap was drawn incorporating the defect and placing the expected incisions within the relaxed skin tension lines where possible.    The area thus outlined was incised deep to adipose tissue with a #15 scalpel blade.  The skin margins were undermined to an appropriate distance in all directions utilizing iris scissors.
Advancement-Rotation Flap Text: The defect edges were debeveled with a #15 scalpel blade. Given the location of the defect, shape of the defect and the proximity to free margins an advancement-rotation flap was deemed most appropriate. Using a sterile surgical marker, an appropriate flap was drawn incorporating the defect and placing the expected incisions within the relaxed skin tension lines where possible. The area thus outlined was incised deep to adipose tissue with a #15 scalpel blade. The skin margins were undermined to an appropriate distance in all directions utilizing iris scissors. Following this, the designed flap was carried over into the primary defect and sutured into place.
Mercedes Flap Text: The defect edges were debeveled with a #15 scalpel blade.  Given the location of the defect, shape of the defect and the proximity to free margins a Mercedes flap was deemed most appropriate.  Using a sterile surgical marker, an appropriate advancement flap was drawn incorporating the defect and placing the expected incisions within the relaxed skin tension lines where possible. The area thus outlined was incised deep to adipose tissue with a #15 scalpel blade.  The skin margins were undermined to an appropriate distance in all directions utilizing iris scissors.
Modified Advancement Flap Text: The defect edges were debeveled with a #15 scalpel blade.  Given the location of the defect, shape of the defect and the proximity to free margins a modified advancement flap was deemed most appropriate.  Using a sterile surgical marker, an appropriate advancement flap was drawn incorporating the defect and placing the expected incisions within the relaxed skin tension lines where possible.    The area thus outlined was incised deep to adipose tissue with a #15 scalpel blade.  The skin margins were undermined to an appropriate distance in all directions utilizing iris scissors.
Mucosal Advancement Flap Text: Given the location of the defect, shape of the defect and the proximity to free margins a mucosal advancement flap was deemed most appropriate. Incisions were made with a 15 blade scalpel in the appropriate fashion along the cutaneous vermillion border and the mucosal lip. The remaining actinically damaged mucosal tissue was excised.  The mucosal advancement flap was then elevated to the gingival sulcus with care taken to preserve the neurovascular structures and advanced into the primary defect. Care was taken to ensure that precise realignment of the vermilion border was achieved.
Peng Advancement Flap Text: The defect edges were debeveled with a #15 scalpel blade. Given the location of the defect, shape of the defect and the proximity to free margins a Peng advancement flap was deemed most appropriate. Using a sterile surgical marker, an appropriate advancement flap was drawn incorporating the defect and placing the expected incisions within the relaxed skin tension lines where possible. The area thus outlined was incised deep to adipose tissue with a #15 scalpel blade. The skin margins were undermined to an appropriate distance in all directions utilizing iris scissors. Following this, the designed flap was advanced and carried over into the primary defect and sutured into place.
Hatchet Flap Text: The defect edges were debeveled with a #15 scalpel blade.  Given the location of the defect, shape of the defect and the proximity to free margins a hatchet flap was deemed most appropriate.  Using a sterile surgical marker, an appropriate hatchet flap was drawn incorporating the defect and placing the expected incisions within the relaxed skin tension lines where possible.    The area thus outlined was incised deep to adipose tissue with a #15 scalpel blade.  The skin margins were undermined to an appropriate distance in all directions utilizing iris scissors.
Rotation Flap Text: The defect edges were debeveled with a #15 scalpel blade.  Given the location of the defect, shape of the defect and the proximity to free margins a rotation flap was deemed most appropriate.  Using a sterile surgical marker, an appropriate rotation flap was drawn incorporating the defect and placing the expected incisions within the relaxed skin tension lines where possible.    The area thus outlined was incised deep to adipose tissue with a #15 scalpel blade.  The skin margins were undermined to an appropriate distance in all directions utilizing iris scissors.
Bilateral Rotation Flap Text: The defect edges were debeveled with a #15 scalpel blade. Given the location of the defect, shape of the defect and the proximity to free margins a bilateral rotation flap was deemed most appropriate. Using a sterile surgical marker, an appropriate rotation flap was drawn incorporating the defect and placing the expected incisions within the relaxed skin tension lines where possible. The area thus outlined was incised deep to adipose tissue with a #15 scalpel blade. The skin margins were undermined to an appropriate distance in all directions utilizing iris scissors. Following this, the designed flap was carried over into the primary defect and sutured into place.
Spiral Flap Text: The defect edges were debeveled with a #15 scalpel blade.  Given the location of the defect, shape of the defect and the proximity to free margins a spiral flap was deemed most appropriate.  Using a sterile surgical marker, an appropriate rotation flap was drawn incorporating the defect and placing the expected incisions within the relaxed skin tension lines where possible. The area thus outlined was incised deep to adipose tissue with a #15 scalpel blade.  The skin margins were undermined to an appropriate distance in all directions utilizing iris scissors.
Staged Advancement Flap Text: The defect edges were debeveled with a #15 scalpel blade. Given the location of the defect, shape of the defect and the proximity to free margins a staged advancement flap was deemed most appropriate. Using a sterile surgical marker, an appropriate advancement flap was drawn incorporating the defect and placing the expected incisions within the relaxed skin tension lines where possible. The area thus outlined was incised deep to adipose tissue with a #15 scalpel blade. The skin margins were undermined to an appropriate distance in all directions utilizing iris scissors. Following this, the designed flap was carried over into the primary defect and sutured into place.
Star Wedge Flap Text: The defect edges were debeveled with a #15 scalpel blade.  Given the location of the defect, shape of the defect and the proximity to free margins a star wedge flap was deemed most appropriate.  Using a sterile surgical marker, an appropriate rotation flap was drawn incorporating the defect and placing the expected incisions within the relaxed skin tension lines where possible. The area thus outlined was incised deep to adipose tissue with a #15 scalpel blade.  The skin margins were undermined to an appropriate distance in all directions utilizing iris scissors.
Transposition Flap Text: The defect edges were debeveled with a #15 scalpel blade.  Given the location of the defect and the proximity to free margins a transposition flap was deemed most appropriate.  Using a sterile surgical marker, an appropriate transposition flap was drawn incorporating the defect.    The area thus outlined was incised deep to adipose tissue with a #15 scalpel blade.  The skin margins were undermined to an appropriate distance in all directions utilizing iris scissors.
Muscle Hinge Flap Text: The defect edges were debeveled with a #15 scalpel blade.  Given the size, depth and location of the defect and the proximity to free margins a muscle hinge flap was deemed most appropriate.  Using a sterile surgical marker, an appropriate hinge flap was drawn incorporating the defect. The area thus outlined was incised with a #15 scalpel blade.  The skin margins were undermined to an appropriate distance in all directions utilizing iris scissors.
Mustarde Flap Text: The defect edges were debeveled with a #15 scalpel blade.  Given the size, depth and location of the defect and the proximity to free margins a Mustarde flap was deemed most appropriate. Using a sterile surgical marker, an appropriate flap was drawn incorporating the defect. The area thus outlined was incised with a #15 scalpel blade. The skin margins were undermined to an appropriate distance in all directions utilizing iris scissors. Following this, the designed flap was carried into the primary defect and sutured into place.
Nasal Turnover Hinge Flap Text: The defect edges were debeveled with a #15 scalpel blade.  Given the size, depth, location of the defect and the defect being full thickness a nasal turnover hinge flap was deemed most appropriate.  Using a sterile surgical marker, an appropriate hinge flap was drawn incorporating the defect. The area thus outlined was incised with a #15 scalpel blade. The flap was designed to recreate the nasal mucosal lining and the alar rim. The skin margins were undermined to an appropriate distance in all directions utilizing iris scissors.
Nasalis-Muscle-Based Myocutaneous Island Pedicle Flap Text: Using a #15 blade, an incision was made around the donor flap to the level of the nasalis muscle. Wide lateral undermining was then performed in both the subcutaneous plane above the nasalis muscle, and in a submuscular plane just above periosteum. This allowed the formation of a free nasalis muscle axial pedicle (based on the angular artery) which was still attached to the actual cutaneous flap, increasing its mobility and vascular viability. Hemostasis was obtained with pinpoint electrocoagulation. The flap was mobilized into position and the pivotal anchor points positioned and stabilized with buried interrupted sutures. Subcutaneous and dermal tissues were closed in a multilayered fashion with sutures. Tissue redundancies were excised, and the epidermal edges were apposed without significant tension and sutured with sutures.
Orbicularis Oris Muscle Flap Text: The defect edges were debeveled with a #15 scalpel blade.  Given that the defect affected the competency of the oral sphincter an obicularis oris muscle flap was deemed most appropriate to restore this competency and normal muscle function.  Using a sterile surgical marker, an appropriate flap was drawn incorporating the defect. The area thus outlined was incised with a #15 scalpel blade.
Melolabial Transposition Flap Text: The defect edges were debeveled with a #15 scalpel blade.  Given the location of the defect and the proximity to free margins a melolabial flap was deemed most appropriate.  Using a sterile surgical marker, an appropriate melolabial transposition flap was drawn incorporating the defect.    The area thus outlined was incised deep to adipose tissue with a #15 scalpel blade.  The skin margins were undermined to an appropriate distance in all directions utilizing iris scissors.
Rhombic Flap Text: The defect edges were debeveled with a #15 scalpel blade.  Given the location of the defect and the proximity to free margins a rhombic flap was deemed most appropriate.  Using a sterile surgical marker, an appropriate rhombic flap was drawn incorporating the defect.    The area thus outlined was incised deep to adipose tissue with a #15 scalpel blade.  The skin margins were undermined to an appropriate distance in all directions utilizing iris scissors.
Rhomboid Transposition Flap Text: The defect edges were debeveled with a #15 scalpel blade.  Given the location of the defect and the proximity to free margins a rhomboid transposition flap was deemed most appropriate.  Using a sterile surgical marker, an appropriate rhomboid flap was drawn incorporating the defect.    The area thus outlined was incised deep to adipose tissue with a #15 scalpel blade.  The skin margins were undermined to an appropriate distance in all directions utilizing iris scissors.
Bi-Rhombic Flap Text: The defect edges were debeveled with a #15 scalpel blade.  Given the location of the defect and the proximity to free margins a bi-rhombic flap was deemed most appropriate.  Using a sterile surgical marker, an appropriate rhombic flap was drawn incorporating the defect. The area thus outlined was incised deep to adipose tissue with a #15 scalpel blade.  The skin margins were undermined to an appropriate distance in all directions utilizing iris scissors.
Helical Rim Advancement Flap Text: The defect edges were debeveled with a #15 blade scalpel.  Given the location of the defect and the proximity to free margins (helical rim) a double helical rim advancement flap was deemed most appropriate.  Using a sterile surgical marker, the appropriate advancement flaps were drawn incorporating the defect and placing the expected incisions between the helical rim and antihelix where possible.  The area thus outlined was incised through and through with a #15 scalpel blade.  With a skin hook and iris scissors, the flaps were gently and sharply undermined and freed up.
Bilateral Helical Rim Advancement Flap Text: The defect edges were debeveled with a #15 blade scalpel.  Given the location of the defect and the proximity to free margins (helical rim) a bilateral helical rim advancement flap was deemed most appropriate.  Using a sterile surgical marker, the appropriate advancement flaps were drawn incorporating the defect and placing the expected incisions between the helical rim and antihelix where possible.  The area thus outlined was incised through and through with a #15 scalpel blade.  With a skin hook and iris scissors, the flaps were gently and sharply undermined and freed up.
Ear Star Wedge Flap Text: The defect edges were debeveled with a #15 blade scalpel.  Given the location of the defect and the proximity to free margins (helical rim) an ear star wedge flap was deemed most appropriate.  Using a sterile surgical marker, the appropriate flap was drawn incorporating the defect and placing the expected incisions between the helical rim and antihelix where possible.  The area thus outlined was incised through and through with a #15 scalpel blade.
Banner Transposition Flap Text: The defect edges were debeveled with a #15 scalpel blade.  Given the location of the defect and the proximity to free margins a Banner transposition flap was deemed most appropriate.  Using a sterile surgical marker, an appropriate flap drawn around the defect. The area thus outlined was incised deep to adipose tissue with a #15 scalpel blade.  The skin margins were undermined to an appropriate distance in all directions utilizing iris scissors.
Bilobed Flap Text: The defect edges were debeveled with a #15 scalpel blade.  Given the location of the defect and the proximity to free margins a bilobe flap was deemed most appropriate.  Using a sterile surgical marker, an appropriate bilobe flap drawn around the defect.    The area thus outlined was incised deep to adipose tissue with a #15 scalpel blade.  The skin margins were undermined to an appropriate distance in all directions utilizing iris scissors.
Bilobed Transposition Flap Text: The defect edges were debeveled with a #15 scalpel blade.  Given the location of the defect and the proximity to free margins a bilobed transposition flap was deemed most appropriate.  Using a sterile surgical marker, an appropriate bilobe flap drawn around the defect.    The area thus outlined was incised deep to adipose tissue with a #15 scalpel blade.  The skin margins were undermined to an appropriate distance in all directions utilizing iris scissors.
Trilobed Flap Text: The defect edges were debeveled with a #15 scalpel blade.  Given the location of the defect and the proximity to free margins a trilobed flap was deemed most appropriate.  Using a sterile surgical marker, an appropriate trilobed flap drawn around the defect.    The area thus outlined was incised deep to adipose tissue with a #15 scalpel blade.  The skin margins were undermined to an appropriate distance in all directions utilizing iris scissors.
Dorsal Nasal Flap Text: The defect edges were debeveled with a #15 scalpel blade.  Given the location of the defect and the proximity to free margins a dorsal nasal flap was deemed most appropriate.  Using a sterile surgical marker, an appropriate dorsal nasal flap was drawn around the defect.    The area thus outlined was incised deep to adipose tissue with a #15 scalpel blade.  The skin margins were undermined to an appropriate distance in all directions utilizing iris scissors.
Island Pedicle Flap Text: The defect edges were debeveled with a #15 scalpel blade.  Given the location of the defect, shape of the defect and the proximity to free margins an island pedicle advancement flap was deemed most appropriate.  Using a sterile surgical marker, an appropriate advancement flap was drawn incorporating the defect, outlining the appropriate donor tissue and placing the expected incisions within the relaxed skin tension lines where possible.    The area thus outlined was incised deep to adipose tissue with a #15 scalpel blade.  The skin margins were undermined to an appropriate distance in all directions around the primary defect and laterally outward around the island pedicle utilizing iris scissors.  There was minimal undermining beneath the pedicle flap.
Island Pedicle Flap With Canthal Suspension Text: The defect edges were debeveled with a #15 scalpel blade.  Given the location of the defect, shape of the defect and the proximity to free margins an island pedicle advancement flap was deemed most appropriate.  Using a sterile surgical marker, an appropriate advancement flap was drawn incorporating the defect, outlining the appropriate donor tissue and placing the expected incisions within the relaxed skin tension lines where possible. The area thus outlined was incised deep to adipose tissue with a #15 scalpel blade.  The skin margins were undermined to an appropriate distance in all directions around the primary defect and laterally outward around the island pedicle utilizing iris scissors.  There was minimal undermining beneath the pedicle flap. A suspension suture was placed in the canthal tendon to prevent tension and prevent ectropion.
Alar Island Pedicle Flap Text: The defect edges were debeveled with a #15 scalpel blade.  Given the location of the defect, shape of the defect and the proximity to the alar rim an island pedicle advancement flap was deemed most appropriate.  Using a sterile surgical marker, an appropriate advancement flap was drawn incorporating the defect, outlining the appropriate donor tissue and placing the expected incisions within the nasal ala running parallel to the alar rim. The area thus outlined was incised with a #15 scalpel blade.  The skin margins were undermined minimally to an appropriate distance in all directions around the primary defect and laterally outward around the island pedicle utilizing iris scissors.  There was minimal undermining beneath the pedicle flap.
Double Island Pedicle Flap Text: The defect edges were debeveled with a #15 scalpel blade.  Given the location of the defect, shape of the defect and the proximity to free margins a double island pedicle advancement flap was deemed most appropriate.  Using a sterile surgical marker, an appropriate advancement flap was drawn incorporating the defect, outlining the appropriate donor tissue and placing the expected incisions within the relaxed skin tension lines where possible.    The area thus outlined was incised deep to adipose tissue with a #15 scalpel blade.  The skin margins were undermined to an appropriate distance in all directions around the primary defect and laterally outward around the island pedicle utilizing iris scissors.  There was minimal undermining beneath the pedicle flap.
Island Pedicle Flap-Requiring Vessel Identification Text: The defect edges were debeveled with a #15 scalpel blade.  Given the location of the defect, shape of the defect and the proximity to free margins an island pedicle advancement flap was deemed most appropriate.  Using a sterile surgical marker, an appropriate advancement flap was drawn, based on the axial vessel mentioned above, incorporating the defect, outlining the appropriate donor tissue and placing the expected incisions within the relaxed skin tension lines where possible.    The area thus outlined was incised deep to adipose tissue with a #15 scalpel blade.  The skin margins were undermined to an appropriate distance in all directions around the primary defect and laterally outward around the island pedicle utilizing iris scissors.  There was minimal undermining beneath the pedicle flap.
Keystone Flap Text: The defect edges were debeveled with a #15 scalpel blade.  Given the location of the defect, shape of the defect a keystone flap was deemed most appropriate.  Using a sterile surgical marker, an appropriate keystone flap was drawn incorporating the defect, outlining the appropriate donor tissue and placing the expected incisions within the relaxed skin tension lines where possible. The area thus outlined was incised deep to adipose tissue with a #15 scalpel blade.  The skin margins were undermined to an appropriate distance in all directions around the primary defect and laterally outward around the flap utilizing iris scissors.
O-T Plasty Text: The defect edges were debeveled with a #15 scalpel blade.  Given the location of the defect, shape of the defect and the proximity to free margins an O-T plasty was deemed most appropriate.  Using a sterile surgical marker, an appropriate O-T plasty was drawn incorporating the defect and placing the expected incisions within the relaxed skin tension lines where possible.    The area thus outlined was incised deep to adipose tissue with a #15 scalpel blade.  The skin margins were undermined to an appropriate distance in all directions utilizing iris scissors.
O-Z Plasty Text: The defect edges were debeveled with a #15 scalpel blade.  Given the location of the defect, shape of the defect and the proximity to free margins an O-Z plasty (double transposition flap) was deemed most appropriate.  Using a sterile surgical marker, the appropriate transposition flaps were drawn incorporating the defect and placing the expected incisions within the relaxed skin tension lines where possible.    The area thus outlined was incised deep to adipose tissue with a #15 scalpel blade.  The skin margins were undermined to an appropriate distance in all directions utilizing iris scissors.  Hemostasis was achieved with electrocautery.  The flaps were then transposed into place, one clockwise and the other counterclockwise, and anchored with interrupted buried subcutaneous sutures.
Double O-Z Plasty Text: The defect edges were debeveled with a #15 scalpel blade.  Given the location of the defect, shape of the defect and the proximity to free margins a Double O-Z plasty (double transposition flap) was deemed most appropriate.  Using a sterile surgical marker, the appropriate transposition flaps were drawn incorporating the defect and placing the expected incisions within the relaxed skin tension lines where possible. The area thus outlined was incised deep to adipose tissue with a #15 scalpel blade.  The skin margins were undermined to an appropriate distance in all directions utilizing iris scissors.  Hemostasis was achieved with electrocautery.  The flaps were then transposed into place, one clockwise and the other counterclockwise, and anchored with interrupted buried subcutaneous sutures.
V-Y Plasty Text: The defect edges were debeveled with a #15 scalpel blade.  Given the location of the defect, shape of the defect and the proximity to free margins an V-Y advancement flap was deemed most appropriate.  Using a sterile surgical marker, an appropriate advancement flap was drawn incorporating the defect and placing the expected incisions within the relaxed skin tension lines where possible.    The area thus outlined was incised deep to adipose tissue with a #15 scalpel blade.  The skin margins were undermined to an appropriate distance in all directions utilizing iris scissors.
H Plasty Text: Given the location of the defect, shape of the defect and the proximity to free margins a H-plasty was deemed most appropriate for repair.  Using a sterile surgical marker, the appropriate advancement arms of the H-plasty were drawn incorporating the defect and placing the expected incisions within the relaxed skin tension lines where possible. The area thus outlined was incised deep to adipose tissue with a #15 scalpel blade. The skin margins were undermined to an appropriate distance in all directions utilizing iris scissors.  The opposing advancement arms were then advanced into place in opposite direction and anchored with interrupted buried subcutaneous sutures.
W Plasty Text: The lesion was extirpated to the level of the fat with a #15 scalpel blade.  Given the location of the defect, shape of the defect and the proximity to free margins a W-plasty was deemed most appropriate for repair.  Using a sterile surgical marker, the appropriate transposition arms of the W-plasty were drawn incorporating the defect and placing the expected incisions within the relaxed skin tension lines where possible.    The area thus outlined was incised deep to adipose tissue with a #15 scalpel blade.  The skin margins were undermined to an appropriate distance in all directions utilizing iris scissors.  The opposing transposition arms were then transposed into place in opposite direction and anchored with interrupted buried subcutaneous sutures.
Z Plasty Text: The lesion was extirpated to the level of the fat with a #15 scalpel blade.  Given the location of the defect, shape of the defect and the proximity to free margins a Z-plasty was deemed most appropriate for repair.  Using a sterile surgical marker, the appropriate transposition arms of the Z-plasty were drawn incorporating the defect and placing the expected incisions within the relaxed skin tension lines where possible.    The area thus outlined was incised deep to adipose tissue with a #15 scalpel blade.  The skin margins were undermined to an appropriate distance in all directions utilizing iris scissors.  The opposing transposition arms were then transposed into place in opposite direction and anchored with interrupted buried subcutaneous sutures.
Double Z Plasty Text: The lesion was extirpated to the level of the fat with a #15 scalpel blade. Given the location of the defect, shape of the defect and the proximity to free margins a double Z-plasty was deemed most appropriate for repair. Using a sterile surgical marker, the appropriate transposition arms of the double Z-plasty were drawn incorporating the defect and placing the expected incisions within the relaxed skin tension lines where possible. The area thus outlined was incised deep to adipose tissue with a #15 scalpel blade. The skin margins were undermined to an appropriate distance in all directions utilizing iris scissors. The opposing transposition arms were then transposed and carried over into place in opposite direction and anchored with interrupted buried subcutaneous sutures.
Zygomaticofacial Flap Text: Given the location of the defect, shape of the defect and the proximity to free margins a zygomaticofacial flap was deemed most appropriate for repair.  Using a sterile surgical marker, the appropriate flap was drawn incorporating the defect and placing the expected incisions within the relaxed skin tension lines where possible. The area thus outlined was incised deep to adipose tissue with a #15 scalpel blade with preservation of a vascular pedicle.  The skin margins were undermined to an appropriate distance in all directions utilizing iris scissors.  The flap was then placed into the defect and anchored with interrupted buried subcutaneous sutures.
Cheek Interpolation Flap Text: A decision was made to reconstruct the defect utilizing an interpolation axial flap and a staged reconstruction.  A telfa template was made of the defect.  This telfa template was then used to outline the Cheek Interpolation flap.  The donor area for the pedicle flap was then injected with anesthesia.  The flap was excised through the skin and subcutaneous tissue down to the layer of the underlying musculature.  The interpolation flap was carefully excised within this deep plane to maintain its blood supply.  The edges of the donor site were undermined.   The donor site was closed in a primary fashion.  The pedicle was then rotated into position and sutured.  Once the tube was sutured into place, adequate blood supply was confirmed with blanching and refill.  The pedicle was then wrapped with xeroform gauze and dressed appropriately with a telfa and gauze bandage to ensure continued blood supply and protect the attached pedicle.
Cheek-To-Nose Interpolation Flap Text: A decision was made to reconstruct the defect utilizing an interpolation axial flap and a staged reconstruction.  A telfa template was made of the defect.  This telfa template was then used to outline the Cheek-To-Nose Interpolation flap.  The donor area for the pedicle flap was then injected with anesthesia.  The flap was excised through the skin and subcutaneous tissue down to the layer of the underlying musculature.  The interpolation flap was carefully excised within this deep plane to maintain its blood supply.  The edges of the donor site were undermined.   The donor site was closed in a primary fashion.  The pedicle was then rotated into position and sutured.  Once the tube was sutured into place, adequate blood supply was confirmed with blanching and refill.  The pedicle was then wrapped with xeroform gauze and dressed appropriately with a telfa and gauze bandage to ensure continued blood supply and protect the attached pedicle.
Interpolation Flap Text: A decision was made to reconstruct the defect utilizing an interpolation axial flap and a staged reconstruction.  A telfa template was made of the defect.  This telfa template was then used to outline the interpolation flap.  The donor area for the pedicle flap was then injected with anesthesia.  The flap was excised through the skin and subcutaneous tissue down to the layer of the underlying musculature.  The interpolation flap was carefully excised within this deep plane to maintain its blood supply.  The edges of the donor site were undermined.   The donor site was closed in a primary fashion.  The pedicle was then rotated into position and sutured.  Once the tube was sutured into place, adequate blood supply was confirmed with blanching and refill.  The pedicle was then wrapped with xeroform gauze and dressed appropriately with a telfa and gauze bandage to ensure continued blood supply and protect the attached pedicle.
Melolabial Interpolation Flap Text: A decision was made to reconstruct the defect utilizing an interpolation axial flap and a staged reconstruction.  A telfa template was made of the defect.  This telfa template was then used to outline the melolabial interpolation flap.  The donor area for the pedicle flap was then injected with anesthesia.  The flap was excised through the skin and subcutaneous tissue down to the layer of the underlying musculature.  The pedicle flap was carefully excised within this deep plane to maintain its blood supply.  The edges of the donor site were undermined.   The donor site was closed in a primary fashion.  The pedicle was then rotated into position and sutured.  Once the tube was sutured into place, adequate blood supply was confirmed with blanching and refill.  The pedicle was then wrapped with xeroform gauze and dressed appropriately with a telfa and gauze bandage to ensure continued blood supply and protect the attached pedicle.
Mastoid Interpolation Flap Text: A decision was made to reconstruct the defect utilizing an interpolation axial flap and a staged reconstruction.  A telfa template was made of the defect.  This telfa template was then used to outline the mastoid interpolation flap.  The donor area for the pedicle flap was then injected with anesthesia.  The flap was excised through the skin and subcutaneous tissue down to the layer of the underlying musculature.  The pedicle flap was carefully excised within this deep plane to maintain its blood supply.  The edges of the donor site were undermined.   The donor site was closed in a primary fashion.  The pedicle was then rotated into position and sutured.  Once the tube was sutured into place, adequate blood supply was confirmed with blanching and refill.  The pedicle was then wrapped with xeroform gauze and dressed appropriately with a telfa and gauze bandage to ensure continued blood supply and protect the attached pedicle.
Posterior Auricular Interpolation Flap Text: A decision was made to reconstruct the defect utilizing an interpolation axial flap and a staged reconstruction.  A telfa template was made of the defect.  This telfa template was then used to outline the posterior auricular interpolation flap.  The donor area for the pedicle flap was then injected with anesthesia.  The flap was excised through the skin and subcutaneous tissue down to the layer of the underlying musculature.  The pedicle flap was carefully excised within this deep plane to maintain its blood supply.  The edges of the donor site were undermined.   The donor site was closed in a primary fashion.  The pedicle was then rotated into position and sutured.  Once the tube was sutured into place, adequate blood supply was confirmed with blanching and refill.  The pedicle was then wrapped with xeroform gauze and dressed appropriately with a telfa and gauze bandage to ensure continued blood supply and protect the attached pedicle.
Paramedian Forehead Flap Text: A decision was made to reconstruct the defect utilizing an interpolation axial flap and a staged reconstruction.  A telfa template was made of the defect.  This telfa template was then used to outline the paramedian forehead pedicle flap.  The donor area for the pedicle flap was then injected with anesthesia.  The flap was excised through the skin and subcutaneous tissue down to the layer of the underlying musculature.  The pedicle flap was carefully excised within this deep plane to maintain its blood supply.  The edges of the donor site were undermined.   The donor site was closed in a primary fashion.  The pedicle was then rotated into position and sutured.  Once the tube was sutured into place, adequate blood supply was confirmed with blanching and refill.  The pedicle was then wrapped with xeroform gauze and dressed appropriately with a telfa and gauze bandage to ensure continued blood supply and protect the attached pedicle.
Abbe Flap (Upper To Lower Lip) Text: The defect of the lower lip was assessed and measured.  Given the location and size of the defect, an Abbe flap was deemed most appropriate. Using a sterile surgical marker, an appropriate Abbe flap was measured and drawn on the upper lip. Local anesthesia was then infiltrated.  A scalpel was then used to incise the upper lip through and through the skin, vermilion, muscle and mucosa, leaving the flap pedicled on the opposite side.  The flap was then rotated and transferred to the lower lip defect.  The flap was then sutured into place with a three layer technique, closing the orbicularis oris muscle layer with subcutaneous buried sutures, followed by a mucosal layer and an epidermal layer.
Abbe Flap (Lower To Upper Lip) Text: The defect of the upper lip was assessed and measured.  Given the location and size of the defect, an Abbe flap was deemed most appropriate. Using a sterile surgical marker, an appropriate Abbe flap was measured and drawn on the lower lip. Local anesthesia was then infiltrated. A scalpel was then used to incise the upper lip through and through the skin, vermilion, muscle and mucosa, leaving the flap pedicled on the opposite side.  The flap was then rotated and transferred to the lower lip defect.  The flap was then sutured into place with a three layer technique, closing the orbicularis oris muscle layer with subcutaneous buried sutures, followed by a mucosal layer and an epidermal layer.
Estlander Flap (Upper To Lower Lip) Text: The defect of the lower lip was assessed and measured.  Given the location and size of the defect, an Estlander flap was deemed most appropriate. Using a sterile surgical marker, an appropriate Estlander flap was measured and drawn on the upper lip. Local anesthesia was then infiltrated. A scalpel was then used to incise the lateral aspect of the flap, through skin, muscle and mucosa, leaving the flap pedicled medially.  The flap was then rotated and positioned to fill the lower lip defect.  The flap was then sutured into place with a three layer technique, closing the orbicularis oris muscle layer with subcutaneous buried sutures, followed by a mucosal layer and an epidermal layer.
Lip Wedge Excision Repair Text: Given the location of the defect and the proximity to free margins a full thickness wedge repair was deemed most appropriate.  Using a sterile surgical marker, the appropriate repair was drawn incorporating the defect and placing the expected incisions perpendicular to the vermilion border.  The vermilion border was also meticulously outlined to ensure appropriate reapproximation during the repair.  The area thus outlined was incised through and through with a #15 scalpel blade.  The muscularis and dermis were reaproximated with deep sutures following hemostasis. Care was taken to realign the vermilion border before proceeding with the superficial closure.  Once the vermilion was realigned the superfical and mucosal closure was finished.
Ftsg Text: The defect edges were debeveled with a #15 scalpel blade.  Given the location of the defect, shape of the defect and the proximity to free margins a full thickness skin graft was deemed most appropriate.  Using a sterile surgical marker, the primary defect shape was transferred to the donor site. The area thus outlined was incised deep to adipose tissue with a #15 scalpel blade.  The harvested graft was then trimmed of adipose tissue until only dermis and epidermis was left.  The skin margins of the secondary defect were undermined to an appropriate distance in all directions utilizing iris scissors.  The secondary defect was closed with interrupted buried subcutaneous sutures.  The skin edges were then re-apposed with running  sutures.  The skin graft was then placed in the primary defect and oriented appropriately.
Split-Thickness Skin Graft Text: The defect edges were debeveled with a #15 scalpel blade.  Given the location of the defect, shape of the defect and the proximity to free margins a split thickness skin graft was deemed most appropriate.  Using a sterile surgical marker, the primary defect shape was transferred to the donor site. The split thickness graft was then harvested.  The skin graft was then placed in the primary defect and oriented appropriately.
Burow's Graft Text: The defect edges were debeveled with a #15 scalpel blade.  Given the location of the defect, shape of the defect, the proximity to free margins and the presence of a standing cone deformity a Burow's skin graft was deemed most appropriate. The standing cone was removed and this tissue was then trimmed to the shape of the primary defect. The adipose tissue was also removed until only dermis and epidermis were left.  The skin margins of the secondary defect were undermined to an appropriate distance in all directions utilizing iris scissors.  The secondary defect was closed with interrupted buried subcutaneous sutures.  The skin edges were then re-apposed with running  sutures.  The skin graft was then placed in the primary defect and oriented appropriately.
Cartilage Graft Text: The defect edges were debeveled with a #15 scalpel blade.  Given the location of the defect, shape of the defect, the fact the defect involved a full thickness cartilage defect a cartilage graft was deemed most appropriate.  An appropriate donor site was identified, cleansed, and anesthetized. The cartilage graft was then harvested and transferred to the recipient site, oriented appropriately and then sutured into place.  The secondary defect was then repaired using a primary closure.
Composite Graft Text: The defect edges were debeveled with a #15 scalpel blade.  Given the location of the defect, shape of the defect, the proximity to free margins and the fact the defect was full thickness a composite graft was deemed most appropriate.  The defect was outline and then transferred to the donor site.  A full thickness graft was then excised from the donor site. The graft was then placed in the primary defect, oriented appropriately and then sutured into place.  The secondary defect was then repaired using a primary closure.
Epidermal Autograft Text: The defect edges were debeveled with a #15 scalpel blade.  Given the location of the defect, shape of the defect and the proximity to free margins an epidermal autograft was deemed most appropriate.  Using a sterile surgical marker, the primary defect shape was transferred to the donor site. The epidermal graft was then harvested.  The skin graft was then placed in the primary defect and oriented appropriately.
Dermal Autograft Text: The defect edges were debeveled with a #15 scalpel blade.  Given the location of the defect, shape of the defect and the proximity to free margins a dermal autograft was deemed most appropriate.  Using a sterile surgical marker, the primary defect shape was transferred to the donor site. The area thus outlined was incised deep to adipose tissue with a #15 scalpel blade.  The harvested graft was then trimmed of adipose and epidermal tissue until only dermis was left.  The skin graft was then placed in the primary defect and oriented appropriately.
Skin Substitute Text: The defect edges were debeveled with a #15 scalpel blade.  Given the location of the defect, shape of the defect and the proximity to free margins a skin substitute graft was deemed most appropriate.  The graft material was trimmed to fit the size of the defect. The graft was then placed in the primary defect and oriented appropriately.
Tissue Cultured Epidermal Autograft Text: The defect edges were debeveled with a #15 scalpel blade.  Given the location of the defect, shape of the defect and the proximity to free margins a tissue cultured epidermal autograft was deemed most appropriate.  The graft was then trimmed to fit the size of the defect.  The graft was then placed in the primary defect and oriented appropriately.
Xenograft Text: The defect edges were debeveled with a #15 scalpel blade.  Given the location of the defect, shape of the defect and the proximity to free margins a xenograft was deemed most appropriate.  The graft was then trimmed to fit the size of the defect.  The graft was then placed in the primary defect and oriented appropriately.
Purse String (Intermediate) Text: Given the location of the defect and the characteristics of the surrounding skin a purse string intermediate closure was deemed most appropriate.  Undermining was performed circumferentially around the surgical defect.  A purse string suture was then placed and tightened.
Purse String (Simple) Text: Given the location of the defect and the characteristics of the surrounding skin a purse string simple closure was deemed most appropriate.  Undermining was performed circumferentially around the surgical defect.  A purse string suture was then placed and tightened.
Complex Repair And Single Advancement Flap Text: The defect edges were debeveled with a #15 scalpel blade.  The primary defect was closed partially with a complex linear closure.  Given the location of the remaining defect, shape of the defect and the proximity to free margins a single advancement flap was deemed most appropriate for complete closure of the defect.  Using a sterile surgical marker, an appropriate advancement flap was drawn incorporating the defect and placing the expected incisions within the relaxed skin tension lines where possible.    The area thus outlined was incised deep to adipose tissue with a #15 scalpel blade.  The skin margins were undermined to an appropriate distance in all directions utilizing iris scissors.
Complex Repair And Double Advancement Flap Text: The defect edges were debeveled with a #15 scalpel blade.  The primary defect was closed partially with a complex linear closure.  Given the location of the remaining defect, shape of the defect and the proximity to free margins a double advancement flap was deemed most appropriate for complete closure of the defect.  Using a sterile surgical marker, an appropriate advancement flap was drawn incorporating the defect and placing the expected incisions within the relaxed skin tension lines where possible.    The area thus outlined was incised deep to adipose tissue with a #15 scalpel blade.  The skin margins were undermined to an appropriate distance in all directions utilizing iris scissors.
Complex Repair And Modified Advancement Flap Text: The defect edges were debeveled with a #15 scalpel blade.  The primary defect was closed partially with a complex linear closure.  Given the location of the remaining defect, shape of the defect and the proximity to free margins a modified advancement flap was deemed most appropriate for complete closure of the defect.  Using a sterile surgical marker, an appropriate advancement flap was drawn incorporating the defect and placing the expected incisions within the relaxed skin tension lines where possible.    The area thus outlined was incised deep to adipose tissue with a #15 scalpel blade.  The skin margins were undermined to an appropriate distance in all directions utilizing iris scissors.
Complex Repair And A-T Advancement Flap Text: The defect edges were debeveled with a #15 scalpel blade.  The primary defect was closed partially with a complex linear closure.  Given the location of the remaining defect, shape of the defect and the proximity to free margins an A-T advancement flap was deemed most appropriate for complete closure of the defect.  Using a sterile surgical marker, an appropriate advancement flap was drawn incorporating the defect and placing the expected incisions within the relaxed skin tension lines where possible.    The area thus outlined was incised deep to adipose tissue with a #15 scalpel blade.  The skin margins were undermined to an appropriate distance in all directions utilizing iris scissors.
Complex Repair And O-T Advancement Flap Text: The defect edges were debeveled with a #15 scalpel blade.  The primary defect was closed partially with a complex linear closure.  Given the location of the remaining defect, shape of the defect and the proximity to free margins an O-T advancement flap was deemed most appropriate for complete closure of the defect.  Using a sterile surgical marker, an appropriate advancement flap was drawn incorporating the defect and placing the expected incisions within the relaxed skin tension lines where possible.    The area thus outlined was incised deep to adipose tissue with a #15 scalpel blade.  The skin margins were undermined to an appropriate distance in all directions utilizing iris scissors.
Complex Repair And O-L Flap Text: The defect edges were debeveled with a #15 scalpel blade.  The primary defect was closed partially with a complex linear closure.  Given the location of the remaining defect, shape of the defect and the proximity to free margins an O-L flap was deemed most appropriate for complete closure of the defect.  Using a sterile surgical marker, an appropriate flap was drawn incorporating the defect and placing the expected incisions within the relaxed skin tension lines where possible.    The area thus outlined was incised deep to adipose tissue with a #15 scalpel blade.  The skin margins were undermined to an appropriate distance in all directions utilizing iris scissors.
Complex Repair And Bilobe Flap Text: The defect edges were debeveled with a #15 scalpel blade.  The primary defect was closed partially with a complex linear closure.  Given the location of the remaining defect, shape of the defect and the proximity to free margins a bilobe flap was deemed most appropriate for complete closure of the defect.  Using a sterile surgical marker, an appropriate advancement flap was drawn incorporating the defect and placing the expected incisions within the relaxed skin tension lines where possible.    The area thus outlined was incised deep to adipose tissue with a #15 scalpel blade.  The skin margins were undermined to an appropriate distance in all directions utilizing iris scissors.
Complex Repair And Melolabial Flap Text: The defect edges were debeveled with a #15 scalpel blade.  The primary defect was closed partially with a complex linear closure.  Given the location of the remaining defect, shape of the defect and the proximity to free margins a melolabial flap was deemed most appropriate for complete closure of the defect.  Using a sterile surgical marker, an appropriate advancement flap was drawn incorporating the defect and placing the expected incisions within the relaxed skin tension lines where possible.    The area thus outlined was incised deep to adipose tissue with a #15 scalpel blade.  The skin margins were undermined to an appropriate distance in all directions utilizing iris scissors.
Complex Repair And Rotation Flap Text: The defect edges were debeveled with a #15 scalpel blade.  The primary defect was closed partially with a complex linear closure.  Given the location of the remaining defect, shape of the defect and the proximity to free margins a rotation flap was deemed most appropriate for complete closure of the defect.  Using a sterile surgical marker, an appropriate advancement flap was drawn incorporating the defect and placing the expected incisions within the relaxed skin tension lines where possible.    The area thus outlined was incised deep to adipose tissue with a #15 scalpel blade.  The skin margins were undermined to an appropriate distance in all directions utilizing iris scissors.
Complex Repair And Rhombic Flap Text: The defect edges were debeveled with a #15 scalpel blade.  The primary defect was closed partially with a complex linear closure.  Given the location of the remaining defect, shape of the defect and the proximity to free margins a rhombic flap was deemed most appropriate for complete closure of the defect.  Using a sterile surgical marker, an appropriate advancement flap was drawn incorporating the defect and placing the expected incisions within the relaxed skin tension lines where possible.    The area thus outlined was incised deep to adipose tissue with a #15 scalpel blade.  The skin margins were undermined to an appropriate distance in all directions utilizing iris scissors.
Complex Repair And Transposition Flap Text: The defect edges were debeveled with a #15 scalpel blade.  The primary defect was closed partially with a complex linear closure.  Given the location of the remaining defect, shape of the defect and the proximity to free margins a transposition flap was deemed most appropriate for complete closure of the defect.  Using a sterile surgical marker, an appropriate advancement flap was drawn incorporating the defect and placing the expected incisions within the relaxed skin tension lines where possible.    The area thus outlined was incised deep to adipose tissue with a #15 scalpel blade.  The skin margins were undermined to an appropriate distance in all directions utilizing iris scissors.
Complex Repair And V-Y Plasty Text: The defect edges were debeveled with a #15 scalpel blade.  The primary defect was closed partially with a complex linear closure.  Given the location of the remaining defect, shape of the defect and the proximity to free margins a V-Y plasty was deemed most appropriate for complete closure of the defect.  Using a sterile surgical marker, an appropriate advancement flap was drawn incorporating the defect and placing the expected incisions within the relaxed skin tension lines where possible.    The area thus outlined was incised deep to adipose tissue with a #15 scalpel blade.  The skin margins were undermined to an appropriate distance in all directions utilizing iris scissors.
Complex Repair And M Plasty Text: The defect edges were debeveled with a #15 scalpel blade.  The primary defect was closed partially with a complex linear closure.  Given the location of the remaining defect, shape of the defect and the proximity to free margins an M plasty was deemed most appropriate for complete closure of the defect.  Using a sterile surgical marker, an appropriate advancement flap was drawn incorporating the defect and placing the expected incisions within the relaxed skin tension lines where possible.    The area thus outlined was incised deep to adipose tissue with a #15 scalpel blade.  The skin margins were undermined to an appropriate distance in all directions utilizing iris scissors.
Complex Repair And Double M Plasty Text: The defect edges were debeveled with a #15 scalpel blade.  The primary defect was closed partially with a complex linear closure.  Given the location of the remaining defect, shape of the defect and the proximity to free margins a double M plasty was deemed most appropriate for complete closure of the defect.  Using a sterile surgical marker, an appropriate advancement flap was drawn incorporating the defect and placing the expected incisions within the relaxed skin tension lines where possible.    The area thus outlined was incised deep to adipose tissue with a #15 scalpel blade.  The skin margins were undermined to an appropriate distance in all directions utilizing iris scissors.
Complex Repair And W Plasty Text: The defect edges were debeveled with a #15 scalpel blade.  The primary defect was closed partially with a complex linear closure.  Given the location of the remaining defect, shape of the defect and the proximity to free margins a W plasty was deemed most appropriate for complete closure of the defect.  Using a sterile surgical marker, an appropriate advancement flap was drawn incorporating the defect and placing the expected incisions within the relaxed skin tension lines where possible.    The area thus outlined was incised deep to adipose tissue with a #15 scalpel blade.  The skin margins were undermined to an appropriate distance in all directions utilizing iris scissors.
Complex Repair And Z Plasty Text: The defect edges were debeveled with a #15 scalpel blade.  The primary defect was closed partially with a complex linear closure.  Given the location of the remaining defect, shape of the defect and the proximity to free margins a Z plasty was deemed most appropriate for complete closure of the defect.  Using a sterile surgical marker, an appropriate advancement flap was drawn incorporating the defect and placing the expected incisions within the relaxed skin tension lines where possible.    The area thus outlined was incised deep to adipose tissue with a #15 scalpel blade.  The skin margins were undermined to an appropriate distance in all directions utilizing iris scissors.
Complex Repair And Dorsal Nasal Flap Text: The defect edges were debeveled with a #15 scalpel blade.  The primary defect was closed partially with a complex linear closure.  Given the location of the remaining defect, shape of the defect and the proximity to free margins a dorsal nasal flap was deemed most appropriate for complete closure of the defect.  Using a sterile surgical marker, an appropriate flap was drawn incorporating the defect and placing the expected incisions within the relaxed skin tension lines where possible.    The area thus outlined was incised deep to adipose tissue with a #15 scalpel blade.  The skin margins were undermined to an appropriate distance in all directions utilizing iris scissors.
Complex Repair And Ftsg Text: The defect edges were debeveled with a #15 scalpel blade.  The primary defect was closed partially with a complex linear closure.  Given the location of the defect, shape of the defect and the proximity to free margins a full thickness skin graft was deemed most appropriate to repair the remaining defect.  The graft was trimmed to fit the size of the remaining defect.  The graft was then placed in the primary defect, oriented appropriately, and sutured into place.
Complex Repair And Burow's Graft Text: The defect edges were debeveled with a #15 scalpel blade.  The primary defect was closed partially with a complex linear closure.  Given the location of the defect, shape of the defect, the proximity to free margins and the presence of a standing cone deformity a Burow's graft was deemed most appropriate to repair the remaining defect.  The graft was trimmed to fit the size of the remaining defect.  The graft was then placed in the primary defect, oriented appropriately, and sutured into place.
Complex Repair And Split-Thickness Skin Graft Text: The defect edges were debeveled with a #15 scalpel blade.  The primary defect was closed partially with a complex linear closure.  Given the location of the defect, shape of the defect and the proximity to free margins a split thickness skin graft was deemed most appropriate to repair the remaining defect.  The graft was trimmed to fit the size of the remaining defect.  The graft was then placed in the primary defect, oriented appropriately, and sutured into place.
Complex Repair And Epidermal Autograft Text: The defect edges were debeveled with a #15 scalpel blade.  The primary defect was closed partially with a complex linear closure.  Given the location of the defect, shape of the defect and the proximity to free margins an epidermal autograft was deemed most appropriate to repair the remaining defect.  The graft was trimmed to fit the size of the remaining defect.  The graft was then placed in the primary defect, oriented appropriately, and sutured into place.
Complex Repair And Dermal Autograft Text: The defect edges were debeveled with a #15 scalpel blade.  The primary defect was closed partially with a complex linear closure.  Given the location of the defect, shape of the defect and the proximity to free margins an dermal autograft was deemed most appropriate to repair the remaining defect.  The graft was trimmed to fit the size of the remaining defect.  The graft was then placed in the primary defect, oriented appropriately, and sutured into place.
Complex Repair And Tissue Cultured Epidermal Autograft Text: The defect edges were debeveled with a #15 scalpel blade.  The primary defect was closed partially with a complex linear closure.  Given the location of the defect, shape of the defect and the proximity to free margins an tissue cultured epidermal autograft was deemed most appropriate to repair the remaining defect.  The graft was trimmed to fit the size of the remaining defect.  The graft was then placed in the primary defect, oriented appropriately, and sutured into place.
Complex Repair And Xenograft Text: The defect edges were debeveled with a #15 scalpel blade.  The primary defect was closed partially with a complex linear closure.  Given the location of the defect, shape of the defect and the proximity to free margins a xenograft was deemed most appropriate to repair the remaining defect.  The graft was trimmed to fit the size of the remaining defect.  The graft was then placed in the primary defect, oriented appropriately, and sutured into place.
Complex Repair And Skin Substitute Graft Text: The defect edges were debeveled with a #15 scalpel blade.  The primary defect was closed partially with a complex linear closure.  Given the location of the remaining defect, shape of the defect and the proximity to free margins a skin substitute graft was deemed most appropriate to repair the remaining defect.  The graft was trimmed to fit the size of the remaining defect.  The graft was then placed in the primary defect, oriented appropriately, and sutured into place.
Path Notes (To The Dermatopathologist): Please check margins.
Consent was obtained from the patient. The risks and benefits to therapy were discussed in detail. Specifically, the risks of infection, scarring, bleeding, prolonged wound healing, incomplete removal, allergy to anesthesia, nerve injury and recurrence were addressed. Prior to the procedure, the treatment site was clearly identified and confirmed by the patient. All components of Universal Protocol/PAUSE Rule completed.
Post-Care Instructions: I reviewed with the patient in detail post-care instructions:\\n1. Apply bacitracin over the steri-strips.  \\n2. Cut non-stick pad (Telfa) to cover the steri-strips\\n3. Apply tape (hypafix) over the non-stick pad\\n4. Change once per day for 5 days\\n5. Shower with bandage on, change bandage after shower\\n\\nPatient is not to engage in any heavy lifting, exercise, hot tub, or swimming for the next 14 days. Should the patient develop any fevers, chills, bleeding, severe pain patient will contact the office immediately.
Home Suture Removal Text: Patient was provided a home suture removal kit and will remove their sutures at home.  If they have any questions or difficulties they will call the office.
Where Do You Want The Question To Include Opioid Counseling Located?: Case Summary Tab
Billing Type: Third-Party Bill
Information: Selecting Yes will display possible errors in your note based on the variables you have selected. This validation is only offered as a suggestion for you. PLEASE NOTE THAT THE VALIDATION TEXT WILL BE REMOVED WHEN YOU FINALIZE YOUR NOTE. IF YOU WANT TO FAX A PRELIMINARY NOTE YOU WILL NEED TO TOGGLE THIS TO 'NO' IF YOU DO NOT WANT IT IN YOUR FAXED NOTE.

## 2023-07-25 ENCOUNTER — APPOINTMENT (RX ONLY)
Dept: URBAN - METROPOLITAN AREA CLINIC 20 | Facility: CLINIC | Age: 48
Setting detail: DERMATOLOGY
End: 2023-07-25

## 2023-07-25 DIAGNOSIS — L55.9 SUNBURN, UNSPECIFIED: ICD-10-CM | Status: INADEQUATELY CONTROLLED

## 2023-07-25 PROCEDURE — ? PRESCRIPTION

## 2023-07-25 PROCEDURE — ? ADDITIONAL NOTES

## 2023-07-25 PROCEDURE — ? COUNSELING

## 2023-07-25 PROCEDURE — 99213 OFFICE O/P EST LOW 20 MIN: CPT

## 2023-07-25 RX ORDER — SILVER SULFADIAZINE 10 MG/G
CREAM TOPICAL
Qty: 20 | Refills: 0 | Status: ERX | COMMUNITY
Start: 2023-07-25

## 2023-07-25 RX ADMIN — SILVER SULFADIAZINE: 10 CREAM TOPICAL at 00:00

## 2023-07-25 ASSESSMENT — LOCATION DETAILED DESCRIPTION DERM
LOCATION DETAILED: LEFT ANTERIOR DISTAL THIGH
LOCATION DETAILED: RIGHT ANTERIOR DISTAL THIGH
LOCATION DETAILED: LEFT PROXIMAL PRETIBIAL REGION
LOCATION DETAILED: RIGHT PROXIMAL PRETIBIAL REGION

## 2023-07-25 ASSESSMENT — LOCATION ZONE DERM: LOCATION ZONE: LEG

## 2023-07-25 ASSESSMENT — LOCATION SIMPLE DESCRIPTION DERM
LOCATION SIMPLE: LEFT PRETIBIAL REGION
LOCATION SIMPLE: RIGHT THIGH
LOCATION SIMPLE: RIGHT PRETIBIAL REGION
LOCATION SIMPLE: LEFT THIGH

## 2023-07-25 NOTE — PROCEDURE: ADDITIONAL NOTES
Render Risk Assessment In Note?: no
Additional Notes: Sample provided of Silvadene apply today, Rx to follow. \\n\\nReviewed s/s of infection if they develop call office to be seen
Detail Level: Simple

## 2023-07-25 NOTE — HPI: SUNBURN
How Severe Is Your Sunburn?: moderate
Additional History: On Sunday pt went to [facility], she states she applied sunscreen 70 spray then went on a paddle board for one hour i water.  She sustained a sunburn to legs, w/o blisters.  Since burn she has been applying cool towels.  She is very concerned about the sunburn putting her at higher risk of melanoma.\\n
What Spf?: 50

## 2023-10-04 ENCOUNTER — APPOINTMENT (RX ONLY)
Dept: URBAN - METROPOLITAN AREA CLINIC 20 | Facility: CLINIC | Age: 48
Setting detail: DERMATOLOGY
End: 2023-10-04

## 2023-10-04 DIAGNOSIS — L57.8 OTHER SKIN CHANGES DUE TO CHRONIC EXPOSURE TO NONIONIZING RADIATION: ICD-10-CM

## 2023-10-04 DIAGNOSIS — L82.1 OTHER SEBORRHEIC KERATOSIS: ICD-10-CM

## 2023-10-04 DIAGNOSIS — D18.0 HEMANGIOMA: ICD-10-CM

## 2023-10-04 DIAGNOSIS — D22 MELANOCYTIC NEVI: ICD-10-CM

## 2023-10-04 DIAGNOSIS — Z85.820 PERSONAL HISTORY OF MALIGNANT MELANOMA OF SKIN: ICD-10-CM

## 2023-10-04 PROBLEM — D48.5 NEOPLASM OF UNCERTAIN BEHAVIOR OF SKIN: Status: ACTIVE | Noted: 2023-10-04

## 2023-10-04 PROBLEM — D22.72 MELANOCYTIC NEVI OF LEFT LOWER LIMB, INCLUDING HIP: Status: ACTIVE | Noted: 2023-10-04

## 2023-10-04 PROBLEM — D18.01 HEMANGIOMA OF SKIN AND SUBCUTANEOUS TISSUE: Status: ACTIVE | Noted: 2023-10-04

## 2023-10-04 PROBLEM — D22.5 MELANOCYTIC NEVI OF TRUNK: Status: ACTIVE | Noted: 2023-10-04

## 2023-10-04 PROBLEM — D22.61 MELANOCYTIC NEVI OF RIGHT UPPER LIMB, INCLUDING SHOULDER: Status: ACTIVE | Noted: 2023-10-04

## 2023-10-04 PROBLEM — D22.71 MELANOCYTIC NEVI OF RIGHT LOWER LIMB, INCLUDING HIP: Status: ACTIVE | Noted: 2023-10-04

## 2023-10-04 PROBLEM — D22.62 MELANOCYTIC NEVI OF LEFT UPPER LIMB, INCLUDING SHOULDER: Status: ACTIVE | Noted: 2023-10-04

## 2023-10-04 PROCEDURE — 99213 OFFICE O/P EST LOW 20 MIN: CPT | Mod: 25

## 2023-10-04 PROCEDURE — 11102 TANGNTL BX SKIN SINGLE LES: CPT

## 2023-10-04 PROCEDURE — ? ADDITIONAL NOTES

## 2023-10-04 PROCEDURE — 11103 TANGNTL BX SKIN EA SEP/ADDL: CPT

## 2023-10-04 PROCEDURE — ? DIAGNOSIS COMMENT

## 2023-10-04 PROCEDURE — ? BIOPSY BY SHAVE METHOD

## 2023-10-04 PROCEDURE — ? COUNSELING

## 2023-10-04 ASSESSMENT — LOCATION DETAILED DESCRIPTION DERM
LOCATION DETAILED: LEFT DISTAL POSTERIOR THIGH
LOCATION DETAILED: LEFT MID-UPPER BACK
LOCATION DETAILED: RIGHT PROXIMAL PRETIBIAL REGION
LOCATION DETAILED: PERIUMBILICAL SKIN
LOCATION DETAILED: LEFT DISTAL POSTERIOR UPPER ARM
LOCATION DETAILED: LEFT POSTERIOR SHOULDER
LOCATION DETAILED: LEFT SUPERIOR LATERAL NECK
LOCATION DETAILED: LEFT PROXIMAL CALF
LOCATION DETAILED: RIGHT DORSAL FOREARM
LOCATION DETAILED: LEFT ANTERIOR DISTAL THIGH
LOCATION DETAILED: LEFT MID BACK
LOCATION DETAILED: RIGHT SUPERIOR LATERAL LOWER BACK
LOCATION DETAILED: RIGHT DISTAL POSTERIOR UPPER ARM
LOCATION DETAILED: RIGHT LATERAL ABDOMEN
LOCATION DETAILED: RIGHT DISTAL CALF
LOCATION DETAILED: RIGHT UPPER BACK
LOCATION DETAILED: MONS PUBIS
LOCATION DETAILED: RIGHT MID-UPPER BACK
LOCATION DETAILED: LEFT UPPER BACK
LOCATION DETAILED: RIGHT ANTERIOR DISTAL THIGH
LOCATION DETAILED: LEFT DISTAL PRETIBIAL REGION
LOCATION DETAILED: LEFT CHEEK
LOCATION DETAILED: LEFT DORSAL FOREARM
LOCATION DETAILED: RIGHT DISTAL POSTERIOR THIGH

## 2023-10-04 ASSESSMENT — LOCATION SIMPLE DESCRIPTION DERM
LOCATION SIMPLE: BACK
LOCATION SIMPLE: ABDOMEN
LOCATION SIMPLE: NECK
LOCATION SIMPLE: RIGHT UPPER BACK
LOCATION SIMPLE: LEFT THIGH
LOCATION SIMPLE: RIGHT THIGH
LOCATION SIMPLE: RIGHT LOWER BACK
LOCATION SIMPLE: GROIN
LOCATION SIMPLE: LEFT SHOULDER
LOCATION SIMPLE: LEFT UPPER EXTREMITY
LOCATION SIMPLE: RIGHT POSTERIOR THIGH
LOCATION SIMPLE: LEFT POSTERIOR THIGH
LOCATION SIMPLE: LEFT POSTERIOR UPPER ARM
LOCATION SIMPLE: RIGHT CALF
LOCATION SIMPLE: RIGHT UPPER EXTREMITY
LOCATION SIMPLE: LEFT PRETIBIAL REGION
LOCATION SIMPLE: LEFT CHEEK
LOCATION SIMPLE: RIGHT PRETIBIAL REGION
LOCATION SIMPLE: RIGHT POSTERIOR UPPER ARM
LOCATION SIMPLE: LEFT UPPER BACK
LOCATION SIMPLE: LEFT CALF

## 2023-10-04 ASSESSMENT — LOCATION ZONE DERM
LOCATION ZONE: ARM
LOCATION ZONE: NECK
LOCATION ZONE: FACE
LOCATION ZONE: TRUNK
LOCATION ZONE: VULVA
LOCATION ZONE: LEG

## 2023-10-04 NOTE — PROCEDURE: COUNSELING
Quality 137: Melanoma: Continuity Of Care - Recall System: Patient information entered into a recall system that includes: target date for the next exam specified AND a process to follow up with patients regarding missed or unscheduled appointments
Detail Level: Detailed
When Should The Patient Follow-Up For Their Next Full-Body Skin Exam?: 6 Months
Detail Level: Zone
Show Follow-Up Variable: Yes
Detail Level: Simple

## 2023-10-04 NOTE — PROCEDURE: DIAGNOSIS COMMENT
Comment: Hx of MM, 0.7 mm, left neck, Dr. Gonzalez, EXC, 2011\\nHx of Recurrent Amelanocytic Melanoma Stage IIIB s/p excision of left upper neck skin and left selective neck dissection IB-V on 8/3/18.
Detail Level: Simple
Render Risk Assessment In Note?: no
Dosing Month 2 (Required For Cumulative Dosing): 40mg BID
Comment: Hx of Amelanocytic MM, 0.7 mm, left neck, Dr. Gonzalez, EXC, 2011\\nHx of Recurrent Amelanocytic Melanoma Stage IIIB s/p excision of left upper neck skin and left selective neck dissection IB-V on 8/3/18.

## 2023-10-04 NOTE — PROCEDURE: ADDITIONAL NOTES
Render Risk Assessment In Note?: no
Detail Level: Simple
Additional Notes: Per [facility]:\\n\\n-defer adjuvant immunotherapy (due to rare autoimmune disorder)\\n-US neck and PE by Oncologists every 3 months\\n	Per pt she does not remember last time she got one\\n\\n-PET/CT (head to toe) every 6 months for 2 years, then yearly\\n	Per pt she does not remember last time she got one\\n\\n-[facility] melanoma clinic every year \\n	Has not been seen since 2018, per pt she called and they said she did not need to return\\n\\nAttempt to get MR from [facility], Dr. Ambicka and [facility] \\n\\nI have recommended pt see Dermatologist at [facility] for final eval and plan moving forward.
Additional Notes: Per [facility]:\\n\\n-defer adjuvant immunotherapy (due to rare autoimmune disorder)\\n-US neck and PE by Oncologists every 3 months\\n Per pt she does not remember last time she got one\\n\\n-PET/CT (head to toe) every 6 months for 2 years, then yearly\\n Per pt she does not remember last time she got one\\n\\n-[facility] melanoma clinic every year \\n Has not been seen since 2018, per pt she called and they said she did not need to return\\n\\nAttempt to get MR from [facility], Dr. Ambicka and [facility] \\n\\nI have recommended pt see Dermatologist at [facility] for final eval and plan moving forward.

## 2023-10-16 NOTE — PROGRESS NOTES
[facility] She requested call back as she is on another call.    Will schedule another call.   
4 = No assist / stand by assistance

## 2024-02-15 NOTE — TELEPHONE ENCOUNTER
"I did not receive a telephone message that she needs the nucala order for tomorrow.    Since I cannot order this as clinic administration, I just placed another order in the computer for the medication, and the comment section it states \"to be administered at the clinic\"  "
Thank You.  
Trauma Surgery

## 2024-02-16 ENCOUNTER — HOSPITAL ENCOUNTER (EMERGENCY)
Facility: MEDICAL CENTER | Age: 49
End: 2024-02-16
Attending: STUDENT IN AN ORGANIZED HEALTH CARE EDUCATION/TRAINING PROGRAM
Payer: COMMERCIAL

## 2024-02-16 ENCOUNTER — APPOINTMENT (OUTPATIENT)
Dept: RADIOLOGY | Facility: MEDICAL CENTER | Age: 49
End: 2024-02-16
Attending: STUDENT IN AN ORGANIZED HEALTH CARE EDUCATION/TRAINING PROGRAM
Payer: COMMERCIAL

## 2024-02-16 VITALS
SYSTOLIC BLOOD PRESSURE: 133 MMHG | HEIGHT: 65 IN | BODY MASS INDEX: 20.31 KG/M2 | HEART RATE: 68 BPM | TEMPERATURE: 98.1 F | RESPIRATION RATE: 19 BRPM | DIASTOLIC BLOOD PRESSURE: 84 MMHG | WEIGHT: 121.91 LBS | OXYGEN SATURATION: 97 %

## 2024-02-16 DIAGNOSIS — R10.2 PELVIC CRAMPING: ICD-10-CM

## 2024-02-16 DIAGNOSIS — N93.9 VAGINAL BLEEDING: ICD-10-CM

## 2024-02-16 LAB
ALBUMIN SERPL BCP-MCNC: 4.5 G/DL (ref 3.2–4.9)
ALBUMIN/GLOB SERPL: 1.9 G/DL
ALP SERPL-CCNC: 49 U/L (ref 30–99)
ALT SERPL-CCNC: 9 U/L (ref 2–50)
ANION GAP SERPL CALC-SCNC: 12 MMOL/L (ref 7–16)
AST SERPL-CCNC: 17 U/L (ref 12–45)
BASOPHILS # BLD AUTO: 1.5 % (ref 0–1.8)
BASOPHILS # BLD: 0.04 K/UL (ref 0–0.12)
BILIRUB SERPL-MCNC: 0.3 MG/DL (ref 0.1–1.5)
BUN SERPL-MCNC: 13 MG/DL (ref 8–22)
CALCIUM ALBUM COR SERPL-MCNC: 8.8 MG/DL (ref 8.5–10.5)
CALCIUM SERPL-MCNC: 9.2 MG/DL (ref 8.4–10.2)
CHLORIDE SERPL-SCNC: 101 MMOL/L (ref 96–112)
CO2 SERPL-SCNC: 25 MMOL/L (ref 20–33)
CREAT SERPL-MCNC: 0.49 MG/DL (ref 0.5–1.4)
EOSINOPHIL # BLD AUTO: 0.04 K/UL (ref 0–0.51)
EOSINOPHIL NFR BLD: 1.5 % (ref 0–6.9)
ERYTHROCYTE [DISTWIDTH] IN BLOOD BY AUTOMATED COUNT: 45.5 FL (ref 35.9–50)
GFR SERPLBLD CREATININE-BSD FMLA CKD-EPI: 115 ML/MIN/1.73 M 2
GLOBULIN SER CALC-MCNC: 2.4 G/DL (ref 1.9–3.5)
GLUCOSE SERPL-MCNC: 64 MG/DL (ref 65–99)
HCG SERPL QL: NEGATIVE
HCT VFR BLD AUTO: 42.2 % (ref 37–47)
HGB BLD-MCNC: 14.4 G/DL (ref 12–16)
IMM GRANULOCYTES # BLD AUTO: 0.01 K/UL (ref 0–0.11)
IMM GRANULOCYTES NFR BLD AUTO: 0.4 % (ref 0–0.9)
LYMPHOCYTES # BLD AUTO: 0.89 K/UL (ref 1–4.8)
LYMPHOCYTES NFR BLD: 32.4 % (ref 22–41)
MCH RBC QN AUTO: 31.2 PG (ref 27–33)
MCHC RBC AUTO-ENTMCNC: 34.1 G/DL (ref 32.2–35.5)
MCV RBC AUTO: 91.3 FL (ref 81.4–97.8)
MONOCYTES # BLD AUTO: 0.28 K/UL (ref 0–0.85)
MONOCYTES NFR BLD AUTO: 10.2 % (ref 0–13.4)
NEUTROPHILS # BLD AUTO: 1.49 K/UL (ref 1.82–7.42)
NEUTROPHILS NFR BLD: 54 % (ref 44–72)
NRBC # BLD AUTO: 0 K/UL
NRBC BLD-RTO: 0 /100 WBC (ref 0–0.2)
PLATELET # BLD AUTO: 256 K/UL (ref 164–446)
PMV BLD AUTO: 10.8 FL (ref 9–12.9)
POTASSIUM SERPL-SCNC: 3.4 MMOL/L (ref 3.6–5.5)
PROT SERPL-MCNC: 6.9 G/DL (ref 6–8.2)
RBC # BLD AUTO: 4.62 M/UL (ref 4.2–5.4)
SODIUM SERPL-SCNC: 138 MMOL/L (ref 135–145)
TSH SERPL DL<=0.005 MIU/L-ACNC: 0.87 UIU/ML (ref 0.38–5.33)
WBC # BLD AUTO: 2.8 K/UL (ref 4.8–10.8)

## 2024-02-16 PROCEDURE — 84443 ASSAY THYROID STIM HORMONE: CPT

## 2024-02-16 PROCEDURE — 85025 COMPLETE CBC W/AUTO DIFF WBC: CPT

## 2024-02-16 PROCEDURE — 99284 EMERGENCY DEPT VISIT MOD MDM: CPT

## 2024-02-16 PROCEDURE — 76856 US EXAM PELVIC COMPLETE: CPT

## 2024-02-16 PROCEDURE — 36415 COLL VENOUS BLD VENIPUNCTURE: CPT

## 2024-02-16 PROCEDURE — 80053 COMPREHEN METABOLIC PANEL: CPT

## 2024-02-16 PROCEDURE — 84703 CHORIONIC GONADOTROPIN ASSAY: CPT

## 2024-02-16 ASSESSMENT — FIBROSIS 4 INDEX: FIB4 SCORE: 0.79

## 2024-02-16 NOTE — ED TRIAGE NOTES
"Chief Complaint   Patient presents with    Vaginal Bleeding     X3 weeks. States bleeding has lessened recently but cramping is constant. Denies any dizziness, lightheadedness, fatigue, sob, fevers  Pt states she has an appt w GYN at [facility] but can not get in until next week.     /83   Pulse 69   Temp 36.4 °C (97.5 °F) (Temporal)   Resp 18   Ht 1.651 m (5' 5\")   Wt 55.3 kg (121 lb 14.6 oz)   SpO2 98%   BMI 20.29 kg/m²     "

## 2024-02-17 NOTE — ED NOTES
Pt. Verbalizes understanding of discharge instructions. accompanied to lobby with spouse. Pt. Alert/awake in NAD.  All questions answered and understood. Advised to ff-up with PCP.

## 2024-02-17 NOTE — ED PROVIDER NOTES
CHIEF COMPLAINT  Chief Complaint   Patient presents with    Vaginal Bleeding     X3 weeks. States bleeding has lessened recently but cramping is constant. Denies any dizziness, lightheadedness, fatigue, sob, fevers  Pt states she has an appt w GYN at [facility] but can not get in until next week.       LIMITATION TO HISTORY   Select:     HPI    Megan August is a 48 y.o. female who presents to the Emergency Department for evaluation of vaginal bleeding for the last 3 weeks patient reports the bleeding is somewhat lessened recently she has an appointment with gynecologist at [facility] on Tuesday she reports some pelvic cramping that is relieved with Aleve    OUTSIDE HISTORIAN(S):  Select:    EXTERNAL RECORDS REVIEWED  Select:       PAST MEDICAL HISTORY  Past Medical History:   Diagnosis Date    Abscess of breast, postpartum     Anemia     Anemia     Anxiety 2009    Anxiety     Anxiety disorder     Bowel habit changes     paralyzed colon    Cancer (HCC)     melanoma, left side neck    Dental disorder     lack of saliva    Dry eyes 2009 right eye scleral redness    Dry eyes     extreme    Dysautonomia (HCC)     Eosinophilic esophagitis     Eosinophilic esophagitis     Exophoria     Hemorrhagic disorder (HCC)     anemia    Hiatus hernia syndrome     Hypothyroid 2009    Melanoma (HCC) 2011    melanoma - Amenanoic left neck jaw    Mild preeclampsia     Other specified symptom associated with female genital organs     PCOS    Polycystic ovarian syndrome     Polycystic ovary disease 2009     labor     Psychiatric disorder     PTSD    Sjogren's syndrome (HCC)     Small intestinal bacterial overgrowth      .    SURGICAL HISTORY  Past Surgical History:   Procedure Laterality Date    OTHER  10/31/2018    left open neck incision    NECK MASS EXCISION Left 10/31/2018    Procedure: NECK MASS EXCISION;  Surgeon: Inna Cadena M.D.;  Location: SURGERY SAME  DAY [facility] ORS;  Service: Ent    OTHER  2018    Neck dissection-melanoma    OTHER  2018    neck excision melanoma    GASTROSCOPY WITH FEED TUBE PLACEMENT N/A 2016    Procedure: GASTROSCOPY WITH NASOENTERIC TUBE PLACEMENT W/FLUORO;  Surgeon: Gallito Rangel M.D.;  Location: SURGERY [facility];  Service:     COLONOSCOPY  2015    SETTLEMENT (INSURANCE)  2014    Performed by Olvin David M.D. at SURGERY [facility]    OTHER      eye ducts cauterized    OTHER  2012    Local excision melanoma    OTHER      left periauricular melanoma excision    INCISION AND DRAINAGE GENERAL  2009    left nipple    SC  DELIVERY ONLY      TONSILLECTOMY AND ADENOIDECTOMY      ENDOSCOPY      multiple upper an lower procedures    OTHER  [id number]    several procedures for infertility          FAMILY HISTORY  Family History   Problem Relation Age of Onset    Cancer Father     No Known Problems Sister         in [place]     Cancer Maternal Grandmother           SOCIAL HISTORY  Social History     Socioeconomic History    Marital status:      Spouse name: Not on file    Number of children: Not on file    Years of education: Not on file    Highest education level: Not on file   Occupational History     Comment: [employer]   Tobacco Use    Smoking status: Never    Smokeless tobacco: Never   Vaping Use    Vaping Use: Never used   Substance and Sexual Activity    Alcohol use: No     Alcohol/week: 0.0 oz    Drug use: No    Sexual activity: Not on file   Other Topics Concern    Not on file   Social History Narrative    Not on file     Social Determinants of Health     Financial Resource Strain: Not on file   Food Insecurity: Not on file   Transportation Needs: Not on file   Physical Activity: Not on file   Stress: Not on file   Social Connections: Not on file   Intimate Partner Violence: Not on file   Housing Stability: Not on file         CURRENT MEDICATIONS  No  "current facility-administered medications on file prior to encounter.     Current Outpatient Medications on File Prior to Encounter   Medication Sig Dispense Refill    potassium chloride (KLOR-CON) 20 MEQ Pack MIX CONTENTS OF 1 PACKET WITH WATER AND TAKE BY MOUTH EVERY DAY 90 Packet 1    levothyroxine (SYNTHROID) 125 MCG Tab Take 1 Tab by mouth Every morning on an empty stomach. 90 Tab 0    ergocalciferol (DRISDOL) 15121 UNIT capsule Take 1 Cap by mouth every 28 days. Indications: vitamin d deficiency 6 Cap 4    drospirenone-ethinyl estradiol (ASHLEY) 3-0.03 MG per tablet Take 1 Tab by mouth every day. 84 Tab 3    venlafaxine (EFFEXOR-XR) 150 MG extended-release capsule Take 1 Cap by mouth every day. 90 Cap 3    clotrimazole-betamethasone (LOTRISONE) 1-0.05 % Cream Apply 1 Application topically 2 times a day as needed (to affected area). 45 g 3    ondansetron (ZOFRAN ODT) 4 MG TABLET DISPERSIBLE Take 1 Tab by mouth every 6 hours as needed for Nausea. 10 Tab 0    immune globulin 5% (5 g/100mL) in empty bag by Intravenous route Once.             ALLERGIES  Allergies   Allergen Reactions    Other Food Anaphylaxis     \"all foods\"= blisters/ anaphylaxis       PHYSICAL EXAM  VITAL SIGNS:/84   Pulse 68   Temp 36.7 °C (98.1 °F) (Temporal)   Resp 19   Ht 1.651 m (5' 5\")   Wt 55.3 kg (121 lb 14.6 oz)   SpO2 97%   BMI 20.29 kg/m²       GENERAL: Awake and alert  HEAD: Normocephalic and atraumatic  NECK: Normal range of motion, without meningismus  EYES: Pupils Equal, Round, Reactive to Light, extraocular movements intact, conjunctiva white  ENT: Mucous membranes moist, oropharynx clear  PULMONARY: Normal effort, clear to auscultation  CARDIOVASCULAR: No murmurs, clicks or rubs, peripheral pulses 2+  ABDOMINAL: Soft, non-tender, no guarding or rigidity present, no pulsatile masses  BACK: no midline tenderness, no costovertebral tenderness  NEUROLOGICAL: Grossly non-focal neurological examination, speech normal, gait " normal  EXTREMITIES: No edema, normal to inspection  SKIN: Warm and dry.  PSYCHIATRIC: Affect is appropriate    DIAGNOSTIC STUDIES / PROCEDURES  EKG  I have independently interpreted this EKG      LABS  Labs Reviewed   CBC WITH DIFFERENTIAL - Abnormal; Notable for the following components:       Result Value    WBC 2.8 (*)     Neutrophils (Absolute) 1.49 (*)     Lymphs (Absolute) 0.89 (*)     All other components within normal limits   COMP METABOLIC PANEL - Abnormal; Notable for the following components:    Potassium 3.4 (*)     Glucose 64 (*)     Creatinine 0.49 (*)     All other components within normal limits   HCG QUAL SERUM   TSH WITH REFLEX TO FT4   ESTIMATED GFR         RADIOLOGY  I independently reviewed and interpreted the images obtained today in the ER.  No large fibroid noted    Radiologist interpretation:   US-PELVIC COMPLETE (TRANSABDOMINAL/TRANSVAGINAL) (COMBO)   Final Result      1.  No acute abnormality detected.           COURSE & MEDICAL DECISION MAKING    ED COURSE:        INTERVENTIONS BY ME:  Medications - No data to display    Response on recheck:      INITIAL ASSESSMENT, COURSE AND PLAN  Care Narrative:   Patient presenting with some light vaginal bleeding and cramping for 3 weeks given her age transvaginal ultrasound obtained which is without evidence of large tumor or fibroid blood work was obtained without evidence of significant anemia or electrolyte disturbance thyroid levels are normal.  Patient without any active bleeding in the emergency department and she is comfortable feel she is suitable for discharge home with outpatient follow-up as planned next week with her gynecologist she is going to continue her oral contraceptive           ADDITIONAL PROBLEM LIST    DISPOSITION AND DISCUSSIONS  Discussion of management with other QHP or appropriate source(s): None         Decision tools and prescription drugs considered including, but not limited to: Consider medication management patient  is instructed to continue her oral contraceptive pill    FINAL DIAGNOSIS  1. Vaginal bleeding    2. Pelvic cramping             Electronically signed by: Yobani Lux DO ,12:09 AM 02/17/24

## 2024-03-04 ENCOUNTER — OFFICE VISIT (OUTPATIENT)
Dept: URGENT CARE | Facility: CLINIC | Age: 49
End: 2024-03-04
Payer: COMMERCIAL

## 2024-03-04 ENCOUNTER — HOSPITAL ENCOUNTER (OUTPATIENT)
Facility: MEDICAL CENTER | Age: 49
End: 2024-03-04
Attending: FAMILY MEDICINE
Payer: COMMERCIAL

## 2024-03-04 VITALS
BODY MASS INDEX: 20.99 KG/M2 | RESPIRATION RATE: 16 BRPM | WEIGHT: 126 LBS | HEART RATE: 70 BPM | OXYGEN SATURATION: 98 % | DIASTOLIC BLOOD PRESSURE: 68 MMHG | HEIGHT: 65 IN | SYSTOLIC BLOOD PRESSURE: 104 MMHG | TEMPERATURE: 97.3 F

## 2024-03-04 DIAGNOSIS — B37.31 VAGINA, CANDIDIASIS: ICD-10-CM

## 2024-03-04 DIAGNOSIS — R30.0 DYSURIA: ICD-10-CM

## 2024-03-04 LAB
APPEARANCE UR: CLEAR
BILIRUB UR STRIP-MCNC: NEGATIVE MG/DL
COLOR UR AUTO: YELLOW
GLUCOSE UR STRIP.AUTO-MCNC: NEGATIVE MG/DL
KETONES UR STRIP.AUTO-MCNC: NEGATIVE MG/DL
LEUKOCYTE ESTERASE UR QL STRIP.AUTO: NEGATIVE
NITRITE UR QL STRIP.AUTO: NEGATIVE
PH UR STRIP.AUTO: 7 [PH] (ref 5–8)
PROT UR QL STRIP: NEGATIVE MG/DL
RBC UR QL AUTO: NORMAL
SP GR UR STRIP.AUTO: 1.02
UROBILINOGEN UR STRIP-MCNC: NORMAL MG/DL

## 2024-03-04 PROCEDURE — 99213 OFFICE O/P EST LOW 20 MIN: CPT | Performed by: FAMILY MEDICINE

## 2024-03-04 PROCEDURE — 81002 URINALYSIS NONAUTO W/O SCOPE: CPT | Performed by: FAMILY MEDICINE

## 2024-03-04 PROCEDURE — 87086 URINE CULTURE/COLONY COUNT: CPT

## 2024-03-04 PROCEDURE — 3078F DIAST BP <80 MM HG: CPT | Performed by: FAMILY MEDICINE

## 2024-03-04 PROCEDURE — 3074F SYST BP LT 130 MM HG: CPT | Performed by: FAMILY MEDICINE

## 2024-03-04 RX ORDER — FLUCONAZOLE 150 MG/1
150 TABLET ORAL DAILY
Qty: 3 TABLET | Refills: 0 | Status: SHIPPED | OUTPATIENT
Start: 2024-03-04 | End: 2024-03-07

## 2024-03-04 ASSESSMENT — ENCOUNTER SYMPTOMS
RESPIRATORY NEGATIVE: 1
EYES NEGATIVE: 1
CONSTITUTIONAL NEGATIVE: 1
CARDIOVASCULAR NEGATIVE: 1

## 2024-03-04 ASSESSMENT — FIBROSIS 4 INDEX: FIB4 SCORE: 1.08

## 2024-03-04 NOTE — PROGRESS NOTES
"Subjective:   Megan August is a 48 y.o. female who presents for UTI (Started this morning, frequent urination, burning when urinating, out of the ordinary patient states)      HPI    Review of Systems   Constitutional: Negative.    Eyes: Negative.    Respiratory: Negative.     Cardiovascular: Negative.    Genitourinary:  Positive for dysuria.   Skin: Negative.        Medications, Allergies, and current problem list reviewed today in Epic.     Objective:     /68 (BP Location: Left arm, Patient Position: Sitting, BP Cuff Size: Large adult)   Pulse 70   Temp 36.3 °C (97.3 °F)   Resp 16   Ht 1.651 m (5' 5\")   Wt 57.2 kg (126 lb)   SpO2 98%     Physical Exam  Vitals and nursing note reviewed.   Cardiovascular:      Rate and Rhythm: Normal rate and regular rhythm.      Pulses: Normal pulses.      Heart sounds: Normal heart sounds.   Pulmonary:      Effort: Pulmonary effort is normal.      Breath sounds: Normal breath sounds.   Abdominal:      General: Abdomen is flat. Bowel sounds are normal.      Palpations: Abdomen is soft.   Genitourinary:     Vagina: Vaginal discharge present.      Comments: Currently on her period        Assessment/Plan:     Diagnosis and associated orders:     1. Dysuria  POCT Urinalysis    URINE CULTURE(NEW)      2. Vagina, candidiasis  fluconazole (DIFLUCAN) 150 MG tablet         Comments/MDM:              Differential diagnosis, natural history, supportive care, and indications for immediate follow-up discussed.    Advised the patient to follow-up with the primary care physician for recheck, reevaluation, and consideration of further management.    Please note that this dictation was created using voice recognition software. I have made a reasonable attempt to correct obvious errors, but I expect that there are errors of grammar and possibly content that I did not discover before finalizing the note.    This note was electronically signed by Moy Rust M.D.  "

## 2024-03-07 LAB
BACTERIA UR CULT: NORMAL
SIGNIFICANT IND 70042: NORMAL
SITE SITE: NORMAL
SOURCE SOURCE: NORMAL

## 2024-04-05 ENCOUNTER — APPOINTMENT (RX ONLY)
Dept: URBAN - METROPOLITAN AREA CLINIC 20 | Facility: CLINIC | Age: 49
Setting detail: DERMATOLOGY
End: 2024-04-05

## 2024-04-05 DIAGNOSIS — Z85.820 PERSONAL HISTORY OF MALIGNANT MELANOMA OF SKIN: ICD-10-CM

## 2024-04-05 DIAGNOSIS — L82.1 OTHER SEBORRHEIC KERATOSIS: ICD-10-CM

## 2024-04-05 DIAGNOSIS — L55.9 SUNBURN, UNSPECIFIED: ICD-10-CM

## 2024-04-05 DIAGNOSIS — L57.8 OTHER SKIN CHANGES DUE TO CHRONIC EXPOSURE TO NONIONIZING RADIATION: ICD-10-CM

## 2024-04-05 DIAGNOSIS — D22 MELANOCYTIC NEVI: ICD-10-CM

## 2024-04-05 DIAGNOSIS — D18.0 HEMANGIOMA: ICD-10-CM

## 2024-04-05 PROBLEM — D18.01 HEMANGIOMA OF SKIN AND SUBCUTANEOUS TISSUE: Status: ACTIVE | Noted: 2024-04-05

## 2024-04-05 PROBLEM — C44.719 BASAL CELL CARCINOMA OF SKIN OF LEFT LOWER LIMB, INCLUDING HIP: Status: ACTIVE | Noted: 2024-04-05

## 2024-04-05 PROBLEM — D48.5 NEOPLASM OF UNCERTAIN BEHAVIOR OF SKIN: Status: ACTIVE | Noted: 2024-04-05

## 2024-04-05 PROBLEM — D22.61 MELANOCYTIC NEVI OF RIGHT UPPER LIMB, INCLUDING SHOULDER: Status: ACTIVE | Noted: 2024-04-05

## 2024-04-05 PROBLEM — D22.71 MELANOCYTIC NEVI OF RIGHT LOWER LIMB, INCLUDING HIP: Status: ACTIVE | Noted: 2024-04-05

## 2024-04-05 PROBLEM — D22.72 MELANOCYTIC NEVI OF LEFT LOWER LIMB, INCLUDING HIP: Status: ACTIVE | Noted: 2024-04-05

## 2024-04-05 PROBLEM — D22.5 MELANOCYTIC NEVI OF TRUNK: Status: ACTIVE | Noted: 2024-04-05

## 2024-04-05 PROBLEM — D22.62 MELANOCYTIC NEVI OF LEFT UPPER LIMB, INCLUDING SHOULDER: Status: ACTIVE | Noted: 2024-04-05

## 2024-04-05 PROCEDURE — ? DIAGNOSIS COMMENT

## 2024-04-05 PROCEDURE — 99214 OFFICE O/P EST MOD 30 MIN: CPT | Mod: 25

## 2024-04-05 PROCEDURE — ? BIOPSY BY SHAVE METHOD

## 2024-04-05 PROCEDURE — ? COUNSELING

## 2024-04-05 PROCEDURE — ? ADDITIONAL NOTES

## 2024-04-05 PROCEDURE — 11102 TANGNTL BX SKIN SINGLE LES: CPT

## 2024-04-05 PROCEDURE — ? PRESCRIPTION

## 2024-04-05 RX ORDER — IMIQUIMOD 12.5 MG/.25G
CREAM TOPICAL
Qty: 12 | Refills: 0 | Status: ERX | COMMUNITY
Start: 2024-04-05

## 2024-04-05 RX ADMIN — IMIQUIMOD: 12.5 CREAM TOPICAL at 00:00

## 2024-04-05 ASSESSMENT — LOCATION DETAILED DESCRIPTION DERM
LOCATION DETAILED: RIGHT UPPER BACK
LOCATION DETAILED: RIGHT DISTAL LATERAL POSTERIOR THIGH
LOCATION DETAILED: RIGHT LATERAL ABDOMEN
LOCATION DETAILED: LEFT DISTAL POSTERIOR UPPER ARM
LOCATION DETAILED: RIGHT SUPERIOR LATERAL LOWER BACK
LOCATION DETAILED: LEFT UPPER BACK
LOCATION DETAILED: RIGHT DISTAL CALF
LOCATION DETAILED: LEFT POSTERIOR SHOULDER
LOCATION DETAILED: PERIUMBILICAL SKIN
LOCATION DETAILED: LEFT ANTERIOR DISTAL THIGH
LOCATION DETAILED: LEFT PROXIMAL CALF
LOCATION DETAILED: LEFT DISTAL POSTERIOR THIGH
LOCATION DETAILED: LEFT CHEEK
LOCATION DETAILED: RIGHT ANTERIOR DISTAL THIGH
LOCATION DETAILED: RIGHT DISTAL POSTERIOR UPPER ARM
LOCATION DETAILED: RIGHT DISTAL POSTERIOR THIGH
LOCATION DETAILED: LEFT MEDIAL FRONTAL SCALP
LOCATION DETAILED: LEFT DORSAL FOREARM
LOCATION DETAILED: LEFT SUPERIOR LATERAL NECK
LOCATION DETAILED: RIGHT PROXIMAL PRETIBIAL REGION
LOCATION DETAILED: RIGHT MID-UPPER BACK
LOCATION DETAILED: LEFT MID BACK
LOCATION DETAILED: LEFT MID-UPPER BACK
LOCATION DETAILED: MONS PUBIS
LOCATION DETAILED: RIGHT DORSAL FOREARM

## 2024-04-05 ASSESSMENT — LOCATION ZONE DERM
LOCATION ZONE: VULVA
LOCATION ZONE: NECK
LOCATION ZONE: TRUNK
LOCATION ZONE: SCALP
LOCATION ZONE: FACE
LOCATION ZONE: LEG
LOCATION ZONE: ARM

## 2024-04-05 ASSESSMENT — LOCATION SIMPLE DESCRIPTION DERM
LOCATION SIMPLE: LEFT UPPER EXTREMITY
LOCATION SIMPLE: RIGHT UPPER BACK
LOCATION SIMPLE: LEFT SHOULDER
LOCATION SIMPLE: BACK
LOCATION SIMPLE: LEFT CHEEK
LOCATION SIMPLE: GROIN
LOCATION SIMPLE: RIGHT POSTERIOR UPPER ARM
LOCATION SIMPLE: RIGHT CALF
LOCATION SIMPLE: LEFT POSTERIOR UPPER ARM
LOCATION SIMPLE: RIGHT THIGH
LOCATION SIMPLE: RIGHT UPPER EXTREMITY
LOCATION SIMPLE: LEFT SCALP
LOCATION SIMPLE: RIGHT POSTERIOR THIGH
LOCATION SIMPLE: LEFT UPPER BACK
LOCATION SIMPLE: LEFT CALF
LOCATION SIMPLE: LEFT POSTERIOR THIGH
LOCATION SIMPLE: NECK
LOCATION SIMPLE: RIGHT LOWER BACK
LOCATION SIMPLE: LEFT THIGH
LOCATION SIMPLE: ABDOMEN
LOCATION SIMPLE: RIGHT PRETIBIAL REGION

## 2024-04-05 NOTE — PROCEDURE: DIAGNOSIS COMMENT
Comment: Hx of MM, 0.7 mm, left neck, Dr. Gonzalez, EXC, 2011\\nHx of Recurrent Amelanocytic Melanoma Stage IIIB s/p excision of left upper neck skin and left selective neck dissection IB-V on 8/3/18.
Detail Level: Simple
Render Risk Assessment In Note?: no
Comment: Bx proven, no treatment pt did not respond to messages,.  Reviewed treatment she would like to start medication.

## 2024-04-05 NOTE — PROCEDURE: ADDITIONAL NOTES
Render Risk Assessment In Note?: no
Detail Level: Simple
Additional Notes: Per [facility]:\\n\\n-defer adjuvant immunotherapy (due to rare autoimmune disorder)\\n-US neck and PE by Oncologists every 3 months\\n	Per pt she does not remember last time she got one\\n\\n-PET/CT (head to toe) every 6 months for 2 years, then yearly\\n	Per pt she does not remember last time she got one\\n\\n-[facility] melanoma clinic every year \\n	Has not been seen since 2018, per pt she called and they said she did not need to return\\n\\nAttempt to get MR from [facility], Dr. Ambicka and [facility] \\n\\nI have recommended pt see Dermatologist at [facility] for final eval and plan moving forward.

## 2024-10-04 ENCOUNTER — APPOINTMENT (RX ONLY)
Dept: URBAN - METROPOLITAN AREA CLINIC 20 | Facility: CLINIC | Age: 49
Setting detail: DERMATOLOGY
End: 2024-10-04

## 2024-10-04 DIAGNOSIS — L57.8 OTHER SKIN CHANGES DUE TO CHRONIC EXPOSURE TO NONIONIZING RADIATION: ICD-10-CM

## 2024-10-04 DIAGNOSIS — L82.1 OTHER SEBORRHEIC KERATOSIS: ICD-10-CM

## 2024-10-04 DIAGNOSIS — Z85.820 PERSONAL HISTORY OF MALIGNANT MELANOMA OF SKIN: ICD-10-CM

## 2024-10-04 DIAGNOSIS — D22 MELANOCYTIC NEVI: ICD-10-CM

## 2024-10-04 DIAGNOSIS — D18.0 HEMANGIOMA: ICD-10-CM

## 2024-10-04 PROBLEM — C44.719 BASAL CELL CARCINOMA OF SKIN OF LEFT LOWER LIMB, INCLUDING HIP: Status: ACTIVE | Noted: 2024-10-04

## 2024-10-04 PROBLEM — D18.01 HEMANGIOMA OF SKIN AND SUBCUTANEOUS TISSUE: Status: ACTIVE | Noted: 2024-10-04

## 2024-10-04 PROBLEM — D22.61 MELANOCYTIC NEVI OF RIGHT UPPER LIMB, INCLUDING SHOULDER: Status: ACTIVE | Noted: 2024-10-04

## 2024-10-04 PROBLEM — D22.5 MELANOCYTIC NEVI OF TRUNK: Status: ACTIVE | Noted: 2024-10-04

## 2024-10-04 PROBLEM — D22.71 MELANOCYTIC NEVI OF RIGHT LOWER LIMB, INCLUDING HIP: Status: ACTIVE | Noted: 2024-10-04

## 2024-10-04 PROBLEM — D22.62 MELANOCYTIC NEVI OF LEFT UPPER LIMB, INCLUDING SHOULDER: Status: ACTIVE | Noted: 2024-10-04

## 2024-10-04 PROBLEM — D22.72 MELANOCYTIC NEVI OF LEFT LOWER LIMB, INCLUDING HIP: Status: ACTIVE | Noted: 2024-10-04

## 2024-10-04 PROCEDURE — ? DIAGNOSIS COMMENT

## 2024-10-04 PROCEDURE — ? COUNSELING

## 2024-10-04 PROCEDURE — 99213 OFFICE O/P EST LOW 20 MIN: CPT

## 2024-10-04 PROCEDURE — ? ADDITIONAL NOTES

## 2024-10-04 ASSESSMENT — LOCATION SIMPLE DESCRIPTION DERM
LOCATION SIMPLE: RIGHT UPPER BACK
LOCATION SIMPLE: RIGHT POSTERIOR UPPER ARM
LOCATION SIMPLE: LEFT POSTERIOR UPPER ARM
LOCATION SIMPLE: BACK
LOCATION SIMPLE: RIGHT THIGH
LOCATION SIMPLE: RIGHT UPPER EXTREMITY
LOCATION SIMPLE: RIGHT CALF
LOCATION SIMPLE: ABDOMEN
LOCATION SIMPLE: LEFT CHEEK
LOCATION SIMPLE: GROIN
LOCATION SIMPLE: LEFT UPPER BACK
LOCATION SIMPLE: LEFT POSTERIOR THIGH
LOCATION SIMPLE: LEFT THIGH
LOCATION SIMPLE: RIGHT PRETIBIAL REGION
LOCATION SIMPLE: NECK
LOCATION SIMPLE: LEFT CALF
LOCATION SIMPLE: RIGHT LOWER BACK
LOCATION SIMPLE: LEFT SHOULDER
LOCATION SIMPLE: RIGHT POSTERIOR THIGH
LOCATION SIMPLE: LEFT UPPER EXTREMITY

## 2024-10-04 ASSESSMENT — LOCATION DETAILED DESCRIPTION DERM
LOCATION DETAILED: RIGHT DORSAL FOREARM
LOCATION DETAILED: LEFT MID-UPPER BACK
LOCATION DETAILED: RIGHT ANTERIOR DISTAL THIGH
LOCATION DETAILED: MONS PUBIS
LOCATION DETAILED: LEFT MID BACK
LOCATION DETAILED: RIGHT SUPERIOR LATERAL LOWER BACK
LOCATION DETAILED: RIGHT DISTAL POSTERIOR UPPER ARM
LOCATION DETAILED: LEFT POSTERIOR SHOULDER
LOCATION DETAILED: LEFT ANTERIOR DISTAL THIGH
LOCATION DETAILED: LEFT SUPERIOR LATERAL NECK
LOCATION DETAILED: RIGHT DISTAL CALF
LOCATION DETAILED: RIGHT MID-UPPER BACK
LOCATION DETAILED: RIGHT DISTAL POSTERIOR THIGH
LOCATION DETAILED: LEFT CHEEK
LOCATION DETAILED: LEFT DORSAL FOREARM
LOCATION DETAILED: RIGHT UPPER BACK
LOCATION DETAILED: RIGHT PROXIMAL PRETIBIAL REGION
LOCATION DETAILED: RIGHT LATERAL ABDOMEN
LOCATION DETAILED: PERIUMBILICAL SKIN
LOCATION DETAILED: LEFT UPPER BACK
LOCATION DETAILED: LEFT DISTAL POSTERIOR THIGH
LOCATION DETAILED: LEFT PROXIMAL CALF
LOCATION DETAILED: LEFT DISTAL POSTERIOR UPPER ARM

## 2024-10-04 ASSESSMENT — LOCATION ZONE DERM
LOCATION ZONE: LEG
LOCATION ZONE: FACE
LOCATION ZONE: VULVA
LOCATION ZONE: ARM
LOCATION ZONE: TRUNK
LOCATION ZONE: NECK

## 2025-02-24 ENCOUNTER — OFFICE VISIT (OUTPATIENT)
Dept: URGENT CARE | Facility: CLINIC | Age: 50
End: 2025-02-24
Payer: COMMERCIAL

## 2025-02-24 VITALS
SYSTOLIC BLOOD PRESSURE: 116 MMHG | WEIGHT: 125.4 LBS | BODY MASS INDEX: 20.89 KG/M2 | HEIGHT: 65 IN | TEMPERATURE: 98.1 F | HEART RATE: 78 BPM | RESPIRATION RATE: 12 BRPM | OXYGEN SATURATION: 96 % | DIASTOLIC BLOOD PRESSURE: 64 MMHG

## 2025-02-24 DIAGNOSIS — R22.0 RIGHT FACIAL SWELLING: ICD-10-CM

## 2025-02-24 DIAGNOSIS — K04.7 DENTAL ABSCESS: ICD-10-CM

## 2025-02-24 PROCEDURE — 3074F SYST BP LT 130 MM HG: CPT | Performed by: PHYSICIAN ASSISTANT

## 2025-02-24 PROCEDURE — 3078F DIAST BP <80 MM HG: CPT | Performed by: PHYSICIAN ASSISTANT

## 2025-02-24 PROCEDURE — 99213 OFFICE O/P EST LOW 20 MIN: CPT | Performed by: PHYSICIAN ASSISTANT

## 2025-02-24 RX ORDER — FLUCONAZOLE 150 MG/1
150 TABLET ORAL DAILY
Qty: 2 TABLET | Refills: 0 | Status: SHIPPED | OUTPATIENT
Start: 2025-02-24

## 2025-02-24 ASSESSMENT — FIBROSIS 4 INDEX: FIB4 SCORE: 1.3

## 2025-02-25 NOTE — PROGRESS NOTES
Subjective     Megan August is a 49 y.o. female who presents with Nasal Congestion (Severe maxillary sinus pain/swelling started symptoms for a couple of weeks and has lump on site after having tooth aches )    PMH:  has a past medical history of Abscess of breast, postpartum, Anemia, Anemia, Anxiety (2009), Anxiety, Anxiety disorder, Bowel habit changes, Cancer (Formerly [facility]) (), Dental disorder, Dry eyes (2009), Dry eyes, Dysautonomia (Formerly [facility]), Eosinophilic esophagitis, Eosinophilic esophagitis, Exophoria, Hemorrhagic disorder (Formerly [facility]), Hiatus hernia syndrome, Hypothyroid (2009), Melanoma (Formerly [facility]) (2011), Mild preeclampsia, Other specified symptom associated with female genital organs, Polycystic ovarian syndrome, Polycystic ovary disease (2009),  labor, Psychiatric disorder, Sjogren's syndrome (Formerly [facility]), and Small intestinal bacterial overgrowth.    She has no past medical history of CAD (coronary artery disease), COPD, Hypertension, or Liver disease.  MEDS:   Current Outpatient Medications:     amoxicillin-clavulanate (AUGMENTIN) 875-125 MG Tab, Take 1 Tablet by mouth 2 times a day., Disp: 20 Tablet, Rfl: 0    fluconazole (DIFLUCAN) 150 MG tablet, Take 1 Tablet by mouth every day., Disp: 2 Tablet, Rfl: 0    potassium chloride (KLOR-CON) 20 MEQ Pack, MIX CONTENTS OF 1 PACKET WITH WATER AND TAKE BY MOUTH EVERY DAY, Disp: 90 Packet, Rfl: 1    levothyroxine (SYNTHROID) 125 MCG Tab, Take 1 Tab by mouth Every morning on an empty stomach., Disp: 90 Tab, Rfl: 0    ergocalciferol (DRISDOL) 43640 UNIT capsule, Take 1 Cap by mouth every 28 days. Indications: vitamin d deficiency, Disp: 6 Cap, Rfl: 4    drospirenone-ethinyl estradiol (ASHLEY) 3-0.03 MG per tablet, Take 1 Tab by mouth every day., Disp: 84 Tab, Rfl: 3    venlafaxine (EFFEXOR-XR) 150 MG extended-release capsule, Take 1 Cap by mouth every day., Disp: 90 Cap, Rfl: 3    clotrimazole-betamethasone (LOTRISONE) 1-0.05 % Cream, Apply 1 Application  "topically 2 times a day as needed (to affected area)., Disp: 45 g, Rfl: 3    ondansetron (ZOFRAN ODT) 4 MG TABLET DISPERSIBLE, Take 1 Tab by mouth every 6 hours as needed for Nausea., Disp: 10 Tab, Rfl: 0    immune globulin 5% (5 g/100mL) in empty bag, by Intravenous route Once., Disp: , Rfl:   ALLERGIES:   Allergies   Allergen Reactions    Other Food Anaphylaxis     \"all foods\"= blisters/ anaphylaxis     SURGHX:   Past Surgical History:   Procedure Laterality Date    OTHER  10/31/2018    left open neck incision    NECK MASS EXCISION Left 10/31/2018    Procedure: NECK MASS EXCISION;  Surgeon: Inna Cadena M.D.;  Location: SURGERY SAME DAY [facility];  Service: Ent    OTHER  2018    Neck dissection-melanoma    OTHER  2018    neck excision melanoma    GASTROSCOPY WITH FEED TUBE PLACEMENT N/A 2016    Procedure: GASTROSCOPY WITH NASOENTERIC TUBE PLACEMENT W/FLUORO;  Surgeon: Gallito Rangel M.D.;  Location: SURGERY [facility];  Service:     COLONOSCOPY  2015    SETTLEMENT (INSURANCE)  2014    Performed by Olvin David M.D. at SURGERY [facility]    OTHER      eye ducts cauterized    OTHER  2012    Local excision melanoma    OTHER      left periauricular melanoma excision    INCISION AND DRAINAGE GENERAL  2009    left nipple    AR  DELIVERY ONLY      TONSILLECTOMY AND ADENOIDECTOMY  1992    ENDOSCOPY      multiple upper an lower procedures    OTHER  [id number]    several procedures for infertility      SOCHX:  reports that she has never smoked. She has never used smokeless tobacco. She reports that she does not drink alcohol and does not use drugs.  FH: Reviewed with patient, not pertinent to this visit.           Patient presents with:  Nasal Congestion: Severe maxillary sinus pain/swelling started symptoms for a couple of weeks and has lump on site after having tooth aches.  PT has been seen by ENT , had CT , no sinusitis seen on CT, pt thinks " "this may be dental in nature.          Review of Systems   HENT:  Positive for congestion and sinus pain.    All other systems reviewed and are negative.             Objective     /64 (BP Location: Left arm, Patient Position: Sitting)   Pulse 78   Temp 36.7 °C (98.1 °F) (Temporal)   Resp 12   Ht 1.651 m (5' 5\")   Wt 56.9 kg (125 lb 6.4 oz)   SpO2 96%   BMI 20.87 kg/m²      Physical Exam  Vitals and nursing note reviewed.   Constitutional:       General: She is not in acute distress.     Appearance: Normal appearance. She is well-developed. She is not ill-appearing or toxic-appearing.   HENT:      Head: Normocephalic and atraumatic.      Right Ear: Tympanic membrane normal.      Left Ear: Tympanic membrane normal.      Nose:      Right Sinus: Maxillary sinus tenderness present.      Left Sinus: Maxillary sinus tenderness present.      Mouth/Throat:      Lips: Pink.      Mouth: Mucous membranes are moist.      Pharynx: Oropharynx is clear. Uvula midline.     Eyes:      Extraocular Movements: Extraocular movements intact.      Conjunctiva/sclera: Conjunctivae normal.      Pupils: Pupils are equal, round, and reactive to light.   Cardiovascular:      Rate and Rhythm: Normal rate and regular rhythm.      Pulses: Normal pulses.      Heart sounds: Normal heart sounds.   Pulmonary:      Effort: Pulmonary effort is normal.      Breath sounds: Normal breath sounds.   Abdominal:      General: Bowel sounds are normal.      Palpations: Abdomen is soft.   Musculoskeletal:         General: Normal range of motion.      Cervical back: Normal range of motion and neck supple.   Skin:     General: Skin is warm and dry.      Capillary Refill: Capillary refill takes less than 2 seconds.   Neurological:      General: No focal deficit present.      Mental Status: She is alert and oriented to person, place, and time.      Cranial Nerves: No cranial nerve deficit.      Motor: Motor function is intact.      Coordination: " Coordination is intact.      Gait: Gait normal.   Psychiatric:         Mood and Affect: Mood normal.                                  Assessment & Plan  Dental abscess    Orders:    amoxicillin-clavulanate (AUGMENTIN) 875-125 MG Tab; Take 1 Tablet by mouth 2 times a day.    fluconazole (DIFLUCAN) 150 MG tablet; Take 1 Tablet by mouth every day.    Right facial swelling    Orders:    amoxicillin-clavulanate (AUGMENTIN) 875-125 MG Tab; Take 1 Tablet by mouth 2 times a day.    fluconazole (DIFLUCAN) 150 MG tablet; Take 1 Tablet by mouth every day.           PT HPI and PE are consistent with dental abscess of upper right molar.  I will treat with augmentin BID x 7 days.      Pt encouraged to keep appointment with Endodontist/oral surgeon that she has scheduled.      Differential diagnosis, supportive care, and indications for immediate follow-up discussed with patient.  Instructed to return to clinic or nearest emergency department for any change in condition, further concerns, or worsening of symptoms.    I personally reviewed prior external notes and test results pertinent to today's visit.  I have independently reviewed and interpreted all diagnostics ordered during this urgent care visit.    PT should follow up with PCP in 1-2 days for re-evaluation if symptoms have not improved.      Discussed red flags and reasons to return to UC or ED.      Pt and/or family verbalized understanding of diagnosis and follow up instructions and was offered informational handout on diagnosis.  PT discharged.     Please note that this dictation was created using voice recognition software. I have made every reasonable attempt to correct obvious errors, but I expect that there may be errors of grammar and possibly content that I did not discover before finalizing the note.

## 2025-02-28 ASSESSMENT — ENCOUNTER SYMPTOMS: SINUS PAIN: 1

## 2025-04-01 ENCOUNTER — HOSPITAL ENCOUNTER (EMERGENCY)
Facility: MEDICAL CENTER | Age: 50
End: 2025-04-01
Attending: EMERGENCY MEDICINE
Payer: COMMERCIAL

## 2025-04-01 VITALS
TEMPERATURE: 97.8 F | OXYGEN SATURATION: 96 % | RESPIRATION RATE: 14 BRPM | WEIGHT: 125 LBS | HEART RATE: 62 BPM | BODY MASS INDEX: 20.83 KG/M2 | DIASTOLIC BLOOD PRESSURE: 71 MMHG | HEIGHT: 65 IN | SYSTOLIC BLOOD PRESSURE: 103 MMHG

## 2025-04-01 DIAGNOSIS — S61.212A LACERATION OF RIGHT MIDDLE FINGER WITHOUT DAMAGE TO NAIL, FOREIGN BODY PRESENCE UNSPECIFIED, INITIAL ENCOUNTER: ICD-10-CM

## 2025-04-01 PROCEDURE — A9270 NON-COVERED ITEM OR SERVICE: HCPCS | Performed by: EMERGENCY MEDICINE

## 2025-04-01 PROCEDURE — 97597 DBRDMT OPN WND 1ST 20 CM/<: CPT

## 2025-04-01 PROCEDURE — 99283 EMERGENCY DEPT VISIT LOW MDM: CPT

## 2025-04-01 PROCEDURE — 700102 HCHG RX REV CODE 250 W/ 637 OVERRIDE(OP): Performed by: EMERGENCY MEDICINE

## 2025-04-01 RX ORDER — CEPHALEXIN 500 MG/1
500 CAPSULE ORAL 4 TIMES DAILY
Qty: 20 CAPSULE | Refills: 0 | Status: ACTIVE | OUTPATIENT
Start: 2025-04-01 | End: 2025-04-06

## 2025-04-01 RX ADMIN — CEPHALEXIN 500 MG: 250 CAPSULE ORAL at 20:29

## 2025-04-01 ASSESSMENT — FIBROSIS 4 INDEX: FIB4 SCORE: 1.3

## 2025-04-02 NOTE — ED NOTES
Patient given discharge instructions, verbalized understanding. Rx given for keflex. Patient instructed to follow up with PCP. Education provided to come to ER if symptoms worsen. Discharged in stable condition, able to walk out with steady gait.

## 2025-04-02 NOTE — ED PROVIDER NOTES
ER Provider Note    Scribed for Ruiz Yeboah M.D. by Sebastian Costello. 4/1/2025  8:20 PM    Primary Care Provider: Pcp Pt States None    CHIEF COMPLAINT   Chief Complaint   Patient presents with    Laceration     Happen 15 mins PTA, pt has changing an over head light and went to clean a spidar web and somehow cut her right middle finger. Bleeding controled in triage with a pressure dressing.      EXTERNAL RECORDS REVIEWED  Outpatient Notes seen in urgent care on February 24 for a dental abscess.    HPI/ROS  LIMITATION TO HISTORY   Select: : None  OUTSIDE HISTORIAN(S):  None    Megan August is a 49 y.o. female who presents to the ED for evaluation of a finger laceration, onset prior to arrival. The patient reports she was changing overhead lights in her garage and when she went to clean a spider web, she lacerated her right middle finger. She notes bleeding is controlled at this time with pressure dressing applied in triage and she is not in any pain. She notes she is right hand dominant. She reports a  history of autoimmune disease and states she is part of the [facility]. She receives Dupixent injections once per week. She denies any known medication allergies.     PAST MEDICAL HISTORY  Past Medical History:   Diagnosis Date    Abscess of breast, postpartum     Anemia     Anemia     Anxiety 6/20/2009    Anxiety     Anxiety disorder     Bowel habit changes     paralyzed colon    Cancer (HCC) 2010    melanoma, left side neck    Dental disorder     lack of saliva    Dry eyes 6/20/2009 5/7/2014 right eye scleral redness    Dry eyes     extreme    Dysautonomia (HCC)     Eosinophilic esophagitis     Eosinophilic esophagitis     Exophoria     Hemorrhagic disorder (HCC)     anemia    Hiatus hernia syndrome     Hypothyroid 6/20/2009    Melanoma (HCC) 4/4/2011    melanoma - Amenanoic left neck jaw    Mild preeclampsia     Other specified symptom associated with female genital organs     PCOS     Polycystic ovarian syndrome     Polycystic ovary disease 2009     labor     Psychiatric disorder     PTSD    Sjogren's syndrome (HCC)     Small intestinal bacterial overgrowth        SURGICAL HISTORY  Past Surgical History:   Procedure Laterality Date    OTHER  10/31/2018    left open neck incision    NECK MASS EXCISION Left 10/31/2018    Procedure: NECK MASS EXCISION;  Surgeon: Inna Cadena M.D.;  Location: SURGERY SAME DAY [facility];  Service: Ent    OTHER  2018    Neck dissection-melanoma    OTHER  2018    neck excision melanoma    GASTROSCOPY WITH FEED TUBE PLACEMENT N/A 2016    Procedure: GASTROSCOPY WITH NASOENTERIC TUBE PLACEMENT W/FLUORO;  Surgeon: Gallito Rangel M.D.;  Location: SURGERY [facility];  Service:     COLONOSCOPY  2015    SETTLEMENT (INSURANCE)  2014    Performed by Olvin David M.D. at SURGERY [facility]    OTHER      eye ducts cauterized    OTHER  2012    Local excision melanoma    OTHER      left periauricular melanoma excision    INCISION AND DRAINAGE  2009    left nipple    [place]  DELIVERY ONLY      TONSILLECTOMY AND ADENOIDECTOMY  1992    ENDOSCOPY      multiple upper an lower procedures    OTHER  [id number]    several procedures for infertility        FAMILY HISTORY  Family History   Problem Relation Age of Onset    Cancer Father     No Known Problems Sister         in [place]     Cancer Maternal Grandmother        SOCIAL HISTORY   reports that she has never smoked. She has never used smokeless tobacco. She reports that she does not drink alcohol and does not use drugs.    CURRENT MEDICATIONS  Current Discharge Medication List        CONTINUE these medications which have NOT CHANGED    Details   amoxicillin-clavulanate (AUGMENTIN) 875-125 MG Tab Take 1 Tablet by mouth 2 times a day.  Qty: 20 Tablet, Refills: 0    Associated Diagnoses: Dental abscess; Right facial swelling      fluconazole (DIFLUCAN) 150 MG  "tablet Take 1 Tablet by mouth every day.  Qty: 2 Tablet, Refills: 0    Associated Diagnoses: Dental abscess; Right facial swelling      potassium chloride (KLOR-CON) 20 MEQ Pack MIX CONTENTS OF 1 PACKET WITH WATER AND TAKE BY MOUTH EVERY DAY  Qty: 90 Packet, Refills: 1    Associated Diagnoses: Low blood potassium      levothyroxine (SYNTHROID) 125 MCG Tab Take 1 Tab by mouth Every morning on an empty stomach.  Qty: 90 Tab, Refills: 0    Comments: Dose change      ergocalciferol (DRISDOL) 64291 UNIT capsule Take 1 Cap by mouth every 28 days. Indications: vitamin d deficiency  Qty: 6 Cap, Refills: 4    Associated Diagnoses: Hypovitaminosis D      drospirenone-ethinyl estradiol (ASHLEY) 3-0.03 MG per tablet Take 1 Tab by mouth every day.  Qty: 84 Tab, Refills: 3      venlafaxine (EFFEXOR-XR) 150 MG extended-release capsule Take 1 Cap by mouth every day.  Qty: 90 Cap, Refills: 3    Comments: Dose changes      clotrimazole-betamethasone (LOTRISONE) 1-0.05 % Cream Apply 1 Application topically 2 times a day as needed (to affected area).  Qty: 45 g, Refills: 3      ondansetron (ZOFRAN ODT) 4 MG TABLET DISPERSIBLE Take 1 Tab by mouth every 6 hours as needed for Nausea.  Qty: 10 Tab, Refills: 0      immune globulin 5% (5 g/100mL) in empty bag by Intravenous route Once.             ALLERGIES  Other food    PHYSICAL EXAM  /72   Pulse 64   Temp 36.6 °C (97.8 °F) (Temporal)   Resp 16   Ht 1.651 m (5' 5\")   Wt 56.7 kg (125 lb)   SpO2 95%   BMI 20.80 kg/m²   Constitutional: Well developed, Well nourished, Mild distress.   HENT: Normocephalic, Atraumatic.  Eyes: Conjunctiva normal, No discharge.   Skin: Warm, Dry, No erythema, No rash.   Extremities: Right middle finger lateral aspect distal to the DIP joint has a triangular devitalized flap.   Neurologic: Alert & oriented x 3, Normal motor function,  No focal deficits noted.   Psychiatric: Affect normal, Judgment normal, Mood normal.           PROCEDURES     Removal " of devitalized tissue.  Patient's skin was prepped with Betadine.  Using iris scissors the devitalized tissue was cut away.  This was then bandaged.      COURSE & MEDICAL DECISION MAKING     ASSESSMENT, COURSE AND PLAN  Care Narrative:     ED OBS: No; Patient does not meet criteria for ED Observation.     8:20 PM - Patient seen and examined at bedside. Discussed plan of care, including laceration repair. I discussed with the patient the plan for discharge and to follow up with her doctor in 1 week for a recheck. I informed the patient I am prescribing her antibiotics and to take the full course as directed. I discussed with the patient she will receive her first dose here in the ED. I discussed wound care precautions with the patient and advised them to return to the ED for any new or worsening symptoms. Patient was given the opportunity for questions. I addressed all questions or concerns at this time and they verbalize agreement to the plan of care.       PROBLEM LIST  Presents with a laceration to the lateral aspect of the finger.  Unfortunately the tissue was devitalized and not able to be sutured.  I therefore removed it and will allow the wound to close by secondary intention.  Because the patient is immune suppressed we will start her on Keflex to prevent infection.  DISPOSITION AND DISCUSSIONS  I have discussed management of the patient with the following physicians and JONATHON's:  None    Discussion of management with other QHP or appropriate source(s): None       Barriers to care at this time, including but not limited to: Patient does not have established PCP.     Decision tools and prescription drugs considered including, but not limited to: Antibiotics Keflex .    The patient will return for new or worsening symptoms and is stable at the time of discharge.    The patient is referred to a primary physician for blood pressure management, diabetic screening, and for all other preventative health  concerns.      DISPOSITION:  Patient will be discharged home in stable condition.    FOLLOW UP:  Your doctor    Schedule an appointment as soon as possible for a visit in 1 week  For wound re-check      OUTPATIENT MEDICATIONS:  Current Discharge Medication List        START taking these medications    Details   cephALEXin (KEFLEX) 500 MG Cap Take 1 Capsule by mouth 4 times a day for 5 days.  Qty: 20 Capsule, Refills: 0    Associated Diagnoses: Laceration of right middle finger without damage to nail, foreign body presence unspecified, initial encounter             FINAL DIAGNOSIS  1. Laceration of right middle finger without damage to nail, foreign body presence unspecified, initial encounter        Sebastian ABEBE (Mildred), am scribing for, and in the presence of, Ruiz Yeboah M.D.    Electronically signed by: Sebastian Henderson), 4/1/2025    Ruiz ABEBE M.D. personally performed the services described in this documentation, as scribed by Sebastina Costello in my presence, and it is both accurate and complete.       The note accurately reflects work and decisions made by me.  Ruiz Yeboah M.D.  4/1/2025  11:25 PM

## 2025-04-02 NOTE — ED TRIAGE NOTES
"Chief Complaint   Patient presents with    Laceration     Happen 15 mins PTA, pt has changing an over head light and went to clean a spidar web and somehow cut her right middle finger. Bleeding controled in triage with a pressure dressing.      /72   Pulse 64   Temp 36.6 °C (97.8 °F) (Temporal)   Resp 16   Ht 1.651 m (5' 5\")   Wt 56.7 kg (125 lb)   SpO2 95%   BMI 20.80 kg/m²     Pt is unsure about last TDAP shot  "

## 2025-08-13 ENCOUNTER — OFFICE VISIT (OUTPATIENT)
Dept: URGENT CARE | Facility: CLINIC | Age: 50
End: 2025-08-13
Payer: COMMERCIAL

## 2025-08-13 VITALS
BODY MASS INDEX: 20.83 KG/M2 | HEIGHT: 65 IN | TEMPERATURE: 96.9 F | HEART RATE: 78 BPM | RESPIRATION RATE: 16 BRPM | DIASTOLIC BLOOD PRESSURE: 68 MMHG | WEIGHT: 125 LBS | SYSTOLIC BLOOD PRESSURE: 104 MMHG | OXYGEN SATURATION: 99 %

## 2025-08-13 DIAGNOSIS — R49.0 VOICE HOARSENESS: Primary | ICD-10-CM

## 2025-08-13 DIAGNOSIS — J39.2 THROAT IRRITATION: ICD-10-CM

## 2025-08-13 PROCEDURE — 3078F DIAST BP <80 MM HG: CPT | Performed by: NURSE PRACTITIONER

## 2025-08-13 PROCEDURE — 99213 OFFICE O/P EST LOW 20 MIN: CPT | Performed by: NURSE PRACTITIONER

## 2025-08-13 PROCEDURE — 3074F SYST BP LT 130 MM HG: CPT | Performed by: NURSE PRACTITIONER

## 2025-08-13 RX ORDER — CYCLOSPORINE OPHTHALMIC SOLUTION 1 MG/ML
1 SOLUTION/ DROPS OPHTHALMIC
COMMUNITY

## 2025-08-13 RX ORDER — DUPILUMAB 300 MG/2ML
INJECTION, SOLUTION SUBCUTANEOUS
COMMUNITY
Start: 2023-01-01

## 2025-08-13 RX ORDER — ESTRADIOL 0.1 MG/D
FILM, EXTENDED RELEASE TRANSDERMAL
COMMUNITY

## 2025-08-13 RX ORDER — LEVONORGESTREL 52 MG/1
INTRAUTERINE DEVICE INTRAUTERINE
COMMUNITY

## 2025-08-13 ASSESSMENT — ENCOUNTER SYMPTOMS: COUGH: 0

## 2025-08-13 ASSESSMENT — FIBROSIS 4 INDEX: FIB4 SCORE: 1.915080865713566212

## (undated) DEVICE — TRAY SRGPRP PVP IOD WT PRP - (20/CA)

## (undated) DEVICE — MASK AIRWAY SIZE 3 UNIQUE SILICON (10/BX)

## (undated) DEVICE — SET LEADWIRE 5 LEAD BEDSIDE DISPOSABLE ECG (1SET OF 5/EA)

## (undated) DEVICE — STERI STRIP COMPOUND BENZOIN - TINCTURE 0.6ML WITH APPLICATOR (40EA/BX)

## (undated) DEVICE — KIT  I.V. START (100EA/CA)

## (undated) DEVICE — GOWN WARMING STANDARD FLEX - (30/CA)

## (undated) DEVICE — SLEEVE, VASO, THIGH, MED

## (undated) DEVICE — PACK MINOR BASIN - (2EA/CA)

## (undated) DEVICE — MASK ANESTHESIA ADULT  - (100/CA)

## (undated) DEVICE — CANISTER SUCTION 3000ML MECHANICAL FILTER AUTO SHUTOFF MEDI-VAC NONSTERILE LF DISP  (40EA/CA)

## (undated) DEVICE — TUBE CONNECTING SUCTION - CLEAR PLASTIC STERILE 72 IN (50EA/CA)

## (undated) DEVICE — SENSOR SPO2 NEO LNCS ADHESIVE (20/BX) SEE USER NOTES

## (undated) DEVICE — CLOSURE SKIN STRIP 1/2 X 4 IN - (STERI STRIP) (50/BX 4BX/CA)

## (undated) DEVICE — DRAPE LAPAROTOMY T SHEET - (12EA/CA)

## (undated) DEVICE — NEPTUNE 4 PORT MANIFOLD - (20/PK)

## (undated) DEVICE — LACTATED RINGERS INJ 1000 ML - (14EA/CA 60CA/PF)

## (undated) DEVICE — SODIUM CHL IRRIGATION 0.9% 1000ML (12EA/CA)

## (undated) DEVICE — ELECTRODE 850 FOAM ADHESIVE - HYDROGEL RADIOTRNSPRNT (50/PK)

## (undated) DEVICE — SPONGE PEANUT - (5/PK 50PK/CA)

## (undated) DEVICE — TUBING CLEARLINK DUO-VENT - C-FLO (48EA/CA)

## (undated) DEVICE — DRAPE LARGE 3 QUARTER - (20/CA)

## (undated) DEVICE — HEAD HOLDER JUNIOR/ADULT

## (undated) DEVICE — KIT ANESTHESIA W/CIRCUIT & 3/LT BAG W/FILTER (20EA/CA)

## (undated) DEVICE — DRAPE STRLE REG TOWEL 18X24 - (10/BX 4BX/CA)"

## (undated) DEVICE — CATHETER IV 20 GA X 1-1/4 ---SURG.& SDS ONLY--- (50EA/BX)

## (undated) DEVICE — ELECTRODE DUAL RETURN W/ CORD - (50/PK)

## (undated) DEVICE — DRAPE SURGICAL U 77X120 - (10/CA)

## (undated) DEVICE — CANISTER SUCTION RIGID RED 1500CC (40EA/CA)

## (undated) DEVICE — SUTURE 4-0 VICRYL PLUS FS-2 - 27 INCH (36/BX)

## (undated) DEVICE — CORDS BIPOLAR COAGULATION - 12FT STERILE DISP. (10EA/BX)

## (undated) DEVICE — GLOVE BIOGEL SZ 7 SURGICAL PF LTX - (50PR/BX 4BX/CA)

## (undated) DEVICE — SUCTION INSTRUMENT YANKAUER BULBOUS TIP W/O VENT (50EA/CA)

## (undated) DEVICE — SUTURE GENERAL

## (undated) DEVICE — PROTECTOR ULNA NERVE - (36PR/CA)

## (undated) DEVICE — SUTURE 5-0 PLAIN GUT PC-1 - (12/BX)

## (undated) DEVICE — WATER IRRIGATION STERILE 1000ML (12EA/CA)